# Patient Record
Sex: MALE | Race: WHITE | Employment: OTHER | URBAN - METROPOLITAN AREA
[De-identification: names, ages, dates, MRNs, and addresses within clinical notes are randomized per-mention and may not be internally consistent; named-entity substitution may affect disease eponyms.]

---

## 2017-02-13 ENCOUNTER — TELEPHONE (OUTPATIENT)
Dept: FAMILY MEDICINE CLINIC | Age: 82
End: 2017-02-13

## 2017-02-13 ENCOUNTER — OFFICE VISIT (OUTPATIENT)
Dept: FAMILY MEDICINE CLINIC | Age: 82
End: 2017-02-13

## 2017-02-13 VITALS
BODY MASS INDEX: 29.28 KG/M2 | SYSTOLIC BLOOD PRESSURE: 137 MMHG | OXYGEN SATURATION: 90 % | TEMPERATURE: 98.2 F | DIASTOLIC BLOOD PRESSURE: 68 MMHG | WEIGHT: 193.2 LBS | HEIGHT: 68 IN | RESPIRATION RATE: 12 BRPM | HEART RATE: 65 BPM

## 2017-02-13 DIAGNOSIS — J18.9 PNEUMONIA OF RIGHT LOWER LOBE DUE TO INFECTIOUS ORGANISM: Primary | ICD-10-CM

## 2017-02-13 RX ORDER — ALBUTEROL SULFATE 90 UG/1
1-2 AEROSOL, METERED RESPIRATORY (INHALATION)
Qty: 1 INHALER | Refills: 5 | Status: SHIPPED | OUTPATIENT
Start: 2017-02-13 | End: 2019-10-29 | Stop reason: SDUPTHER

## 2017-02-13 RX ORDER — CEFTRIAXONE 1 G/1
1 INJECTION, POWDER, FOR SOLUTION INTRAMUSCULAR; INTRAVENOUS ONCE
Qty: 1 VIAL | Refills: 0
Start: 2017-02-13 | End: 2017-02-13

## 2017-02-13 RX ORDER — PREDNISONE 20 MG/1
40 TABLET ORAL DAILY
Qty: 10 TAB | Refills: 0 | Status: SHIPPED | OUTPATIENT
Start: 2017-02-13 | End: 2017-02-18

## 2017-02-13 RX ORDER — IPRATROPIUM BROMIDE AND ALBUTEROL SULFATE 2.5; .5 MG/3ML; MG/3ML
3 SOLUTION RESPIRATORY (INHALATION)
Qty: 1 NEBULE | Refills: 0
Start: 2017-02-13 | End: 2017-02-13

## 2017-02-13 RX ORDER — DOXYCYCLINE 100 MG/1
100 TABLET ORAL 2 TIMES DAILY
Qty: 20 TAB | Refills: 0 | Status: SHIPPED | OUTPATIENT
Start: 2017-02-13 | End: 2017-02-23

## 2017-02-13 RX ORDER — CARVEDILOL 3.12 MG/1
TABLET ORAL
Refills: 3 | COMMUNITY
Start: 2016-12-06 | End: 2017-05-03 | Stop reason: ALTCHOICE

## 2017-02-13 NOTE — PROGRESS NOTES
HISTORY OF PRESENT ILLNESS  Juan Antonio Castro is a 80 y.o. male. HPI  Upper Respiratory Infection  Patient complains of symptoms of a URI. Symptoms include congestion and cough. Onset of symptoms was 4 days ago, unchanged since that time. He also c/o congestion, non productive cough, post nasal drip, shortness of breath, sore throat and wheezing for the past 4 days . He is drinking plenty of fluids. Evaluation to date: none. Treatment to date: Delsym and Sudafed. Review of Systems   Constitutional: Positive for malaise/fatigue. Negative for chills and fever. HENT: Positive for congestion and sore throat. Negative for ear pain. Respiratory: Positive for cough and sputum production. Negative for shortness of breath and wheezing. Neurological: Negative for headaches. Physical Exam   Constitutional: He is oriented to person, place, and time. He appears well-developed and well-nourished. No distress. HENT:   Right Ear: Tympanic membrane and ear canal normal.   Left Ear: Tympanic membrane and ear canal normal.   Nose: Mucosal edema present. Right sinus exhibits no maxillary sinus tenderness and no frontal sinus tenderness. Left sinus exhibits no maxillary sinus tenderness and no frontal sinus tenderness. Mouth/Throat: Oropharynx is clear and moist.   Cardiovascular: Normal rate, regular rhythm and normal heart sounds. No murmur heard. Pulmonary/Chest: Effort normal. He has decreased breath sounds. He has no wheezes. He has no rhonchi. Lymphadenopathy:     He has no cervical adenopathy. Neurological: He is alert and oriented to person, place, and time. Psychiatric: He has a normal mood and affect. His behavior is normal.   Nursing note and vitals reviewed. XR Results (most recent):    Results from Appointment encounter on 02/13/17   XR CHEST PA LAT   Narrative Exam:  2 view chest    Indication: Bronchitis, hypertension.     COMPARISON: 5/13/2016    PA and lateral views demonstrate normal heart size. The lungs demonstrate new  opacities posteriorly and medially in the right lower lobe suggesting pneumonia. Follow-up to resolution is suggested. Also linear areas of scarring or  atelectasis right lung base. Left lung is clear. No adenopathy or pleural effusions. Impression IMPRESSION:  1. Findings are consistent mild right lower lobe pneumonia. Follow-up to  resolution is suggested. 23X            ASSESSMENT and PLAN  Greg Coelho was seen today for sore throat and cough. Diagnoses and all orders for this visit:    Pneumonia of right lower lobe due to infectious organism  As noted on CXR. SpO2 increased to 94% following nebulizer treatment. Rocefin 1 g today. Will cover with Doxycycline and Prednisone Albuterol HFA PRN. RTC in 1 week or sooner. -     XR CHEST PA LAT; Future  -     albuterol-ipratropium (DUO-NEB) 2.5 mg-0.5 mg/3 ml nebu; 3 mL by Nebulization route now for 1 dose. -     predniSONE (DELTASONE) 20 mg tablet; Take 2 Tabs by mouth daily for 5 days. -     doxycycline (ADOXA) 100 mg tablet; Take 1 Tab by mouth two (2) times a day for 10 days. -     albuterol (PROVENTIL HFA, VENTOLIN HFA, PROAIR HFA) 90 mcg/actuation inhaler; Take 1-2 Puffs by inhalation every four (4) hours as needed for Wheezing.  -     cefTRIAXone (ROCEPHIN) 1 gram injection; 1 g by IntraMUSCular route once for 1 dose. I have discussed the diagnosis with the patient and the intended plan as seen in the above orders. The patient has received an after-visit summary and questions were answered concerning future plans. I have discussed medication side effects and warnings with the patient as well. Follow-up Disposition:  Return in about 1 week (around 2/20/2017) for pneumonia.

## 2017-02-13 NOTE — MR AVS SNAPSHOT
Visit Information Date & Time Provider Department Dept. Phone Encounter #  
 2/13/2017 10:15 AM Rosamaria Hopson  [facility] [address] [phone] [id number] Follow-up Instructions Return in about 1 week (around 2/20/2017) for pneumonia. Your Appointments 5/3/2017  4:00 PM  
ROUTINE CARE with Rui Yi MD  
[facility]) Appt Note: 6 months follow up visit [address] [id number]  
[phone]  
  
   
 [address] [id number] Upcoming Health Maintenance Date Due ZOSTER VACCINE AGE 60> 12/12/1997 Pneumococcal 65+ Low/Medium Risk (2 of 2 - PPSV23) 11/1/2015 GLAUCOMA SCREENING Q2Y 9/15/2017 MEDICARE YEARLY EXAM 11/3/2017 COLONOSCOPY 3/18/2018 DTaP/Tdap/Td series (2 - Td) 2/14/2019 Allergies as of 2/13/2017  Review Complete On: 2/13/2017 By: Rosamaria Hopson NP No Known Allergies Current Immunizations  Reviewed on 6/22/2016 Name Date DTAP Vaccine 2/14/2009 Influenza High Dose Vaccine PF 12/17/2013 Pneumococcal Vaccine (Unspecified Type) 11/1/2010 Not reviewed this visit You Were Diagnosed With   
  
 Codes Comments Pneumonia of right lower lobe due to infectious organism    -  Primary ICD-10-CM: J18.9 ICD-9-CM: 483.8 Vitals BP Pulse Temp Resp Height(growth percentile) Weight(growth percentile)  
 137/68 (BP 1 Location: Right arm, BP Patient Position: Sitting) 65 98.2 °F (36.8 °C) (Oral) 12 5' 8\" (1.727 m) 193 lb 3.2 oz (87.6 kg) SpO2 BMI Smoking Status 90% 29.38 kg/m2 Never Smoker Vitals History BMI and BSA Data Body Mass Index Body Surface Area  
 29.38 kg/m 2 2.05 m 2 Preferred Pharmacy Pharmacy Name Phone [address], [address] [facility] [place] [address] [phone] Your Updated Medication List  
  
   
 This list is accurate as of: 2/13/17 11:50 AM.  Always use your most recent med list.  
  
  
  
  
 albuterol 90 mcg/actuation inhaler Commonly known as:  PROVENTIL HFA, VENTOLIN HFA, PROAIR HFA Take 1-2 Puffs by inhalation every four (4) hours as needed for Wheezing. albuterol-ipratropium 2.5 mg-0.5 mg/3 ml Nebu Commonly known as:  DUO-NEB  
3 mL by Nebulization route now for 1 dose. amLODIPine 5 mg tablet Commonly known as:  Aba Chafe Take 1 Tab by mouth daily. carvedilol 3.125 mg tablet Commonly known as:  COREG  
TK 1 T PO  BID WITH FOOD  
  
 cefTRIAXone 1 gram injection Commonly known as:  ROCEPHIN  
1 g by IntraMUSCular route once for 1 dose. doxycycline 100 mg tablet Commonly known as:  ADOXA Take 1 Tab by mouth two (2) times a day for 10 days. esomeprazole 40 mg capsule Commonly known as:  NEXIUM  
TAKE 1 CAPSULE BY MOUTH DAILY  
  
 glucosamine 1,000 mg Tab Take 1 Tab by mouth daily. M-VIT PO Take 1 Tab by mouth daily. predniSONE 20 mg tablet Commonly known as:  Brionna Mons Take 2 Tabs by mouth daily for 5 days. VITAMIN B-12 1,000 mcg tablet Generic drug:  cyanocobalamin Take 1,000 mcg by mouth daily. Prescriptions Sent to Pharmacy Refills  
 predniSONE (DELTASONE) 20 mg tablet 0 Sig: Take 2 Tabs by mouth daily for 5 days. Class: Normal  
 Pharmacy: [address] [place] [address] Ph #: [phone] Route: Oral  
 doxycycline (ADOXA) 100 mg tablet 0 Sig: Take 1 Tab by mouth two (2) times a day for 10 days. Class: Normal  
 [address] Ph #: [phone] Route: Oral  
 albuterol (PROVENTIL HFA, VENTOLIN HFA, PROAIR HFA) 90 mcg/actuation inhaler 5 Sig: Take 1-2 Puffs by inhalation every four (4) hours as needed for Wheezing.   
 Class: Normal  
 Pharmacy: [facility] [address], [facility] [place] [address] Ph #: [phone] Route: Inhalation We Performed the Following ALBUTEROL IPRATROP NON-COMP [id number] HCP] CEFTRIAXONE SODIUM INJECTION  MG [ [facility]] Comments:  
 Mix per protocol MN INHAL RX, AIRWAY OBST/DX SPUTUM INDUCT [id number] CPT(R)] MN THER/PROPH/DIAG INJECTION, SUBCUT/IM [id number] CPT(R)] Follow-up Instructions Return in about 1 week (around 2/20/2017) for pneumonia. To-Do List   
 Around 02/13/2017 Imaging:  XR CHEST PA LAT Patient Instructions Pneumonia: Care Instructions Your Care Instructions Pneumonia is an infection of the lungs. Most cases are caused by infections from bacteria or viruses. Pneumonia may be mild or very severe. If it is caused by bacteria, you will be treated with antibiotics. It may take a few weeks to a few months to recover fully from pneumonia, depending on how sick you were and whether your overall health is good. Follow-up care is a key part of your treatment and safety. Be sure to make and go to all appointments, and call your doctor if you are having problems. Its also a good idea to know your test results and keep a list of the medicines you take. How can you care for yourself at home? · Take your antibiotics exactly as directed. Do not stop taking the medicine just because you are feeling better. You need to take the full course of antibiotics. · Take your medicines exactly as prescribed. Call your doctor if you think you are having a problem with your medicine. · Get plenty of rest and sleep. You may feel weak and tired for a while, but your energy level will improve with time. · To prevent dehydration, drink plenty of fluids, enough so that your urine is light yellow or clear like water. Choose water and other caffeine-free clear liquids until you feel better.  If you have kidney, heart, or liver disease and have to limit fluids, talk with your doctor before you increase the amount of fluids you drink. · Take care of your cough so you can rest. A cough that brings up mucus from your lungs is common with pneumonia. It is one way your body gets rid of the infection. But if coughing keeps you from resting or causes severe fatigue and chest-wall pain, talk to your doctor. He or she may suggest that you take a medicine to reduce the cough. · Use a vaporizer or humidifier to add moisture to your bedroom. Follow the directions for cleaning the machine. · Do not smoke or allow others to smoke around you. Smoke will make your cough last longer. If you need help quitting, talk to your doctor about stop-smoking programs and medicines. These can increase your chances of quitting for good. · Take an over-the-counter pain medicine, such as acetaminophen (Tylenol), ibuprofen (Advil, Motrin), or naproxen (Aleve). Read and follow all instructions on the label. · Do not take two or more pain medicines at the same time unless the doctor told you to. Many pain medicines have acetaminophen, which is Tylenol. Too much acetaminophen (Tylenol) can be harmful. · If you were given a spirometer to measure how well your lungs are working, use it as instructed. This can help your doctor tell how your recovery is going. · To prevent pneumonia in the future, talk to your doctor about getting a flu vaccine (once a year) and a pneumococcal vaccine (one time only for most people). When should you call for help? Call 911 anytime you think you may need emergency care. For example, call if: 
· You have severe trouble breathing. Call your doctor now or seek immediate medical care if: 
· You cough up dark brown or bloody mucus (sputum). · You have new or worse trouble breathing. · You are dizzy or lightheaded, or you feel like you may faint.  
Watch closely for changes in your health, and be sure to contact your doctor if: 
· You have a new or higher fever. · You are coughing more deeply or more often. · You are not getting better after 2 days (48 hours). · You do not get better as expected. Where can you learn more? Go to http://marques-mei.info/. Enter [id number] in the search box to learn more about \"Pneumonia: Care Instructions. \" Current as of: May 23, 2016 Content Version: 11.1 © 9503-2218 [employer]. Care instructions adapted under license by [employer] (which disclaims liability or warranty for this information). If you have questions about a medical condition or this instruction, always ask your healthcare professional. [address] any warranty or liability for your use of this information. Introducing [address]! Dear Lorelei Garcia: Thank you for requesting a [employer] account. Our records indicate that you already have an active [employer] account. You can access your account anytime at [email or web address] Did you know that you can access your hospital and ER discharge instructions at any time in Pago[employer]il? You can also review all of your test results from your hospital stay or ER visit. Additional Information If you have questions, please visit the Frequently Asked Questions section of the [employer] website at [email or web address] Remember, P[employer]il is NOT to be used for urgent needs. For medical emergencies, dial 911. Now available from your iPhone and Android! Please provide this summary of care documentation to your next provider. Your primary care clinician is listed as Sherie Smith. If you have any questions after today's visit, please call [phone].

## 2017-02-13 NOTE — TELEPHONE ENCOUNTER
Spoke with Pt's daughter Liliana Nash today who reported that pt had some chills and shivers, so I called pt back to check on him, and he did confirm that he had the above symptoms after he left the office, but rested and feels fine now. He agreed to call office back if he starts having these symptoms again. Pt's daughter notified, so apologized for calling our office though.

## 2017-02-13 NOTE — PATIENT INSTRUCTIONS
Pneumonia: Care Instructions  Your Care Instructions    Pneumonia is an infection of the lungs. Most cases are caused by infections from bacteria or viruses. Pneumonia may be mild or very severe. If it is caused by bacteria, you will be treated with antibiotics. It may take a few weeks to a few months to recover fully from pneumonia, depending on how sick you were and whether your overall health is good. Follow-up care is a key part of your treatment and safety. Be sure to make and go to all appointments, and call your doctor if you are having problems. Its also a good idea to know your test results and keep a list of the medicines you take. How can you care for yourself at home? · Take your antibiotics exactly as directed. Do not stop taking the medicine just because you are feeling better. You need to take the full course of antibiotics. · Take your medicines exactly as prescribed. Call your doctor if you think you are having a problem with your medicine. · Get plenty of rest and sleep. You may feel weak and tired for a while, but your energy level will improve with time. · To prevent dehydration, drink plenty of fluids, enough so that your urine is light yellow or clear like water. Choose water and other caffeine-free clear liquids until you feel better. If you have kidney, heart, or liver disease and have to limit fluids, talk with your doctor before you increase the amount of fluids you drink. · Take care of your cough so you can rest. A cough that brings up mucus from your lungs is common with pneumonia. It is one way your body gets rid of the infection. But if coughing keeps you from resting or causes severe fatigue and chest-wall pain, talk to your doctor. He or she may suggest that you take a medicine to reduce the cough. · Use a vaporizer or humidifier to add moisture to your bedroom. Follow the directions for cleaning the machine. · Do not smoke or allow others to smoke around you.  Smoke will make your cough last longer. If you need help quitting, talk to your doctor about stop-smoking programs and medicines. These can increase your chances of quitting for good. · Take an over-the-counter pain medicine, such as acetaminophen (Tylenol), ibuprofen (Advil, Motrin), or naproxen (Aleve). Read and follow all instructions on the label. · Do not take two or more pain medicines at the same time unless the doctor told you to. Many pain medicines have acetaminophen, which is Tylenol. Too much acetaminophen (Tylenol) can be harmful. · If you were given a spirometer to measure how well your lungs are working, use it as instructed. This can help your doctor tell how your recovery is going. · To prevent pneumonia in the future, talk to your doctor about getting a flu vaccine (once a year) and a pneumococcal vaccine (one time only for most people). When should you call for help? Call 911 anytime you think you may need emergency care. For example, call if:  · You have severe trouble breathing. Call your doctor now or seek immediate medical care if:  · You cough up dark brown or bloody mucus (sputum). · You have new or worse trouble breathing. · You are dizzy or lightheaded, or you feel like you may faint. Watch closely for changes in your health, and be sure to contact your doctor if:  · You have a new or higher fever. · You are coughing more deeply or more often. · You are not getting better after 2 days (48 hours). · You do not get better as expected. Where can you learn more? Go to http://marques-mei.info/. Enter [id number] in the search box to learn more about \"Pneumonia: Care Instructions. \"  Current as of: May 23, 2016  Content Version: 11.1  © 1950-5128 [employer]. Care instructions adapted under license by [employer] (which disclaims liability or warranty for this information).  If you have questions about a medical condition or this instruction, always ask your healthcare professional. [place] [address] any warranty or liability for your use of this information.

## 2017-02-13 NOTE — PROGRESS NOTES
1. Have you been to the ER, urgent care clinic since your last visit? Hospitalized since your last visit? No    2. Have you seen or consulted any other health care providers outside of the [address] [place] [address] since your last visit? Include any pap smears or colon screening.  [facility]     Chief Complaint   Patient presents with    Sore Throat     began 2/9/17    Cough     for the last 3 days- producing a whitish/yellow mucus- has been using OTC delsym

## 2017-02-20 DIAGNOSIS — J11.00 INFLUENZA AND PNEUMONIA: Primary | ICD-10-CM

## 2017-02-21 ENCOUNTER — HOSPITAL ENCOUNTER (OUTPATIENT)
Dept: GENERAL RADIOLOGY | Age: 82
Discharge: HOME | End: 2017-02-21
Payer: MEDICARE

## 2017-02-21 DIAGNOSIS — J11.00 INFLUENZA AND PNEUMONIA: ICD-10-CM

## 2017-02-21 PROCEDURE — 71020 XR CHEST PA LAT: CPT

## 2017-02-22 NOTE — PROGRESS NOTES
Impression             Impression:  1. No acute cardiopulmonary disease.  Resolved right lower lobe airspace disease

## 2017-02-23 NOTE — PROGRESS NOTES
[phone] ([place]) verified  Patient notified of above note and voiced understanding of what was read.

## 2017-04-24 ENCOUNTER — TELEPHONE (OUTPATIENT)
Dept: FAMILY MEDICINE CLINIC | Age: 82
End: 2017-04-24

## 2017-04-24 NOTE — TELEPHONE ENCOUNTER
[phone] spoke to Linnea Gowers patient was [facility] had chest xray was diagnosed of Pneumonia and hiatal hernia. Patient was given doxycycline 100 mg BID for 10 days.  Per Linnea Gowers patient needs CT scan for his chest and his hemoglobin was low needs to know if patient should see GI for low hemoglobin and for the hiatal hernia   Patient has appointment 5/3/2017 at

## 2017-04-24 NOTE — TELEPHONE ENCOUNTER
Contact # is [phone]    Patient's daughter, Iris Phan states patient was seen in the emergency room last night and has pneumonia again.  She would like a call from the nurse to discuss the visit

## 2017-04-26 NOTE — TELEPHONE ENCOUNTER
----- Message from [facility] sent at 4/25/2017  7:15 PM EDT -----  Regarding: Dr. Sveta Griffith, pt's daughter returning a call. Best contact number is [phone].

## 2017-04-26 NOTE — TELEPHONE ENCOUNTER
[phone] spoke to Joo Bowman advised her that Dr Jin Rooney will re evaluate him when he comes in to the office 5/3/2017 Joo Bowman understand

## 2017-05-03 ENCOUNTER — OFFICE VISIT (OUTPATIENT)
Dept: FAMILY MEDICINE CLINIC | Age: 82
End: 2017-05-03

## 2017-05-03 ENCOUNTER — HOSPITAL ENCOUNTER (OUTPATIENT)
Dept: LAB | Age: 82
Discharge: HOME | End: 2017-05-03
Payer: MEDICARE

## 2017-05-03 VITALS
TEMPERATURE: 98.3 F | RESPIRATION RATE: 15 BRPM | HEIGHT: 68 IN | HEART RATE: 56 BPM | SYSTOLIC BLOOD PRESSURE: 152 MMHG | BODY MASS INDEX: 29.55 KG/M2 | DIASTOLIC BLOOD PRESSURE: 82 MMHG | OXYGEN SATURATION: 97 % | WEIGHT: 195 LBS

## 2017-05-03 DIAGNOSIS — K44.9 HIATAL HERNIA: ICD-10-CM

## 2017-05-03 DIAGNOSIS — I10 ESSENTIAL HYPERTENSION: ICD-10-CM

## 2017-05-03 DIAGNOSIS — J18.9 PNEUMONIA OF LEFT LOWER LOBE DUE TO INFECTIOUS ORGANISM: Primary | ICD-10-CM

## 2017-05-03 DIAGNOSIS — D64.9 LOW HEMOGLOBIN: ICD-10-CM

## 2017-05-03 PROCEDURE — 80061 LIPID PANEL: CPT

## 2017-05-03 PROCEDURE — 80053 COMPREHEN METABOLIC PANEL: CPT

## 2017-05-03 PROCEDURE — 85025 COMPLETE CBC W/AUTO DIFF WBC: CPT

## 2017-05-03 RX ORDER — LOSARTAN POTASSIUM 50 MG/1
TABLET ORAL
Refills: 5 | COMMUNITY
Start: 2017-03-30 | End: 2018-04-02 | Stop reason: DRUGHIGH

## 2017-05-03 NOTE — PROGRESS NOTES
Chief Complaint   Patient presents with    Hypertension       1. Have you been to the ER, urgent care clinic since your last visit? Hospitalized since your last visit? No    2. Have you seen or consulted any other health care providers outside of the [address] [place] [address] since your last visit? Include any pap smears or colon screening. No    Body mass index is 29.65 kg/(m^2).

## 2017-05-03 NOTE — PROGRESS NOTES
HISTORY OF PRESENT ILLNESS  Sergey Quintero is a 80 y.o. male. Blood pressure 152/82, pulse (!) 56, temperature 98.3 °F (36.8 °C), temperature source Oral, resp. rate 15, height 5' 8\" (1.727 m), weight 195 lb (88.5 kg), SpO2 97 %. Body mass index is 29.65 kg/(m^2). Chief Complaint   Patient presents with    Hypertension      HPI   Sergey Quintero 80 y.o. male  presents to the office today for a follow up on chronic conditions. Pneumonia/hiatal hernia: Pt was seen at ER on 04/23/17 for pneumonia of left lower lobe which was confirmed with chest XR. He was treated with 10-day course of Doxycycline and pt notes he is feeling well. I have advised pt to complete chest XR today to rule out any abnormality. Pt was also advised he had a hiatal hernia on the left side. Pt states he was unaware he had a hernia and does not endorse any associated pain. Will refer pt to GI (Dr. Roseanna Ying) for further evaluation. Hypertension: Bp at office today 152/82 with manual arm cuff recheck. Pt continues with Losartan 50 mg daily. He has discontinued Coreg 3.125 mg BID due to associated SOB. Pt also notes he discontinued his Norvasc over one month ago because he thought he could stop it. Pt regularly checks his bp outside the office and notes reading of 156/78 last week. Bp is not at goal today. Advised pt to restart his Norvasc 5 mg daily and to monitor his bp outside the office. We will reassess his bp in two weeks. Current Outpatient Prescriptions   Medication Sig Dispense Refill    losartan (COZAAR) 50 mg tablet TK 1 T PO QD  5    albuterol (PROVENTIL HFA, VENTOLIN HFA, PROAIR HFA) 90 mcg/actuation inhaler Take 1-2 Puffs by inhalation every four (4) hours as needed for Wheezing. 1 Inhaler 5    esomeprazole (NEXIUM) 40 mg capsule TAKE 1 CAPSULE BY MOUTH DAILY 90 Cap 1    cyanocobalamin (VITAMIN B-12) 1,000 mcg tablet Take 1,000 mcg by mouth daily.       amLODIPine (NORVASC) 5 mg tablet Take 1 Tab by mouth daily. 90 Tab 1    PV W-O ALO/FERROUS FUMARATE/FA (M-VIT PO) Take 1 Tab by mouth daily.  glucosamine 1,000 mg Tab Take 1 Tab by mouth daily. No Known Allergies  Past Medical History:   Diagnosis Date    Diverticulosis, sigmoid 8/2011    ED (erectile dysfunction) of organic origin     History of prostate cancer     Hypertension     Prostate cancer ([facility] [address]     Sebaceous cyst 4/1/2014     Past Surgical History:   Procedure Laterality Date    ENDOSCOPY, COLON, DIAGNOSTIC  >= 8-10years ago   [facility] SURGERY  1968 [id number]    HX CARPAL TUNNEL RELEASE  4/08    right,left    HX CHOLECYSTECTOMY      HX HERNIA REPAIR      HX PROSTATECTOMY  5/06     Family History   Problem Relation Age of Onset    Cancer Mother      pancreatic    Diabetes Father     Stroke Father     Diabetes Sister     Hypertension Sister     Other Sister      Open heart surgery     Diabetes Brother     Hypertension Brother     Hypertension Brother     Asthma Daughter     Heart Attack Daughter     Other Daughter      hip replacement x 2     Social History   Substance Use Topics    Smoking status: Never Smoker    Smokeless tobacco: Never Used    Alcohol use No        Review of Systems   Constitutional: Negative. Negative for malaise/fatigue. Eyes: Negative for blurred vision. Respiratory: Negative for shortness of breath. Cardiovascular: Negative for chest pain and leg swelling. Musculoskeletal: Negative. Neurological: Negative. Negative for dizziness and headaches. All other systems reviewed and are negative. Physical Exam   Constitutional: He is oriented to person, place, and time. He appears well-developed and well-nourished. HENT:   Head: Normocephalic and atraumatic. Neck: Carotid bruit is not present. Cardiovascular: Normal rate, regular rhythm, normal heart sounds and intact distal pulses. Exam reveals no gallop and no friction rub. No murmur heard.   Pulmonary/Chest: Effort normal. No respiratory distress. He has no wheezes. He has rales in the left lower field. He exhibits no tenderness. Neurological: He is alert and oriented to person, place, and time. Psychiatric: He has a normal mood and affect. His behavior is normal. Judgment and thought content normal.   Nursing note and vitals reviewed. ASSESSMENT and Jamee Segovia was seen today for hypertension. Diagnoses and all orders for this visit:    Pneumonia of left lower lobe due to infectious organism  -     XR CHEST PA LAT; Future        - Pt was treated with 10-day course of Doxycycline and states he is doing well. Advised pt to complete chest XR today to rule out acute pulmonary disease. Hiatal hernia  -     REFERRAL TO GASTROENTEROLOGY (Dr. Joshua Nascimento)   - Will refer pt to Dr. Hansa Allred for further evaluation. Essential hypertension  -     CBC WITH AUTOMATED DIFF  -     METABOLIC PANEL, COMPREHENSIVE  -     LIPID PANEL  - Bp is not at goal today. Pt has discontinued his Norvasc over one month ago. Advised pt to restart his Norvasc 5 mg daily and to monitor his bp outside the office. We will reassess his bp in two weeks. Follow-up Disposition:  Return in about 2 weeks (around 5/17/2017) for hypertension follow up, Wed evening. Medication risks/benefits/costs/interactions/alternatives discussed with patient. Advised patient to call back or return to office if symptoms worsen/change/persist.  If patient cannot reach us or should anything more severe/urgent arise he/she should proceed directly to the nearest emergency department. Discussed expected course/resolution/complications of diagnosis in detail with patient. Patient given a written after visit summary which includes her diagnoses, current medications and vitals. Patient expressed understanding with the diagnosis and plan. Written by kellee Ibanez, as dictated by Rachel Lyon M.D.    I have reviewed and agree with the above note and have made corrections where appropriate, Dr. Liseth Peterson MD

## 2017-05-03 NOTE — MR AVS SNAPSHOT
Visit Information Date & Time Provider Department Dept. Phone Encounter #  
 5/3/2017  4:00 PM Justin Pradhan MD [address] [place] [address] [phone] [id number] Follow-up Instructions Return in about 2 weeks (around 5/17/2017) for hypertension follow up, Wed evening. Upcoming Health Maintenance Date Due ZOSTER VACCINE AGE 60> 12/12/1997 INFLUENZA AGE 9 TO ADULT 8/1/2017 GLAUCOMA SCREENING Q2Y 9/15/2017 MEDICARE YEARLY EXAM 11/3/2017 COLONOSCOPY 3/18/2018 DTaP/Tdap/Td series (2 - Td) 2/14/2019 Allergies as of 5/3/2017  Review Complete On: 5/3/2017 By: Justin Pradhan MD  
 No Known Allergies Current Immunizations  Reviewed on 5/1/2017 Name Date DTAP Vaccine 2/14/2009 Influenza High Dose Vaccine PF 12/17/2013 Influenza Vaccine 9/29/2016 Pneumococcal Conjugate (PCV-13) 10/1/2015 Pneumococcal Vaccine (Unspecified Type) 11/1/2010 Not reviewed this visit You Were Diagnosed With   
  
 Codes Comments Pneumonia of left lower lobe due to infectious organism    -  Primary ICD-10-CM: J18.1 ICD-9-CM: 755 Hiatal hernia     ICD-10-CM: K44.9 ICD-9-CM: 421. 3 Vitals BP Pulse Temp Resp Height(growth percentile) Weight(growth percentile) 152/82 (!) 56 98.3 °F (36.8 °C) (Oral) 15 5' 8\" (1.727 m) 195 lb (88.5 kg) SpO2 BMI Smoking Status 97% 29.65 kg/m2 Never Smoker Vitals History BMI and BSA Data Body Mass Index Body Surface Area  
 29.65 kg/m 2 2.06 m 2 [facility] [address] [email or web address] [place] [place] [address] [phone] Your Updated Medication List  
  
   
This list is accurate as of: 5/3/17  4:46 PM.  Always use your most recent med list.  
  
  
  
  
 albuterol 90 mcg/actuation inhaler Commonly known as:  PROVENTIL HFA, VENTOLIN HFA, PROAIR HFA  
 Take 1-2 Puffs by inhalation every four (4) hours as needed for Wheezing. amLODIPine 5 mg tablet Commonly known as:  Eward Leydi Take 1 Tab by mouth daily. esomeprazole 40 mg capsule Commonly known as:  NEXIUM  
TAKE 1 CAPSULE BY MOUTH DAILY  
  
 glucosamine 1,000 mg Tab Take 1 Tab by mouth daily. losartan 50 mg tablet Commonly known as:  COZAAR TK 1 T PO QD  
  
 M-VIT PO Take 1 Tab by mouth daily. VITAMIN B-12 1,000 mcg tablet Generic drug:  cyanocobalamin Take 1,000 mcg by mouth daily. Follow-up Instructions Return in about 2 weeks (around 5/17/2017) for hypertension follow up, Wed evening. Introducing [facility] HEALTH SERVICES! Dear Michele Hogan: Thank you for requesting a [employer] account. Our records indicate that you already have an active [employer] account. You can access your account anytime at [email or web address] Did you know that you can access your hospital and ER discharge instructions at any time in Ca[employer]? You can also review all of your test results from your hospital stay or ER visit. Additional Information If you have questions, please visit the Frequently Asked Questions section of the Ca[employer] website at [email or web address] Remember, Ca[employer]efits is NOT to be used for urgent needs. For medical emergencies, dial 911. Now available from your iPhone and Android! Please provide this summary of care documentation to your next provider. Your primary care clinician is listed as Lani Marroquin. If you have any questions after today's visit, please call [phone].

## 2017-05-04 LAB
ALBUMIN SERPL-MCNC: 3.9 G/DL (ref 3.5–4.8)
ALBUMIN/GLOB SERPL: 1.3 {RATIO} (ref 1.2–2.2)
ALP SERPL-CCNC: 68 IU/L (ref 39–117)
ALT SERPL-CCNC: 14 IU/L (ref 0–44)
AST SERPL-CCNC: 25 IU/L (ref 0–40)
BASOPHILS # BLD AUTO: 0 X10E3/UL (ref 0–0.2)
BASOPHILS NFR BLD AUTO: 0 %
BILIRUB SERPL-MCNC: 0.3 MG/DL (ref 0–1.2)
BUN SERPL-MCNC: 18 MG/DL (ref 8–27)
BUN/CREAT SERPL: 22 (ref 10–24)
CALCIUM SERPL-MCNC: 9.5 MG/DL (ref 8.6–10.2)
CHLORIDE SERPL-SCNC: 102 MMOL/L (ref 96–106)
CHOLEST SERPL-MCNC: 126 MG/DL (ref 100–199)
CO2 SERPL-SCNC: 29 MMOL/L (ref 18–29)
CREAT SERPL-MCNC: 0.81 MG/DL (ref 0.76–1.27)
EOSINOPHIL # BLD AUTO: 0.1 X10E3/UL (ref 0–0.4)
EOSINOPHIL NFR BLD AUTO: 2 %
ERYTHROCYTE [DISTWIDTH] IN BLOOD BY AUTOMATED COUNT: 14.5 % (ref 12.3–15.4)
GLOBULIN SER CALC-MCNC: 3.1 G/DL (ref 1.5–4.5)
GLUCOSE SERPL-MCNC: 84 MG/DL (ref 65–99)
HCT VFR BLD AUTO: 37.5 % (ref 37.5–51)
HDLC SERPL-MCNC: 44 MG/DL
HGB BLD-MCNC: 11.7 G/DL (ref 12.6–17.7)
IMM GRANULOCYTES # BLD: 0 X10E3/UL (ref 0–0.1)
IMM GRANULOCYTES NFR BLD: 1 %
INTERPRETATION, 910389: NORMAL
LDLC SERPL CALC-MCNC: 71 MG/DL (ref 0–99)
LYMPHOCYTES # BLD AUTO: 1.5 X10E3/UL (ref 0.7–3.1)
LYMPHOCYTES NFR BLD AUTO: 24 %
MCH RBC QN AUTO: 25.3 PG (ref 26.6–33)
MCHC RBC AUTO-ENTMCNC: 31.2 G/DL (ref 31.5–35.7)
MCV RBC AUTO: 81 FL (ref 79–97)
MONOCYTES # BLD AUTO: 0.4 X10E3/UL (ref 0.1–0.9)
MONOCYTES NFR BLD AUTO: 7 %
NEUTROPHILS # BLD AUTO: 4.3 X10E3/UL (ref 1.4–7)
NEUTROPHILS NFR BLD AUTO: 66 %
PLATELET # BLD AUTO: 257 X10E3/UL (ref 150–379)
POTASSIUM SERPL-SCNC: 5.2 MMOL/L (ref 3.5–5.2)
PROT SERPL-MCNC: 7 G/DL (ref 6–8.5)
RBC # BLD AUTO: 4.62 X10E6/UL (ref 4.14–5.8)
SODIUM SERPL-SCNC: 143 MMOL/L (ref 134–144)
TRIGL SERPL-MCNC: 56 MG/DL (ref 0–149)
VLDLC SERPL CALC-MCNC: 11 MG/DL (ref 5–40)
WBC # BLD AUTO: 6.4 X10E3/UL (ref 3.4–10.8)

## 2017-05-04 NOTE — PROGRESS NOTES
[phone] (home) attempted to call patient no answer left message to call me back in regards to xray/labs

## 2017-12-24 DIAGNOSIS — I10 ESSENTIAL HYPERTENSION WITH GOAL BLOOD PRESSURE LESS THAN 140/90: ICD-10-CM

## 2017-12-26 RX ORDER — AMLODIPINE BESYLATE 5 MG/1
TABLET ORAL
Qty: 90 TAB | Refills: 0 | Status: SHIPPED | OUTPATIENT
Start: 2017-12-26 | End: 2018-03-26 | Stop reason: SDUPTHER

## 2018-03-26 DIAGNOSIS — I10 ESSENTIAL HYPERTENSION WITH GOAL BLOOD PRESSURE LESS THAN 140/90: ICD-10-CM

## 2018-03-27 RX ORDER — AMLODIPINE BESYLATE 5 MG/1
TABLET ORAL
Qty: 90 TAB | Refills: 0 | Status: SHIPPED | OUTPATIENT
Start: 2018-03-27 | End: 2018-07-01 | Stop reason: SDUPTHER

## 2018-03-30 NOTE — TELEPHONE ENCOUNTER
[phone] (home)   Spoke to patient notified prescription sent to pharmacy and due for follow up in May per patient [place] 1 month patient wants to see Dr Stefan Smith 4/2/2018 at 7:45A patient understand

## 2018-04-02 ENCOUNTER — OFFICE VISIT (OUTPATIENT)
Dept: FAMILY MEDICINE CLINIC | Age: 83
End: 2018-04-02

## 2018-04-02 ENCOUNTER — HOSPITAL ENCOUNTER (OUTPATIENT)
Dept: LAB | Age: 83
Discharge: HOME | End: 2018-04-02
Payer: MEDICARE

## 2018-04-02 VITALS
TEMPERATURE: 97.4 F | BODY MASS INDEX: 29.25 KG/M2 | OXYGEN SATURATION: 98 % | HEART RATE: 69 BPM | SYSTOLIC BLOOD PRESSURE: 152 MMHG | DIASTOLIC BLOOD PRESSURE: 80 MMHG | RESPIRATION RATE: 18 BRPM | HEIGHT: 68 IN | WEIGHT: 193 LBS

## 2018-04-02 DIAGNOSIS — K21.9 GASTROESOPHAGEAL REFLUX DISEASE WITHOUT ESOPHAGITIS: ICD-10-CM

## 2018-04-02 DIAGNOSIS — I10 ESSENTIAL HYPERTENSION: ICD-10-CM

## 2018-04-02 DIAGNOSIS — E53.8 B12 DEFICIENCY: ICD-10-CM

## 2018-04-02 DIAGNOSIS — R53.83 MALAISE AND FATIGUE: ICD-10-CM

## 2018-04-02 DIAGNOSIS — R53.81 MALAISE AND FATIGUE: ICD-10-CM

## 2018-04-02 DIAGNOSIS — Z00.00 ENCOUNTER FOR MEDICARE ANNUAL WELLNESS EXAM: ICD-10-CM

## 2018-04-02 DIAGNOSIS — M25.432 WRIST SWELLING, LEFT: ICD-10-CM

## 2018-04-02 DIAGNOSIS — M25.532 LEFT WRIST PAIN: Primary | ICD-10-CM

## 2018-04-02 DIAGNOSIS — M79.662 PAIN OF LEFT CALF: ICD-10-CM

## 2018-04-02 PROCEDURE — 81003 URINALYSIS AUTO W/O SCOPE: CPT

## 2018-04-02 PROCEDURE — 80053 COMPREHEN METABOLIC PANEL: CPT

## 2018-04-02 PROCEDURE — 80061 LIPID PANEL: CPT

## 2018-04-02 PROCEDURE — 82607 VITAMIN B-12: CPT

## 2018-04-02 PROCEDURE — 84443 ASSAY THYROID STIM HORMONE: CPT

## 2018-04-02 PROCEDURE — 85025 COMPLETE CBC W/AUTO DIFF WBC: CPT

## 2018-04-02 RX ORDER — LOSARTAN POTASSIUM 100 MG/1
100 TABLET ORAL DAILY
Qty: 30 TAB | Refills: 5 | Status: SHIPPED | OUTPATIENT
Start: 2018-04-02 | End: 2018-04-02 | Stop reason: SDUPTHER

## 2018-04-02 NOTE — MR AVS SNAPSHOT
[address] [place] [address] [address] 
[phone] Patient: Jake Bowling MRN: [id number] :1935 Visit Information Date & Time Provider Department Dept. Phone Encounter #  
 2018  7:45 AM Sahil Hylton  [facility] [address] [phone] [id number] Follow-up Instructions Return in about 4 weeks (around 2018) for hypertension follow up. Upcoming Health Maintenance Date Due ZOSTER VACCINE AGE 60> 10/12/1997 GLAUCOMA SCREENING Q2Y 9/15/2017 MEDICARE YEARLY EXAM 3/14/2018 COLONOSCOPY 3/18/2018 DTaP/Tdap/Td series (2 - Tdap) 2019 Allergies as of 2018  Review Complete On: 2018 By: Sahil Hylton MD  
 No Known Allergies Current Immunizations  Reviewed on 2017 Name Date DTAP Vaccine 2009 Influenza High Dose Vaccine PF 2013 Influenza Vaccine 2016 Pneumococcal Conjugate (PCV-13) 10/1/2015 ZZZ-RETIRED (DO NOT USE) Pneumococcal Vaccine (Unspecified Type) 2010 Not reviewed this visit You Were Diagnosed With   
  
 Codes Comments Left wrist pain    -  Primary ICD-10-CM: K97.036 ICD-9-CM: 719.43 Wrist swelling, left     ICD-10-CM: M25.432 ICD-9-CM: 719.03 Pain of left calf     ICD-10-CM: D44.886 ICD-9-CM: 729.5 Essential hypertension     ICD-10-CM: I10 
ICD-9-CM: 401.9 B12 deficiency     ICD-10-CM: E53.8 ICD-9-CM: 266.2 Gastroesophageal reflux disease without esophagitis     ICD-10-CM: K21.9 ICD-9-CM: 530.81 Encounter for Medicare annual wellness exam     ICD-10-CM: Z00.00 ICD-9-CM: V70.0 Malaise and fatigue     ICD-10-CM: R53.81, R53.83 ICD-9-CM: 780.79 Vitals BP Pulse Temp Resp Height(growth percentile) Weight(growth percentile) 152/80 (BP 1 Location: Left arm, BP Patient Position: Sitting) 69 97.4 °F (36.3 °C) (Oral) 18 5' 8\" (1.727 m) 193 lb (87.5 kg) SpO2 BMI Smoking Status 98% 29.35 kg/m2 Never Smoker Vitals History BMI and BSA Data Body Mass Index Body Surface Area  
 29.35 kg/m 2 2.05 m 2 Preferred Pharmacy Pharmacy Name Phone [address], [facility] [address] [facility] [place] [address] [phone] Your Updated Medication List  
  
   
This list is accurate as of 4/2/18  9:37 AM.  Always use your most recent med list.  
  
  
  
  
 albuterol 90 mcg/actuation inhaler Commonly known as:  PROVENTIL HFA, VENTOLIN HFA, PROAIR HFA Take 1-2 Puffs by inhalation every four (4) hours as needed for Wheezing. amLODIPine 5 mg tablet Commonly known as:  Sherrye Acron TAKE 1 TABLET BY MOUTH EVERY DAY  
  
 esomeprazole 40 mg capsule Commonly known as:  NEXIUM  
TAKE 1 CAPSULE BY MOUTH DAILY  
  
 glucosamine 1,000 mg Tab Take 1 Tab by mouth daily. losartan 100 mg tablet Commonly known as:  COZAAR Take 1 Tab by mouth daily. M-VIT PO Take 1 Tab by mouth daily. VITAMIN B-12 1,000 mcg tablet Generic drug:  cyanocobalamin Take 1,000 mcg by mouth daily. Prescriptions Sent to Pharmacy Refills  
 losartan (COZAAR) 100 mg tablet 5 Sig: Take 1 Tab by mouth daily. Class: Normal  
 Pharmacy: [facility] [address] Ph #: [phone] Route: Oral  
  
We Performed the Following CBC WITH AUTOMATED DIFF [97568 CPT(R)] LIPID PANEL [62758 CPT(R)] METABOLIC PANEL, COMPREHENSIVE [15292 CPT(R)] TSH 3RD GENERATION [96601 CPT(R)] VITAMIN B12 [id number] CPT(R)] Follow-up Instructions Return in about 4 weeks (around 4/27/2018) for hypertension follow up. To-Do List   
 04/02/2018 Imaging:  DUPLEX LOWER EXT VENOUS LEFT   
  
 04/02/2018 Imaging:  US EXT NONVAS LT COMP Patient Instructions Gastroesophageal Reflux Disease (GERD): Care Instructions Your Care Instructions Gastroesophageal reflux disease (GERD) is the backward flow of stomach acid into the esophagus. The esophagus is the tube that leads from your throat to your stomach. A one-way valve prevents the stomach acid from moving up into this tube. When you have GERD, this valve does not close tightly enough. If you have mild GERD symptoms including heartburn, you may be able to control the problem with antacids or over-the-counter medicine. Changing your diet, losing weight, and making other lifestyle changes can also help reduce symptoms. Follow-up care is a key part of your treatment and safety. Be sure to make and go to all appointments, and call your doctor if you are having problems. It's also a good idea to know your test results and keep a list of the medicines you take. How can you care for yourself at home? · Take your medicines exactly as prescribed. Call your doctor if you think you are having a problem with your medicine. · Your doctor may recommend over-the-counter medicine. For mild or occasional indigestion, antacids, such as Tums, Gaviscon, Mylanta, or Maalox, may help. Your doctor also may recommend over-the-counter acid reducers, such as Pepcid AC, Tagamet HB, Zantac 75, or Prilosec. Read and follow all instructions on the label. If you use these medicines often, talk with your doctor. · Change your eating habits. ¨ It's best to eat several small meals instead of two or three large meals. ¨ After you eat, wait 2 to 3 hours before you lie down. ¨ Chocolate, mint, and alcohol can make GERD worse. ¨ Spicy foods, foods that have a lot of acid (like tomatoes and oranges), and coffee can make GERD symptoms worse in some people. If your symptoms are worse after you eat a certain food, you may want to stop eating that food to see if your symptoms get better. · Do not smoke or chew tobacco. Smoking can make GERD worse. If you need help quitting, talk to your doctor about stop-smoking programs and medicines. These can increase your chances of quitting for good. · If you have GERD symptoms at night, raise the head of your bed 6 to 8 inches by putting the frame on blocks or placing a foam wedge under the head of your mattress. (Adding extra pillows does not work.) · Do not wear tight clothing around your middle. · Lose weight if you need to. Losing just 5 to 10 pounds can help. When should you call for help? Call your doctor now or seek immediate medical care if: 
? · You have new or different belly pain. ? · Your stools are black and tarlike or have streaks of blood. ? Watch closely for changes in your health, and be sure to contact your doctor if: 
? · Your symptoms have not improved after 2 days. ? · Food seems to catch in your throat or chest.  
Where can you learn more? Go to http://marques-mei.info/. Enter Y180 in the search box to learn more about \"Gastroesophageal Reflux Disease (GERD): Care Instructions. \" Current as of: May 12, 2017 Content Version: 11.4 © 9600-1785 [employer]. Care instructions adapted under license by [employer] (which disclaims liability or warranty for this information). If you have questions about a medical condition or this instruction, always ask your healthcare professional. [place] [address] any warranty or liability for your use of this information. Introducing [facility] & HEALTH SERVICES! Dear Juan Spencer: Thank you for requesting a [employer] account. Our records indicate that you already have an active [employer] account. You can access your account anytime at [email or web address] Did you know that you can access your hospital and ER discharge instructions at any time in Bella Pictures?   You can also review all of your test results from your hospital stay or ER visit. Additional Information If you have questions, please visit the Frequently Asked Questions section of the [employer]alsa website at [email or web address] Remember, [employer] is NOT to be used for urgent needs. For medical emergencies, dial 911. Now available from your iPhone and Android! Please provide this summary of care documentation to your next provider. Your primary care clinician is listed as Alfie Fernando. If you have any questions after today's visit, please call [phone].

## 2018-04-02 NOTE — ACP (ADVANCE CARE PLANNING)
Advance Care Planning (ACP) Provider Note - Comprehensive     Date of ACP Conversation: 04/02/18  Persons included in Conversation:  patient  Length of ACP Conversation in minutes:  <16 minutes (Non-Billable)            General ACP for ALL Patients with Decision Making Capacity:   Importance of advance care planning, including choosing a healthcare agent to communicate patient's healthcare decisions if patient lost the ability to make decisions, such as after a sudden illness or accident        For Serious or Chronic Illness:  Understanding of medical condition      Interventions Provided:  Recommended completion of Advance Directive form after review of ACP materials and conversation with prospective healthcare agent

## 2018-04-02 NOTE — PROGRESS NOTES
This is the Subsequent Medicare Annual Wellness Exam, performed 12 months or more after the Initial AWV or the last Subsequent AWV    I have reviewed the patient's medical history in detail and updated the computerized patient record. History     Past Medical History:   Diagnosis Date    Diverticulosis, sigmoid 8/2011    ED (erectile dysfunction) of organic origin     History of prostate cancer     Hypertension     [facility] [address]     Sebaceous cyst 4/1/2014      Past Surgical History:   Procedure Laterality Date    ENDOSCOPY, COLON, DIAGNOSTIC  >= 8-10years ago   [facility] SURGERY  1968 [id number]    HX CARPAL TUNNEL RELEASE  4/08    right,left    HX CHOLECYSTECTOMY      HX HERNIA REPAIR      HX PROSTATECTOMY  5/06     Current Outpatient Prescriptions   Medication Sig Dispense Refill    losartan (COZAAR) 100 mg tablet Take 1 Tab by mouth daily. 30 Tab 5    amLODIPine (NORVASC) 5 mg tablet TAKE 1 TABLET BY MOUTH EVERY DAY 90 Tab 0    glucosamine 1,000 mg Tab Take 1 Tab by mouth daily.  albuterol (PROVENTIL HFA, VENTOLIN HFA, PROAIR HFA) 90 mcg/actuation inhaler Take 1-2 Puffs by inhalation every four (4) hours as needed for Wheezing. 1 Inhaler 5    esomeprazole (NEXIUM) 40 mg capsule TAKE 1 CAPSULE BY MOUTH DAILY 90 Cap 1    cyanocobalamin (VITAMIN B-12) 1,000 mcg tablet Take 1,000 mcg by mouth daily.  PV W-O ALO/FERROUS FUMARATE/FA (M-VIT PO) Take 1 Tab by mouth daily.        No Known Allergies  Family History   Problem Relation Age of Onset   Kit Prior Cancer Mother      pancreatic    Diabetes Father     Stroke Father     Diabetes Sister     Hypertension Sister     Other Sister      Open heart surgery     Diabetes Brother     Hypertension Brother     Hypertension Brother     Asthma Daughter     Heart Attack Daughter     Other Daughter      hip replacement x 2     Social History   Substance Use Topics    Smoking status: Never Smoker    Smokeless tobacco: Never Used    Alcohol use No     Patient Active Problem List   Diagnosis Code    Hypertension I10    ED (erectile dysfunction) of organic origin N52.9    Diverticulosis, sigmoid K57.30    History of prostate cancer Z85.46    B12 deficiency E53.8    Sebaceous cyst L72.3    Advanced care planning/counseling discussion Z71.89    Gastroesophageal reflux disease without esophagitis K21.9       Depression Risk Factor Screening:     PHQ over the last two weeks 4/2/2018   Little interest or pleasure in doing things Not at all   Feeling down, depressed or hopeless Several days   Total Score PHQ 2 1     Alcohol Risk Factor Screening: You do not drink alcohol or very rarely. Functional Ability and Level of Safety:   Hearing Loss  Hearing is good. Activities of Daily Living  The home contains: no safety equipment. Patient does total self care    Fall Risk  Fall Risk Assessment, last 12 mths 4/2/2018   Able to walk? Yes   Fall in past 12 months? No       Abuse Screen  Patient is not abused    Cognitive Screening   Evaluation of Cognitive Function:  Has your family/caregiver stated any concerns about your memory: no  Normal    Patient Care Team   Patient Care Team:  Camilo Balderrama MD as PCP - General (Family Practice)    Assessment/Plan   Education and counseling provided:  Are appropriate based on today's review and evaluation    Diagnoses and all orders for this visit:    1. Left wrist pain    2. Wrist swelling, left    3. Pain of left calf  -     US EXT NONVAS LT COMP; Future  -     DUPLEX LOWER EXT VENOUS LEFT; Future    4. Essential hypertension  -     losartan (COZAAR) 100 mg tablet; Take 1 Tab by mouth daily.  -     CBC WITH AUTOMATED DIFF  -     METABOLIC PANEL, COMPREHENSIVE  -     LIPID PANEL    5. B12 deficiency  -     VITAMIN B12    6. Gastroesophageal reflux disease without esophagitis    7. Encounter for Medicare annual wellness exam    8.  Malaise and fatigue  -     TSH 3RD GENERATION        Health Maintenance Due Topic Date Due    ZOSTER VACCINE AGE 60>  10/12/1997    GLAUCOMA SCREENING Q2Y  09/15/2017    MEDICARE YEARLY EXAM  03/14/2018    COLONOSCOPY  03/18/2018

## 2018-04-02 NOTE — PROGRESS NOTES
HISTORY OF PRESENT ILLNESS  Matty Bosch is a [de-identified] y.o. male. Blood pressure 152/80, pulse 69, temperature 97.4 °F (36.3 °C), temperature source Oral, resp. rate 18, height 5' 8\" (1.727 m), weight 193 lb (87.5 kg), SpO2 98 %. Body mass index is 29.35 kg/(m^2). Chief Complaint   Patient presents with    Annual Wellness Visit    Hypertension     follow up    Hand Pain     states left hand injury recently, swollen and painful, ? fracture        HPI  Matty Bosch [de-identified] y.o. male  presents to the office today for annual wellness visit, hypertension follow up, and hand pain. Hypertension: Bp at office today 152/80, 146/80 by manual arm cuff recheck. Pt continues with Amlodipine 5mg daily and Losartan 50mg daily. Pt states that his Bp at home will fluctuate between 130-150/70-80. Advised pt that I want to increase his Losartan from 50mg to 100mg daily. Hand pain: Pt states that on 03/29/18 he was taking out his recycling and dropped the box with the recycling that pulled his left hand down as it fell. He reports that he heard a pop in his wrist and had some pain. He went to the [facility] to play handball and hit his wrist on the wall which caused the pain to radiate up his arm. He is having tingling in his fingers. Advised pt that we will get an XR of his left wrist to rule out anything abnormal.     Left knee pain: Pt has asked if he can have a doppler to rule out a clot behind his left leg. He has soreness in his left leg behind his left knee that gets worse when he walks. He reports that when he walks his leg will get sore before he feels tired and when the pain starts he will get light headed. He states that he has had these symptoms for over 1 year and his symptoms have been progressing. He notes a family history of blockages behind the knee.  Will get an US and duplex of his left knee to rule out anything abnormal.    Health maintenance: Pt continues with Esomeprazole 40mg daily for his GERD symptoms. Pt continues with Vitamin B-12 1,000units daily. Pt states that he has not been regularly taking his B-12. Discussed with pt that if he regularly takes his Nexium he should continue taking his B-12. Pt reports that he has been using Tums instead of his Nexium. [facility] and book[facility]. Patient advised to follow up with me or contact nurse navigator to submit these form to medical chart. I advised Patient of the benefits of Advance Care Plan with regard to end of life care and benefits of filling forms out in case Patient cannot speak for themselves. I have asked pt a series of questions related to [employer]. Current Outpatient Prescriptions   Medication Sig Dispense Refill    losartan (COZAAR) 100 mg tablet Take 1 Tab by mouth daily. 30 Tab 5    amLODIPine (NORVASC) 5 mg tablet TAKE 1 TABLET BY MOUTH EVERY DAY 90 Tab 0    glucosamine 1,000 mg Tab Take 1 Tab by mouth daily.  albuterol (PROVENTIL HFA, VENTOLIN HFA, PROAIR HFA) 90 mcg/actuation inhaler Take 1-2 Puffs by inhalation every four (4) hours as needed for Wheezing. 1 Inhaler 5    esomeprazole (NEXIUM) 40 mg capsule TAKE 1 CAPSULE BY MOUTH DAILY 90 Cap 1    cyanocobalamin (VITAMIN B-12) 1,000 mcg tablet Take 1,000 mcg by mouth daily.  PV W-O ALO/FERROUS FUMARATE/FA (M-VIT PO) Take 1 Tab by mouth daily.        No Known Allergies  Past Medical History:   Diagnosis Date    Diverticulosis, sigmoid 8/2011    ED (erectile dysfunction) of organic origin     History of prostate cancer     Hypertension     Prostate cancer ([facility] [address]     Sebaceous cyst 4/1/2014     Past Surgical History:   Procedure Laterality Date    ENDOSCOPY, COLON, DIAGNOSTIC  >= 8-10years ago   Viola Caves SURGERY  1968 [id number]    HX CARPAL TUNNEL RELEASE  4/08    right,left    HX CHOLECYSTECTOMY      HX HERNIA REPAIR      HX PROSTATECTOMY  5/06     Family History   Problem Relation Age of Onset    Cancer Mother pancreatic    Diabetes Father     Stroke Father     Diabetes Sister     Hypertension Sister     Other Sister      Open heart surgery     Diabetes Brother     Hypertension Brother     Hypertension Brother     Asthma Daughter     Heart Attack Daughter     Other Daughter      hip replacement x 2     Social History   Substance Use Topics    Smoking status: Never Smoker    Smokeless tobacco: Never Used    Alcohol use No        Review of Systems   Constitutional: Negative. Negative for malaise/fatigue. Eyes: Negative for blurred vision. Respiratory: Negative for shortness of breath. Cardiovascular: Negative for chest pain and leg swelling. Musculoskeletal: Positive for joint pain (left hand, left knee). Neurological: Negative. Negative for dizziness and headaches. All other systems reviewed and are negative. Physical Exam   Constitutional: He is oriented to person, place, and time. He appears well-developed and well-nourished. HENT:   Head: Normocephalic and atraumatic. Neck: Carotid bruit is not present. Cardiovascular: Normal rate, regular rhythm, normal heart sounds and intact distal pulses. Exam reveals no gallop and no friction rub. No murmur heard. Pulmonary/Chest: Effort normal and breath sounds normal. No respiratory distress. He has no wheezes. He has no rales. He exhibits no tenderness. Musculoskeletal:   Nabil's negative left calf   Neurological: He is alert and oriented to person, place, and time. Able to do OK sign   Psychiatric: He has a normal mood and affect. His behavior is normal. Judgment and thought content normal.   Nursing note and vitals reviewed. ASSESSMENT and PLAN  Diagnoses and all orders for this visit:    1. Left wrist pain        - Advised pt that we will get an XR of his left wrist to rule out anything abnormal.    2. Wrist swelling, left        - See above    3.  Pain of left calf  -     US EXT NONVAS LT COMP; Future  -     DUPLEX LOWER EXT VENOUS LEFT; Future  - Will get an US and duplex of his left knee to rule out anything abnormal.    4. Essential hypertension  -     losartan (COZAAR) 100 mg tablet; Take 1 Tab by mouth daily.  -     CBC WITH AUTOMATED DIFF  -     METABOLIC PANEL, COMPREHENSIVE  -     LIPID PANEL        -     URINALYSIS W/ RFLX MICROSCOPIC  - Bp at office today 152/80, 146/80 by manual arm cuff recheck. Pt continues with Amlodipine 5mg daily and Losartan 50mg daily. Advised pt that I want to increase his Losartan from 50mg to 100mg daily. 5. B12 deficiency  -     VITAMIN B12  - Pt continues with Vitamin B-12 1,000units daily. Discussed with pt that if he regularly takes his Nexium he should continue taking his B-12.    6. Gastroesophageal reflux disease without esophagitis        - Pt has been taking Tums instead of his Nexium. Advised pt to continue taking Tums if they provide relief for his symptoms. 7. Encounter for Medicare annual wellness exam        - I have asked pt a series of questions related to [facility]. 8. Malaise and fatigue  -     TSH 3RD GENERATION  - Presumed stable, will assess levels today. Follow-up Disposition:  Return in about 4 weeks (around 4/27/2018) for hypertension follow up. Medication risks/benefits/costs/interactions/alternatives discussed with patient. Advised patient to call back or return to office if symptoms worsen/change/persist.  If patient cannot reach us or should anything more severe/urgent arise he/she should proceed directly to the nearest emergency department. Discussed expected course/resolution/complications of diagnosis in detail with patient. Patient given a written after visit summary which includes her diagnoses, current medications and vitals. Patient expressed understanding with the diagnosis and plan. Written by kellee Whatley, as dictated by Moriah Madsen M.D.   I have reviewed and agree with the above note and have made corrections where appropriate, Dr. Keila Almazan MD

## 2018-04-02 NOTE — PROGRESS NOTES
Chief Complaint   Patient presents with    Annual Wellness Visit    Hypertension     follow up    Hand Pain     states left hand injury recently, swollen and painful, ? fracture       Reviewed Record in preparation for visit and have obtained necessary documentation. Identified pt with two pt identifiers (Name @ )    Health Maintenance Due   Topic    ZOSTER VACCINE AGE 60>     GLAUCOMA SCREENING Q2Y     MEDICARE YEARLY EXAM     COLONOSCOPY          1. Have you been to the ER, urgent care clinic since your last visit? Hospitalized since your last visit? No    2. Have you seen or consulted any other health care providers outside of the [address] [place] [address] since your last visit? Include any pap smears or colon screening.  No

## 2018-04-02 NOTE — PATIENT INSTRUCTIONS
Gastroesophageal Reflux Disease (GERD): Care Instructions  Your Care Instructions    Gastroesophageal reflux disease (GERD) is the backward flow of stomach acid into the esophagus. The esophagus is the tube that leads from your throat to your stomach. A one-way valve prevents the stomach acid from moving up into this tube. When you have GERD, this valve does not close tightly enough. If you have mild GERD symptoms including heartburn, you may be able to control the problem with antacids or over-the-counter medicine. Changing your diet, losing weight, and making other lifestyle changes can also help reduce symptoms. Follow-up care is a key part of your treatment and safety. Be sure to make and go to all appointments, and call your doctor if you are having problems. It's also a good idea to know your test results and keep a list of the medicines you take. How can you care for yourself at home? · Take your medicines exactly as prescribed. Call your doctor if you think you are having a problem with your medicine. · Your doctor may recommend over-the-counter medicine. For mild or occasional indigestion, antacids, such as Tums, Gaviscon, Mylanta, or Maalox, may help. Your doctor also may recommend over-the-counter acid reducers, such as Pepcid AC, Tagamet HB, Zantac 75, or Prilosec. Read and follow all instructions on the label. If you use these medicines often, talk with your doctor. · Change your eating habits. ¨ It's best to eat several small meals instead of two or three large meals. ¨ After you eat, wait 2 to 3 hours before you lie down. ¨ Chocolate, mint, and alcohol can make GERD worse. ¨ Spicy foods, foods that have a lot of acid (like tomatoes and oranges), and coffee can make GERD symptoms worse in some people. If your symptoms are worse after you eat a certain food, you may want to stop eating that food to see if your symptoms get better.   · Do not smoke or chew tobacco. Smoking can make GERD worse. If you need help quitting, talk to your doctor about stop-smoking programs and medicines. These can increase your chances of quitting for good. · If you have GERD symptoms at night, raise the head of your bed 6 to 8 inches by putting the frame on blocks or placing a foam wedge under the head of your mattress. (Adding extra pillows does not work.)  · Do not wear tight clothing around your middle. · Lose weight if you need to. Losing just 5 to 10 pounds can help. When should you call for help? Call your doctor now or seek immediate medical care if:  ? · You have new or different belly pain. ? · Your stools are black and tarlike or have streaks of blood. ? Watch closely for changes in your health, and be sure to contact your doctor if:  ? · Your symptoms have not improved after 2 days. ? · Food seems to catch in your throat or chest.   Where can you learn more? Go to http://marques-mei.info/. Enter X838 in the search box to learn more about \"Gastroesophageal Reflux Disease (GERD): Care Instructions. \"  Current as of: May 12, 2017  Content Version: 11.4  © 0987-1981 [employer]. Care instructions adapted under license by [employer] (which disclaims liability or warranty for this information). If you have questions about a medical condition or this instruction, always ask your healthcare professional. [place] [address] any warranty or liability for your use of this information. Medicare Wellness Visit, Male    The best way to live healthy is to have a healthy lifestyle by eating a well-balanced diet, exercising regularly, limiting alcohol and stopping smoking. Regular physical exams and screening tests are another way to keep healthy. Preventive exams provided by your health care provider can find health problems before they become diseases or illnesses.  Preventive services including immunizations, screening tests, monitoring and exams can help you take care of your own health. All people over age 72 should have a pneumovax  and and a prevnar shot to prevent pneumonia. These are once in a lifetime unless you and your provider decide differently. All people over 65 should have a yearly flu shot and a tetanus vaccine every 10 years. Screening for diabetes mellitus with a blood sugar test should be done every year. Glaucoma is a disease of the eye due to increased ocular pressure that can lead to blindness and it should be done every year by an eye professional.    Cardiovascular screening tests that check for elevated lipids (fatty part of blood) which can lead to heart disease and strokes should be done every 5 years. Colorectal screening that evaluates for blood or polyps in your colon should be done yearly as a stool test or every five years as a flexible sigmoidoscope or every 10 years as a colonoscopy up to age 76. Men up to age 76 may need a screening blood test for prostate cancer at certain intervals, depending on their personal and family history. This decision is between the patient and his provider. If you have been a smoker or had family history of abdominal aortic aneurysms, you and your provider may decide to schedule an ultrasound test of your aorta. Hepatitis C screening is also recommended for anyone born between [birth year] through [address]. A shingles vaccine is also recommended once in a lifetime after age 61. Your Medicare Wellness Exam is recommended annually.     Here is a list of your current Health Maintenance items with a due date:  Health Maintenance Due   Topic Date Due    Shingles Vaccine  10/12/1997    Glaucoma Screening   09/15/2017    Annual Well Visit  03/14/2018    Colonoscopy  03/18/2018

## 2018-04-03 LAB
ALBUMIN SERPL-MCNC: 4.2 G/DL (ref 3.5–4.7)
ALBUMIN/GLOB SERPL: 1.4 {RATIO} (ref 1.2–2.2)
ALP SERPL-CCNC: 81 IU/L (ref 39–117)
ALT SERPL-CCNC: 18 IU/L (ref 0–44)
APPEARANCE UR: CLEAR
AST SERPL-CCNC: 27 IU/L (ref 0–40)
BASOPHILS # BLD AUTO: 0 X10E3/UL (ref 0–0.2)
BASOPHILS NFR BLD AUTO: 0 %
BILIRUB SERPL-MCNC: 0.5 MG/DL (ref 0–1.2)
BILIRUB UR QL STRIP: NEGATIVE
BUN SERPL-MCNC: 20 MG/DL (ref 8–27)
BUN/CREAT SERPL: 25 (ref 10–24)
CALCIUM SERPL-MCNC: 8.9 MG/DL (ref 8.6–10.2)
CHLORIDE SERPL-SCNC: 102 MMOL/L (ref 96–106)
CHOLEST SERPL-MCNC: 136 MG/DL (ref 100–199)
CO2 SERPL-SCNC: 24 MMOL/L (ref 18–29)
COLOR UR: YELLOW
CREAT SERPL-MCNC: 0.8 MG/DL (ref 0.76–1.27)
EOSINOPHIL # BLD AUTO: 0.1 X10E3/UL (ref 0–0.4)
EOSINOPHIL NFR BLD AUTO: 1 %
ERYTHROCYTE [DISTWIDTH] IN BLOOD BY AUTOMATED COUNT: 14.4 % (ref 12.3–15.4)
GFR SERPLBLD CREATININE-BSD FMLA CKD-EPI: 84 ML/MIN/1.73
GFR SERPLBLD CREATININE-BSD FMLA CKD-EPI: 98 ML/MIN/1.73
GLOBULIN SER CALC-MCNC: 2.9 G/DL (ref 1.5–4.5)
GLUCOSE SERPL-MCNC: 91 MG/DL (ref 65–99)
GLUCOSE UR QL: NEGATIVE
HCT VFR BLD AUTO: 36.6 % (ref 37.5–51)
HDLC SERPL-MCNC: 49 MG/DL
HGB BLD-MCNC: 11.2 G/DL (ref 13–17.7)
HGB UR QL STRIP: NEGATIVE
IMM GRANULOCYTES # BLD: 0 X10E3/UL (ref 0–0.1)
IMM GRANULOCYTES NFR BLD: 0 %
INTERPRETATION, 910389: NORMAL
KETONES UR QL STRIP: NEGATIVE
LDLC SERPL CALC-MCNC: 77 MG/DL (ref 0–99)
LEUKOCYTE ESTERASE UR QL STRIP: NEGATIVE
LYMPHOCYTES # BLD AUTO: 1.2 X10E3/UL (ref 0.7–3.1)
LYMPHOCYTES NFR BLD AUTO: 16 %
MCH RBC QN AUTO: 24.1 PG (ref 26.6–33)
MCHC RBC AUTO-ENTMCNC: 30.6 G/DL (ref 31.5–35.7)
MCV RBC AUTO: 79 FL (ref 79–97)
MICRO URNS: NORMAL
MONOCYTES # BLD AUTO: 0.5 X10E3/UL (ref 0.1–0.9)
MONOCYTES NFR BLD AUTO: 7 %
NEUTROPHILS # BLD AUTO: 5.8 X10E3/UL (ref 1.4–7)
NEUTROPHILS NFR BLD AUTO: 76 %
NITRITE UR QL STRIP: NEGATIVE
PH UR STRIP: 6 [PH] (ref 5–7.5)
PLATELET # BLD AUTO: 227 X10E3/UL (ref 150–379)
POTASSIUM SERPL-SCNC: 4.3 MMOL/L (ref 3.5–5.2)
PROT SERPL-MCNC: 7.1 G/DL (ref 6–8.5)
PROT UR QL STRIP: NEGATIVE
RBC # BLD AUTO: 4.64 X10E6/UL (ref 4.14–5.8)
SODIUM SERPL-SCNC: 142 MMOL/L (ref 134–144)
SP GR UR: 1.01 (ref 1–1.03)
TRIGL SERPL-MCNC: 48 MG/DL (ref 0–149)
TSH SERPL DL<=0.005 MIU/L-ACNC: 3.64 UIU/ML (ref 0.45–4.5)
UROBILINOGEN UR STRIP-MCNC: 0.2 MG/DL (ref 0.2–1)
VIT B12 SERPL-MCNC: 268 PG/ML (ref 232–1245)
VLDLC SERPL CALC-MCNC: 10 MG/DL (ref 5–40)
WBC # BLD AUTO: 7.6 X10E3/UL (ref 3.4–10.8)

## 2018-04-05 NOTE — PROGRESS NOTES
Inform pt to go to my chart to see results and recommendations    Mr. Rea Mullen,  Only worry id the hg/hct level are low  I would take a multivitamin with Iron.     Rest of the labs are stable    Any questions let me know

## 2018-04-06 NOTE — PROGRESS NOTES
Called and informed patient to go to my chart to review results on my chart. Also wrist xray results should be available as well. Please give me a call to let me know you have reviewed them.

## 2018-04-11 ENCOUNTER — HOSPITAL ENCOUNTER (OUTPATIENT)
Dept: ULTRASOUND IMAGING | Age: 83
Discharge: HOME | End: 2018-04-11
Attending: FAMILY MEDICINE
Payer: MEDICARE

## 2018-04-11 DIAGNOSIS — M79.662 PAIN OF LEFT CALF: ICD-10-CM

## 2018-04-11 PROCEDURE — 93971 EXTREMITY STUDY: CPT

## 2018-04-13 NOTE — PROGRESS NOTES
No DVT pt has a ruptures bakers cyst  If has pain needs to see     Impression             IMPRESSION:  1. No deep venous thrombosis.     2. A small amount of fluid in the left popliteal soft tissues medially is  suggestive of a ruptured Bakers cyst.             Specimen Collected: 04/11/18  1:44 PM

## 2018-04-27 ENCOUNTER — OFFICE VISIT (OUTPATIENT)
Dept: FAMILY MEDICINE CLINIC | Age: 83
End: 2018-04-27

## 2018-04-27 ENCOUNTER — HOSPITAL ENCOUNTER (OUTPATIENT)
Dept: LAB | Age: 83
Discharge: HOME | End: 2018-04-27
Payer: MEDICARE

## 2018-04-27 VITALS
WEIGHT: 195 LBS | BODY MASS INDEX: 29.55 KG/M2 | OXYGEN SATURATION: 99 % | SYSTOLIC BLOOD PRESSURE: 134 MMHG | RESPIRATION RATE: 16 BRPM | HEART RATE: 58 BPM | HEIGHT: 68 IN | TEMPERATURE: 97.6 F | DIASTOLIC BLOOD PRESSURE: 68 MMHG

## 2018-04-27 DIAGNOSIS — R06.02 SOB (SHORTNESS OF BREATH): ICD-10-CM

## 2018-04-27 DIAGNOSIS — E53.8 B12 DEFICIENCY: ICD-10-CM

## 2018-04-27 DIAGNOSIS — I10 ESSENTIAL HYPERTENSION: Primary | ICD-10-CM

## 2018-04-27 DIAGNOSIS — D64.9 LOW HEMOGLOBIN: ICD-10-CM

## 2018-04-27 PROCEDURE — 82607 VITAMIN B-12: CPT

## 2018-04-27 PROCEDURE — 36415 COLL VENOUS BLD VENIPUNCTURE: CPT

## 2018-04-27 PROCEDURE — 83550 IRON BINDING TEST: CPT

## 2018-04-27 PROCEDURE — 85025 COMPLETE CBC W/AUTO DIFF WBC: CPT

## 2018-04-27 RX ORDER — MENTHOL
1000 GEL (GRAM) TOPICAL DAILY
COMMUNITY
End: 2019-06-17

## 2018-04-27 NOTE — PATIENT INSTRUCTIONS
Anemia: Care Instructions  Your Care Instructions    Anemia is a low level of red blood cells, which carry oxygen throughout your body. Many things can cause anemia. Lack of iron is one of the most common causes. Your body needs iron to make hemoglobin, a substance in red blood cells that carries oxygen from the lungs to your body's cells. Without enough iron, the body produces fewer and smaller red blood cells. As a result, your body's cells do not get enough oxygen, and you feel tired and weak. And you may have trouble concentrating. Bleeding is the most common cause of a lack of iron. You may have heavy menstrual bleeding or bleeding caused by conditions such as ulcers, hemorrhoids, or cancer. Regular use of aspirin or other anti-inflammatory medicines (such as ibuprofen) also can cause bleeding in some people. A lack of iron in your diet also can cause anemia, especially at times when the body needs more iron, such as during pregnancy, infancy, and the teen years. Your doctor may have prescribed iron pills. It may take several months of treatment for your iron levels to return to normal. Your doctor also may suggest that you eat foods that are rich in iron, such as meat and beans. There are many other causes of anemia. It is not always due to a lack of iron. Finding the specific cause of your anemia will help your doctor find the right treatment for you. Follow-up care is a key part of your treatment and safety. Be sure to make and go to all appointments, and call your doctor if you are having problems. It's also a good idea to know your test results and keep a list of the medicines you take. How can you care for yourself at home? · Take your medicines exactly as prescribed. Call your doctor if you think you are having a problem with your medicine. · If your doctor recommends iron pills, take them as directed:  ¨ Try to take the pills on an empty stomach about 1 hour before or 2 hours after meals. But you may need to take iron with food to avoid an upset stomach. ¨ Do not take antacids or drink milk or caffeine drinks (such as coffee, tea, or cola) at the same time or within 2 hours of the time that you take your iron. They can make it hard for your body to absorb the iron. ¨ Vitamin C (from food or supplements) helps your body absorb iron. Try taking iron pills with a glass of orange juice or some other food that is high in vitamin C, such as citrus fruits. ¨ Iron pills may cause stomach problems, such as heartburn, nausea, diarrhea, constipation, and cramps. Be sure to drink plenty of fluids, and include fruits, vegetables, and fiber in your diet each day. Iron pills often make your bowel movements dark or green. ¨ If you forget to take an iron pill, do not take a double dose of iron the next time you take a pill. ¨ Keep iron pills out of the reach of small children. An overdose of iron can be very dangerous. · Follow your doctor's advice about eating iron-rich foods. These include red meat, shellfish, poultry, eggs, beans, raisins, whole-grain bread, and leafy green vegetables. · Steam vegetables to help them keep their iron content. When should you call for help? Call 911 anytime you think you may need emergency care. For example, call if:  ? · You have symptoms of a heart attack. These may include:  ¨ Chest pain or pressure, or a strange feeling in the chest.  ¨ Sweating. ¨ Shortness of breath. ¨ Nausea or vomiting. ¨ Pain, pressure, or a strange feeling in the back, neck, jaw, or upper belly or in one or both shoulders or arms. ¨ Lightheadedness or sudden weakness. ¨ A fast or irregular heartbeat. After you call 911, the  may tell you to chew 1 adult-strength or 2 to 4 low-dose aspirin. Wait for an ambulance. Do not try to drive yourself. ? · You passed out (lost consciousness).    ?Call your doctor now or seek immediate medical care if:  ? · You have new or increased shortness of breath. ? · You are dizzy or lightheaded, or you feel like you may faint. ? · Your fatigue and weakness continue or get worse. ? · You have any abnormal bleeding, such as:  ¨ Nosebleeds. ¨ Vaginal bleeding that is different (heavier, more frequent, at a different time of the month) than what you are used to. ¨ Bloody or black stools, or rectal bleeding. ¨ Bloody or pink urine. ? Watch closely for changes in your health, and be sure to contact your doctor if:  ? · You do not get better as expected. Where can you learn more? Go to http://marques-mei.info/. Enter R301 in the search box to learn more about \"Anemia: Care Instructions. \"  Current as of: October 13, 2016  Content Version: 11.4  © 2606-2867 [employer]. Care instructions adapted under license by [employer] (which disclaims liability or warranty for this information). If you have questions about a medical condition or this instruction, always ask your healthcare professional. [place] [address] any warranty or liability for your use of this information.

## 2018-04-27 NOTE — MR AVS SNAPSHOT
[address] 
[phone] Patient: Vicky Lamb MRN: [id number] :1935 Visit Information Date & Time Provider Department Dept. Phone Encounter #  
 2018  2:45 PM Cam Dave  [facility] [phone] [id number] Follow-up Instructions Return in about 3 months (around 2018) for diabetes follow up. Upcoming Health Maintenance Date Due ZOSTER VACCINE AGE 60> 10/12/1997 GLAUCOMA SCREENING Q2Y 9/15/2017 COLONOSCOPY 3/18/2018 Influenza Age 5 to Adult 2018 DTaP/Tdap/Td series (2 - Tdap) 2019 MEDICARE YEARLY EXAM 4/3/2019 Allergies as of 2018  Review Complete On: 2018 By: Cam Dave MD  
 No Known Allergies Current Immunizations  Reviewed on 2017 Name Date DTAP Vaccine 2009 Influenza High Dose Vaccine PF 2013 Influenza Vaccine 2016 Pneumococcal Conjugate (PCV-13) 10/1/2015 ZZZ-RETIRED (DO NOT USE) Pneumococcal Vaccine (Unspecified Type) 2010 Not reviewed this visit You Were Diagnosed With   
  
 Codes Comments Essential hypertension    -  Primary ICD-10-CM: I10 
ICD-9-CM: 401.9 B12 deficiency     ICD-10-CM: E53.8 ICD-9-CM: 266.2 Low hemoglobin     ICD-10-CM: D64.9 ICD-9-CM: 285.9 SOB (shortness of breath)     ICD-10-CM: R06.02 
ICD-9-CM: 786.05 Vitals BP Pulse Temp Resp Height(growth percentile) Weight(growth percentile) 134/68 (!) 58 97.6 °F (36.4 °C) (Oral) 16 5' 8\" (1.727 m) 195 lb (88.5 kg) SpO2 BMI Smoking Status 99% 29.65 kg/m2 Never Smoker Vitals History BMI and BSA Data Body Mass Index Body Surface Area  
 29.65 kg/m 2 2.06 m 2 Preferred Pharmacy Pharmacy Name Phone [address], [facility] [place] [facility] [place] [address] [phone] Your Updated Medication List  
  
   
This list is accurate as of 4/27/18  3:17 PM.  Always use your most recent med list.  
  
  
  
  
 albuterol 90 mcg/actuation inhaler Commonly known as:  PROVENTIL HFA, VENTOLIN HFA, PROAIR HFA Take 1-2 Puffs by inhalation every four (4) hours as needed for Wheezing. amLODIPine 5 mg tablet Commonly known as:  Alexa Ape TAKE 1 TABLET BY MOUTH EVERY DAY  
  
 esomeprazole 40 mg capsule Commonly known as:  NEXIUM  
TAKE 1 CAPSULE BY MOUTH DAILY  
  
 glucosamine 1,000 mg Tab Take 1 Tab by mouth daily. losartan 100 mg tablet Commonly known as:  COZAAR  
TAKE 1 TABLET BY MOUTH DAILY  
  
 M-VIT PO Take 1 Tab by mouth daily. VITAMIN B-12 1,000 mcg tablet Generic drug:  cyanocobalamin Take 1,000 mcg by mouth daily. vitamin e 1,000 unit capsule Commonly known as:  E GEMS Take 1,000 Units by mouth daily. We Performed the Following CBC WITH AUTOMATED DIFF [42922 CPT(R)] IRON PROFILE B3031533 CPT(R)] PULMONARY FUNCTION TEST [UME8965 Custom] VITAMIN B12 & FOLATE [02676 CPT(R)] Follow-up Instructions Return in about 3 months (around 7/27/2018) for diabetes follow up. To-Do List   
 04/27/2018 PFT:  PFT DLCO Patient Instructions Anemia: Care Instructions Your Care Instructions Anemia is a low level of red blood cells, which carry oxygen throughout your body. Many things can cause anemia. Lack of iron is one of the most common causes. Your body needs iron to make hemoglobin, a substance in red blood cells that carries oxygen from the lungs to your body's cells. Without enough iron, the body produces fewer and smaller red blood cells. As a result, your body's cells do not get enough oxygen, and you feel tired and weak. And you may have trouble concentrating. Bleeding is the most common cause of a lack of iron.  You may have heavy menstrual bleeding or bleeding caused by conditions such as ulcers, hemorrhoids, or cancer. Regular use of aspirin or other anti-inflammatory medicines (such as ibuprofen) also can cause bleeding in some people. A lack of iron in your diet also can cause anemia, especially at times when the body needs more iron, such as during pregnancy, infancy, and the teen years. Your doctor may have prescribed iron pills. It may take several months of treatment for your iron levels to return to normal. Your doctor also may suggest that you eat foods that are rich in iron, such as meat and beans. There are many other causes of anemia. It is not always due to a lack of iron. Finding the specific cause of your anemia will help your doctor find the right treatment for you. Follow-up care is a key part of your treatment and safety. Be sure to make and go to all appointments, and call your doctor if you are having problems. It's also a good idea to know your test results and keep a list of the medicines you take. How can you care for yourself at home? · Take your medicines exactly as prescribed. Call your doctor if you think you are having a problem with your medicine. · If your doctor recommends iron pills, take them as directed: ¨ Try to take the pills on an empty stomach about 1 hour before or 2 hours after meals. But you may need to take iron with food to avoid an upset stomach. ¨ Do not take antacids or drink milk or caffeine drinks (such as coffee, tea, or cola) at the same time or within 2 hours of the time that you take your iron. They can make it hard for your body to absorb the iron. ¨ Vitamin C (from food or supplements) helps your body absorb iron. Try taking iron pills with a glass of orange juice or some other food that is high in vitamin C, such as citrus fruits. ¨ Iron pills may cause stomach problems, such as heartburn, nausea, diarrhea, constipation, and cramps.  Be sure to drink plenty of fluids, and include fruits, vegetables, and fiber in your diet each day. Iron pills often make your bowel movements dark or green. ¨ If you forget to take an iron pill, do not take a double dose of iron the next time you take a pill. ¨ Keep iron pills out of the reach of small children. An overdose of iron can be very dangerous. · Follow your doctor's advice about eating iron-rich foods. These include red meat, shellfish, poultry, eggs, beans, raisins, whole-grain bread, and leafy green vegetables. · Steam vegetables to help them keep their iron content. When should you call for help? Call 911 anytime you think you may need emergency care. For example, call if: 
? · You have symptoms of a heart attack. These may include: ¨ Chest pain or pressure, or a strange feeling in the chest. 
¨ Sweating. ¨ Shortness of breath. ¨ Nausea or vomiting. ¨ Pain, pressure, or a strange feeling in the back, neck, jaw, or upper belly or in one or both shoulders or arms. ¨ Lightheadedness or sudden weakness. ¨ A fast or irregular heartbeat. After you call 911, the  may tell you to chew 1 adult-strength or 2 to 4 low-dose aspirin. Wait for an ambulance. Do not try to drive yourself. ? · You passed out (lost consciousness). ?Call your doctor now or seek immediate medical care if: 
? · You have new or increased shortness of breath. ? · You are dizzy or lightheaded, or you feel like you may faint. ? · Your fatigue and weakness continue or get worse. ? · You have any abnormal bleeding, such as: 
¨ Nosebleeds. ¨ Vaginal bleeding that is different (heavier, more frequent, at a different time of the month) than what you are used to. ¨ Bloody or black stools, or rectal bleeding. ¨ Bloody or pink urine. ? Watch closely for changes in your health, and be sure to contact your doctor if: 
? · You do not get better as expected. Where can you learn more? Go to http://sruthi.info/. Enter R301 in the search box to learn more about \"Anemia: Care Instructions. \" Current as of: October 13, 2016 Content Version: 11.4 © 0750-9946 Healthwise, [employer]. Care instructions adapted under license by [employer] (which disclaims liability or warranty for this information). If you have questions about a medical condition or this instruction, always ask your healthcare professional. [address] any warranty or liability for your use of this information. Introducing [facility] & HEALTH SERVICES! Dear Jaycee Reeder: Thank you for requesting a [employer] account. Our records indicate that you already have an active [employer] account. You can access your account anytime at [email or web address]. [email or web address] Did you know that you can access your hospital and ER discharge instructions at any time in [employer]? You can also review all of your test results from your hospital stay or ER visit. Additional Information If you have questions, please visit the Frequently Asked Questions section of the [employer] website at [email or web address] Remember, [employer] is NOT to be used for urgent needs. For medical emergencies, dial 911. Now available from your iPhone and Android! Please provide this summary of care documentation to your next provider. Your primary care clinician is listed as Leti Bowers. If you have any questions after today's visit, please call [phone].

## 2018-04-27 NOTE — PROGRESS NOTES
HISTORY OF PRESENT ILLNESS  Mau Amado is a [de-identified] y.o. male. Blood pressure 134/68, pulse (!) 58, temperature 97.6 °F (36.4 °C), temperature source Oral, resp. rate 16, height 5' 8\" (1.727 m), weight 195 lb (88.5 kg), SpO2 99 %. Body mass index is 29.65 kg/(m^2). Chief Complaint   Patient presents with    Hypertension     1 month         HPI  Mau Amado [de-identified] y.o. male  presents to the office today for hypertension follow up. Hypertension: Bp at office today 153/84, 134/68 by manual arm cuff recheck. Pt continues with Losartan 100mg daily and Amlodipine 5mg daily. Pt states that his Bp at home has been 140s/70s. Pt reports that since we increased his Losartan to 100mg he has not noticed any vision changes when changing position. Advised pt to continue to monitor his blood pressure at home. Health maintenance: Discussed with pt that his Hgb and hematocrit are low. Pt denies any blood in his stool. Will check an iron panel today to make sure that pt is not iron deficient. Pt states that he has been having some shortness of breath with activity. Advised pt that we need to have him take pulmonary function test to rule out anything abnormal.     Current Outpatient Prescriptions   Medication Sig Dispense Refill    vitamin e (E GEMS) 1,000 unit capsule Take 1,000 Units by mouth daily.  losartan (COZAAR) 100 mg tablet TAKE 1 TABLET BY MOUTH DAILY 90 Tab 1    amLODIPine (NORVASC) 5 mg tablet TAKE 1 TABLET BY MOUTH EVERY DAY 90 Tab 0    cyanocobalamin (VITAMIN B-12) 1,000 mcg tablet Take 1,000 mcg by mouth daily.  PV W-O ALO/FERROUS FUMARATE/FA (M-VIT PO) Take 1 Tab by mouth daily.  glucosamine 1,000 mg Tab Take 1 Tab by mouth daily.  albuterol (PROVENTIL HFA, VENTOLIN HFA, PROAIR HFA) 90 mcg/actuation inhaler Take 1-2 Puffs by inhalation every four (4) hours as needed for Wheezing.  (Patient not taking: Reported on 4/27/2018) 1 Inhaler 5    esomeprazole (NEXIUM) 40 mg capsule TAKE 1 CAPSULE BY MOUTH DAILY (Patient not taking: Reported on 4/27/2018) 90 Cap 1     No Known Allergies  Past Medical History:   Diagnosis Date    Diverticulosis, sigmoid 8/2011    ED (erectile dysfunction) of organic origin     History of prostate cancer     Hypertension     [facility] ([facility] [address]     Sebaceous cyst 4/1/2014     Past Surgical History:   Procedure Laterality Date    ENDOSCOPY, COLON, DIAGNOSTIC  >= 8-10years ago   Onetha Holstein SURGERY  1968 [id number]    HX CARPAL TUNNEL RELEASE  4/08    right,left    HX CHOLECYSTECTOMY      HX HERNIA REPAIR      HX PROSTATECTOMY  5/06     Family History   Problem Relation Age of Onset    Cancer Mother      pancreatic    Diabetes Father     Stroke Father     Diabetes Sister     Hypertension Sister     Other Sister      Open heart surgery     Diabetes Brother     Hypertension Brother     Hypertension Brother     Asthma Daughter     Heart Attack Daughter     Other Daughter      hip replacement x 2     Social History   Substance Use Topics    Smoking status: Never Smoker    Smokeless tobacco: Never Used    Alcohol use No        Review of Systems   Constitutional: Negative. Negative for malaise/fatigue. Eyes: Negative for blurred vision. Respiratory: Negative for shortness of breath. Cardiovascular: Negative for chest pain and leg swelling. Musculoskeletal: Negative. Neurological: Negative. Negative for dizziness and headaches. All other systems reviewed and are negative. Physical Exam   Constitutional: He is oriented to person, place, and time. He appears well-developed and well-nourished. HENT:   Head: Normocephalic and atraumatic. Neck: Carotid bruit is not present. Cardiovascular: Normal rate, regular rhythm, normal heart sounds and intact distal pulses. Exam reveals no gallop and no friction rub. No murmur heard. Pulmonary/Chest: Effort normal and breath sounds normal. No respiratory distress. He has no wheezes. He has no rales. He exhibits no tenderness. Neurological: He is alert and oriented to person, place, and time. Psychiatric: He has a normal mood and affect. His behavior is normal. Judgment and thought content normal.   Nursing note and vitals reviewed. ASSESSMENT and PLAN  Diagnoses and all orders for this visit:    1. Essential hypertension        - Bp at office today 153/84, 134/68 by manual arm cuff recheck. Pt continues with Losartan 100mg daily and Amlodipine 5mg daily. Advised pt to continue to monitor his blood pressure at home. 2. B12 deficiency  -     VITAMIN B12 & FOLATE  - Presumed stable, will assess levels today. 3. Low hemoglobin  -     CBC WITH AUTOMATED DIFF  -     IRON PROFILE  - Discussed with pt that his Hgb and hematocrit are low. Will check an iron panel today to make sure that pt is not iron deficient. 4. SOB (shortness of breath)  -     PULMONARY FUNCTION TEST  -     PFT DLCO; Future  - Advised pt that we need to have him take pulmonary function test to rule out anything abnormal.       Follow-up Disposition:  Return in about 3 months (around 7/27/2018) for diabetes follow up. Medication risks/benefits/costs/interactions/alternatives discussed with patient. Advised patient to call back or return to office if symptoms worsen/change/persist.  If patient cannot reach us or should anything more severe/urgent arise he/she should proceed directly to the nearest emergency department. Discussed expected course/resolution/complications of diagnosis in detail with patient. Patient given a written after visit summary which includes her diagnoses, current medications and vitals. Patient expressed understanding with the diagnosis and plan. Written by kellee Wasserman, as dictated by Reinaldo Romberg, M.D.   I have reviewed and agree with the above note and have made corrections where appropriate, Dr. Edyth Brunner, MD

## 2018-04-27 NOTE — PROGRESS NOTES
Catalina Peraza is a [de-identified] y.o. male    Chief Complaint   Patient presents with    Hypertension     1 month        1. Have you been to the ER, urgent care clinic since your last visit? Hospitalized since your last visit? No    2. Have you seen or consulted any other health care providers outside of the [address] [place] [address] since your last visit? Include any pap smears or colon screening.   No    Visit Vitals    /84 (BP 1 Location: Left arm, BP Patient Position: Sitting)    Pulse (!) 58    Temp 97.6 °F (36.4 °C) (Oral)    Resp 16    Ht 5' 8\" (1.727 m)    Wt 195 lb (88.5 kg)    SpO2 99%    BMI 29.65 kg/m2

## 2018-04-28 LAB
BASOPHILS # BLD AUTO: 0 X10E3/UL (ref 0–0.2)
BASOPHILS NFR BLD AUTO: 0 %
EOSINOPHIL # BLD AUTO: 0.1 X10E3/UL (ref 0–0.4)
EOSINOPHIL NFR BLD AUTO: 2 %
ERYTHROCYTE [DISTWIDTH] IN BLOOD BY AUTOMATED COUNT: 14 % (ref 12.3–15.4)
FOLATE SERPL-MCNC: 14.8 NG/ML
HCT VFR BLD AUTO: 35.4 % (ref 37.5–51)
HGB BLD-MCNC: 10.9 G/DL (ref 13–17.7)
IMM GRANULOCYTES # BLD: 0 X10E3/UL (ref 0–0.1)
IMM GRANULOCYTES NFR BLD: 0 %
IRON SATN MFR SERPL: 4 % (ref 15–55)
IRON SERPL-MCNC: 16 UG/DL (ref 38–169)
LYMPHOCYTES # BLD AUTO: 1.2 X10E3/UL (ref 0.7–3.1)
LYMPHOCYTES NFR BLD AUTO: 19 %
MCH RBC QN AUTO: 25.3 PG (ref 26.6–33)
MCHC RBC AUTO-ENTMCNC: 30.8 G/DL (ref 31.5–35.7)
MCV RBC AUTO: 82 FL (ref 79–97)
MONOCYTES # BLD AUTO: 0.8 X10E3/UL (ref 0.1–0.9)
MONOCYTES NFR BLD AUTO: 13 %
NEUTROPHILS # BLD AUTO: 4.2 X10E3/UL (ref 1.4–7)
NEUTROPHILS NFR BLD AUTO: 66 %
PLATELET # BLD AUTO: 207 X10E3/UL (ref 150–379)
RBC # BLD AUTO: 4.3 X10E6/UL (ref 4.14–5.8)
TIBC SERPL-MCNC: 368 UG/DL (ref 250–450)
UIBC SERPL-MCNC: 352 UG/DL (ref 111–343)
VIT B12 SERPL-MCNC: 238 PG/ML (ref 232–1245)
WBC # BLD AUTO: 6.4 X10E3/UL (ref 3.4–10.8)

## 2018-04-30 DIAGNOSIS — E61.1 LOW SERUM IRON: Primary | ICD-10-CM

## 2018-04-30 RX ORDER — LANOLIN ALCOHOL/MO/W.PET/CERES
325 CREAM (GRAM) TOPICAL 2 TIMES DAILY
Qty: 60 TAB | Refills: 5 | Status: ON HOLD | OUTPATIENT
Start: 2018-04-30 | End: 2019-05-30

## 2018-04-30 RX ORDER — LANOLIN ALCOHOL/MO/W.PET/CERES
CREAM (GRAM) TOPICAL 2 TIMES DAILY
COMMUNITY
End: 2018-04-30 | Stop reason: SDUPTHER

## 2018-04-30 NOTE — TELEPHONE ENCOUNTER
[phone] verified  Patient notified of above note and voiced understanding of what was read.   Patient didn't want to see hematology specialist yet wants to try to take medication first will come back in 1 month after his travel to get labs done

## 2018-04-30 NOTE — PROGRESS NOTES
[phone] verified  Patient notified of above note and voiced understanding of what was read.   Patient didn't want to see hematology specialist yet wants to try to take medication first will come back in 1 month after his travel to get labs done

## 2018-04-30 NOTE — PROGRESS NOTES
His Iron levels are very low  He needs to start Iron 325 mg BID  He needs to take vitamin C orange juice.     We need to recheck iron levels in  1 month place order for iron profile and ferrtin levels    Pt needs to also see hematology  Dr. Diane Ambriz need to have an iron infusion  Please place referrral

## 2018-04-30 NOTE — TELEPHONE ENCOUNTER
----- Message from Arley Escudero MD sent at 4/30/2018 10:07 AM EDT -----  His Iron levels are very low  He needs to start Iron 325 mg BID  He needs to take vitamin C orange juice.     We need to recheck iron levels in  1 month place order for iron profile and ferrtin levels    Pt needs to also see hematology  Dr. Tali Pennington need to have an iron infusion  Please place referrral

## 2018-06-05 ENCOUNTER — HOSPITAL ENCOUNTER (OUTPATIENT)
Dept: PULMONOLOGY | Age: 83
Discharge: HOME | End: 2018-06-05
Attending: FAMILY MEDICINE
Payer: MEDICARE

## 2018-06-05 DIAGNOSIS — R06.02 SOB (SHORTNESS OF BREATH): ICD-10-CM

## 2018-06-05 PROCEDURE — 94010 BREATHING CAPACITY TEST: CPT

## 2018-06-05 PROCEDURE — 94729 DIFFUSING CAPACITY: CPT

## 2018-06-07 NOTE — PROCEDURES
[address]  PULMONARY FUNCTION    Bert Mccain  MR#: [id number]  : 1935  ACCOUNT #: [de-identified]   DATE OF SERVICE: 2018    INDICATION:  Shortness of breath. FINDINGS:  SPIROMETRY:  Reduced FVC, normal FEV1, normal FEV1/FVC ratio. DIFFUSION CAPACITY:  Normal diffusion capacity. 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 Normal diffusion capacity.       MD Juan Peralta / Pedrito Lei  D: 2018 13:27     T: 2018 13:41  JOB #: [id number]

## 2018-07-01 DIAGNOSIS — I10 ESSENTIAL HYPERTENSION WITH GOAL BLOOD PRESSURE LESS THAN 140/90: ICD-10-CM

## 2018-07-02 RX ORDER — AMLODIPINE BESYLATE 5 MG/1
TABLET ORAL
Qty: 90 TAB | Refills: 0 | Status: SHIPPED | OUTPATIENT
Start: 2018-07-02 | End: 2018-10-27 | Stop reason: SDUPTHER

## 2018-10-27 DIAGNOSIS — I10 ESSENTIAL HYPERTENSION WITH GOAL BLOOD PRESSURE LESS THAN 140/90: ICD-10-CM

## 2018-10-29 RX ORDER — AMLODIPINE BESYLATE 5 MG/1
TABLET ORAL
Qty: 90 TAB | Refills: 0 | Status: SHIPPED | OUTPATIENT
Start: 2018-10-29 | End: 2019-01-28 | Stop reason: SDUPTHER

## 2018-12-12 ENCOUNTER — OFFICE VISIT (OUTPATIENT)
Dept: FAMILY MEDICINE CLINIC | Age: 83
End: 2018-12-12

## 2018-12-12 ENCOUNTER — HOSPITAL ENCOUNTER (OUTPATIENT)
Dept: LAB | Age: 83
Discharge: HOME | End: 2018-12-12
Payer: MEDICARE

## 2018-12-12 VITALS
SYSTOLIC BLOOD PRESSURE: 120 MMHG | HEIGHT: 68 IN | DIASTOLIC BLOOD PRESSURE: 80 MMHG | TEMPERATURE: 97.9 F | RESPIRATION RATE: 16 BRPM | WEIGHT: 197 LBS | BODY MASS INDEX: 29.86 KG/M2 | OXYGEN SATURATION: 97 % | HEART RATE: 58 BPM

## 2018-12-12 DIAGNOSIS — R51.9 INTRACTABLE HEADACHE, UNSPECIFIED CHRONICITY PATTERN, UNSPECIFIED HEADACHE TYPE: ICD-10-CM

## 2018-12-12 DIAGNOSIS — I10 ESSENTIAL HYPERTENSION: Primary | ICD-10-CM

## 2018-12-12 DIAGNOSIS — J45.990 EXERCISE-INDUCED ASTHMA: ICD-10-CM

## 2018-12-12 DIAGNOSIS — Z12.11 COLON CANCER SCREENING: ICD-10-CM

## 2018-12-12 DIAGNOSIS — Z23 ENCOUNTER FOR IMMUNIZATION: ICD-10-CM

## 2018-12-12 DIAGNOSIS — E61.1 IRON DEFICIENCY: ICD-10-CM

## 2018-12-12 PROCEDURE — 85025 COMPLETE CBC W/AUTO DIFF WBC: CPT

## 2018-12-12 PROCEDURE — 85651 RBC SED RATE NONAUTOMATED: CPT

## 2018-12-12 PROCEDURE — 83550 IRON BINDING TEST: CPT

## 2018-12-12 PROCEDURE — 80061 LIPID PANEL: CPT

## 2018-12-12 PROCEDURE — 80053 COMPREHEN METABOLIC PANEL: CPT

## 2018-12-12 PROCEDURE — 86140 C-REACTIVE PROTEIN: CPT

## 2018-12-12 PROCEDURE — 36415 COLL VENOUS BLD VENIPUNCTURE: CPT

## 2018-12-12 RX ORDER — GLUC/MSM/COLGN2/HYAL/ANTIARTH3 375-375-20
1 TABLET ORAL
COMMUNITY
End: 2019-10-21

## 2018-12-12 RX ORDER — ALBUTEROL SULFATE 90 UG/1
2 AEROSOL, METERED RESPIRATORY (INHALATION)
Qty: 1 INHALER | Refills: 0 | Status: SHIPPED | OUTPATIENT
Start: 2018-12-12 | End: 2019-02-13 | Stop reason: SDUPTHER

## 2018-12-12 NOTE — PROGRESS NOTES
Chief Complaint   Patient presents with    Hypertension     patient is not fasting     \"REVIEWED RECORD IN PREPARATION FOR VISIT AND HAVE OBTAINED THE NECESSARY DOCUMENTATION\"  1. Have you been to the ER, urgent care clinic since your last visit? Hospitalized since your last visit? No    2. Have you seen or consulted any other health care providers outside of the [address] [place] [address] since your last visit? Include any pap smears or colon screening.  No

## 2018-12-12 NOTE — PATIENT INSTRUCTIONS
Learning About Diuretics for High Blood Pressure  Introduction  Diuretics help to lower blood pressure. This reduces your risk of a heart attack and stroke. It also reduces your risk of kidney disease. Diuretics cause your kidneys to remove sodium and water. They also relax the blood vessel walls. These help lower your blood pressure. Examples  · Chlorthalidone  · Hydrochlorothiazide  Possible side effects  There are some common side effects. They are:  · Too little potassium. · Feeling dizzy. · Rash. · Urinating a lot. · High blood sugar. (But this is not common.)  You may have other side effects. Check the information that comes with your medicine. What to know about taking this medicine  · You may take other medicines for blood pressure. Diuretics can help those work better. They can also prevent extra fluid in your body. · You may need to take potassium pills. Or you may have to watch how much potassium is in your food. Ask your doctor about this. · You may need blood tests to check your kidneys and your potassium level. · Take your medicines exactly as prescribed. Call your doctor if you think you are having a problem with your medicine. · Check with your doctor or pharmacist before you use any other medicines. This includes over-the-counter medicines. Make sure your doctor knows all of the medicines, vitamins, herbal products, and supplements you take. Taking some medicines together can cause problems. Where can you learn more? Go to http://marques-mei.info/. Enter B679 in the search box to learn more about \"Learning About Diuretics for High Blood Pressure. \"  Current as of: December 6, 2017  Content Version: 11.8  © 1678-6062 [employer]. Care instructions adapted under license by [employer] (which disclaims liability or warranty for this information).  If you have questions about a medical condition or this instruction, always ask your healthcare professional. [address] any warranty or liability for your use of this information.

## 2018-12-12 NOTE — PROGRESS NOTES
HISTORY OF PRESENT ILLNESS  Etelvina Munoz is a 82 y.o. male who presents today for hypertension. Blood pressure 142/64, pulse (!) 58, temperature 97.9 °F (36.6 °C), temperature source Oral, resp. rate 16, height 5' 8\" (1.727 m), weight 197 lb (89.4 kg), SpO2 97 %. Body mass index is 29.95 kg/m². Chief Complaint   Patient presents with    Hypertension     patient is not fasting      HPI  Hypertension: Bp at office today 142/64. 120/80 on manual arm cuff recheck. Pt continues with Norvasc 5 mg daily and cozaar 100 mg daily. Pt reports that his bp machine at home broke and he needs to get a new one. SOB: Pt reports that he exercises regularly by playing hand ball. Pt states that sometimes he feels SOB and he takes 2 puffs of albuterol from an inhaler at his house that belonged to his wife. Advised that I will order him his own inhaler. Headaches: Pt asked about headaches that center on his left side and was concerned about sxs of stroke. Pt endorses some blurred vision. He describes the pain as similar to brain freeze and intermittent. Advised that I want to make sure it isn't Temporal arteritis. Will check SED rate     Health Maintenance: Pt received their flu shot in office today. Pt will see Dr. Karen Buchanan for his colonoscopy. Pt will have fasting lab work done at later date. Pt denies headaches, CP, bathroom problems. Current Outpatient Medications   Medication Sig Dispense Refill    Ferrous Fumarate 325 mg (106 mg iron) tab Take 325 mg by mouth.  glucosamine ramirez 2KCl-chondroit 500-400 mg tab Take 1 Tab by mouth.  albuterol (PROVENTIL HFA, VENTOLIN HFA, PROAIR HFA) 90 mcg/actuation inhaler Take 2 Puffs by inhalation every four (4) hours as needed for Wheezing. 1 Inhaler 0    amLODIPine (NORVASC) 5 mg tablet TAKE 1 TABLET BY MOUTH EVERY DAY 90 Tab 0    ferrous sulfate 325 mg (65 mg iron) tablet Take 1 Tab by mouth two (2) times a day.  60 Tab 5    vitamin e (E GEMS) 1,000 unit capsule Take 1,000 Units by mouth daily.  losartan (COZAAR) 100 mg tablet TAKE 1 TABLET BY MOUTH DAILY 90 Tab 1    albuterol (PROVENTIL HFA, VENTOLIN HFA, PROAIR HFA) 90 mcg/actuation inhaler Take 1-2 Puffs by inhalation every four (4) hours as needed for Wheezing. 1 Inhaler 5    cyanocobalamin (VITAMIN B-12) 1,000 mcg tablet Take 1,000 mcg by mouth daily.  esomeprazole (NEXIUM) 40 mg capsule TAKE 1 CAPSULE BY MOUTH DAILY (Patient not taking: Reported on 4/27/2018) 90 Cap 1     No Known Allergies  Past Medical History:   Diagnosis Date    Diverticulosis, sigmoid 8/2011    ED (erectile dysfunction) of organic origin     History of prostate cancer     Hypertension     Prostate [facility] ([facility] [address]     Sebaceous cyst 4/1/2014     Past Surgical History:   Procedure Laterality Date    ENDOSCOPY, COLON, DIAGNOSTIC  >= 8-10years ago    HX BACK SURGERY  1968 [id number]    HX CARPAL TUNNEL RELEASE  4/08    right,left    HX CHOLECYSTECTOMY      HX HERNIA REPAIR      HX PROSTATECTOMY  5/06     Family History   Problem Relation Age of Onset    Cancer Mother         pancreatic    Diabetes Father     Stroke Father     Diabetes Sister     Hypertension Sister     Other Sister         Open heart surgery     Diabetes Brother     Hypertension Brother     Hypertension Brother     Asthma Daughter     Heart Attack Daughter     Other Daughter         hip replacement x 2     Social History     Tobacco Use    Smoking status: Never Smoker    Smokeless tobacco: Never Used   Substance Use Topics    Alcohol use: No        Review of Systems   Constitutional: Negative. Negative for malaise/fatigue. Eyes: Negative for blurred vision. Respiratory: Positive for shortness of breath (sometimes after exercising ). Cardiovascular: Negative for chest pain and leg swelling. Musculoskeletal: Negative. Neurological: Positive for headaches (concentrated on left side). Negative for dizziness.    All other systems reviewed and are negative. Physical Exam   Constitutional: He is oriented to person, place, and time. He appears well-developed and well-nourished. HENT:   Head: Normocephalic and atraumatic. Neck: Carotid bruit is not present. Cardiovascular: Normal rate, regular rhythm, normal heart sounds and intact distal pulses. Exam reveals no gallop and no friction rub. No murmur heard. Pulmonary/Chest: Effort normal and breath sounds normal. No respiratory distress. He has no wheezes. He has no rales. He exhibits no tenderness. Neurological: He is alert and oriented to person, place, and time. Psychiatric: He has a normal mood and affect. His behavior is normal. Judgment and thought content normal.   Nursing note and vitals reviewed. ASSESSMENT and PLAN  Diagnoses and all orders for this visit:    1. Essential hypertension  -     METABOLIC PANEL, COMPREHENSIVE  -     CBC WITH AUTOMATED DIFF  -     LIPID PANEL  -     Bp at office today 142/64. 120/80 on manual arm cuff recheck. Pt continues with Norvasc 5 mg daily and cozaar 100 mg daily. Stable after recheck. Advised to continue current regimen. Pt will return at later date for fasting labs     2. Colon cancer screening  -     Joycelyn Barrera- Dr. Kehinde Zimmerman    3. Exercise-induced asthma  -     albuterol (PROVENTIL HFA, VENTOLIN HFA, PROAIR HFA) 90 mcg/actuation inhaler; Take 2 Puffs by inhalation every four (4) hours as needed for Wheezing.  -     Pt reports feeling sOB after exercising sometimes and had been using an inhaler that had belonged to his wife. Ordered inhaler for him to use as needed    4. Encounter for immunization  -     INFLUENZA VACCINE INACTIVATED (IIV), SUBUNIT, ADJUVANTED, IM  -     ADMIN INFLUENZA VIRUS VAC    5. Intractable headache, unspecified chronicity pattern, unspecified headache type  -     SED RATE (ESR)  -     C REACTIVE PROTEIN, QT  -     Pt reports having headaches sometimes.  Want to make sure it isn't Temporal arteritis. Will check SED rate      Follow-up Disposition:  Return in about 6 months (around 6/12/2019) for hyperlipidemia follow up, hypertension follow up. Medication risks/benefits/costs/interactions/alternatives discussed with patient. Advised patient to call back or return to office if symptoms worsen/change/persist.  If patient cannot reach us or should anything more severe/urgent arise he/she should proceed directly to the nearest emergency department. Discussed expected course/resolution/complications of diagnosis in detail with patient. Patient given a written after visit summary which includes their diagnoses, current medications and vitals. Patient expressed understanding with the diagnosis and plan. Written by kellee Eagle, as dictated by Ricardo Montes De Oca M.D.  4:42 PM - 4:58 PM    Total time spent with the patient 16 minutes, greater than 50% of time spent counseling patient.

## 2018-12-13 LAB
ALBUMIN SERPL-MCNC: 4.1 G/DL (ref 3.5–4.7)
ALBUMIN/GLOB SERPL: 1.5 {RATIO} (ref 1.2–2.2)
ALP SERPL-CCNC: 78 IU/L (ref 39–117)
ALT SERPL-CCNC: 20 IU/L (ref 0–44)
AST SERPL-CCNC: 30 IU/L (ref 0–40)
BASOPHILS # BLD AUTO: 0 X10E3/UL (ref 0–0.2)
BASOPHILS NFR BLD AUTO: 0 %
BILIRUB SERPL-MCNC: 0.3 MG/DL (ref 0–1.2)
BUN SERPL-MCNC: 24 MG/DL (ref 8–27)
BUN/CREAT SERPL: 26 (ref 10–24)
CALCIUM SERPL-MCNC: 9 MG/DL (ref 8.6–10.2)
CHLORIDE SERPL-SCNC: 104 MMOL/L (ref 96–106)
CHOLEST SERPL-MCNC: 134 MG/DL (ref 100–199)
CO2 SERPL-SCNC: 29 MMOL/L (ref 20–29)
CREAT SERPL-MCNC: 0.92 MG/DL (ref 0.76–1.27)
CRP SERPL-MCNC: 0.6 MG/L (ref 0–4.9)
EOSINOPHIL # BLD AUTO: 0.2 X10E3/UL (ref 0–0.4)
EOSINOPHIL NFR BLD AUTO: 2 %
ERYTHROCYTE [DISTWIDTH] IN BLOOD BY AUTOMATED COUNT: 13.5 % (ref 12.3–15.4)
ERYTHROCYTE [SEDIMENTATION RATE] IN BLOOD BY WESTERGREN METHOD: 7 MM/HR (ref 0–30)
GLOBULIN SER CALC-MCNC: 2.8 G/DL (ref 1.5–4.5)
GLUCOSE SERPL-MCNC: 91 MG/DL (ref 65–99)
HCT VFR BLD AUTO: 41.5 % (ref 37.5–51)
HDLC SERPL-MCNC: 44 MG/DL
HGB BLD-MCNC: 13.3 G/DL (ref 13–17.7)
IMM GRANULOCYTES # BLD: 0.1 X10E3/UL (ref 0–0.1)
IMM GRANULOCYTES NFR BLD: 1 %
INTERPRETATION, 910389: NORMAL
IRON SATN MFR SERPL: 17 % (ref 15–55)
IRON SERPL-MCNC: 53 UG/DL (ref 38–169)
LDLC SERPL CALC-MCNC: 64 MG/DL (ref 0–99)
LYMPHOCYTES # BLD AUTO: 1.8 X10E3/UL (ref 0.7–3.1)
LYMPHOCYTES NFR BLD AUTO: 21 %
MCH RBC QN AUTO: 28.5 PG (ref 26.6–33)
MCHC RBC AUTO-ENTMCNC: 32 G/DL (ref 31.5–35.7)
MCV RBC AUTO: 89 FL (ref 79–97)
MONOCYTES # BLD AUTO: 1 X10E3/UL (ref 0.1–0.9)
MONOCYTES NFR BLD AUTO: 12 %
NEUTROPHILS # BLD AUTO: 5.7 X10E3/UL (ref 1.4–7)
NEUTROPHILS NFR BLD AUTO: 64 %
PLATELET # BLD AUTO: 192 X10E3/UL (ref 150–379)
POTASSIUM SERPL-SCNC: 4.3 MMOL/L (ref 3.5–5.2)
PROT SERPL-MCNC: 6.9 G/DL (ref 6–8.5)
RBC # BLD AUTO: 4.67 X10E6/UL (ref 4.14–5.8)
SODIUM SERPL-SCNC: 143 MMOL/L (ref 134–144)
TIBC SERPL-MCNC: 304 UG/DL (ref 250–450)
TRIGL SERPL-MCNC: 130 MG/DL (ref 0–149)
UIBC SERPL-MCNC: 251 UG/DL (ref 111–343)
VLDLC SERPL CALC-MCNC: 26 MG/DL (ref 5–40)
WBC # BLD AUTO: 8.7 X10E3/UL (ref 3.4–10.8)

## 2019-01-28 DIAGNOSIS — I10 ESSENTIAL HYPERTENSION WITH GOAL BLOOD PRESSURE LESS THAN 140/90: ICD-10-CM

## 2019-01-29 RX ORDER — AMLODIPINE BESYLATE 5 MG/1
TABLET ORAL
Qty: 90 TAB | Refills: 0 | Status: SHIPPED | OUTPATIENT
Start: 2019-01-29 | End: 2019-05-13 | Stop reason: SDUPTHER

## 2019-02-13 ENCOUNTER — OFFICE VISIT (OUTPATIENT)
Dept: FAMILY MEDICINE CLINIC | Age: 84
End: 2019-02-13

## 2019-02-13 VITALS
BODY MASS INDEX: 29.95 KG/M2 | DIASTOLIC BLOOD PRESSURE: 86 MMHG | RESPIRATION RATE: 19 BRPM | HEART RATE: 63 BPM | TEMPERATURE: 97.9 F | SYSTOLIC BLOOD PRESSURE: 130 MMHG | HEIGHT: 68 IN | OXYGEN SATURATION: 96 % | WEIGHT: 197.6 LBS

## 2019-02-13 DIAGNOSIS — R32 URINARY INCONTINENCE, UNSPECIFIED TYPE: ICD-10-CM

## 2019-02-13 DIAGNOSIS — J32.9 SINUSITIS, UNSPECIFIED CHRONICITY, UNSPECIFIED LOCATION: Primary | ICD-10-CM

## 2019-02-13 RX ORDER — CEFDINIR 300 MG/1
300 CAPSULE ORAL 2 TIMES DAILY
Qty: 20 CAP | Refills: 0 | Status: SHIPPED | OUTPATIENT
Start: 2019-02-13 | End: 2019-02-23

## 2019-02-13 NOTE — PROGRESS NOTES
HISTORY OF PRESENT ILLNESS Karthik Falling Stephany 82 y.o. male  presents to the office today with c/o sinus infection. HPI Blood pressure 130/86, pulse 63, temperature 97.9 °F (36.6 °C), temperature source Oral, resp. rate 19, height 5' 8\" (1.727 m), weight 197 lb 9.6 oz (89.6 kg), SpO2 96 %. Body mass index is 30.04 kg/m². Chief Complaint Patient presents with  Sinus Infection  
  head congestion, fever temp 100, blowing clear mucus out his nose, sinus pressure, watery eyes since Saturday has been taking sudafed and mucinex Sinus Infection: Pt reports onset of head congestion and sinus pressure since 2/9/19. He admits to low grade fever of 100, runny nose, and watery eyes. Pt has been taking Mucinex and Sudafed. Frontal tenderness, no maxillary tenderness with palpation on exam; post oropharyngeal erythema noted. Advised pt to start on Omnicef 300 mg/BID. Urinary Incontinence: Pt reports increase in urinary frequency and urinary leaking. His prostate was removed 5/2006. He inquires about medications to treat. Advised pt to follow up with urology [facility]) to discuss treatment. Current Outpatient Medications Medication Sig Dispense Refill  cefdinir (OMNICEF) 300 mg capsule Take 1 Cap by mouth two (2) times a day for 10 days. 20 Cap 0  
 amLODIPine (NORVASC) 5 mg tablet TAKE 1 TABLET BY MOUTH EVERY DAY 90 Tab 0  
 ferrous sulfate 325 mg (65 mg iron) tablet Take 1 Tab by mouth two (2) times a day. 60 Tab 5  
 vitamin e (E GEMS) 1,000 unit capsule Take 1,000 Units by mouth daily.  losartan (COZAAR) 100 mg tablet TAKE 1 TABLET BY MOUTH DAILY 90 Tab 1  
 albuterol (PROVENTIL HFA, VENTOLIN HFA, PROAIR HFA) 90 mcg/actuation inhaler Take 1-2 Puffs by inhalation every four (4) hours as needed for Wheezing. 1 Inhaler 5  cyanocobalamin (VITAMIN B-12) 1,000 mcg tablet Take 1,000 mcg by mouth daily.  glucosamine ramirez 2KCl-chondroit 500-400 mg tab Take 1 Tab by mouth.  esomeprazole (NEXIUM) 40 mg capsule TAKE 1 CAPSULE BY MOUTH DAILY (Patient not taking: Reported on 4/27/2018) 90 Cap 1 No Known Allergies Past Medical History:  
Diagnosis Date  Diverticulosis, sigmoid 8/2011  ED (erectile dysfunction) of organic origin  History of prostate cancer  Hypertension  Prostate cancer ([facility] [address]  Sebaceous cyst 4/1/2014 Past Surgical History:  
Procedure Laterality Date  ENDOSCOPY, COLON, DIAGNOSTIC  >= 8-10years ago [address] [id number]  HX CARPAL TUNNEL RELEASE  4/08 right,left  HX CHOLECYSTECTOMY  HX HERNIA REPAIR    
 HX PROSTATECTOMY  5/06 Family History Problem Relation Age of Onset  Cancer Mother   
     pancreatic  Diabetes Father  Stroke Father  Diabetes Sister  Hypertension Sister  Other Sister Open heart surgery  Diabetes Brother  Hypertension Brother  Hypertension Brother  Asthma Daughter  Heart Attack Daughter  Other Daughter   
     hip replacement x 2 Social History Tobacco Use  Smoking status: Never Smoker  Smokeless tobacco: Never Used Substance Use Topics  Alcohol use: No  
  
 
Review of Systems Constitutional: Negative for chills and fever. HENT: Negative for hearing loss and tinnitus. Eyes: Negative for blurred vision and double vision. Respiratory: Negative for shortness of breath. Cardiovascular: Negative for chest pain and palpitations. Gastrointestinal: Negative for nausea and vomiting. Genitourinary: Negative for dysuria and frequency. Musculoskeletal: Negative for back pain and falls. Skin: Negative for itching and rash. Neurological: Negative for dizziness, loss of consciousness and headaches. Psychiatric/Behavioral: Negative for depression. The patient is not nervous/anxious. Physical Exam  
Constitutional: He is oriented to person, place, and time.  He appears well-developed and well-nourished. HENT:  
Head: Normocephalic and atraumatic. Right Ear: External ear normal.  
Left Ear: External ear normal.  
Nose: Nose normal.  
Mouth/Throat: Oropharynx is clear and moist.  
Frontal tenderness, no maxillary tenderness with palpation. Post oropharyngeal erythema, no exudate noted Eyes: Conjunctivae and EOM are normal.  
Neck: Normal range of motion. Neck supple. Cardiovascular: Normal rate, regular rhythm, normal heart sounds and intact distal pulses. Pulmonary/Chest: Effort normal and breath sounds normal.  
Abdominal: Soft. Bowel sounds are normal.  
Genitourinary: Testes normal.  
Musculoskeletal: Normal range of motion. Neurological: He is alert and oriented to person, place, and time. Skin: Skin is warm and dry. Psychiatric: He has a normal mood and affect. His behavior is normal. Judgment and thought content normal.  
Nursing note and vitals reviewed. ASSESSMENT and PLAN Diagnoses and all orders for this visit: 1. Sinusitis, unspecified chronicity, unspecified location Frontal tenderness, no maxillary tenderness with palpation on exam; post oropharyngeal erythema noted. Advised pt to start on Omnicef 300 mg/BID. -     cefdinir (OMNICEF) 300 mg capsule; Take 1 Cap by mouth two (2) times a day for 10 days. 2. Urinary incontinence, unspecified type Advised pt to follow up with urology [facility]) to discuss treatment. 
-     [address] [place] [address] Follow-up Disposition: 
Return if symptoms worsen or fail to improve, for URI. Medication risks/benefits/costs/interactions/alternatives discussed with patient. Advised patient to call back or return to office if symptoms worsen/change/persist.  If patient cannot reach us or should anything more severe/urgent arise he/she should proceed directly to the nearest emergency department. Discussed expected course/resolution/complications of diagnosis in detail with patient. Patient given a written after visit summary which includes her diagnoses, current medications and vitals. Patient expressed understanding with the diagnosis and plan. Written by kellee Jordan, as dictated by Raoul Altamirano M.D. 
 
1:02 PM - 1:30 PM 
Total time spent with the patient 28 minutes, greater than 50% of time spent counseling patient.

## 2019-02-13 NOTE — PATIENT INSTRUCTIONS
Sinusitis: Care Instructions Your Care Instructions Sinusitis is an infection of the lining of the sinus cavities in your head. Sinusitis often follows a cold. It causes pain and pressure in your head and face. In most cases, sinusitis gets better on its own in 1 to 2 weeks. But some mild symptoms may last for several weeks. Sometimes antibiotics are needed. Follow-up care is a key part of your treatment and safety. Be sure to make and go to all appointments, and call your doctor if you are having problems. It's also a good idea to know your test results and keep a list of the medicines you take. How can you care for yourself at home? · Take an over-the-counter pain medicine, such as acetaminophen (Tylenol), ibuprofen (Advil, Motrin), or naproxen (Aleve). Read and follow all instructions on the label. · If the doctor prescribed antibiotics, take them as directed. Do not stop taking them just because you feel better. You need to take the full course of antibiotics. · Be careful when taking over-the-counter cold or flu medicines and Tylenol at the same time. Many of these medicines have acetaminophen, which is Tylenol. Read the labels to make sure that you are not taking more than the recommended dose. Too much acetaminophen (Tylenol) can be harmful. · Breathe warm, moist air from a steamy shower, a hot bath, or a sink filled with hot water. Avoid cold, dry air. Using a humidifier in your home may help. Follow the directions for cleaning the machine. · Use saline (saltwater) nasal washes to help keep your nasal passages open and wash out mucus and bacteria. You can buy saline nose drops at a grocery store or drugstore. Or you can make your own at home by adding 1 teaspoon of salt and 1 teaspoon of baking soda to 2 cups of distilled water. If you make your own, fill a bulb syringe with the solution, insert the tip into your nostril, and squeeze gently. Cristin Alonzo your nose. · Put a hot, wet towel or a warm gel pack on your face 3 or 4 times a day for 5 to 10 minutes each time. · Try a decongestant nasal spray like oxymetazoline (Afrin). Do not use it for more than 3 days in a row. Using it for more than 3 days can make your congestion worse. When should you call for help? Call your doctor now or seek immediate medical care if: 
  · You have new or worse swelling or redness in your face or around your eyes.  
  · You have a new or higher fever.  
 Watch closely for changes in your health, and be sure to contact your doctor if: 
  · You have new or worse facial pain.  
  · The mucus from your nose becomes thicker (like pus) or has new blood in it.  
  · You are not getting better as expected. Where can you learn more? Go to http://marques-mei.info/. Enter Q931 in the search box to learn more about \"Sinusitis: Care Instructions. \" Current as of: March 27, 2018 Content Version: 11.9 © 1635-1775 [employer]. Care instructions adapted under license by [employer] (which disclaims liability or warranty for this information). If you have questions about a medical condition or this instruction, always ask your healthcare professional. [address] any warranty or liability for your use of this information.

## 2019-02-13 NOTE — PROGRESS NOTES
Chief Complaint Patient presents with  Sinus Infection  
  head congestion, fever temp 100, blowing clear mucus out his nose, sinus pressure, watery eyes since Saturday has been taking sudafed and mucinex Spoke to patient Identified pt with two pt identifiers (Name @ ) 1. Have you been to the ER, urgent care clinic since your last visit? Hospitalized since your last visit? NO 
 
2. Have you seen or consulted any other health care providers outside of the [address] [place] [address] since your last visit? Include any pap smears or colon screening. NO 
 
\"REVIEWED RECORD IN PREPARATION FOR VISIT AND HAVE OBTAINED NECESSARY DOCUMENTATION\"

## 2019-02-25 ENCOUNTER — TELEPHONE (OUTPATIENT)
Dept: FAMILY MEDICINE CLINIC | Age: 84
End: 2019-02-25

## 2019-02-25 ENCOUNTER — OFFICE VISIT (OUTPATIENT)
Dept: FAMILY MEDICINE CLINIC | Age: 84
End: 2019-02-25

## 2019-02-25 VITALS
OXYGEN SATURATION: 95 % | BODY MASS INDEX: 29.25 KG/M2 | SYSTOLIC BLOOD PRESSURE: 120 MMHG | HEART RATE: 65 BPM | TEMPERATURE: 97.8 F | DIASTOLIC BLOOD PRESSURE: 82 MMHG | RESPIRATION RATE: 16 BRPM | HEIGHT: 68 IN | WEIGHT: 193 LBS

## 2019-02-25 DIAGNOSIS — B02.9 HERPES ZOSTER WITHOUT COMPLICATION: Primary | ICD-10-CM

## 2019-02-25 RX ORDER — VALACYCLOVIR HYDROCHLORIDE 1 G/1
TABLET, FILM COATED ORAL
Refills: 0 | Status: ON HOLD | COMMUNITY
Start: 2019-02-23 | End: 2019-05-30

## 2019-02-25 RX ORDER — GABAPENTIN 100 MG/1
100 CAPSULE ORAL 3 TIMES DAILY
Qty: 90 CAP | Refills: 1 | Status: SHIPPED | OUTPATIENT
Start: 2019-02-25 | End: 2019-03-13 | Stop reason: SDUPTHER

## 2019-02-25 RX ORDER — LOSARTAN POTASSIUM 50 MG/1
TABLET ORAL
Refills: 1 | COMMUNITY
Start: 2019-02-01 | End: 2019-05-01 | Stop reason: SDUPTHER

## 2019-02-25 NOTE — PROGRESS NOTES
HPI  Sam Chin 82 y.o. male  presents to the office today for shingles outbreak. Blood pressure 120/82, pulse 65, temperature 97.8 °F (36.6 °C), temperature source Oral, resp. rate 16, height 5' 8\" (1.727 m), weight 193 lb (87.5 kg), SpO2 95 %. Body mass index is 29.35 kg/m². Chief Complaint   Patient presents with    Arm Pain     left arm and elbow. symptoms began on Friday 2/22/19     Rash     redness and pain , sores in palm of hand     Shingles     was seen at [facility] diagnosed with shingles       Shingles: Pt reports he noticed pain and vesicular lesions on 2/22/19. Pt was seen at [facility] and started on Valtrex 1g/TID. He was not put on anything for the pain. He notes his daughter gave him Gabapentin and it has been helping to tolerate the pain. Instructed pt to cover lesions while around other people. In addition he will finish his course of Valtrex 1g/TID and he will start on Gabapentin 100 mg/TID. Health Maintenance: Pt notes his cough and congestion has gotten better. He notes residual dry cough, but he is tolerating. He notes he has been able to exercise by playing handball. Current Outpatient Medications   Medication Sig Dispense Refill    valACYclovir (VALTREX) 1 gram tablet TK 1 T PO TID FOR 7 DAYS  0    losartan (COZAAR) 50 mg tablet TK 1 T PO QD  1    gabapentin (NEURONTIN) 100 mg capsule Take 1 Cap by mouth three (3) times daily. 90 Cap 1    amLODIPine (NORVASC) 5 mg tablet TAKE 1 TABLET BY MOUTH EVERY DAY 90 Tab 0    glucosamine ramirez 2KCl-chondroit 500-400 mg tab Take 1 Tab by mouth.  ferrous sulfate 325 mg (65 mg iron) tablet Take 1 Tab by mouth two (2) times a day. 60 Tab 5    losartan (COZAAR) 100 mg tablet TAKE 1 TABLET BY MOUTH DAILY 90 Tab 1    albuterol (PROVENTIL HFA, VENTOLIN HFA, PROAIR HFA) 90 mcg/actuation inhaler Take 1-2 Puffs by inhalation every four (4) hours as needed for Wheezing.  1 Inhaler 5    cyanocobalamin (VITAMIN B-12) 1,000 mcg tablet Take 1,000 mcg by mouth daily.  vitamin e (E GEMS) 1,000 unit capsule Take 1,000 Units by mouth daily.  esomeprazole (NEXIUM) 40 mg capsule TAKE 1 CAPSULE BY MOUTH DAILY (Patient not taking: Reported on 4/27/2018) 90 Cap 1     No Known Allergies  Past Medical History:   Diagnosis Date    Diverticulosis, sigmoid 8/2011    ED (erectile dysfunction) of organic origin     History of prostate cancer     Hypertension     Prostate cancer ([facility] [address]     Sebaceous cyst 4/1/2014     Past Surgical History:   Procedure Laterality Date    ENDOSCOPY, COLON, DIAGNOSTIC  >= 8-10years ago   [address] SURGERY  1968 [id number]    HX CARPAL TUNNEL RELEASE  4/08    right,left    HX CHOLECYSTECTOMY      HX HERNIA REPAIR      HX PROSTATECTOMY  5/06     Family History   Problem Relation Age of Onset    Cancer Mother         pancreatic    Diabetes Father     Stroke Father     Diabetes Sister     Hypertension Sister     Other Sister         Open heart surgery     Diabetes Brother     Hypertension Brother     Hypertension Brother     Asthma Daughter     Heart Attack Daughter     Other Daughter         hip replacement x 2     Social History     Tobacco Use    Smoking status: Never Smoker    Smokeless tobacco: Never Used   Substance Use Topics    Alcohol use: No        Review of Systems   Constitutional: Negative for chills and fever. HENT: Negative for hearing loss and tinnitus. Eyes: Negative for blurred vision and double vision. Respiratory: Positive for cough. Negative for shortness of breath. Cardiovascular: Negative for chest pain and palpitations. Gastrointestinal: Negative for nausea and vomiting. Genitourinary: Negative for dysuria and frequency. Musculoskeletal: Negative for back pain and falls. Skin: Positive for itching and rash. Neurological: Positive for tingling. Negative for dizziness, loss of consciousness and headaches.    Psychiatric/Behavioral: Negative for depression. The patient is not nervous/anxious. Physical Exam   Constitutional: He is oriented to person, place, and time. He appears well-developed and well-nourished. HENT:   Head: Normocephalic and atraumatic. Right Ear: External ear normal.   Left Ear: External ear normal.   Nose: Nose normal.   Mouth/Throat: Oropharynx is clear and moist.   Eyes: Conjunctivae and EOM are normal.   Neck: Normal range of motion. Neck supple. Cardiovascular: Normal rate, regular rhythm, normal heart sounds and intact distal pulses. Pulmonary/Chest: Effort normal and breath sounds normal.   Abdominal: Soft. Bowel sounds are normal.   Genitourinary: Testes normal.   Musculoskeletal: Normal range of motion. Neurological: He is alert and oriented to person, place, and time. Skin: Skin is warm and dry. Vesicular, erythematous lesions on lateral aspect of left arm and palm of wrist.   Psychiatric: He has a normal mood and affect. His behavior is normal. Judgment and thought content normal.   Nursing note and vitals reviewed. ASSESSMENT and PLAN  Diagnoses and all orders for this visit:    1. Herpes zoster without complication  Instructed pt to cover lesions while around other people. In addition he will finish his course of Valtrex 1g/TID and he will start on Gabapentin 100 mg/TID. -     gabapentin (NEURONTIN) 100 mg capsule; Take 1 Cap by mouth three (3) times daily. Follow-up Disposition:  Return in about 4 weeks (around 3/25/2019). Medication risks/benefits/costs/interactions/alternatives discussed with patient. Advised patient to call back or return to office if symptoms worsen/change/persist.  If patient cannot reach us or should anything more severe/urgent arise he/she should proceed directly to the nearest emergency department. Discussed expected course/resolution/complications of diagnosis in detail with patient.     Patient given a written after visit summary which includes her diagnoses, current medications and vitals. Patient expressed understanding with the diagnosis and plan. Written by kellee Henson, as dictated by Ines Lane M.D.    6:56 PM - 7:06 PM    Total time spent with the patient 10 minutes, greater than 50% of time spent counseling patient.

## 2019-02-25 NOTE — TELEPHONE ENCOUNTER
[phone] spoke to patient notified per Dr Augustus Wisdom ok to be seen today at 5:30PM patient understand

## 2019-02-25 NOTE — PROGRESS NOTES
Chief Complaint   Patient presents with    Arm Pain     left arm and elbow. symptoms began on Friday 2/22/19     Rash     redness and pain , sores in palm of hand     Shingles     was seen at [facility] diagnosed with shingles        1. Have you been to the ER, urgent care clinic since your last visit? Yes, [facility] for Rash . Hospitalized since your last visit? No    2. Have you seen or consulted any other health care providers outside of the [address] [place] [address] since your last visit? Include any pap smears or colon screening.  No

## 2019-02-25 NOTE — TELEPHONE ENCOUNTER
----- Message from Milagros Damon sent at 2/25/2019 12:21 PM EST -----  Regarding: /Telephone   Pt requesting a call back regarding developing \"shingles\" on upper left side of body.  Best [phone]

## 2019-02-26 NOTE — PROGRESS NOTES
This is the Subsequent Medicare Annual Wellness Exam, performed 12 months or more after the Initial AWV or the last Subsequent AWV I have reviewed the patient's medical history in detail and updated the computerized patient record. History Past Medical History:  
Diagnosis Date  Diverticulosis, sigmoid 8/2011  ED (erectile dysfunction) of organic origin  History of prostate cancer  Hypertension  Prostate cancer ([facility] [address]  Sebaceous cyst 4/1/2014 Past Surgical History:  
Procedure Laterality Date  ENDOSCOPY, COLON, DIAGNOSTIC  >= 8-10years ago [address] [facility] [address] [id number]  HX CARPAL TUNNEL RELEASE  4/08 right,left  HX CHOLECYSTECTOMY  HX HERNIA REPAIR    
 HX PROSTATECTOMY  5/06 Current Outpatient Medications Medication Sig Dispense Refill  valACYclovir (VALTREX) 1 gram tablet TK 1 T PO TID FOR 7 DAYS  0  
 losartan (COZAAR) 50 mg tablet TK 1 T PO QD  1  
 gabapentin (NEURONTIN) 100 mg capsule Take 1 Cap by mouth three (3) times daily. 90 Cap 1  
 amLODIPine (NORVASC) 5 mg tablet TAKE 1 TABLET BY MOUTH EVERY DAY 90 Tab 0  
 glucosamine ramirez 2KCl-chondroit 500-400 mg tab Take 1 Tab by mouth.  ferrous sulfate 325 mg (65 mg iron) tablet Take 1 Tab by mouth two (2) times a day. 60 Tab 5  
 losartan (COZAAR) 100 mg tablet TAKE 1 TABLET BY MOUTH DAILY 90 Tab 1  
 albuterol (PROVENTIL HFA, VENTOLIN HFA, PROAIR HFA) 90 mcg/actuation inhaler Take 1-2 Puffs by inhalation every four (4) hours as needed for Wheezing. 1 Inhaler 5  cyanocobalamin (VITAMIN B-12) 1,000 mcg tablet Take 1,000 mcg by mouth daily.  vitamin e (E GEMS) 1,000 unit capsule Take 1,000 Units by mouth daily.  esomeprazole (NEXIUM) 40 mg capsule TAKE 1 CAPSULE BY MOUTH DAILY (Patient not taking: Reported on 4/27/2018) 90 Cap 1 No Known Allergies Family History Problem Relation Age of Onset  Cancer Mother   
     pancreatic  Diabetes Father  Stroke Father  Diabetes Sister  Hypertension Sister  Other Sister Open heart surgery  Diabetes Brother  Hypertension Brother  Hypertension Brother  Asthma Daughter  Heart Attack Daughter  Other Daughter   
     hip replacement x 2 Social History Tobacco Use  Smoking status: Never Smoker  Smokeless tobacco: Never Used Substance Use Topics  Alcohol use: No  
 
Patient Active Problem List  
Diagnosis Code  Hypertension I10  
 ED (erectile dysfunction) of organic origin N52.9  Diverticulosis, sigmoid K57.30  
 History of prostate cancer Z85.46  
 B12 deficiency E53.8  Sebaceous cyst L72.3  Advanced care planning/counseling discussion Z71.89  
 Gastroesophageal reflux disease without esophagitis K21.9 Depression Risk Factor Screening:  
 
3 most recent PHQ Screens 2019 Little interest or pleasure in doing things Not at all Feeling down, depressed, irritable, or hopeless Not at all Total Score PHQ 2 0 Alcohol Risk Factor Screening:  
{screenin} Functional Ability and Level of Safety:  
Hearing Loss {Desc; hearing loss:79117::\"Hearing is good. \"} Activities of Daily Living The home contains: {AWV Home BQMVUP:30695::\"TW safety equipment. \"} 
{Functional ADL's:48129::\"Patient does total self care\"} Fall Risk Fall Risk Assessment, last 12 mths 2019 Able to walk? Yes Fall in past 12 months? No  
 
 
Abuse Screen 
{Abuse Screen:::\"Patient is not abused\"} Cognitive Screening Evaluation of Cognitive Function: 
Has your family/caregiver stated any concerns about your memory: {AWV no/yes:57651::\"no\"} {Cognitive Screenin::\"Normal\"} Patient Care Team  
Patient Care Team: 
Yaa Abarca MD as PCP - [facility]) Assessment/Plan Education and counseling provided: 
{Education List, choose as appropriate:::\"Are appropriate based on today's review and evaluation\"} Diagnoses and all orders for this visit: 1. Herpes zoster without complication 
-     gabapentin (NEURONTIN) 100 mg capsule; Take 1 Cap by mouth three (3) times daily. Health Maintenance Due Topic Date Due  Shingrix Vaccine Age 50> (1 of 2) 12/12/1987  GLAUCOMA SCREENING Q2Y  09/15/2017  
 DTaP/Tdap/Td series (2 - Tdap) 02/14/2019 This is the Subsequent Medicare Annual Wellness Exam, performed 12 months or more after the Initial AWV or the last Subsequent AWV I have reviewed the patient's medical history in detail and updated the computerized patient record. History Past Medical History:  
Diagnosis Date  Diverticulosis, sigmoid 8/2011  ED (erectile dysfunction) of organic origin  History of prostate cancer  Hypertension  Prostate cancer ([facility] [address]  Sebaceous cyst 4/1/2014 Past Surgical History:  
Procedure Laterality Date  ENDOSCOPY, COLON, DIAGNOSTIC  >= 8-10years ago [address] [place] [id number]  HX CARPAL TUNNEL RELEASE  4/08 right,left  HX CHOLECYSTECTOMY  HX HERNIA REPAIR    
 HX PROSTATECTOMY  5/06 Current Outpatient Medications Medication Sig Dispense Refill  valACYclovir (VALTREX) 1 gram tablet TK 1 T PO TID FOR 7 DAYS  0  
 losartan (COZAAR) 50 mg tablet TK 1 T PO QD  1  
 gabapentin (NEURONTIN) 100 mg capsule Take 1 Cap by mouth three (3) times daily. 90 Cap 1  
 amLODIPine (NORVASC) 5 mg tablet TAKE 1 TABLET BY MOUTH EVERY DAY 90 Tab 0  
 glucosamine ramirez 2KCl-chondroit 500-400 mg tab Take 1 Tab by mouth.  ferrous sulfate 325 mg (65 mg iron) tablet Take 1 Tab by mouth two (2) times a day. 60 Tab 5  
 losartan (COZAAR) 100 mg tablet TAKE 1 TABLET BY MOUTH DAILY 90 Tab 1  
 albuterol (PROVENTIL HFA, VENTOLIN HFA, PROAIR HFA) 90 mcg/actuation inhaler Take 1-2 Puffs by inhalation every four (4) hours as needed for Wheezing. 1 Inhaler 5  cyanocobalamin (VITAMIN B-12) 1,000 mcg tablet Take 1,000 mcg by mouth daily.    
 vitamin e (E GEMS) 1,000 unit capsule Take 1,000 Units by mouth daily.  esomeprazole (NEXIUM) 40 mg capsule TAKE 1 CAPSULE BY MOUTH DAILY (Patient not taking: Reported on 2018) 90 Cap 1 No Known Allergies Family History Problem Relation Age of Onset  Cancer Mother   
     pancreatic  Diabetes Father  Stroke Father  Diabetes Sister  Hypertension Sister  Other Sister Open heart surgery  Diabetes Brother  Hypertension Brother  Hypertension Brother  Asthma Daughter  Heart Attack Daughter  Other Daughter   
     hip replacement x 2 Social History Tobacco Use  Smoking status: Never Smoker  Smokeless tobacco: Never Used Substance Use Topics  Alcohol use: No  
 
Patient Active Problem List  
Diagnosis Code  Hypertension I10  
 ED (erectile dysfunction) of organic origin N52.9  Diverticulosis, sigmoid K57.30  
 History of prostate cancer Z85.46  
 B12 deficiency E53.8  Sebaceous cyst L72.3  Advanced care planning/counseling discussion Z71.89  
 Gastroesophageal reflux disease without esophagitis K21.9 Depression Risk Factor Screening:  
 
3 most recent PHQ Screens 2019 Little interest or pleasure in doing things Not at all Feeling down, depressed, irritable, or hopeless Not at all Total Score PHQ 2 0 Alcohol Risk Factor Screening:  
{screenin} Functional Ability and Level of Safety:  
Hearing Loss {Desc; hearing loss:59649::\"Hearing is good. \"} Activities of Daily Living The home contains: [facility]::\"LM safety equipment. \"} 
{Functional ADL's:71331::\"Patient does total self care\"} Fall Risk Fall Risk Assessment, last 12 mths 2019 Able to walk? Yes Fall in past 12 months? No  
 
 
Abuse Screen 
{Abuse Screen:::\"Patient is not abused\"} Cognitive Screening Evaluation of Cognitive Function: Has your family/caregiver stated any concerns about your memory: {AWV no/yes:47772::\"no\"} {Cognitive Screenin::\"Normal\"} Patient Care Team  
Patient Care Team: 
Nidhi Thomas MD as PCP - [facility]) Assessment/Plan Education and counseling provided: 
{Education List, choose as appropriate:76888::\"Are appropriate based on today's review and evaluation\"} Diagnoses and all orders for this visit: 1. Herpes zoster without complication 
-     gabapentin (NEURONTIN) 100 mg capsule; Take 1 Cap by mouth three (3) times daily. Health Maintenance Due Topic Date Due  Shingrix Vaccine Age 50> (1 of 2) 1987  GLAUCOMA SCREENING Q2Y  09/15/2017  
 DTaP/Tdap/Td series (2 - Tdap) 2019

## 2019-02-26 NOTE — PATIENT INSTRUCTIONS
Shingles: Care Instructions  Your Care Instructions    Shingles (herpes zoster) causes pain and a blistered rash. The rash can appear anywhere on the body but will be on only one side of the body, the left or right. It will be in a band, a strip, or a small area. The pain can be very severe. Shingles can also cause tingling or itching in the area of the rash. The blisters scab over after a few days and heal in 2 to 4 weeks. Medicines can help you feel better and may help prevent more serious problems caused by shingles. Shingles is caused by the same virus that causes chickenpox. When you have chickenpox, the virus gets into your nerve roots and stays there (becomes dormant) long after you get over the chickenpox. If the virus becomes active again, it can cause shingles. Follow-up care is a key part of your treatment and safety. Be sure to make and go to all appointments, and call your doctor if you are having problems. It's also a good idea to know your test results and keep a list of the medicines you take. How can you care for yourself at home? · Be safe with medicines. Take your medicines exactly as prescribed. Call your doctor if you think you are having a problem with your medicine. Antiviral medicine helps you get better faster. · Try not to scratch or pick at the blisters. They will crust over and fall off on their own if you leave them alone. · Put cool, wet cloths on the area to relieve pain and itching. You can also use calamine lotion. Try not to use so much lotion that it cakes and is hard to get off. · Put cornstarch or baking soda on the sores to help dry them out so they heal faster. · Do not use thick ointment, such as petroleum jelly, on the sores. This will keep them from drying and healing. · To help remove loose crusts, soak them in tap water. This can help decrease oozing, and dry and soothe the skin.   · Take an over-the-counter pain medicine, such as acetaminophen (Tylenol), ibuprofen (Advil, Motrin), or naproxen (Aleve). Read and follow all instructions on the label. · Avoid close contact with people until the blisters have healed. It is very important for you to avoid contact with anyone who has never had chickenpox or the chickenpox vaccine. Pregnant women, young babies, and anyone else who has a hard time fighting infection (such as someone with HIV, diabetes, or cancer) is especially at risk. When should you call for help? Call your doctor now or seek immediate medical care if:    · You have a new or higher fever.     · You have a severe headache and a stiff neck.     · You lose the ability to think clearly.     · The rash spreads to your forehead, nose, eyes, or eyelids.     · You have eye pain, or your vision gets worse.     · You have new pain in your face, or you cannot move the muscles in your face.     · Blisters spread to new parts of your body.    Watch closely for changes in your health, and be sure to contact your doctor if:    · The rash has not healed after 2 to 4 weeks.     · You still have pain after the rash has healed. Where can you learn more? Go to http://marquesKloudlessmei.info/. Iglesia Barron in the search box to learn more about \"Shingles: Care Instructions. \"  Current as of: July 30, 2018  Content Version: 11.9  © 6813-6289 [employer]. Care instructions adapted under license by [employer] (which disclaims liability or warranty for this information). If you have questions about a medical condition or this instruction, always ask your healthcare professional. [place] [address] any warranty or liability for your use of this information. Influenza (Flu): Care Instructions  Your Care Instructions    Influenza (flu) is an infection in the lungs and breathing passages. It is caused by the influenza virus. There are different strains, or types, of the flu virus from year to year.  Unlike the common cold, the flu comes on suddenly and the symptoms, such as a cough, congestion, fever, chills, fatigue, aches, and pains, are more severe. These symptoms may last up to 10 days. Although the flu can make you feel very sick, it usually doesn't cause serious health problems. Home treatment is usually all you need for flu symptoms. But your doctor may prescribe antiviral medicine to prevent other health problems, such as pneumonia, from developing. Older people and those who have a long-term health condition, such as lung disease, are most at risk for having pneumonia or other health problems. Follow-up care is a key part of your treatment and safety. Be sure to make and go to all appointments, and call your doctor if you are having problems. It's also a good idea to know your test results and keep a list of the medicines you take. How can you care for yourself at home? · Get plenty of rest.  · Drink plenty of fluids, enough so that your urine is light yellow or clear like water. If you have kidney, heart, or liver disease and have to limit fluids, talk with your doctor before you increase the amount of fluids you drink. · Take an over-the-counter pain medicine if needed, such as acetaminophen (Tylenol), ibuprofen (Advil, Motrin), or naproxen (Aleve), to relieve fever, headache, and muscle aches. Read and follow all instructions on the label. No one younger than 20 should take aspirin. It has been linked to Reye syndrome, a serious illness. · Do not smoke. Smoking can make the flu worse. If you need help quitting, talk to your doctor about stop-smoking programs and medicines. These can increase your chances of quitting for good. · Breathe moist air from a hot shower or from a sink filled with hot water to help clear a stuffy nose. · Before you use cough and cold medicines, check the label. These medicines may not be safe for young children or for people with certain health problems.   · If the skin around your nose and lips becomes sore, put some petroleum jelly on the area. · To ease coughing:  ? Drink fluids to soothe a scratchy throat. ? Suck on cough drops or plain hard candy. ? Take an over-the-counter cough medicine that contains dextromethorphan to help you get some sleep. Read and follow all instructions on the label. ? Raise your head at night with an extra pillow. This may help you rest if coughing keeps you awake. · Take any prescribed medicine exactly as directed. Call your doctor if you think you are having a problem with your medicine. To avoid spreading the flu  · Wash your hands regularly, and keep your hands away from your face. · Stay home from school, work, and other public places until you are feeling better and your fever has been gone for at least 24 hours. The fever needs to have gone away on its own without the help of medicine. · Ask people living with you to talk to their doctors about preventing the flu. They may get antiviral medicine to keep from getting the flu from you. · To prevent the flu in the future, get a flu vaccine every fall. Encourage people living with you to get the vaccine. · Cover your mouth when you cough or sneeze. When should you call for help? Call 911 anytime you think you may need emergency care. For example, call if:    · You have severe trouble breathing.    Call your doctor now or seek immediate medical care if:    · You have new or worse trouble breathing.     · You seem to be getting much sicker.     · You feel very sleepy or confused.     · You have a new or higher fever.     · You get a new rash.    Watch closely for changes in your health, and be sure to contact your doctor if:    · You begin to get better and then get worse.     · You are not getting better after 1 week. Where can you learn more? Go to http://marques-mei.info/. Enter D276 in the search box to learn more about \"Influenza (Flu): Care Instructions. \"  Current as of: September 5, 2018  Content Version: 11.9  © 8371-0745 [employer]. Care instructions adapted under license by [employer] (which disclaims liability or warranty for this information). If you have questions about a medical condition or this instruction, always ask your healthcare professional. [address] any warranty or liability for your use of this information.

## 2019-02-27 NOTE — PROGRESS NOTES
Felton Ceron is a 82 y.o. male and presents for annual Medicare Wellness Visit. Problem List: Reviewed with patient and discussed risk factors. Patient Active Problem List  
Diagnosis Code  Hypertension I10  
 ED (erectile dysfunction) of organic origin N52.9  Diverticulosis, sigmoid K57.30  
 History of prostate cancer Z85.46  
 B12 deficiency E53.8  Sebaceous cyst L72.3  Advanced care planning/counseling discussion Z71.89  
 Gastroesophageal reflux disease without esophagitis K21.9 Current medical providers:  Patient Care Team: 
Michoacano Alonso MD as PCP - General (Family Practice) PSH: Reviewed with patient Past Surgical History:  
Procedure Laterality Date  ENDOSCOPY, COLON, DIAGNOSTIC  >= 8-10years ago [address] [id number]  HX CARPAL TUNNEL RELEASE  4/08 right,left  HX CHOLECYSTECTOMY  HX HERNIA REPAIR    
 HX PROSTATECTOMY  5/06 SH: Reviewed with patient Social History Tobacco Use  Smoking status: Never Smoker  Smokeless tobacco: Never Used Substance Use Topics  Alcohol use: No  
 Drug use: No  
 
 
FH: Reviewed with patient Family History Problem Relation Age of Onset  Cancer Mother   
     pancreatic  Diabetes Father  Stroke Father  Diabetes Sister  Hypertension Sister  Other Sister Open heart surgery  Diabetes Brother  Hypertension Brother  Hypertension Brother  Asthma Daughter  Heart Attack Daughter  Other Daughter   
     hip replacement x 2 Medications/Allergies: Reviewed with patient Current Outpatient Medications on File Prior to Visit Medication Sig Dispense Refill  valACYclovir (VALTREX) 1 gram tablet TK 1 T PO TID FOR 7 DAYS  0  
 losartan (COZAAR) 50 mg tablet TK 1 T PO QD  1  
 amLODIPine (NORVASC) 5 mg tablet TAKE 1 TABLET BY MOUTH EVERY DAY 90 Tab 0  
 glucosamine ramirez 2KCl-chondroit 500-400 mg tab Take 1 Tab by mouth.  ferrous sulfate 325 mg (65 mg iron) tablet Take 1 Tab by mouth two (2) times a day. 60 Tab 5  
 losartan (COZAAR) 100 mg tablet TAKE 1 TABLET BY MOUTH DAILY 90 Tab 1  
 albuterol (PROVENTIL HFA, VENTOLIN HFA, PROAIR HFA) 90 mcg/actuation inhaler Take 1-2 Puffs by inhalation every four (4) hours as needed for Wheezing. 1 Inhaler 5  cyanocobalamin (VITAMIN B-12) 1,000 mcg tablet Take 1,000 mcg by mouth daily.  vitamin e (E GEMS) 1,000 unit capsule Take 1,000 Units by mouth daily.  esomeprazole (NEXIUM) 40 mg capsule TAKE 1 CAPSULE BY MOUTH DAILY (Patient not taking: Reported on 4/27/2018) 90 Cap 1 No current facility-administered medications on file prior to visit. No Known Allergies Objective: 
Visit Vitals /82 (BP 1 Location: Right arm, BP Patient Position: Sitting) Pulse 65 Temp 97.8 °F (36.6 °C) (Oral) Resp 16 Ht 5' 8\" (1.727 m) Wt 193 lb (87.5 kg) SpO2 95% BMI 29.35 kg/m² Body mass index is 29.35 kg/m². Assessment of cognitive impairment: Alert and oriented x *** Depression Screen:  
3 most recent PHQ Screens 2/13/2019 Little interest or pleasure in doing things Not at all Feeling down, depressed, irritable, or hopeless Not at all Total Score PHQ 2 0 Fall Risk Assessment:   
Fall Risk Assessment, last 12 mths 2/13/2019 Able to walk? Yes Fall in past 12 months? No  
 
 
Functional Ability:  
Does the patient exhibit a steady gait? {gen no default/yes/free text:784160::\"yes\"} How long did it take the patient to get up and walk from a sitting position? *** Is the patient self reliant?  (ie can do own laundry, meals, household chores)  {gen no default/yes/free text:003174::\"yes\"} Does the patient handle his/her own medications? {gen no default/yes/free text:893754::\"yes\"} Does the patient handle his/her own money? {gen no default/yes/free text:492251::\"yes\"} Is the patients home safe (ie good lighting, handrails on stairs and bath, etc.)? {gen no default/yes/free text:751985::\"yes\"} Did you notice or did patient express any hearing difficulties? {gen no default/yes/free text:030839::\"yes\"} Did you notice or did patient express any vision difficulties? {gen no default/yes/free text:136219::\"no\"} Were distance and reading eye charts used? {gen no default/yes/free text:547124::\"yes\"} Advance Care Planning:  
Patient was offered the opportunity to discuss advance care planning:  {gen no default/yes/free text:222933::\"yes\"} Does patient have an Advance Directive:  {gen no default/yes/free text:848811::\"yes\"} If no, did you provide information on [facility] [employer]ctions? {gen no default/yes/free text:316932::\"yes\"} Plan:   
 
Orders Placed This Encounter  valACYclovir (VALTREX) 1 gram tablet  losartan (COZAAR) 50 mg tablet  gabapentin (NEURONTIN) 100 mg capsule Health Maintenance Topic Date Due  Shingrix Vaccine Age 50> (1 of 2) 12/12/1987  GLAUCOMA SCREENING Q2Y  09/15/2017  
 DTaP/Tdap/Td series (2 - Tdap) 02/14/2019  MEDICARE YEARLY EXAM  04/03/2019  COLONOSCOPY  03/18/2020  Pneumococcal 65+ Low/Medium Risk  Completed  Influenza Age 5 to Adult  Completed *Patient verbalized understanding and agreement with the plan. A copy of the After Visit Summary with personalized health plan was given to the patient today.

## 2019-03-13 ENCOUNTER — OFFICE VISIT (OUTPATIENT)
Dept: FAMILY MEDICINE CLINIC | Age: 84
End: 2019-03-13

## 2019-03-13 VITALS
HEART RATE: 63 BPM | OXYGEN SATURATION: 96 % | HEIGHT: 68 IN | WEIGHT: 189 LBS | DIASTOLIC BLOOD PRESSURE: 78 MMHG | BODY MASS INDEX: 28.64 KG/M2 | TEMPERATURE: 97.9 F | SYSTOLIC BLOOD PRESSURE: 132 MMHG | RESPIRATION RATE: 18 BRPM

## 2019-03-13 DIAGNOSIS — B02.9 HERPES ZOSTER WITHOUT COMPLICATION: ICD-10-CM

## 2019-03-13 DIAGNOSIS — L30.9 DERMATITIS: ICD-10-CM

## 2019-03-13 DIAGNOSIS — R29.898 LEFT HAND WEAKNESS: Primary | ICD-10-CM

## 2019-03-13 RX ORDER — GABAPENTIN 100 MG/1
100-300 CAPSULE ORAL 3 TIMES DAILY
Qty: 270 CAP | Refills: 1 | Status: SHIPPED | OUTPATIENT
Start: 2019-03-13 | End: 2019-04-03 | Stop reason: DRUGHIGH

## 2019-03-13 NOTE — PATIENT INSTRUCTIONS
Neuropathic Pain: Care Instructions  Your Care Instructions    Neuropathic pain is caused by pressure on or damage to your nerves. It's often simply called nerve pain. Some people feel this type of pain all the time. For others, it comes and goes. Diabetes, shingles, or an injury can cause nerve pain. Many people say the pain feels sharp, burning, or stabbing. But some people feel it as a dull ache. In some cases, it makes your skin very sensitive. So touch, pressure, and other sensations that did not hurt before may now cause pain. It's important to know that this kind of pain is real and can affect your quality of life. It's also important to know that treatment can help. Treatment includes pain medicines, exercise, and physical therapy. Medicines can help reduce the number of pain signals that travel over the nerves. This can make the painful areas less sensitive. It can also help you sleep better and improve your mood. But medicines are only one part of successful treatment. Most people do best with more than one kind of treatment. Your doctor may recommend that you try cognitive-behavioral therapy and stress management. Or, if needed, you may decide to try to quit smoking, lower your blood pressure, or better control blood sugar. These kinds of healthy changes can also make a difference. If you feel that your treatment is not working, talk to your doctor. And be sure to tell your doctor if you think you might be depressed or anxious. These are common problems that can also be treated. Follow-up care is a key part of your treatment and safety. Be sure to make and go to all appointments, and call your doctor if you are having problems. It's also a good idea to know your test results and keep a list of the medicines you take. How can you care for yourself at home? · Be safe with medicines. Read and follow all instructions on the label.   ? If the doctor gave you a prescription medicine for pain, take it as prescribed. ? If you are not taking a prescription pain medicine, ask your doctor if you can take an over-the-counter medicine. · Save hard tasks for days when you have less pain. Follow a hard task with an easy task. And remember to take breaks. · Relax, and reduce stress. You may want to try deep breathing or meditation. These can help. · Keep moving. Gentle, daily exercise can help reduce pain. Your doctor or physical therapist can tell you what type of exercise is best for you. This may include walking, swimming, and stationary biking. It may also include stretches and range-of-motion exercises. · Try heat, cold packs, and massage. · Get enough sleep. Constant pain can make you more tired. If the pain makes it hard to sleep, talk with your doctor. · Think positively. Your thoughts can affect your pain. Do fun things to distract yourself from the pain. See a movie, read a book, listen to music, or spend time with a friend. · Keep a pain diary. Try to write down how strong your pain is and what it feels like. Also try to notice and write down how your moods, thoughts, sleep, activities, and medicine affect your pain. These notes can help you and your doctor find the best ways to treat your pain. Reducing constipation caused by pain medicine  Pain medicines often cause constipation. To reduce constipation:  · Include fruits, vegetables, beans, and whole grains in your diet each day. These foods are high in fiber. · Drink plenty of fluids, enough so that your urine is light yellow or clear like water. If you have kidney, heart, or liver disease and have to limit fluids, talk with your doctor before you increase the amount of fluids you drink. · Get some exercise every day. Build up slowly to 30 to 60 minutes a day on 5 or more days of the week. · Take a fiber supplement, such as Citrucel or Metamucil, every day if needed. Read and follow all instructions on the label.   · Schedule time each day for a bowel movement. Having a daily routine may help. Take your time and do not strain when having a bowel movement. · Ask your doctor about a laxative. The goal is to have one easy bowel movement every 1 to 2 days. Do not let constipation go untreated for more than 3 days. When should you call for help? Call your doctor now or seek immediate medical care if:    · You feel sad, anxious, or hopeless for more than a few days. This could mean you are depressed. Depression is common in people who have a lot of pain. But it can be treated.     · You have trouble with bowel movements, such as:  ? No bowel movement in 3 days. ? Blood in the anal area, in your stool, or on the toilet paper. ? Diarrhea for more than 24 hours.    Watch closely for changes in your health, and be sure to contact your doctor if:    · Your pain is getting worse.     · You can't sleep because of pain.     · You are very worried or anxious about your pain.     · You have trouble taking your pain medicine.     · You have any concerns about your pain medicine or its side effects.     · You have vomiting or cramps for more than 2 hours. Where can you learn more? Go to http://marques-mei.info/. Enter L552 in the search box to learn more about \"Neuropathic Pain: Care Instructions. \"  Current as of: Andreia 3, 2018  Content Version: 11.9  © 9219-8540 [employer]. Care instructions adapted under license by [employer] (which disclaims liability or warranty for this information). If you have questions about a medical condition or this instruction, always ask your healthcare professional. [place] [address] any warranty or liability for your use of this information.

## 2019-03-13 NOTE — PROGRESS NOTES
HPI  Olive Reyes 82 y.o. male  presents to the office today for shingles follow up. Blood pressure 132/78, pulse 63, temperature 97.9 °F (36.6 °C), temperature source Oral, resp. rate 18, height 5' 8\" (1.727 m), weight 189 lb (85.7 kg), SpO2 96 %. Body mass index is 28.74 kg/m². Chief Complaint   Patient presents with    Shingles     follow up       Shingles/left hand weakness: Pt has completed course of treatment, but still has residual weakness of left hand. Pt reports he picked up gallon of milk and it had dropped. He had seen Dr. Gilda Conklin and told nerve in hand affected due to Shingles. Pt has scabs on palm aspect of left hand; advised pt to leave scabs and allow them to fall off naturally. We discussed increased Gabapentin to 300 mg because low dose has not been helping. We elect to titrate medication up to 300 mg at night and 100-200 mg in the morning and afternoon due to sedating side effect. Dermatitis: Pt has been utilizing oatmeal soap and has had breakout and inquires if due to the soap. Current Outpatient Medications   Medication Sig Dispense Refill    gabapentin (NEURONTIN) 100 mg capsule Take 1-3 Caps by mouth three (3) times daily. 270 Cap 1    losartan (COZAAR) 50 mg tablet TK 1 T PO QD  1    amLODIPine (NORVASC) 5 mg tablet TAKE 1 TABLET BY MOUTH EVERY DAY 90 Tab 0    glucosamine ramirez 2KCl-chondroit 500-400 mg tab Take 1 Tab by mouth.  ferrous sulfate 325 mg (65 mg iron) tablet Take 1 Tab by mouth two (2) times a day. 60 Tab 5    vitamin e (E GEMS) 1,000 unit capsule Take 1,000 Units by mouth daily.  albuterol (PROVENTIL HFA, VENTOLIN HFA, PROAIR HFA) 90 mcg/actuation inhaler Take 1-2 Puffs by inhalation every four (4) hours as needed for Wheezing. 1 Inhaler 5    cyanocobalamin (VITAMIN B-12) 1,000 mcg tablet Take 1,000 mcg by mouth daily.       valACYclovir (VALTREX) 1 gram tablet TK 1 T PO TID FOR 7 DAYS  0    losartan (COZAAR) 100 mg tablet TAKE 1 TABLET BY MOUTH DAILY 90 Tab 1    esomeprazole (NEXIUM) 40 mg capsule TAKE 1 CAPSULE BY MOUTH DAILY (Patient not taking: Reported on 4/27/2018) 90 Cap 1     No Known Allergies  Past Medical History:   Diagnosis Date    Diverticulosis, sigmoid 8/2011    ED (erectile dysfunction) of organic origin     History of prostate cancer     Hypertension     [facility]state [facility] ([facility] [address]     Sebaceous cyst 4/1/2014     Past Surgical History:   Procedure Laterality Date    ENDOSCOPY, COLON, DIAGNOSTIC  >= 8-10years ago   [facility] SURGERY  1968 [id number]    HX CARPAL TUNNEL RELEASE  4/08    right,left    HX CHOLECYSTECTOMY      HX HERNIA REPAIR      HX PROSTATECTOMY  5/06     Family History   Problem Relation Age of Onset    Cancer Mother         pancreatic    Diabetes Father     Stroke Father     Diabetes Sister     Hypertension Sister     Other Sister         Open heart surgery     Diabetes Brother     Hypertension Brother     Hypertension Brother     Asthma Daughter     Heart Attack Daughter     Other Daughter         hip replacement x 2     Social History     Tobacco Use    Smoking status: Never Smoker    Smokeless tobacco: Never Used   Substance Use Topics    Alcohol use: No        Review of Systems   Constitutional: Negative for chills and fever. HENT: Negative for hearing loss and tinnitus. Eyes: Negative for blurred vision and double vision. Respiratory: Negative for shortness of breath. Cardiovascular: Negative for chest pain and palpitations. Gastrointestinal: Negative for nausea and vomiting. Genitourinary: Negative for dysuria and frequency. Musculoskeletal: Negative for back pain and falls. Skin: Positive for rash. Negative for itching. Neurological: Negative for dizziness, loss of consciousness and headaches. Psychiatric/Behavioral: Negative for depression. The patient is not nervous/anxious. Physical Exam   Constitutional: He is oriented to person, place, and time.  He appears well-developed and well-nourished. HENT:   Head: Normocephalic and atraumatic. Right Ear: External ear normal.   Left Ear: External ear normal.   Nose: Nose normal.   Mouth/Throat: Oropharynx is clear and moist.   Eyes: Conjunctivae and EOM are normal.   Neck: Normal range of motion. Neck supple. Cardiovascular: Normal rate, regular rhythm, normal heart sounds and intact distal pulses. Pulmonary/Chest: Effort normal and breath sounds normal.   Abdominal: Soft. Bowel sounds are normal.   Genitourinary: Testes normal.   Musculoskeletal: Normal range of motion. Neurological: He is alert and oriented to person, place, and time. Skin: Skin is warm and dry. Psychiatric: He has a normal mood and affect. His behavior is normal. Judgment and thought content normal.   Nursing note and vitals reviewed. ASSESSMENT and PLAN  Diagnoses and all orders for this visit:    1. Left hand weakness  Advised pt to do hand  exercises by squeezing tennis ball 3x/day. If not regaining strength, he may need to start PT.     2. Herpes zoster without complication  Discussed it will take time for symptoms to improve. Advised pt to wear plastic glove and sleeve when he is participating in activities to prevent spreading of lesions. Provided pt with new increased Rx of Gabapentin 300 mg.  -     gabapentin (NEURONTIN) 100 mg capsule; Take 1-3 Caps by mouth three (3) times daily. 3. Dermatitis  Most likely caused by soap; advised pt to stop using oatmeal soap. Follow-up Disposition:  Return if symptoms worsen or fail to improve, for shingles. Medication risks/benefits/costs/interactions/alternatives discussed with patient. Advised patient to call back or return to office if symptoms worsen/change/persist.  If patient cannot reach us or should anything more severe/urgent arise he/she should proceed directly to the nearest emergency department.   Discussed expected course/resolution/complications of diagnosis in detail with patient. Patient given a written after visit summary which includes her diagnoses, current medications and vitals. Patient expressed understanding with the diagnosis and plan. Written by kellee Limon, as dictated by Netta Dominguez M.D. Greater than 50% of time spent counseling patient.

## 2019-03-13 NOTE — PROGRESS NOTES
Chief Complaint   Patient presents with    Shingles     follow up        1. Have you been to the ER, urgent care clinic since your last visit? Hospitalized since your last visit? No    2. Have you seen or consulted any other health care providers outside of the [address] [place] [address] since your last visit? Include any pap smears or colon screening.  No

## 2019-04-03 ENCOUNTER — OFFICE VISIT (OUTPATIENT)
Dept: FAMILY MEDICINE CLINIC | Age: 84
End: 2019-04-03

## 2019-04-03 VITALS
WEIGHT: 189 LBS | TEMPERATURE: 97.9 F | HEIGHT: 68 IN | DIASTOLIC BLOOD PRESSURE: 79 MMHG | RESPIRATION RATE: 18 BRPM | SYSTOLIC BLOOD PRESSURE: 127 MMHG | OXYGEN SATURATION: 96 % | HEART RATE: 60 BPM | BODY MASS INDEX: 28.64 KG/M2

## 2019-04-03 DIAGNOSIS — R42 DIZZY: ICD-10-CM

## 2019-04-03 DIAGNOSIS — B02.29 POSTHERPETIC NEURALGIA: Primary | ICD-10-CM

## 2019-04-03 DIAGNOSIS — R00.1 SINUS BRADYCARDIA: ICD-10-CM

## 2019-04-03 RX ORDER — GABAPENTIN 300 MG/1
600 CAPSULE ORAL 3 TIMES DAILY
Qty: 90 CAP | Refills: 2 | Status: SHIPPED | OUTPATIENT
Start: 2019-04-03 | End: 2019-08-05 | Stop reason: SDUPTHER

## 2019-04-03 NOTE — PROGRESS NOTES
Chief Complaint Patient presents with  Shingles  
  follow up Reviewed Record in preparation for visit and have obtained necessary documentation. Identified pt with two pt identifiers (Name @ ) Health Maintenance Due Topic  Shingrix Vaccine Age 50> (1 of 2)  Pneumococcal 65+ years (2 of 2 - PPSV23)  GLAUCOMA SCREENING Q2Y   
 DTaP/Tdap/Td series (2 - Tdap)  MEDICARE YEARLY EXAM   
 
 
 
1. Have you been to the ER, urgent care clinic since your last visit? Hospitalized since your last visit? no 
 
2. Have you seen or consulted any other health care providers outside of the [address] [place] [address] since your last visit? Include any pap smears or colon screening.  no

## 2019-04-03 NOTE — PROGRESS NOTES
HPI 
Radha Wilhelm 82 y.o. male  presents to the office today for shingles follow up. Blood pressure 127/79, pulse 60, temperature 97.9 °F (36.6 °C), temperature source Oral, resp. rate 18, height 5' 8\" (1.727 m), weight 189 lb (85.7 kg), SpO2 96 %. Body mass index is 28.74 kg/m². Chief Complaint Patient presents with  Shingles  
  follow up [facility] Postherpetic Neuralgia: Pt reports blisters have healed on his forearm. However, he still has nerve pain on the palm of his hand. He continues to use Gabapentin 300 mg. He states he has been using sling for his hand. Dizzy/Bradycardia: Pt reports a few episodes of feeling dizzy after overly exerting hisself. Pt notes his most recent episode happened several days ago as he was taking out trash. He notes he took more trash out than tolerated and on his way back to the house, he had to stop and hold onto his vehicle because he started to feel lightheaded and dizzy. Administered EKG today in office, we discussed results. Based on results, encouraged pt to follow up with cardiology. RR interval appears to be stable, SR, no murmur, rub, or gallop noted. Current Outpatient Medications Medication Sig Dispense Refill  gabapentin (NEURONTIN) 300 mg capsule Take 2 Caps by mouth three (3) times daily. 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  
 amLODIPine (NORVASC) 5 mg tablet TAKE 1 TABLET BY MOUTH EVERY DAY 90 Tab 0  
 glucosamine ramirez 2KCl-chondroit 500-400 mg tab Take 1 Tab by mouth.  ferrous sulfate 325 mg (65 mg iron) tablet Take 1 Tab by mouth two (2) times a day. 60 Tab 5  
 vitamin e (E GEMS) 1,000 unit capsule Take 1,000 Units by mouth daily.  losartan (COZAAR) 100 mg tablet TAKE 1 TABLET BY MOUTH DAILY 90 Tab 1  
 albuterol (PROVENTIL HFA, VENTOLIN HFA, PROAIR HFA) 90 mcg/actuation inhaler Take 1-2 Puffs by inhalation every four (4) hours as needed for Wheezing.  1 Inhaler 5  
  cyanocobalamin (VITAMIN B-12) 1,000 mcg tablet Take 1,000 mcg by mouth daily.  valACYclovir (VALTREX) 1 gram tablet TK 1 T PO TID FOR 7 DAYS  0  
 losartan (COZAAR) 50 mg tablet TK 1 T PO QD  1  
 esomeprazole (NEXIUM) 40 mg capsule TAKE 1 CAPSULE BY MOUTH DAILY (Patient not taking: Reported on 4/27/2018) 90 Cap 1 No Known Allergies Past Medical History:  
Diagnosis Date  Diverticulosis, sigmoid 8/2011  ED (erectile dysfunction) of organic origin  History of prostate cancer  Hypertension  Prostate cancer ([facility] [address]  Sebaceous cyst 4/1/2014 Past Surgical History:  
Procedure Laterality Date  ENDOSCOPY, COLON, DIAGNOSTIC  >= 8-10years ago [address] [place] [id number]  HX CARPAL TUNNEL RELEASE  4/08 right,left  HX CHOLECYSTECTOMY  HX HERNIA REPAIR    
 HX PROSTATECTOMY  5/06 Family History Problem Relation Age of Onset  Cancer Mother   
     pancreatic  Diabetes Father  Stroke Father  Diabetes Sister  Hypertension Sister  Other Sister Open heart surgery  Diabetes Brother  Hypertension Brother  Hypertension Brother  Asthma Daughter  Heart Attack Daughter  Other Daughter   
     hip replacement x 2 Social History Tobacco Use  Smoking status: Never Smoker  Smokeless tobacco: Never Used Substance Use Topics  Alcohol use: No  
  
 
Review of Systems Constitutional: Negative for chills and fever. HENT: Negative for hearing loss and tinnitus. Eyes: Negative for blurred vision and double vision. Respiratory: Negative for shortness of breath. Cardiovascular: Negative for chest pain and palpitations. Gastrointestinal: Negative for nausea and vomiting. Genitourinary: Negative for dysuria and frequency. Musculoskeletal: Negative for back pain and falls. Skin: Negative for itching and rash. Neurological: Negative for dizziness, loss of consciousness and headaches. Psychiatric/Behavioral: Negative for depression. The patient is not nervous/anxious. Physical Exam  
Constitutional: He is oriented to person, place, and time. He appears well-developed and well-nourished. HENT:  
Head: Normocephalic and atraumatic. Right Ear: External ear normal.  
Left Ear: External ear normal.  
Nose: Nose normal.  
Mouth/Throat: Oropharynx is clear and moist.  
Eyes: Conjunctivae and EOM are normal.  
Neck: Normal range of motion. Neck supple. Cardiovascular: Normal rate, regular rhythm, normal heart sounds and intact distal pulses. Pulmonary/Chest: Effort normal and breath sounds normal.  
Abdominal: Soft. Bowel sounds are normal.  
Genitourinary: Testes normal.  
Musculoskeletal: Normal range of motion. Neurological: He is alert and oriented to person, place, and time. Skin: Skin is warm and dry. Psychiatric: He has a normal mood and affect. His behavior is normal. Judgment and thought content normal.  
Nursing note and vitals reviewed. ASSESSMENT and PLAN Diagnoses and all orders for this visit: 1. Postherpetic neuralgia Pt symptoms have overall improved, but still having residual nerve related hypersensitivity in palm of the left hand. Advised pt to increase Gabapentin from 100 mg to 300 mg/TID. -     gabapentin (NEURONTIN) 300 mg capsule; Take 2 Caps by mouth three (3) times daily. Indications: Nerve Pain after Herpes 2. Dizzy Administered EKG today in office, results indicate AFIB, but physical exam is not consistent. Advised pt to follow up with cardiology, concerned about bradycardia. 
-     AMB POC EKG ROUTINE W/ 12 LEADS, INTER & REP 
-     DUPLEX CAROTID BILATERAL; Future 
-     REFERRAL TO CARDIOLOGY 3. Sinus bradycardia Administered EKG today in office, results indicate AFIB, but physical exam is not consistent. Advised pt to follow up with cardiology, concerned about bradycardia.  Encouraged pt to hold off on strenuous exercise at this time.  
-     REFERRAL TO CARDIOLOGY Follow-up and Dispositions · Return in about 1 month (around 5/1/2019), or if symptoms worsen or fail to improve, for herpetic neuralgia. Medication risks/benefits/costs/interactions/alternatives discussed with patient. Advised patient to call back or return to office if symptoms worsen/change/persist.  If patient cannot reach us or should anything more severe/urgent arise he/she should proceed directly to the nearest emergency department. Discussed expected course/resolution/complications of diagnosis in detail with patient. Patient given a written after visit summary which includes her diagnoses, current medications and vitals. Patient expressed understanding with the diagnosis and plan. Written by kellee Hernández, as dictated by Enrike Muller M.D. 
 
3:09 PM - 3:47 PM 
 
Total time spent with the patient 36 minutes, greater than 50% of time spent counseling patient.

## 2019-04-03 NOTE — PATIENT INSTRUCTIONS
Neuropathic Pain: Care Instructions Your Care Instructions Neuropathic pain is caused by pressure on or damage to your nerves. It's often simply called nerve pain. Some people feel this type of pain all the time. For others, it comes and goes. Diabetes, shingles, or an injury can cause nerve pain. Many people say the pain feels sharp, burning, or stabbing. But some people feel it as a dull ache. In some cases, it makes your skin very sensitive. So touch, pressure, and other sensations that did not hurt before may now cause pain. It's important to know that this kind of pain is real and can affect your quality of life. It's also important to know that treatment can help. Treatment includes pain medicines, exercise, and physical therapy. Medicines can help reduce the number of pain signals that travel over the nerves. This can make the painful areas less sensitive. It can also help you sleep better and improve your mood. But medicines are only one part of successful treatment. Most people do best with more than one kind of treatment. Your doctor may recommend that you try cognitive-behavioral therapy and stress management. Or, if needed, you may decide to try to quit smoking, lower your blood pressure, or better control blood sugar. These kinds of healthy changes can also make a difference. If you feel that your treatment is not working, talk to your doctor. And be sure to tell your doctor if you think you might be depressed or anxious. These are common problems that can also be treated. Follow-up care is a key part of your treatment and safety. Be sure to make and go to all appointments, and call your doctor if you are having problems. It's also a good idea to know your test results and keep a list of the medicines you take. How can you care for yourself at home? · Be safe with medicines. Read and follow all instructions on the label. ? If the doctor gave you a prescription medicine for pain, take it as prescribed. ? If you are not taking a prescription pain medicine, ask your doctor if you can take an over-the-counter medicine. · Save hard tasks for days when you have less pain. Follow a hard task with an easy task. And remember to take breaks. · Relax, and reduce stress. You may want to try deep breathing or meditation. These can help. · Keep moving. Gentle, daily exercise can help reduce pain. Your doctor or physical therapist can tell you what type of exercise is best for you. This may include walking, swimming, and stationary biking. It may also include stretches and range-of-motion exercises. · Try heat, cold packs, and massage. · Get enough sleep. Constant pain can make you more tired. If the pain makes it hard to sleep, talk with your doctor. · Think positively. Your thoughts can affect your pain. Do fun things to distract yourself from the pain. See a movie, read a book, listen to music, or spend time with a friend. · Keep a pain diary. Try to write down how strong your pain is and what it feels like. Also try to notice and write down how your moods, thoughts, sleep, activities, and medicine affect your pain. These notes can help you and your doctor find the best ways to treat your pain. Reducing constipation caused by pain medicine Pain medicines often cause constipation. To reduce constipation: 
· Include fruits, vegetables, beans, and whole grains in your diet each day. These foods are high in fiber. · Drink plenty of fluids, enough so that your urine is light yellow or clear like water. If you have kidney, heart, or liver disease and have to limit fluids, talk with your doctor before you increase the amount of fluids you drink. · Get some exercise every day. Build up slowly to 30 to 60 minutes a day on 5 or more days of the week.  
· Take a fiber supplement, such as Citrucel or Metamucil, every day if needed. Read and follow all instructions on the label. · Schedule time each day for a bowel movement. Having a daily routine may help. Take your time and do not strain when having a bowel movement. · Ask your doctor about a laxative. The goal is to have one easy bowel movement every 1 to 2 days. Do not let constipation go untreated for more than 3 days. When should you call for help? Call your doctor now or seek immediate medical care if: 
  · You feel sad, anxious, or hopeless for more than a few days. This could mean you are depressed. Depression is common in people who have a lot of pain. But it can be treated.  
  · You have trouble with bowel movements, such as: 
? No bowel movement in 3 days. ? Blood in the anal area, in your stool, or on the toilet paper. ? Diarrhea for more than 24 hours.  
 Watch closely for changes in your health, and be sure to contact your doctor if: 
  · Your pain is getting worse.  
  · You can't sleep because of pain.  
  · You are very worried or anxious about your pain.  
  · You have trouble taking your pain medicine.  
  · You have any concerns about your pain medicine or its side effects.  
  · You have vomiting or cramps for more than 2 hours. Where can you learn more? Go to http://marques-mei.info/. Enter Q174 in the search box to learn more about \"Neuropathic Pain: Care Instructions. \" Current as of: Andreia 3, 2018 Content Version: 11.9 © 2613-8932 [employer]. Care instructions adapted under license by [employer] (which disclaims liability or warranty for this information). If you have questions about a medical condition or this instruction, always ask your healthcare professional. [place] [address] any warranty or liability for your use of this information.

## 2019-04-09 ENCOUNTER — HOSPITAL ENCOUNTER (OUTPATIENT)
Dept: VASCULAR SURGERY | Age: 84
Discharge: HOME | End: 2019-04-09
Attending: FAMILY MEDICINE
Payer: MEDICARE

## 2019-04-09 DIAGNOSIS — R42 DIZZY: ICD-10-CM

## 2019-04-09 PROCEDURE — 93880 EXTRACRANIAL BILAT STUDY: CPT

## 2019-04-10 LAB
LEFT CCA DIST DIAS: 22.7 CM/S
LEFT CCA DIST SYS: 86.5 CM/S
LEFT CCA PROX DIAS: 15 CM/S
LEFT CCA PROX SYS: 77.7 CM/S
LEFT ECA DIAS: 17.12 CM/S
LEFT ECA SYS: 120.5 CM/S
LEFT ICA DIST DIAS: 21.5 CM/S
LEFT ICA DIST SYS: 77.6 CM/S
LEFT ICA MID DIAS: 18.8 CM/S
LEFT ICA MID SYS: 72.1 CM/S
LEFT ICA PROX DIAS: 20.3 CM/S
LEFT ICA PROX SYS: 94.6 CM/S
LEFT ICA/CCA SYS: 1.09
LEFT VERTEBRAL DIAS: 17.17 CM/S
LEFT VERTEBRAL SYS: 42.5 CM/S
RIGHT CCA DIST DIAS: 16.4 CM/S
RIGHT CCA DIST SYS: 86.9 CM/S
RIGHT CCA PROX DIAS: 16.4 CM/S
RIGHT CCA PROX SYS: 83 CM/S
RIGHT ECA DIAS: 15.51 CM/S
RIGHT ECA SYS: 107.6 CM/S
RIGHT ICA DIST DIAS: 20.4 CM/S
RIGHT ICA DIST SYS: 60 CM/S
RIGHT ICA MID DIAS: 13.9 CM/S
RIGHT ICA MID SYS: 73.2 CM/S
RIGHT ICA PROX DIAS: 15.5 CM/S
RIGHT ICA PROX SYS: 96.2 CM/S
RIGHT ICA/CCA SYS: 1.1
RIGHT VERTEBRAL DIAS: 13.16 CM/S
RIGHT VERTEBRAL SYS: 38.7 CM/S

## 2019-04-11 ENCOUNTER — TELEPHONE (OUTPATIENT)
Dept: FAMILY MEDICINE CLINIC | Age: 84
End: 2019-04-11

## 2019-04-11 NOTE — TELEPHONE ENCOUNTER
Patient need to speak to Makenzie Klein, in regards to his situation, and referral to neurologist about shingles , Long message wants to speak to Makenzie Klein only  [facility]# [phone]

## 2019-04-12 NOTE — TELEPHONE ENCOUNTER
Patient returning call to [facility], he will be home until 5pm  [facility]# [phone]

## 2019-04-12 NOTE — TELEPHONE ENCOUNTER
[phone] spoke to patient per patient has appointment with Dr. Estee Doe on 4/22/2019 and he needs office notes faxed to [phone]     Faxed office notes to Dr Avi Castillo [phone]

## 2019-04-13 NOTE — PROGRESS NOTES
Result Text Consistent with normal right and left ICA stenosis. All Measurements Cerebrovascular Findings Right Carotid There is no stenosis in the right CCA. There is minimal stenosis in the right ICA. There is no stenosis in the right ECA. The right vertebral is antegrade. Left Carotid There is no stenosis in the left CCA. There is minimal stenosis in the left ICA. There is no stenosis in the left ECA. The left vertebral is antegrade.

## 2019-05-01 ENCOUNTER — OFFICE VISIT (OUTPATIENT)
Dept: FAMILY MEDICINE CLINIC | Age: 84
End: 2019-05-01

## 2019-05-01 VITALS
SYSTOLIC BLOOD PRESSURE: 120 MMHG | BODY MASS INDEX: 30.31 KG/M2 | HEIGHT: 68 IN | OXYGEN SATURATION: 96 % | WEIGHT: 200 LBS | TEMPERATURE: 98 F | RESPIRATION RATE: 19 BRPM | HEART RATE: 54 BPM | DIASTOLIC BLOOD PRESSURE: 80 MMHG

## 2019-05-01 DIAGNOSIS — Z00.00 ENCOUNTER FOR MEDICARE ANNUAL WELLNESS EXAM: ICD-10-CM

## 2019-05-01 DIAGNOSIS — B02.23 POST-HERPETIC POLYNEUROPATHY: ICD-10-CM

## 2019-05-01 DIAGNOSIS — I10 ESSENTIAL HYPERTENSION: Primary | ICD-10-CM

## 2019-05-01 DIAGNOSIS — Z71.89 ACP (ADVANCE CARE PLANNING): ICD-10-CM

## 2019-05-01 DIAGNOSIS — G56.02 CARPAL TUNNEL SYNDROME OF LEFT WRIST: ICD-10-CM

## 2019-05-01 NOTE — PROGRESS NOTES
This is the Subsequent Medicare Annual Wellness Exam, performed 12 months or more after the Initial AWV or the last Subsequent AWV I have reviewed the patient's medical history in detail and updated the computerized patient record. History Past Medical History:  
Diagnosis Date  Diverticulosis, sigmoid 8/2011  ED (erectile dysfunction) of organic origin  History of prostate cancer  Hypertension  Prostate cancer ([facility] [address]  Sebaceous cyst 4/1/2014 Past Surgical History:  
Procedure Laterality Date  ENDOSCOPY, COLON, DIAGNOSTIC  >= 8-10years ago 40 [facility] [place] [id number]  HX CARPAL TUNNEL RELEASE  4/08 right,left  HX CHOLECYSTECTOMY  HX HERNIA REPAIR    
 HX PROSTATECTOMY  5/06 Current Outpatient Medications Medication Sig Dispense Refill  gabapentin (NEURONTIN) 300 mg capsule Take 2 Caps by mouth three (3) times daily. 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  
 amLODIPine (NORVASC) 5 mg tablet TAKE 1 TABLET BY MOUTH EVERY DAY 90 Tab 0  
 glucosamine ramirez 2KCl-chondroit 500-400 mg tab Take 1 Tab by mouth.  ferrous sulfate 325 mg (65 mg iron) tablet Take 1 Tab by mouth two (2) times a day. 60 Tab 5  
 vitamin e (E GEMS) 1,000 unit capsule Take 1,000 Units by mouth daily.  losartan (COZAAR) 100 mg tablet TAKE 1 TABLET BY MOUTH DAILY 90 Tab 1  
 albuterol (PROVENTIL HFA, VENTOLIN HFA, PROAIR HFA) 90 mcg/actuation inhaler Take 1-2 Puffs by inhalation every four (4) hours as needed for Wheezing. 1 Inhaler 5  cyanocobalamin (VITAMIN B-12) 1,000 mcg tablet Take 1,000 mcg by mouth daily.  valACYclovir (VALTREX) 1 gram tablet TK 1 T PO TID FOR 7 DAYS  0  
 esomeprazole (NEXIUM) 40 mg capsule TAKE 1 CAPSULE BY MOUTH DAILY (Patient not taking: Reported on 4/27/2018) 90 Cap 1 No Known Allergies Family History Problem Relation Age of Onset  Cancer Mother   
     pancreatic  Diabetes Father  Stroke Father  Diabetes Sister  Hypertension Sister  Other Sister Open heart surgery  Diabetes Brother  Hypertension Brother  Hypertension Brother  Asthma Daughter  Heart Attack Daughter  Other Daughter   
     hip replacement x 2 Social History Tobacco Use  Smoking status: Never Smoker  Smokeless tobacco: Never Used Substance Use Topics  Alcohol use: No  
 
Patient Active Problem List  
Diagnosis Code  Hypertension I10  
 ED (erectile dysfunction) of organic origin N52.9  Diverticulosis, sigmoid K57.30  
 History of prostate cancer Z85.46  
 B12 deficiency E53.8  Sebaceous cyst L72.3  Advanced care planning/counseling discussion Z71.89  
 Gastroesophageal reflux disease without esophagitis K21.9 Depression Risk Factor Screening:  
 
3 most recent PHQ Screens 5/1/2019 Little interest or pleasure in doing things Not at all Feeling down, depressed, irritable, or hopeless Not at all Total Score PHQ 2 0 Alcohol Risk Factor Screening: You do not drink alcohol or very rarely. Functional Ability and Level of Safety:  
Hearing Loss Hearing is good. Activities of Daily Living The home contains: no safety equipment. Patient does total self care Fall Risk Fall Risk Assessment, last 12 mths 5/1/2019 Able to walk? Yes Fall in past 12 months? No  
 
 
Abuse Screen Patient is not abused Cognitive Screening Evaluation of Cognitive Function: 
Has your family/caregiver stated any concerns about your memory: no 
 
 
Patient Care Team  
Patient Care Team: 
Libby Mas MD as PCP - General (Family Practice) Assessment/Plan Education and counseling provided: 
Are appropriate based on today's review and evaluation Diagnoses and all orders for this visit: 1. Essential hypertension 2. Carpal tunnel syndrome of left wrist 
 
3. Post-herpetic polyneuropathy Health Maintenance Due Topic Date Due  
  Pneumococcal 65+ years (2 of 2 - PPSV23) 10/01/2016  GLAUCOMA SCREENING Q2Y  09/15/2017  Shingrix Vaccine Age 50> (2 of 2) 08/20/2018  DTaP/Tdap/Td series (2 - Tdap) 02/14/2019  MEDICARE YEARLY EXAM  04/03/2019

## 2019-05-01 NOTE — PROGRESS NOTES
CORI Mclaughlin 82 y.o. male  presents to the office today for shingles follow up Blood pressure 120/80, pulse (!) 54, temperature 98 °F (36.7 °C), temperature source Oral, resp. rate 19, height 5' 8\" (1.727 m), weight 200 lb (90.7 kg), SpO2 96 %. Body mass index is 30.41 kg/m². Chief Complaint Patient presents with Abril Silva Annual Wellness Visit  Shingles 1 month follow up visit for shingles Hypertension: BP at office today 154/79 and 120/80 on manual recheck. Pt continues with amlodipine 5 mg/d and losartan 100 mg/d. Pt reports more SOB with exertion. He states he used to take out the trash and walk without feeling SOB. Pt has yet to see Dr. Luisa Holland, Left/Polyneorpathy: Pt reports his hand has been stable since previous visit. He notes he has followed up with Dr. Shiva Colin. He suggested pt follow up for EMG and follow up with Dr. Delvis Muller for an evaluation. Pt notes he has followed up with hand therapist. Pt notes he had hands submerged in cold and warm water and had degree of swelling measured. He was provided with compression glove to wear on left hand. Admits to numbness of left hand. Pt is concerned about carpal tunnel of left wrist due to decreased strength. He states he is still following up with PT. Health Maintenance: Medicare questionnaire discussed and responses reviewed today in office. Patient given [facility] Directive form and booklet. Patient advised to follow up with me or contact nurse navigator to submit these form to medical chart. I advised patient of the benefits of Advance Care Plan with regard to end of life care and benefits of filling forms out in case patient cannot speak for themselves. Current Outpatient Medications Medication Sig Dispense Refill  gabapentin (NEURONTIN) 300 mg capsule Take 2 Caps by mouth three (3) times daily.  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  
  amLODIPine (NORVASC) 5 mg tablet TAKE 1 TABLET BY MOUTH EVERY DAY 90 Tab 0  
 glucosamine ramirez 2KCl-chondroit 500-400 mg tab Take 1 Tab by mouth.  ferrous sulfate 325 mg (65 mg iron) tablet Take 1 Tab by mouth two (2) times a day. 60 Tab 5  
 vitamin e (E GEMS) 1,000 unit capsule Take 1,000 Units by mouth daily.  losartan (COZAAR) 100 mg tablet TAKE 1 TABLET BY MOUTH DAILY 90 Tab 1  
 albuterol (PROVENTIL HFA, VENTOLIN HFA, PROAIR HFA) 90 mcg/actuation inhaler Take 1-2 Puffs by inhalation every four (4) hours as needed for Wheezing. 1 Inhaler 5  cyanocobalamin (VITAMIN B-12) 1,000 mcg tablet Take 1,000 mcg by mouth daily.  valACYclovir (VALTREX) 1 gram tablet TK 1 T PO TID FOR 7 DAYS  0  
 esomeprazole (NEXIUM) 40 mg capsule TAKE 1 CAPSULE BY MOUTH DAILY (Patient not taking: Reported on 4/27/2018) 90 Cap 1 No Known Allergies Past Medical History:  
Diagnosis Date  Diverticulosis, sigmoid 8/2011  ED (erectile dysfunction) of organic origin  History of prostate cancer  Hypertension  Prostate cancer ([facility] [address]  Sebaceous cyst 4/1/2014 Past Surgical History:  
Procedure Laterality Date  ENDOSCOPY, COLON, DIAGNOSTIC  >= 8-10years ago 40 [facility] [id number]  HX CARPAL TUNNEL RELEASE  4/08 right,left  HX CHOLECYSTECTOMY  HX HERNIA REPAIR    
 HX PROSTATECTOMY  5/06 Family History Problem Relation Age of Onset  Cancer Mother   
     pancreatic  Diabetes Father  Stroke Father  Diabetes Sister  Hypertension Sister  Other Sister Open heart surgery  Diabetes Brother  Hypertension Brother  Hypertension Brother  Asthma Daughter  Heart Attack Daughter  Other Daughter   
     hip replacement x 2 Social History Tobacco Use  Smoking status: Never Smoker  Smokeless tobacco: Never Used Substance Use Topics  Alcohol use: No  
  
 
Review of Systems Constitutional: Negative for chills and fever. HENT: Negative for hearing loss and tinnitus. Eyes: Negative for blurred vision and double vision. Respiratory: Negative for shortness of breath. Cardiovascular: Negative for chest pain and palpitations. Gastrointestinal: Negative for nausea and vomiting. Genitourinary: Negative for dysuria and frequency. Musculoskeletal: Negative for back pain and falls. Skin: Negative for itching and rash. Neurological: Negative for dizziness, loss of consciousness and headaches. Positive: numbness left hand Psychiatric/Behavioral: Negative for depression. The patient is not nervous/anxious. Physical Exam  
Constitutional: He is oriented to person, place, and time. He appears well-developed and well-nourished. HENT:  
Head: Normocephalic and atraumatic. Right Ear: External ear normal.  
Left Ear: External ear normal.  
Nose: Nose normal.  
Mouth/Throat: Oropharynx is clear and moist.  
Eyes: Conjunctivae and EOM are normal.  
Neck: Normal range of motion. Neck supple. Cardiovascular: Normal rate, regular rhythm, normal heart sounds and intact distal pulses. Pulmonary/Chest: Effort normal and breath sounds normal.  
Abdominal: Soft. Bowel sounds are normal.  
Genitourinary: Testes normal.  
Musculoskeletal: Normal range of motion. Phallens Tinel test negative Neurological: He is alert and oriented to person, place, and time. Strength 4/5 left hand, 5/5 right hand Skin: Skin is warm and dry. Psychiatric: He has a normal mood and affect. His behavior is normal. Judgment and thought content normal.  
Nursing note and vitals reviewed. ASSESSMENT and PLAN Diagnoses and all orders for this visit: 1. Essential hypertension BP is at goal today in office. Advised pt to continue with losartan 100 mg/d and amlodipine 5 mg/d. He has an appointment with Dr. Earnest Bryan next week.   
 
2. Carpal tunnel syndrome of left wrist 
 Advised pt to continue following up with Dr. Mariah Pires and Dr. Eleazar Onofre. 3. Post-herpetic polyneuropathy Strength appears to be returning slowly. Advised pt to continue following up with PT.   
 
4. Encounter for Medicare annual wellness exam 
Medicare questionnaire discussed and responses reviewed today in office. 5. ACP (advance care planning) Patient given [facility] Directive form and booklet. Patient advised to follow up with me or contact nurse navigator to submit these form to medical chart. I advised patient of the benefits of Advance Care Plan with regard to end of life care and benefits of filling forms out in case patient cannot speak for themselves. Follow-up and Dispositions · Return in about 3 months (around 8/1/2019) for hyperlipidemia follow up. Medication risks/benefits/costs/interactions/alternatives discussed with patient. Advised patient to call back or return to office if symptoms worsen/change/persist.  If patient cannot reach us or should anything more severe/urgent arise he/she should proceed directly to the nearest emergency department. Discussed expected course/resolution/complications of diagnosis in detail with patient. Patient given a written after visit summary which includes her diagnoses, current medications and vitals. Patient expressed understanding with the diagnosis and plan. Written by kellee Ignacio, as dictated by Namrata Bae M.D. 
 
2:08 PM - 2:38 PM 
 
Total time spent with the patient 30 minutes, greater than 50% of time spent counseling patient.

## 2019-05-01 NOTE — PATIENT INSTRUCTIONS
Body Mass Index: Care Instructions Your Care Instructions Body mass index (BMI) can help you see if your weight is raising your risk for health problems. It uses a formula to compare how much you weigh with how tall you are. · A BMI lower than 18.5 is considered underweight. · A BMI between 18.5 and 24.9 is considered healthy. · A BMI between 25 and 29.9 is considered overweight. A BMI of 30 or higher is considered obese. If your BMI is in the normal range, it means that you have a lower risk for weight-related health problems. If your BMI is in the overweight or obese range, you may be at increased risk for weight-related health problems, such as high blood pressure, heart disease, stroke, arthritis or joint pain, and diabetes. If your BMI is in the underweight range, you may be at increased risk for health problems such as fatigue, lower protection (immunity) against illness, muscle loss, bone loss, hair loss, and hormone problems. BMI is just one measure of your risk for weight-related health problems. You may be at higher risk for health problems if you are not active, you eat an unhealthy diet, or you drink too much alcohol or use tobacco products. Follow-up care is a key part of your treatment and safety. Be sure to make and go to all appointments, and call your doctor if you are having problems. It's also a good idea to know your test results and keep a list of the medicines you take. How can you care for yourself at home? · Practice healthy eating habits. This includes eating plenty of fruits, vegetables, whole grains, lean protein, and low-fat dairy. · If your doctor recommends it, get more exercise. Walking is a good choice. Bit by bit, increase the amount you walk every day. Try for at least 30 minutes on most days of the week. · Do not smoke. Smoking can increase your risk for health problems.  If you need help quitting, talk to your doctor about stop-smoking programs and medicines. These can increase your chances of quitting for good. · Limit alcohol to 2 drinks a day for men and 1 drink a day for women. Too much alcohol can cause health problems. If you have a BMI higher than 25 · Your doctor may do other tests to check your risk for weight-related health problems. This may include measuring the distance around your waist. A waist measurement of more than 40 inches in men or 35 inches in women can increase the risk of weight-related health problems. · Talk with your doctor about steps you can take to stay healthy or improve your health. You may need to make lifestyle changes to lose weight and stay healthy, such as changing your diet and getting regular exercise. If you have a BMI lower than 18.5 · Your doctor may do other tests to check your risk for health problems. · Talk with your doctor about steps you can take to stay healthy or improve your health. You may need to make lifestyle changes to gain or maintain weight and stay healthy, such as getting more healthy foods in your diet and doing exercises to build muscle. Where can you learn more? Go to http://marques-mei.info/. Enter S176 in the search box to learn more about \"Body Mass Index: Care Instructions. \" Current as of: June 25, 2018 Content Version: 11.9 © 4328-9555 [employer]. Care instructions adapted under license by [employer] (which disclaims liability or warranty for this information). If you have questions about a medical condition or this instruction, always ask your healthcare professional. [address] any warranty or liability for your use of this information. 0=not present

## 2019-05-01 NOTE — PROGRESS NOTES
Chief Complaint Patient presents with [facility] Annual Wellness Visit  Shingles 1 month follow up visit for shingles Spoke to patient Identified pt with two pt identifiers (Name @ ) 1. Have you been to the ER, urgent care clinic since your last visit? Hospitalized since your last visit? No 
 
2. Have you seen or consulted any other health care providers outside of the [address] [place] [address] since your last visit? Include any pap smears or colon screening. No  
 
\"REVIEWED RECORD IN PREPARATION FOR VISIT AND HAVE OBTAINED NECESSARY DOCUMENTATION\"

## 2019-05-02 ENCOUNTER — PATIENT OUTREACH (OUTPATIENT)
Dept: FAMILY MEDICINE CLINIC | Age: 84
End: 2019-05-02

## 2019-05-02 NOTE — PROGRESS NOTES
Patient referred to  today for ACP discussion and information. Patient verified by 3 identifiers. ACP discussion completed. Discussion completed, information given. Patient will call for appointment to complete document after discussion with agent. 
 
 
====Renny Cedeno Invitation==== 
 
Patient was invited to [facility] on this date and given the information folder for review. Recommended appointment with Renny Cedeno facilitator for ACP conversation regarding advance directives. [x] Yes  [] No  Referral sent to [facility] team member or Coordinator for follow-up 
 
[] Yes  [x] No  Patient scheduled an appointment

## 2019-05-02 NOTE — ACP (ADVANCE CARE PLANNING)
====Renny Cedeno Invitation====    Patient was invited to [facility] on this date and given the information folder for review. Recommended appointment with Renny Cedeno facilitator for ACP conversation regarding advance directives. [] Yes  [] No  Referral sent to [facility] team member or Coordinator for follow-up    [] Yes  [x] No  Patient scheduled an appointment      [facility] First Steps ACP            Advance Care Planning Conversation  First Steps®     Does individual have existing ACP documents? [] Yes  [x] No  If Yes, type of document:   Copy of document in electronic health record? [] Yes  [] No  If no, requested copy of document? [] Yes  [] No    Date of conversation:  5/1/19 Location: [facility]    Participants:   [x] Patient    [] Prospective agent (not yet named in a document) / Other surrogate decision  maker / next of kin    Name:   Relationship to patient:    Phone number:    [] Healthcare agent (already designated in existing ACP document)    Name:     Relationship to patient:    Phone number:       Other persons present:   Name:     Relationship to patient:   Name:     Relationship to patient:    Individual's Fears/Concerns about planning: none verbalized, \"understand, lost wife one year ago. \"     Goals/Narrative of Conversation: \"I know this is something I need to do. \"    Conversation topics for Individual    Has learned the following from prior experiences with serious illness: \"Know it is not good to wait to have this done. \"    Identifies the following as important for living well: \"Making sure my family knows what I want. \"    \"What cultural, Denominational, spiritual, or personal beliefs (if any) do you have that might help you choose the care you want, or do not want? \"  Patient response:   None verbalized    \"Would you like to talk to someone about these beliefs or concerns? \"  Patient response: no    Conversation topics for Agent / Prospective Agent    Understanding of the role of healthcare agent:     Agent confirms Willingness to:   []Yes []No Accept role   [] Yes []No Talk with individual about his/her goals, values, & preferences   [] Yes [] No   Follow individual's decisions, even if do not agree with those decisions   []Yes  []No Make decisions in difficult moments    Other questions/concerns about role of agent: Patient will talk with agent before signing document    Topics for Chronic Illness (if applicable): \"What do you understand about your (name of illness)? \"  Patient response: Unaware of any chronic conditions    \"What do you understand about the complications that may occur with your (name of illness)? \"  Patient response:    \"What do you understand about CPR? \" Patient response:    \"What outcome would you expect from CPR? \" Patient response:    \"What would be an unacceptable outcome? \" Patient response:        First Steps® ACP Facilitator: Carmela Arreola RN    Length (minutes): 30    The following was provided (check all that apply):   [x] Clarification of the goals of ACP    [x] Criteria for choosing a healthcare agent   [x] Written information on the planning process      Healthcare Decision Information Cards:   [x] Help with Breathing Facts   [x] Tube Feeding Facts   [x] CPR Facts      [x] Review of the completion of the advance directive    [] Review of existing advance directive       Meeting Outcomes:   [x] ACP discussion completed   [] Advance directive form completed   [] Original of completed advance directive given to patient   [] Copy given to healthcare agent    [] Copy scanned to electronic medical record    If ACP discussion not completed, last interview topic discussed: All topics discussed     Follow-up plan:     [x] Schedule follow-up conversation to continue planning, will call for completion of document time.    [] Referred individual to provider for additional questions/concerns    [] Individual will invite agent/prospective agent to next ACP appointment   [x] Recommended to review completed AMD annually or upon change in health status     [x] This note routed to one or more involved healthcare providers. Patient discussion and conversation done. Patient will talk with agent and call for appointment to have document completed.

## 2019-05-16 ENCOUNTER — OFFICE VISIT (OUTPATIENT)
Dept: FAMILY MEDICINE CLINIC | Age: 84
End: 2019-05-16

## 2019-05-16 VITALS
BODY MASS INDEX: 30.71 KG/M2 | SYSTOLIC BLOOD PRESSURE: 150 MMHG | RESPIRATION RATE: 18 BRPM | WEIGHT: 202.6 LBS | OXYGEN SATURATION: 98 % | DIASTOLIC BLOOD PRESSURE: 77 MMHG | HEART RATE: 66 BPM | HEIGHT: 68 IN | TEMPERATURE: 97.9 F

## 2019-05-16 DIAGNOSIS — G56.02 CARPAL TUNNEL SYNDROME ON LEFT: ICD-10-CM

## 2019-05-16 DIAGNOSIS — I10 ESSENTIAL HYPERTENSION: ICD-10-CM

## 2019-05-16 DIAGNOSIS — Z01.818 PREOP EXAMINATION: Primary | ICD-10-CM

## 2019-05-16 NOTE — PROGRESS NOTES
Preoperative Evaluation    Date of Exam: 2019    Yudi Crystal is a 82 y.o. male (:1935) who presents for preoperative evaluation. Procedure/Surgery: Left carpal tunnel release  Date of Procedure/Surgery: 19  [facility]  Primary Physician: Camilla Saab MD  Latex Allergy: no    Medical History:     Past Medical History:   Diagnosis Date    Diverticulosis, sigmoid 2011    ED (erectile dysfunction) of organic origin     History of prostate cancer     Hypertension     Prostate cancer ([facility] [address]     Sebaceous cyst 2014     Allergies:   No Known Allergies   Medications:     Current Outpatient Medications   Medication Sig    amLODIPine (NORVASC) 5 mg tablet TAKE 1 TABLET BY MOUTH EVERY DAY    gabapentin (NEURONTIN) 300 mg capsule Take 2 Caps by mouth three (3) times daily. 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  vitamin e (E GEMS) 1,000 unit capsule Take 1,000 Units by mouth daily.  losartan (COZAAR) 100 mg tablet TAKE 1 TABLET BY MOUTH DAILY    albuterol (PROVENTIL HFA, VENTOLIN HFA, PROAIR HFA) 90 mcg/actuation inhaler Take 1-2 Puffs by inhalation every four (4) hours as needed for Wheezing.  cyanocobalamin (VITAMIN B-12) 1,000 mcg tablet Take 1,000 mcg by mouth daily.  valACYclovir (VALTREX) 1 gram tablet TK 1 T PO TID FOR 7 DAYS    ferrous sulfate 325 mg (65 mg iron) tablet Take 1 Tab by mouth two (2) times a day.  esomeprazole (NEXIUM) 40 mg capsule TAKE 1 CAPSULE BY MOUTH DAILY (Patient not taking: Reported on 2018)     No current facility-administered medications for this visit.       Surgical History:     Past Surgical History:   Procedure Laterality Date    ENDOSCOPY, COLON, DIAGNOSTIC  >= 8-10years ago   Rajat Noun SURGERY  1968 [id number]    HX CARPAL TUNNEL RELEASE      right,left    HX CHOLECYSTECTOMY      HX HERNIA REPAIR  HX PROSTATECTOMY  5/06     Social History:     Socioeconomic History    Marital status:    Tobacco Use    Smoking status: Never Smoker    Smokeless tobacco: Never Used   Substance and Sexual Activity    Alcohol use: No    Drug use: No       Anesthesia Complications: None  History of abnormal bleeding : None    REVIEW OF SYSTEMS:  A comprehensive review of systems was negative. EXAM:   Visit Vitals  /77 (BP 1 Location: Right arm, BP Patient Position: Sitting)   Pulse 66   Temp 97.9 °F (36.6 °C) (Oral)   Resp 18   Ht 5' 8\" (1.727 m)   Wt 202 lb 9.6 oz (91.9 kg)   SpO2 98%   BMI 30.81 kg/m²     General appearance: alert, well appearing, and in no distress. Chest: clear to auscultation, no wheezes, rales or rhonchi, symmetric air entry. CVS exam: normal rate, regular rhythm, normal S1, S2, no murmurs, rubs, clicks or gallops, normal bilateral carotid upstroke without bruits, no JVD. Exam of extremities: peripheral pulses normal, no pedal edema, no clubbing or cyanosis    DIAGNOSTICS:   1. EKG: EKG FINDINGS - unchanged from previous tracings, normal sinus rhythm, nonspecific ST and T waves changes  2. Labs: pending      IMPRESSION:   Based on the above evaluation, the patient is stable and cleared for surgery.         Abdoulaye Cordon NP   5/16/2019

## 2019-05-16 NOTE — PATIENT INSTRUCTIONS
Carpal Tunnel Release: Before Your Surgery  What is carpal tunnel release? Carpal tunnel release is surgery that reduces the pressure on a nerve in the wrist. Your doctor will cut a ligament that presses on the nerve. This lets the nerve pass freely through the tunnel without being squeezed. The surgery can be open or endoscopic. In open surgery, your doctor makes a small cut in the palm of your hand. This cut is called an incision. In endoscopic surgery, your doctor makes one small incision in the wrist, or one small incision in the wrist and one in the palm. Your doctor puts a thin tube with a camera attached (endoscope) into the incision. Surgical tools are put in along with the endoscope. In both types of surgeries, the incisions are closed with stitches. The incisions leave scars that usually fade in time. You may be asleep during the surgery. Or you may be awake and have medicine to numb your hand and arm so you will not feel pain. After surgery, your wrist and hand pain should begin to go away. It usually takes 3 to 4 months to recover and 1 year before your hand strength returns. How much hand strength returns is different for each person. You will go home the same day as the surgery. When you can return to work depends on the type of work you do. Follow-up care is a key part of your treatment and safety. Be sure to make and go to all appointments, and call your doctor if you are having problems. It's also a good idea to know your test results and keep a list of the medicines you take. What happens before surgery?   Surgery can be stressful. This information will help you understand what you can expect. And it will help you safely prepare for surgery.   Preparing for surgery    · Understand exactly what surgery is planned, along with the risks, benefits, and other options. · Tell your doctors ALL the medicines, vitamins, supplements, and herbal remedies you take.  Some of these can increase the risk of bleeding or interact with anesthesia.     · If you take blood thinners, such as warfarin (Coumadin), clopidogrel (Plavix), or aspirin, be sure to talk to your doctor. He or she will tell you if you should stop taking these medicines before your surgery. Make sure that you understand exactly what your doctor wants you to do.     · Your doctor will tell you which medicines to take or stop before your surgery. You may need to stop taking certain medicines a week or more before surgery. So talk to your doctor as soon as you can.     · If you have an advance directive, let your doctor know. It may include a living will and a durable power of  for health care. Bring a copy to the hospital. If you don't have one, you may want to prepare one. It lets your doctor and loved ones know your health care wishes. Doctors advise that everyone prepare these papers before any type of surgery or procedure. What happens on the day of surgery? · Follow the instructions exactly about when to stop eating and drinking. If you don't, your surgery may be canceled. If your doctor told you to take your medicines on the day of surgery, take them with only a sip of water.     · Take a bath or shower before you come in for your surgery. Do not apply lotions, perfumes, deodorants, or nail polish.     · Do not shave the surgical site yourself.     · Take off all jewelry and piercings. And take out contact lenses, if you wear them.    At the hospital or surgery center   · Bring a picture ID.     · The area for surgery is often marked to make sure there are no errors.     · You will be kept comfortable and safe by your anesthesia provider. The anesthesia may make you sleep. Or it may just numb the area being worked on.     · The surgery will take about 15 to 60 minutes. Going home   · Be sure you have someone to drive you home.  Anesthesia and pain medicine make it unsafe for you to drive.     · You will be given more specific instructions about recovering from your surgery. They will cover things like diet, wound care, follow-up care, driving, and getting back to your normal routine. When should you call your doctor? · You have questions or concerns.     · You don't understand how to prepare for your surgery.     · You become ill before the surgery (such as fever, flu, or a cold).     · You need to reschedule or have changed your mind about having the surgery. Where can you learn more? Go to http://marques-mei.info/. Enter P498 in the search box to learn more about \"Carpal Tunnel Release: Before Your Surgery. \"  Current as of: September 20, 2018  Content Version: 11.9  © 3059-6871 [employer]. Care instructions adapted under license by [employer] (which disclaims liability or warranty for this information). If you have questions about a medical condition or this instruction, always ask your healthcare professional. [address] any warranty or liability for your use of this information.

## 2019-05-16 NOTE — PROGRESS NOTES
Chief Complaint   Patient presents with    Pre-op Exam     carpel tunnel      1. Have you been to the ER, urgent care clinic since your last visit? Hospitalized since your last visit? No    2. Have you seen or consulted any other health care providers outside of the [address] [place] [address] since your last visit? Include any pap smears or colon screening.  No    Pre-op paperwork on counter in exam room

## 2019-05-17 ENCOUNTER — HOSPITAL ENCOUNTER (OUTPATIENT)
Dept: LAB | Age: 84
Discharge: HOME | End: 2019-05-17
Payer: MEDICARE

## 2019-05-17 PROCEDURE — 85025 COMPLETE CBC W/AUTO DIFF WBC: CPT

## 2019-05-17 PROCEDURE — 80053 COMPREHEN METABOLIC PANEL: CPT

## 2019-05-18 LAB
ALBUMIN SERPL-MCNC: 4.1 G/DL (ref 3.5–4.7)
ALBUMIN/GLOB SERPL: 1.5 {RATIO} (ref 1.2–2.2)
ALP SERPL-CCNC: 66 IU/L (ref 39–117)
ALT SERPL-CCNC: 13 IU/L (ref 0–44)
AST SERPL-CCNC: 24 IU/L (ref 0–40)
BASOPHILS # BLD AUTO: 0 X10E3/UL (ref 0–0.2)
BASOPHILS NFR BLD AUTO: 0 %
BILIRUB SERPL-MCNC: 0.5 MG/DL (ref 0–1.2)
BUN SERPL-MCNC: 21 MG/DL (ref 8–27)
BUN/CREAT SERPL: 22 (ref 10–24)
CALCIUM SERPL-MCNC: 9.4 MG/DL (ref 8.6–10.2)
CHLORIDE SERPL-SCNC: 101 MMOL/L (ref 96–106)
CO2 SERPL-SCNC: 27 MMOL/L (ref 20–29)
CREAT SERPL-MCNC: 0.97 MG/DL (ref 0.76–1.27)
EOSINOPHIL # BLD AUTO: 0.2 X10E3/UL (ref 0–0.4)
EOSINOPHIL NFR BLD AUTO: 3 %
ERYTHROCYTE [DISTWIDTH] IN BLOOD BY AUTOMATED COUNT: 14.5 % (ref 12.3–15.4)
GLOBULIN SER CALC-MCNC: 2.8 G/DL (ref 1.5–4.5)
GLUCOSE SERPL-MCNC: 89 MG/DL (ref 65–99)
HCT VFR BLD AUTO: 37.5 % (ref 37.5–51)
HGB BLD-MCNC: 12.2 G/DL (ref 13–17.7)
IMM GRANULOCYTES # BLD AUTO: 0 X10E3/UL (ref 0–0.1)
IMM GRANULOCYTES NFR BLD AUTO: 1 %
LYMPHOCYTES # BLD AUTO: 1.1 X10E3/UL (ref 0.7–3.1)
LYMPHOCYTES NFR BLD AUTO: 17 %
MCH RBC QN AUTO: 28.7 PG (ref 26.6–33)
MCHC RBC AUTO-ENTMCNC: 32.5 G/DL (ref 31.5–35.7)
MCV RBC AUTO: 88 FL (ref 79–97)
MONOCYTES # BLD AUTO: 0.7 X10E3/UL (ref 0.1–0.9)
MONOCYTES NFR BLD AUTO: 11 %
NEUTROPHILS # BLD AUTO: 4.5 X10E3/UL (ref 1.4–7)
NEUTROPHILS NFR BLD AUTO: 68 %
PLATELET # BLD AUTO: 166 X10E3/UL (ref 150–379)
POTASSIUM SERPL-SCNC: 4.3 MMOL/L (ref 3.5–5.2)
PROT SERPL-MCNC: 6.9 G/DL (ref 6–8.5)
RBC # BLD AUTO: 4.25 X10E6/UL (ref 4.14–5.8)
SODIUM SERPL-SCNC: 144 MMOL/L (ref 134–144)
WBC # BLD AUTO: 6.5 X10E3/UL (ref 3.4–10.8)

## 2019-05-23 ENCOUNTER — TELEPHONE (OUTPATIENT)
Dept: FAMILY MEDICINE CLINIC | Age: 84
End: 2019-05-23

## 2019-05-23 NOTE — TELEPHONE ENCOUNTER
Maciej Tapia from [facility] pre admission is calling requesting for the patients most recent EKG and older EKG to be faxed over to do comparison.       Fax: [phone]      Best callback:  [phone]      LOV:  Friday, May 17, 2019

## 2019-05-24 ENCOUNTER — TELEPHONE (OUTPATIENT)
Dept: CARDIOLOGY CLINIC | Age: 84
End: 2019-05-24

## 2019-05-24 ENCOUNTER — HOSPITAL ENCOUNTER (OUTPATIENT)
Dept: LAB | Age: 84
Discharge: HOME | End: 2019-05-24
Payer: MEDICARE

## 2019-05-24 ENCOUNTER — OFFICE VISIT (OUTPATIENT)
Dept: CARDIOLOGY CLINIC | Age: 84
End: 2019-05-24

## 2019-05-24 VITALS
DIASTOLIC BLOOD PRESSURE: 80 MMHG | RESPIRATION RATE: 16 BRPM | HEIGHT: 68 IN | WEIGHT: 200.8 LBS | SYSTOLIC BLOOD PRESSURE: 140 MMHG | BODY MASS INDEX: 30.43 KG/M2 | HEART RATE: 62 BPM | OXYGEN SATURATION: 97 %

## 2019-05-24 DIAGNOSIS — R94.31 ABNORMAL EKG: ICD-10-CM

## 2019-05-24 DIAGNOSIS — I10 ESSENTIAL HYPERTENSION: ICD-10-CM

## 2019-05-24 DIAGNOSIS — R07.2 SUBSTERNAL CHEST PAIN: Primary | ICD-10-CM

## 2019-05-24 DIAGNOSIS — R06.09 DOE (DYSPNEA ON EXERTION): ICD-10-CM

## 2019-05-24 DIAGNOSIS — I44.0 FIRST DEGREE AV BLOCK: ICD-10-CM

## 2019-05-24 PROCEDURE — 85651 RBC SED RATE NONAUTOMATED: CPT

## 2019-05-24 PROCEDURE — 36415 COLL VENOUS BLD VENIPUNCTURE: CPT

## 2019-05-24 PROCEDURE — 86141 C-REACTIVE PROTEIN HS: CPT

## 2019-05-24 PROCEDURE — 80048 BASIC METABOLIC PNL TOTAL CA: CPT

## 2019-05-24 PROCEDURE — 85025 COMPLETE CBC W/AUTO DIFF WBC: CPT

## 2019-05-24 NOTE — PROGRESS NOTES
Miracle Hammond     1935       Gaby Aranda MD, [facility]  Date of Visit-5/24/2019   PCP is Camilla Saab MD   [facility] [place]  [facility]  HPI:  Yudi Crystal is a 82 y.o. male   Nice gentleman , friends with Netta Mendez, refer by Dr Chaevz Shannon  There was suspicion for possible Atrial fibrillation   Now has worse dyspnea on exertion doing household chores  They used to play hand ball but now patient is limited due to wrist injury  He is a new patient   He describes a chest tighness along left side , lasting 1-2 minutes, worse over last one year and worse when he cuts grass or takes out garbage  Not a pain but more tightness or soreness, sometimes last 5 minutes, relieved by stopping exertion    EKG: NSR first degree AV block  msec otherwise normal     Assessment/Plan:     1. Chest pain and FERNANDO with exertion  Suspect CAD , discussed options of stress test or cath  Agrees to cath  Planned for 5-30-19  risks and benefits discussed  2/1000 serious life threatening risk includes stroke, heart attack leading to  death  1/100 prolong hospital stay above and bleeding, groin complications, infection, renals possible but  unlikely unless baseline renal dysfunction, tear in cardiac vessel, tamponade  PCI 7-6/817 similar complications but benefits likely outweigh risk     2. Rhythm on EKG 4-3-19 hard to see but I believe we see P waves in leads V 4 V5  Cant follow it in rhythm strip, so hard to know if rest of EKG is sinus      3. HTN at goal by average but some higher #s  BP Readings from Last 6 Encounters:   05/24/19 140/80   05/16/19 150/77   05/01/19 120/80   04/03/19 127/79   03/13/19 132/78   02/25/19 120/82       --Norvasc also good anti-anginal   & Losartan  Lab Results   Component Value Date/Time    Creatinine 0.90 05/24/2019 12:50 PM      4. First degree AV block, mild, asymptomatic   5.  Iron deficiency on Fe tabs  Lab Results   Component Value Date/Time    LDL, calculated 64 2018 05:04 PM    Lipids ok for primary prevention      Future Appointments   Date Time Provider Samantha Ley   2019  9:00 AM Savana Henry  [address]   2019  1:45 PM Rafia Swann MD [address]      Patient Instructions   Your cardiac catheterization procedure has been scheduled for 19 at 0815, at [facility].    Please report to Admitting Department by 0630, or 2 hours prior to your scheduled procedure. Please bring a list of your current medications and medication bottles, if able, to the hospital on this day. You will be unable to drive after your procedure so please make sure to bring someone with you to your procedure. You will need to have nothing to eat or drink after midnight, the night prior to your procedure. You may have small sips of water, if needed, to take with your medication. You will need labs drawn prior to your procedure. Please go to [facility] to have this done. Key CAD CHF Meds             amLODIPine (NORVASC) 5 mg tablet (Taking) TAKE 1 TABLET BY MOUTH EVERY DAY    losartan (COZAAR) 100 mg tablet (Taking) TAKE 1 TABLET BY MOUTH DAILY            Impression:   1. Substernal chest pain    2. FERNANDO (dyspnea on exertion)    3. Abnormal EKG    4. Essential hypertension    5. First degree AV block         Medical Hx= prostate cancer May 2006 surgery  Lower back surgery , , appy, choly, hernia, all in 1950s  No prior heart cath  Social Hx=  No etoh, no tobaco, drinsk 2-3 cups coffee daily, retired, 3 grown children, likes to go to [facility] to play handball  Family Hx= mother  80 of cancer, father  64 of stroke    REVIEW OF SYMPTOMS:Review of Systems   Constitutional: Negative for diaphoresis, fever and malaise/fatigue. HENT: Negative for ear pain, hearing loss, nosebleeds and tinnitus. Eyes: Negative for blurred vision, double vision and pain.    Respiratory: Positive for shortness of breath. Negative for cough, hemoptysis, sputum production, wheezing and stridor. Cardiovascular: Positive for chest pain. Negative for palpitations, orthopnea, claudication and leg swelling. Gastrointestinal: Negative for abdominal pain, blood in stool, constipation, diarrhea, heartburn, melena, nausea and vomiting. Genitourinary: Negative for dysuria, frequency and urgency. Musculoskeletal: Negative for back pain, falls, joint pain, myalgias and neck pain. Skin: Negative for rash. Neurological: Negative for dizziness, sensory change, seizures, loss of consciousness, weakness and headaches. Endo/Heme/Allergies: Does not bruise/bleed easily. Psychiatric/Behavioral: Negative for depression, hallucinations and memory loss. The patient is not nervous/anxious and does not have insomnia. A  full 12 system ROS is reviewed with aid of new patient form  see scanned new patient worksheet. No Known Allergies    see supplement sheet, initialed and to be scanned by staff  Past Medical History:   Diagnosis Date    Diverticulosis, sigmoid 8/2011    ED (erectile dysfunction) of organic origin     History of prostate cancer     Hypertension     Prostate cancer ([facility] [address]     Sebaceous cyst 4/1/2014      Social Hx= reports that he has never smoked. He has never used smokeless tobacco. He reports that he does not drink alcohol or use drugs. Exam and Labs:    Visit Vitals  /80 (BP 1 Location: Left arm, BP Patient Position: Sitting)   Pulse 62   Resp 16   Ht 5' 8\" (1.727 m)   Wt 200 lb 12.8 oz (91.1 kg)   SpO2 97%   BMI 30.53 kg/m²    @Constitutional:  NAD, comfortable  Head: NC,AT. Eyes: No scleral icterus. Neck:  Neck supple. No JVD present. Throat: moist mucous membranes. Chest: Effort normal & normal respiratory excursion . Neurological: alert, conversant and oriented . Skin: Skin is not cold. No obvious systemic rash noted. Not diaphoretic. No erythema.  Psychiatric:  Grossly normal mood and affect. Behavior appears normal. Extremities:  no clubbing or cyanosis. Abdomen: non distended    Lungs:breath sounds normal. No stridor. distress, wheezes or  Rales. Heart:    normal rate, regular rhythm, normal S1, S2, no murmurs, rubs, clicks or gallops , PMI non displaced. Edema: Edema is none. Lab Results   Component Value Date/Time    Cholesterol, total 134 12/12/2018 05:04 PM    HDL Cholesterol 44 12/12/2018 05:04 PM    LDL, calculated 64 12/12/2018 05:04 PM    Triglyceride 130 12/12/2018 05:04 PM     Lab Results   Component Value Date/Time    Sodium 144 05/17/2019 08:26 AM    Potassium 4.3 05/17/2019 08:26 AM    Chloride 101 05/17/2019 08:26 AM    CO2 27 05/17/2019 08:26 AM    Anion gap 6 12/02/2009 08:22 AM    Glucose 89 05/17/2019 08:26 AM    BUN 21 05/17/2019 08:26 AM    Creatinine 0.97 05/17/2019 08:26 AM    BUN/Creatinine ratio 22 05/17/2019 08:26 AM    GFR est AA 84 05/17/2019 08:26 AM    GFR est non-AA 73 05/17/2019 08:26 AM    Calcium 9.4 05/17/2019 08:26 AM      Wt Readings from Last 3 Encounters:   05/24/19 200 lb 12.8 oz (91.1 kg)   05/16/19 202 lb 9.6 oz (91.9 kg)   05/01/19 200 lb (90.7 kg)      BP Readings from Last 3 Encounters:   05/24/19 140/80   05/16/19 150/77   05/01/19 120/80      Current Outpatient Medications   Medication Sig    amLODIPine (NORVASC) 5 mg tablet TAKE 1 TABLET BY MOUTH EVERY DAY    gabapentin (NEURONTIN) 300 mg capsule Take 2 Caps by mouth three (3) times daily. 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  valACYclovir (VALTREX) 1 gram tablet TK 1 T PO TID FOR 7 DAYS    glucosamine ramirez 2KCl-chondroit 500-400 mg tab Take 1 Tab by mouth.  ferrous sulfate 325 mg (65 mg iron) tablet Take 1 Tab by mouth two (2) times a day.  vitamin e (E GEMS) 1,000 unit capsule Take 1,000 Units by mouth daily.     albuterol (PROVENTIL HFA, VENTOLIN HFA, PROAIR HFA) 90 mcg/actuation inhaler Take 1-2 Puffs by inhalation every four (4) hours as needed for Wheezing.  esomeprazole (NEXIUM) 40 mg capsule TAKE 1 CAPSULE BY MOUTH DAILY (Patient not taking: Reported on 4/27/2018)     No current facility-administered medications for this visit. Impression see above.

## 2019-05-24 NOTE — TELEPHONE ENCOUNTER
Lidya Abarca is calling to obtain the cardiac clearance for a procedure first thing Tuesday morning.    FGPKP:[phone] X  [phone]  Fax: [phone] (PLEASE SEND TO BOTH)

## 2019-05-24 NOTE — H&P (VIEW-ONLY)
Ezra Connolly Tomaszjilihaja     1935       Gaby Major MD, [facility] Date of Visit-5/24/2019 PCP is Jerrica Ball MD  
[address] [facility] and [facility] [place] [facility] HPI:  Xuan Montes is a 82 y.o. male Nice gentleman , friends with Elizabeth Cervantes, refer by Dr Lorna Lora There was suspicion for possible Atrial fibrillation Now has worse dyspnea on exertion doing household chores They used to play hand ball but now patient is limited due to wrist injury He is a new patient He describes a chest tighness along left side , lasting 1-2 minutes, worse over last one year and worse when he cuts grass or takes out garbage Not a pain but more tightness or soreness, sometimes last 5 minutes, relieved by stopping exertion EKG: NSR first degree AV block  msec otherwise normal  
 
Assessment/Plan: 1. Chest pain and FERNANDO with exertion Suspect CAD , discussed options of stress test or cath Agrees to cath Planned for 5-30-19 
risks and benefits discussed 2/1000 serious life threatening risk includes stroke, heart attack leading to  death 1/100 prolong hospital stay above and bleeding, groin complications, infection, renals possible but  unlikely unless baseline renal dysfunction, tear in cardiac vessel, tamponade PCI 1-4/390 similar complications but benefits likely outweigh risk 2. Rhythm on EKG 4-3-19 hard to see but I believe we see P waves in leads V 4 V5 Cant follow it in rhythm strip, so hard to know if rest of EKG is sinus 3. HTN at goal by average but some higher #s BP Readings from Last 6 Encounters:  
05/24/19 140/80  
05/16/19 150/77  
05/01/19 120/80  
04/03/19 127/79  
03/13/19 132/78  
02/25/19 120/82  
   
--Norvasc also good anti-anginal 
 & Losartan Lab Results Component Value Date/Time Creatinine 0.90 05/24/2019 12:50 PM  
  
4. First degree AV block, mild, asymptomatic 5. Iron deficiency on Fe tabs Lab Results Component Value Date/Time LDL, calculated 64 2018 05:04 PM  
 Lipids ok for primary prevention Future Appointments Date Time Provider Samantha Hancocki 2019  9:00 AM Conor Velasquez  [address]  
2019  1:45 PM Roz Mcneil  [facility] Patient Instructions Your cardiac catheterization procedure has been scheduled for 19 at 0815, at [facility]. 
 
Please report to Admitting Department by 0630, or 2 hours prior to your scheduled procedure. Please bring a list of your current medications and medication bottles, if able, to the hospital on this day. You will be unable to drive after your procedure so please make sure to bring someone with you to your procedure. You will need to have nothing to eat or drink after midnight, the night prior to your procedure. You may have small sips of water, if needed, to take with your medication. You will need labs drawn prior to your procedure. Please go to [facility] to have this done. Key CAD CHF Meds   
    
  
 amLODIPine (NORVASC) 5 mg tablet (Taking) TAKE 1 TABLET BY MOUTH EVERY DAY  
 losartan (COZAAR) 100 mg tablet (Taking) TAKE 1 TABLET BY MOUTH DAILY Impression: 1. Substernal chest pain 2. FERNANDO (dyspnea on exertion) 3. Abnormal EKG 4. Essential hypertension 5. First degree AV block Medical Hx= prostate cancer May 2006 surgery Lower back surgery , , appy, choly, hernia, all in 80s No prior heart cath Social Hx=  No etoh, no tobaco, drinsk 2-3 cups coffee daily, retired, 3 grown children, likes to go to [employer] to play handball Family Hx= mother  80 of cancer, father  64 of stroke REVIEW OF SYMPTOMS:Review of Systems Constitutional: Negative for diaphoresis, fever and malaise/fatigue. HENT: Negative for ear pain, hearing loss, nosebleeds and tinnitus. Eyes: Negative for blurred vision, double vision and pain. Respiratory: Positive for shortness of breath. Negative for cough, hemoptysis, sputum production, wheezing and stridor. Cardiovascular: Positive for chest pain. Negative for palpitations, orthopnea, claudication and leg swelling. Gastrointestinal: Negative for abdominal pain, blood in stool, constipation, diarrhea, heartburn, melena, nausea and vomiting. Genitourinary: Negative for dysuria, frequency and urgency. Musculoskeletal: Negative for back pain, falls, joint pain, myalgias and neck pain. Skin: Negative for rash. Neurological: Negative for dizziness, sensory change, seizures, loss of consciousness, weakness and headaches. Endo/Heme/Allergies: Does not bruise/bleed easily. Psychiatric/Behavioral: Negative for depression, hallucinations and memory loss. The patient is not nervous/anxious and does not have insomnia. A  full 12 system ROS is reviewed with aid of new patient form  see scanned new patient worksheet. No Known Allergies 
 
see supplement sheet, initialed and to be scanned by staff Past Medical History:  
Diagnosis Date  Diverticulosis, sigmoid 8/2011  ED (erectile dysfunction) of organic origin  History of prostate cancer  Hypertension  Prostate cancer ([facility] [address]  Sebaceous cyst 4/1/2014 Social Hx= reports that he has never smoked. He has never used smokeless tobacco. He reports that he does not drink alcohol or use drugs. Exam and Labs:   
Visit Vitals /80 (BP 1 Location: Left arm, BP Patient Position: Sitting) Pulse 62 Resp 16 Ht 5' 8\" (1.727 m) Wt 200 lb 12.8 oz (91.1 kg) SpO2 97% BMI 30.53 kg/m² @Constitutional:  NAD, comfortable Head: NC,AT. Eyes: No scleral icterus. Neck:  Neck supple. No JVD present. Throat: moist mucous membranes. Chest: Effort normal & normal respiratory excursion . Neurological: alert, conversant and oriented . Skin: Skin is not cold. No obvious systemic rash noted. Not diaphoretic. No erythema. Psychiatric:  Grossly normal mood and affect. Behavior appears normal. Extremities:  no clubbing or cyanosis. Abdomen: non distended Lungs:breath sounds normal. No stridor. distress, wheezes or  Rales. Heart:    normal rate, regular rhythm, normal S1, S2, no murmurs, rubs, clicks or gallops , PMI non displaced. Edema: Edema is none. Lab Results Component Value Date/Time Cholesterol, total 134 12/12/2018 05:04 PM  
 HDL Cholesterol 44 12/12/2018 05:04 PM  
 LDL, calculated 64 12/12/2018 05:04 PM  
 Triglyceride 130 12/12/2018 05:04 PM  
 
Lab Results Component Value Date/Time Sodium 144 05/17/2019 08:26 AM  
 Potassium 4.3 05/17/2019 08:26 AM  
 Chloride 101 05/17/2019 08:26 AM  
 CO2 27 05/17/2019 08:26 AM  
 Anion gap 6 12/02/2009 08:22 AM  
 Glucose 89 05/17/2019 08:26 AM  
 BUN 21 05/17/2019 08:26 AM  
 Creatinine 0.97 05/17/2019 08:26 AM  
 BUN/Creatinine ratio 22 05/17/2019 08:26 AM  
 GFR est AA 84 05/17/2019 08:26 AM  
 GFR est non-AA 73 05/17/2019 08:26 AM  
 Calcium 9.4 05/17/2019 08:26 AM  
  
Wt Readings from Last 3 Encounters:  
05/24/19 200 lb 12.8 oz (91.1 kg) 05/16/19 202 lb 9.6 oz (91.9 kg) 05/01/19 200 lb (90.7 kg) BP Readings from Last 3 Encounters:  
05/24/19 140/80  
05/16/19 150/77  
05/01/19 120/80 Current Outpatient Medications Medication Sig  
 amLODIPine (NORVASC) 5 mg tablet TAKE 1 TABLET BY MOUTH EVERY DAY  gabapentin (NEURONTIN) 300 mg capsule Take 2 Caps by mouth three (3) times daily. 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  valACYclovir (VALTREX) 1 gram tablet TK 1 T PO TID FOR 7 DAYS  glucosamine ramirez 2KCl-chondroit 500-400 mg tab Take 1 Tab by mouth.  ferrous sulfate 325 mg (65 mg iron) tablet Take 1 Tab by mouth two (2) times a day.  vitamin e (E GEMS) 1,000 unit capsule Take 1,000 Units by mouth daily.  albuterol (PROVENTIL HFA, VENTOLIN HFA, PROAIR HFA) 90 mcg/actuation inhaler Take 1-2 Puffs by inhalation every four (4) hours as needed for Wheezing.  esomeprazole (NEXIUM) 40 mg capsule TAKE 1 CAPSULE BY MOUTH DAILY (Patient not taking: Reported on 4/27/2018) No current facility-administered medications for this visit. Impression see above.

## 2019-05-24 NOTE — PROGRESS NOTES
Visit Vitals  /80 (BP 1 Location: Left arm, BP Patient Position: Sitting)   Pulse 62   Resp 16   Ht 5' 8\" (1.727 m)   Wt 200 lb 12.8 oz (91.1 kg)   SpO2 97%   BMI 30.53 kg/m²

## 2019-05-24 NOTE — PATIENT INSTRUCTIONS
Your cardiac catheterization procedure has been scheduled for 5-30-19 at 0815, at [facility].    Please report to Admitting Department by 0630, or 2 hours prior to your scheduled procedure. Please bring a list of your current medications and medication bottles, if able, to the hospital on this day. You will be unable to drive after your procedure so please make sure to bring someone with you to your procedure. You will need to have nothing to eat or drink after midnight, the night prior to your procedure. You may have small sips of water, if needed, to take with your medication. You will need labs drawn prior to your procedure. Please go to [facility] to have this done.

## 2019-05-25 LAB
BASOPHILS # BLD AUTO: 0 X10E3/UL (ref 0–0.2)
BASOPHILS NFR BLD AUTO: 0 %
BUN SERPL-MCNC: 17 MG/DL (ref 8–27)
BUN/CREAT SERPL: 19 (ref 10–24)
CALCIUM SERPL-MCNC: 9.4 MG/DL (ref 8.6–10.2)
CHLORIDE SERPL-SCNC: 103 MMOL/L (ref 96–106)
CO2 SERPL-SCNC: 28 MMOL/L (ref 20–29)
CREAT SERPL-MCNC: 0.9 MG/DL (ref 0.76–1.27)
CRP SERPL HS-MCNC: 10.91 MG/L (ref 0–3)
EOSINOPHIL # BLD AUTO: 0.2 X10E3/UL (ref 0–0.4)
EOSINOPHIL NFR BLD AUTO: 3 %
ERYTHROCYTE [DISTWIDTH] IN BLOOD BY AUTOMATED COUNT: 14.4 % (ref 12.3–15.4)
ERYTHROCYTE [SEDIMENTATION RATE] IN BLOOD BY WESTERGREN METHOD: 20 MM/HR (ref 0–30)
GLUCOSE SERPL-MCNC: 88 MG/DL (ref 65–99)
HCT VFR BLD AUTO: 36.6 % (ref 37.5–51)
HGB BLD-MCNC: 12.1 G/DL (ref 13–17.7)
IMM GRANULOCYTES # BLD AUTO: 0 X10E3/UL (ref 0–0.1)
IMM GRANULOCYTES NFR BLD AUTO: 0 %
LYMPHOCYTES # BLD AUTO: 1.3 X10E3/UL (ref 0.7–3.1)
LYMPHOCYTES NFR BLD AUTO: 18 %
MCH RBC QN AUTO: 28.9 PG (ref 26.6–33)
MCHC RBC AUTO-ENTMCNC: 33.1 G/DL (ref 31.5–35.7)
MCV RBC AUTO: 88 FL (ref 79–97)
MONOCYTES # BLD AUTO: 0.7 X10E3/UL (ref 0.1–0.9)
MONOCYTES NFR BLD AUTO: 11 %
NEUTROPHILS # BLD AUTO: 4.8 X10E3/UL (ref 1.4–7)
NEUTROPHILS NFR BLD AUTO: 68 %
PLATELET # BLD AUTO: 188 X10E3/UL (ref 150–450)
POTASSIUM SERPL-SCNC: 4.7 MMOL/L (ref 3.5–5.2)
RBC # BLD AUTO: 4.18 X10E6/UL (ref 4.14–5.8)
SODIUM SERPL-SCNC: 143 MMOL/L (ref 134–144)
WBC # BLD AUTO: 7 X10E3/UL (ref 3.4–10.8)

## 2019-05-28 RX ORDER — SODIUM CHLORIDE 0.9 % (FLUSH) 0.9 %
5-40 SYRINGE (ML) INJECTION EVERY 8 HOURS
Status: CANCELLED | OUTPATIENT
Start: 2019-05-30

## 2019-05-28 RX ORDER — SODIUM CHLORIDE 9 MG/ML
1000 INJECTION, SOLUTION INTRAVENOUS CONTINUOUS
Status: CANCELLED | OUTPATIENT
Start: 2019-05-30 | End: 2019-05-30

## 2019-05-28 RX ORDER — SODIUM CHLORIDE 0.9 % (FLUSH) 0.9 %
5-40 SYRINGE (ML) INJECTION AS NEEDED
Status: CANCELLED | OUTPATIENT
Start: 2019-05-30

## 2019-05-29 NOTE — PROGRESS NOTES
Labs for pre cath ok  Sed rate WNL so temporal arteritis seems less likely  CRP up, inflammatory marker, need to see if CAD  Has cath in am

## 2019-05-30 ENCOUNTER — HOSPITAL ENCOUNTER (OUTPATIENT)
Age: 84
Setting detail: OUTPATIENT SURGERY
Discharge: HOME | End: 2019-05-30
Attending: SPECIALIST | Admitting: SPECIALIST
Payer: MEDICARE

## 2019-05-30 VITALS
SYSTOLIC BLOOD PRESSURE: 150 MMHG | WEIGHT: 199 LBS | DIASTOLIC BLOOD PRESSURE: 78 MMHG | OXYGEN SATURATION: 96 % | HEIGHT: 68 IN | TEMPERATURE: 97.6 F | HEART RATE: 60 BPM | RESPIRATION RATE: 18 BRPM | BODY MASS INDEX: 30.16 KG/M2

## 2019-05-30 DIAGNOSIS — R07.2 PRECORDIAL PAIN: ICD-10-CM

## 2019-05-30 LAB — END DIASTOLIC PRESSURE: 20

## 2019-05-30 PROCEDURE — 74011250636 HC RX REV CODE- 250/636: Performed by: SPECIALIST

## 2019-05-30 PROCEDURE — 99152 MOD SED SAME PHYS/QHP 5/>YRS: CPT | Performed by: SPECIALIST

## 2019-05-30 PROCEDURE — 92928 PRQ TCAT PLMT NTRAC ST 1 LES: CPT | Performed by: SPECIALIST

## 2019-05-30 PROCEDURE — C1725 CATH, TRANSLUMIN NON-LASER: HCPCS | Performed by: SPECIALIST

## 2019-05-30 PROCEDURE — C1769 GUIDE WIRE: HCPCS | Performed by: SPECIALIST

## 2019-05-30 PROCEDURE — 74011636320 HC RX REV CODE- 636/320: Performed by: SPECIALIST

## 2019-05-30 PROCEDURE — 74011250637 HC RX REV CODE- 250/637: Performed by: SPECIALIST

## 2019-05-30 PROCEDURE — 93458 L HRT ARTERY/VENTRICLE ANGIO: CPT | Performed by: SPECIALIST

## 2019-05-30 PROCEDURE — 74011000258 HC RX REV CODE- 258: Performed by: INTERNAL MEDICINE

## 2019-05-30 PROCEDURE — 77030004532 HC CATH ANGI DX IMP BSC -A: Performed by: SPECIALIST

## 2019-05-30 PROCEDURE — C1887 CATHETER, GUIDING: HCPCS | Performed by: SPECIALIST

## 2019-05-30 PROCEDURE — C1894 INTRO/SHEATH, NON-LASER: HCPCS | Performed by: SPECIALIST

## 2019-05-30 PROCEDURE — 93005 ELECTROCARDIOGRAM TRACING: CPT

## 2019-05-30 PROCEDURE — C1874 STENT, COATED/COV W/DEL SYS: HCPCS | Performed by: SPECIALIST

## 2019-05-30 PROCEDURE — 74011250636 HC RX REV CODE- 250/636

## 2019-05-30 PROCEDURE — 74011250637 HC RX REV CODE- 250/637: Performed by: INTERNAL MEDICINE

## 2019-05-30 PROCEDURE — 99153 MOD SED SAME PHYS/QHP EA: CPT | Performed by: SPECIALIST

## 2019-05-30 PROCEDURE — 77030013744: Performed by: SPECIALIST

## 2019-05-30 PROCEDURE — 74011250636 HC RX REV CODE- 250/636: Performed by: INTERNAL MEDICINE

## 2019-05-30 DEVICE — XIENCE SIERRA™ EVEROLIMUS ELUTING CORONARY STENT SYSTEM 2.25 MM X 08 MM / RAPID-EXCHANGE
Type: IMPLANTABLE DEVICE | Status: FUNCTIONAL
Brand: XIENCE SIERRA™

## 2019-05-30 RX ORDER — MIDAZOLAM HYDROCHLORIDE 1 MG/ML
INJECTION, SOLUTION INTRAMUSCULAR; INTRAVENOUS AS NEEDED
Status: DISCONTINUED | OUTPATIENT
Start: 2019-05-30 | End: 2019-05-30 | Stop reason: HOSPADM

## 2019-05-30 RX ORDER — CLOPIDOGREL BISULFATE 75 MG/1
75 TABLET ORAL DAILY
Status: DISCONTINUED | OUTPATIENT
Start: 2019-05-30 | End: 2019-05-30 | Stop reason: HOSPADM

## 2019-05-30 RX ORDER — ONDANSETRON 2 MG/ML
4 INJECTION INTRAMUSCULAR; INTRAVENOUS
Status: DISCONTINUED | OUTPATIENT
Start: 2019-05-30 | End: 2019-05-30 | Stop reason: HOSPADM

## 2019-05-30 RX ORDER — SODIUM CHLORIDE 9 MG/ML
1000 INJECTION, SOLUTION INTRAVENOUS CONTINUOUS
Status: DISCONTINUED | OUTPATIENT
Start: 2019-05-30 | End: 2019-05-30 | Stop reason: HOSPADM

## 2019-05-30 RX ORDER — SODIUM CHLORIDE 0.9 % (FLUSH) 0.9 %
5-40 SYRINGE (ML) INJECTION EVERY 8 HOURS
Status: DISCONTINUED | OUTPATIENT
Start: 2019-05-30 | End: 2019-05-30 | Stop reason: HOSPADM

## 2019-05-30 RX ORDER — LIDOCAINE HYDROCHLORIDE 10 MG/ML
INJECTION INFILTRATION; PERINEURAL AS NEEDED
Status: DISCONTINUED | OUTPATIENT
Start: 2019-05-30 | End: 2019-05-30 | Stop reason: HOSPADM

## 2019-05-30 RX ORDER — FENTANYL CITRATE 50 UG/ML
INJECTION, SOLUTION INTRAMUSCULAR; INTRAVENOUS AS NEEDED
Status: DISCONTINUED | OUTPATIENT
Start: 2019-05-30 | End: 2019-05-30 | Stop reason: HOSPADM

## 2019-05-30 RX ORDER — SODIUM CHLORIDE 9 MG/ML
3 INJECTION, SOLUTION INTRAVENOUS CONTINUOUS
Status: DISPENSED | OUTPATIENT
Start: 2019-05-30 | End: 2019-05-30

## 2019-05-30 RX ORDER — SODIUM CHLORIDE 0.9 % (FLUSH) 0.9 %
5-40 SYRINGE (ML) INJECTION AS NEEDED
Status: DISCONTINUED | OUTPATIENT
Start: 2019-05-30 | End: 2019-05-30 | Stop reason: HOSPADM

## 2019-05-30 RX ORDER — HYDRALAZINE HYDROCHLORIDE 20 MG/ML
10 INJECTION INTRAMUSCULAR; INTRAVENOUS ONCE
Status: COMPLETED | OUTPATIENT
Start: 2019-05-30 | End: 2019-05-30

## 2019-05-30 RX ORDER — CLOPIDOGREL BISULFATE 75 MG/1
75 TABLET ORAL DAILY
Qty: 90 TAB | Refills: 3 | Status: SHIPPED | OUTPATIENT
Start: 2019-05-30 | End: 2020-06-18 | Stop reason: SDUPTHER

## 2019-05-30 RX ORDER — ACETAMINOPHEN 325 MG/1
650 TABLET ORAL
Status: DISCONTINUED | OUTPATIENT
Start: 2019-05-30 | End: 2019-05-30 | Stop reason: HOSPADM

## 2019-05-30 RX ORDER — AMLODIPINE BESYLATE 5 MG/1
5 TABLET ORAL ONCE
Status: COMPLETED | OUTPATIENT
Start: 2019-05-30 | End: 2019-05-30

## 2019-05-30 RX ORDER — SODIUM CHLORIDE 9 MG/ML
1.5 INJECTION, SOLUTION INTRAVENOUS CONTINUOUS
Status: DISPENSED | OUTPATIENT
Start: 2019-05-30 | End: 2019-05-30

## 2019-05-30 RX ORDER — CLOPIDOGREL 300 MG/1
TABLET, FILM COATED ORAL AS NEEDED
Status: DISCONTINUED | OUTPATIENT
Start: 2019-05-30 | End: 2019-05-30 | Stop reason: HOSPADM

## 2019-05-30 RX ORDER — HEPARIN SODIUM 200 [USP'U]/100ML
INJECTION, SOLUTION INTRAVENOUS
Status: COMPLETED | OUTPATIENT
Start: 2019-05-30 | End: 2019-05-30

## 2019-05-30 RX ADMIN — HYDRALAZINE HYDROCHLORIDE 10 MG: 20 INJECTION INTRAMUSCULAR; INTRAVENOUS at 13:10

## 2019-05-30 RX ADMIN — SODIUM CHLORIDE 1.5 ML/KG/HR: 900 INJECTION, SOLUTION INTRAVENOUS at 10:49

## 2019-05-30 RX ADMIN — SODIUM CHLORIDE 500 ML: 9 INJECTION, SOLUTION INTRAVENOUS at 14:59

## 2019-05-30 RX ADMIN — SODIUM CHLORIDE 3 ML/KG/HR: 900 INJECTION, SOLUTION INTRAVENOUS at 07:37

## 2019-05-30 RX ADMIN — AMLODIPINE BESYLATE 5 MG: 5 TABLET ORAL at 11:23

## 2019-05-30 NOTE — PROGRESS NOTES
Dr Troy Mcgee to be updated on Pt's elevated BP. Right groin site with no bleeding or hematoma and Pt with no complaints of discomfort.

## 2019-05-30 NOTE — PROGRESS NOTES
Pt and Pt's daughter verbalized understanding of discharge instructions. PIV removed and guaze with tape placed; no redness or swelling. Right groin site with no bleeding or hematoma. Pt escorted to car via wheelchair with belongings; daughter is driving.

## 2019-05-30 NOTE — Clinical Note
Lesion located in the Proximal OM 1. Balloon inserted. Balloon inflated using multiple inflations inflation technique. Pressure = 14 gen; Duration = 30 sec. Inflation 2: Pressure: 14 gen; Duration: 30 sec.

## 2019-05-30 NOTE — PROGRESS NOTES
Dr Marita Miranda updated on Pt's EG=446/85 HR= 55. Pt medicated with Norvasc 5mg ( see MAR ) as per Dr Marita Miranda. Will monitor.

## 2019-05-30 NOTE — Clinical Note
Lesion: Located in the Proximal OM 1. Stent inserted. Stent deployed. First inflation pressure = 12 gen; inflation time: 30 sec.

## 2019-05-30 NOTE — CARDIO/PULMONARY
Cardiac Rehab: CAD education folder given to [facility]. Educated using teach back method. Reviewed CAD diagnosis definition and purpose of intervention. Discussed risk factors for CAD to include the following: family history, elevated BMI, hyperlipidemia, hypertension, diabetes, stress, and smoking. Discussed Heart Healthy/Low Sodium (2000 mg) diet. Reviewed the importance of medication compliance, the purpose of PLAVIX, and potential side effects. Discussed follow up appointments with cardiologist, signs and symptoms of angina, and what to report to physician after discharge. Emphasized the value of cardiac rehab. Discussed Cardiac Rehab Program format, benefits, and encouraged enrollment to assist with risk modification and management. Agreed to follow up by phone as he would like to discuss with Dr Carrie Cee verbalized understanding with questions answered.  Barbee Barthel, RN

## 2019-05-30 NOTE — PROGRESS NOTES
SHEATH PULL NOTE:    Patient informed of procedure with questions answered with review. Sheath site prepped with Chloraprep swab. 6 fr sheath in Right Groin pulled by Maia Giron RN,RN. Hand hold and clotting pad, with manual compression to site. No bleeding, no hematoma, no pain at site. Hemostasis obtained with hand hold/manual compression at site. Patient tolerated well. No change in status. Handhold for 20 minutes. No change at site. Tegaderm dressing applied to site. No bleeding, no hematoma, no pain/discomfort at site. Groin instructions provided with review. Continue to monitor procedure site and patient status. *Advised patient to keep head flat and extremity flat to decrease risk of bleeding. *Recommended that patient not drink for ONE HOUR post sheath pull completion. *Recommended that patient not eat for TWO HOURS post sheath pull completion. *Instructed patient on rationale for delay of PO products to decrease risk for aspiration and if additional treatment to procedure site is required. Patient verbalized understanding of instructions with review.

## 2019-05-30 NOTE — PROGRESS NOTES
Transfer to [address] [place] [address] from Procedure Area    Verbal report given to Sanam Figueredo on Bren [place] being transferred to Cardiac Cath Lab  for routine progression of care   Patient is post left heart cath with intervention procedure. Patient stable upon transfer to . Report consisted of patients Situation, Background, Assessment and   Recommendations(SBAR). Information from the following report(s) Kardex, Procedure Summary, Intake/Output, MAR and Recent Results was reviewed with the receiving nurse. Opportunity for questions and clarification was provided. Patient medicated during procedure with orders obtained and verified by Dr. Lorin Wilde. Refer to patient PROCEDURE REPORT for vital signs, assessment, status, and response during procedure.

## 2019-05-30 NOTE — PROGRESS NOTES
Cardiac Cath Lab Recovery Arrival Note:      Karma Jasmine arrived to Cardiac Cath Lab, Recovery Area. Staff introduced to patient. Patient identifiers verified with NAME and DATE OF BIRTH. Procedure verified with patient. Consent forms reviewed and signed by patient or authorized representative and verified. Allergies verified. Patient and family oriented to department. Patient and family informed of procedure and plan of care. Questions answered with review. Patient prepped for procedure, per orders from physician, prior to arrival.    Patient on cardiac monitor, non-invasive blood pressure, SPO2 monitor. On Room Air. Patient is A&Ox 4. Patient reports No Pain. Patient in stretcher, in low position, with side rails up, call bell within reach, patient instructed to call if assistance as needed. Patient prep in: [address], [address] [facility] [address]  Family in: Waiting Room.    Prep by: Savannah Rai RN

## 2019-05-30 NOTE — PROGRESS NOTES
Pt to be medicated with Hydralazine 10 mg IVP as per Dr Deborah Bernal for Pt's elevated  /92 HR=55. Will Monitor.

## 2019-05-30 NOTE — PROGRESS NOTES
Cardiac Cath Lab Procedure Area Arrival Note:    Rashaun Springer arrived to Cardiac Cath Lab, Procedure Area. Patient identifiers verified with NAME and DATE OF BIRTH. Procedure verified with patient. Consent forms verified. Allergies verified. Patient informed of procedure and plan of care. Questions answered with review. Patient voiced understanding of procedure and plan of care. Patient on cardiac monitor, non-invasive blood pressure, SPO2 monitor. On O2 @ 2 lpm via nasal cannula. IV of normal saline on pump at 135 ml/hr. Patient status doing well without problems. Patient is A&Ox 4. Patient reports no pain. Patient medicated during procedure with orders obtained and verified by Dr. Gurjit Johnston.    Refer to patients Cardiac Cath Lab PROCEDURE REPORT for vital signs, assessment, status, and response during procedure, printed at end of case. Printed report on chart or scanned into chart.

## 2019-05-30 NOTE — BRIEF OP NOTE
BRIEF OPERATIVE NOTE    Date of Procedure: 5/30/2019   Preoperative Diagnosis: Precordial pain [R07.2]  Postoperative Diagnosis: CAD, angina pectoris  Procedure(s):  LEFT HEART CATH / CORONARY ANGIOGRAPHY  Insert Stent Good Coronary  Surgeon(s) and Role:  Panel 1:     * Le Jones MD - Primary  Panel 2:     Alistair Thakkar MD - Primary    Surgical Staff:  CV Monitor: Eduardo Zepeda RN  CV Scrub: Brock Sauceda  CV Circulator: Frederick Russo case tracking events are documented in the log. Anesthesia: MAC  Estimated Blood Loss: minimal  Specimens: none  Findings: 70% OM1. Reduced to 0% with 2.25 x 8 Xience Shelli GOOD. Post dilated with 2.5 mm balloon. MAYUR 3 flow. No vessel damage. Complications: none  Implants:   Implant Name Type Inv.  Item Serial No.  Lot No. LRB No. Used Action   STENT SYS COR [id number] -- XIENCE SHELLI - [id number] Stent STENT SYS COR 2.25X8MM -- XIENCE SHELLI  [employer]  N/A 1 Implanted

## 2019-05-30 NOTE — PROGRESS NOTES
TRANSFER - IN REPORT:    Verbal report received from [facility] on Beverly Wilson  being received from procedure area for routine progression of care. Report consisted of patients Situation, Background, Assessment and Recommendations(SBAR). Information from the following report(s) SBAR, Procedure Summary, MAR, Recent Results and Cardiac Rhythm NSR was reviewed with the receiving clinician. Opportunity for questions and clarification was provided. Assessment completed upon patients arrival to [address] [place] [address] and care assumed. Cardiac Cath Lab Recovery Arrival Note:    Beverly Wilson arrived to [facility] recovery area. Patient procedure= LHC/stent. Patient on cardiac monitor, non-invasive blood pressure, SPO2 monitor. On oO2 @ 2 lpm via NC.  IV  of NS on pump at 135 ml/hr. Patient status doing well without problems. Patient is A&Ox 4. Patient reports No Pain. PROCEDURE SITE CHECK:    Procedure site:without any bleeding and No Hematoma, No pain/discomfort reported at procedure site. No change in patient status. Continue to monitor patient and status.

## 2019-05-30 NOTE — INTERVAL H&P NOTE
H&P Update: 
Shala Chin was seen and examined. History and physical has been reviewed. The patient has been examined.  There have been no significant clinical changes since the completion of the originally dated History and Physical. 
Went over the labs including ESR and CRP

## 2019-05-31 LAB
ATRIAL RATE: 55 BPM
CALCULATED P AXIS, ECG09: 71 DEGREES
CALCULATED R AXIS, ECG10: -25 DEGREES
CALCULATED T AXIS, ECG11: 15 DEGREES
DIAGNOSIS, 93000: NORMAL
P-R INTERVAL, ECG05: 240 MS
Q-T INTERVAL, ECG07: 448 MS
QRS DURATION, ECG06: 98 MS
QTC CALCULATION (BEZET), ECG08: 428 MS
VENTRICULAR RATE, ECG03: 55 BPM

## 2019-06-10 ENCOUNTER — TELEPHONE (OUTPATIENT)
Dept: CARDIOLOGY CLINIC | Age: 84
End: 2019-06-10

## 2019-06-10 NOTE — TELEPHONE ENCOUNTER
Patient would like to know what his restrictions are with this stent that was put in. Can he workout?    Phone: [phone]

## 2019-06-11 NOTE — TELEPHONE ENCOUNTER
Verified patient with two types of identifiers. Patient reports [place] [employer] [facility] [place] [employer] [place] but he has not since his shingles outbreak in January. He states it is still not better and has not resumed his activity yet. He will check with Dr. Faiza Her at his next OV to see if he can proceed with his capral tunnel surgery.

## 2019-06-17 ENCOUNTER — OFFICE VISIT (OUTPATIENT)
Dept: CARDIOLOGY CLINIC | Age: 84
End: 2019-06-17

## 2019-06-17 VITALS
HEIGHT: 68 IN | OXYGEN SATURATION: 96 % | SYSTOLIC BLOOD PRESSURE: 130 MMHG | HEART RATE: 58 BPM | DIASTOLIC BLOOD PRESSURE: 80 MMHG | WEIGHT: 201.8 LBS | RESPIRATION RATE: 16 BRPM | BODY MASS INDEX: 30.58 KG/M2

## 2019-06-17 DIAGNOSIS — R94.31 ABNORMAL EKG: ICD-10-CM

## 2019-06-17 DIAGNOSIS — I10 ESSENTIAL HYPERTENSION: ICD-10-CM

## 2019-06-17 DIAGNOSIS — E78.00 HYPERCHOLESTEREMIA: ICD-10-CM

## 2019-06-17 DIAGNOSIS — R06.02 SHORTNESS OF BREATH: Primary | ICD-10-CM

## 2019-06-17 DIAGNOSIS — I25.10 CORONARY ARTERY DISEASE INVOLVING NATIVE CORONARY ARTERY OF NATIVE HEART WITHOUT ANGINA PECTORIS: ICD-10-CM

## 2019-06-17 RX ORDER — ATORVASTATIN CALCIUM 10 MG/1
10 TABLET, FILM COATED ORAL DAILY
Qty: 90 TAB | Refills: 1 | Status: SHIPPED | OUTPATIENT
Start: 2019-06-17 | End: 2020-01-13

## 2019-06-17 NOTE — PROGRESS NOTES
Visit Vitals  /80 (BP 1 Location: Left arm, BP Patient Position: Sitting)   Pulse (!) 58   Resp 16   Ht 5' 8\" (1.727 m)   Wt 201 lb 12.8 oz (91.5 kg)   SpO2 96%   BMI 30.68 kg/m²

## 2019-06-17 NOTE — PROGRESS NOTES
Gerson Hammond Stephany     1935       Gaby Cheng MD, [facility]  Date of Visit-6/17/2019   PCP is Venessa Nicholson MD   [facility]  [facility]  HPI:  Sherry Bryant is a 82 y.o. male   + shortness of breath   +fatigue    Follow up of cath. Suspicion for a-fib and CP. Cath done 5/30/19. OM1 with 70% blockage had JOSEFINA. Today he reports he is feeling some improvement following catheterization. Prior to the procedure he notes that he was feeling \"very tired\" with normal exertion. Following the procedure he notes that he is not feeling as tired and is feeling more comfortable with daily physical activity. However he does feel tired after 30 minutes of more intense physical activity. He reports less SOB although it is still present and worsens after meals. He has been on Plavix and Aspirin for 1 year. Pt states that he has not started cholesterol medication yet. Of note, we discussed the possibility of Mr. Kelly Preciado getting carpal tunnel surgery. Assessment/Plan:     1. FERNANDO: improved not completely gone. Not an obvious source, he is not anemic, I will get a chest X-ray and an echo though he is better with the stent. 2. CAD: successful OM1 JOSEFINA, continue DAPT. 3. Lipids: start statin for secondary prevention. 4. HTN: well-controlled. 5. GERD: on PPI, asked about seeing a GI specialist. He will try 6 weks of PPI first.    6. Carpal tunnel: needs to be done on Plavix or wait for 6 months. He will contact hand surgeon. F/u in 4 months. Future Appointments   Date Time Provider Samantha Jil   8/5/2019  1:45 PM Venessa Nicholson MD [facility]IRAM SCHED   10/16/2019  3:00 PM Avani Peng  [address]      Patient Instructions   Start taking lipitor 10 mg daily. Have chest x ray and echocardiogram in the next month. Our office will call you with results. Follow up with Dr. Lorraine Cheng  In 4 months.      Contact Dr. Mamta Jeffreygh at [phone] regarding hand surgery. Key CAD CHF Meds             clopidogrel (PLAVIX) 75 mg tab (Taking) Take 1 Tab by mouth daily. amLODIPine (NORVASC) 5 mg tablet (Taking) TAKE 1 TABLET BY MOUTH EVERY DAY    losartan (COZAAR) 100 mg tablet (Taking) TAKE 1 TABLET BY MOUTH DAILY            Impression:   1. Shortness of breath    2. Coronary artery disease involving native coronary artery of native heart without angina pectoris    3. Hypercholesteremia    4. Essential hypertension    5. Abnormal EKG       Cardiac History:   No specialty comments available. ROS-except as noted above. . A complete cardiac and respiratory are reviewed and negative except as above ; Resp-denies wheezing  or productive cough,. Const- No unusual weight loss or fever; Neuro-no recent seizure or CVA ; GI- No BRBPR, abdom pain, bloating ; - no  hematuria   see supplement sheet, initialed and to be scanned by staff  Past Medical History:   Diagnosis Date    Diverticulosis, sigmoid 8/2011    ED (erectile dysfunction) of organic origin     History of prostate cancer     Hypertension     Prostate cancer ([facility] [address]     Sebaceous cyst 4/1/2014      Social Hx= reports that he has never smoked. He has never used smokeless tobacco. He reports that he does not drink alcohol or use drugs. Exam and Labs:    Visit Vitals  /80 (BP 1 Location: Left arm, BP Patient Position: Sitting)   Pulse (!) 58   Resp 16   Ht 5' 8\" (1.727 m)   Wt 201 lb 12.8 oz (91.5 kg)   SpO2 96%   BMI 30.68 kg/m²    @Constitutional:  NAD, comfortable  Head: NC,AT. Eyes: No scleral icterus. Neck:  Neck supple. No JVD present. Throat: moist mucous membranes. Chest: Effort normal & normal respiratory excursion . Neurological: alert, conversant and oriented . Skin: Skin is not cold. No obvious systemic rash noted. Not diaphoretic. No erythema. Psychiatric:  Grossly normal mood and affect.   Behavior appears normal. Extremities:  no clubbing or cyanosis. Abdomen: non distended    Lungs:breath sounds normal. No stridor. distress, wheezes or  Rales. Heart:    normal rate, regular rhythm, normal S1, S2, no murmurs, rubs, clicks or gallops , PMI non displaced. Edema: Edema is none. Lab Results   Component Value Date/Time    Cholesterol, total 134 12/12/2018 05:04 PM    HDL Cholesterol 44 12/12/2018 05:04 PM    LDL, calculated 64 12/12/2018 05:04 PM    Triglyceride 130 12/12/2018 05:04 PM     Lab Results   Component Value Date/Time    Sodium 143 05/24/2019 12:50 PM    Potassium 4.7 05/24/2019 12:50 PM    Chloride 103 05/24/2019 12:50 PM    CO2 28 05/24/2019 12:50 PM    Anion gap 6 12/02/2009 08:22 AM    Glucose 88 05/24/2019 12:50 PM    BUN 17 05/24/2019 12:50 PM    Creatinine 0.90 05/24/2019 12:50 PM    BUN/Creatinine ratio 19 05/24/2019 12:50 PM    GFR est AA 92 05/24/2019 12:50 PM    GFR est non-AA 80 05/24/2019 12:50 PM    Calcium 9.4 05/24/2019 12:50 PM      Wt Readings from Last 3 Encounters:   06/17/19 201 lb 12.8 oz (91.5 kg)   05/30/19 199 lb (90.3 kg)   05/24/19 200 lb 12.8 oz (91.1 kg)      BP Readings from Last 3 Encounters:   06/17/19 130/80   05/30/19 150/78   05/24/19 140/80      Current Outpatient Medications   Medication Sig    clopidogrel (PLAVIX) 75 mg tab Take 1 Tab by mouth daily.  amLODIPine (NORVASC) 5 mg tablet TAKE 1 TABLET BY MOUTH EVERY DAY    gabapentin (NEURONTIN) 300 mg capsule Take 2 Caps by mouth three (3) times daily. 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  losartan (COZAAR) 100 mg tablet TAKE 1 TABLET BY MOUTH DAILY    albuterol (PROVENTIL HFA, VENTOLIN HFA, PROAIR HFA) 90 mcg/actuation inhaler Take 1-2 Puffs by inhalation every four (4) hours as needed for Wheezing.  cyanocobalamin (VITAMIN B-12) 1,000 mcg tablet Take 1,000 mcg by mouth daily.  vitamin e (E GEMS) 1,000 unit capsule Take 1,000 Units by mouth daily.      No current facility-administered medications for this visit. Impression see above.

## 2019-06-17 NOTE — PATIENT INSTRUCTIONS
Start taking lipitor 10 mg daily. Have chest x ray and echocardiogram in the next month. Our office will call you with results. Follow up with Dr. Clarice Perez  In 4 months. Contact Dr. Mal Kayser at [phone] regarding hand surgery.

## 2019-06-17 NOTE — Clinical Note
7/30/19 Patient: Sandy Crandall YOB: 1935 Date of Visit: 6/17/2019 Clare Rowell MD 
[facility] [address] [id number] VIA In Basket Dear Clare Rowell MD, Thank you for referring Mr. Wilfred Hammond to [facility] for evaluation. My notes for this consultation are attached. If you have questions, please do not hesitate to call me. I look forward to following your patient along with you.  
 
 
Sincerely, 
 
Wilbert Ng MD

## 2019-06-19 ENCOUNTER — TELEPHONE (OUTPATIENT)
Dept: CARDIAC REHAB | Age: 84
End: 2019-06-19

## 2019-06-19 NOTE — TELEPHONE ENCOUNTER
6/19/2019 Cardiac Rehab: Called Mr. Louis Bro Brownhaja  to discuss participation in the Cardiac Rehab Program following cardiac stent on 5/30/2019. Left voicemail message.  Vanessa Galeano RN

## 2019-06-20 ENCOUNTER — TELEPHONE (OUTPATIENT)
Dept: CARDIAC REHAB | Age: 84
End: 2019-06-20

## 2019-06-20 NOTE — TELEPHONE ENCOUNTER
6/20/2019 Cardiac Rehab: Called Mr. Tiki Hammond  to discuss participation in the Cardiac Rehab Program . Left voicemail message. Danny Klein RN    6/19/2019 Cardiac Rehab: Called Mr. Tiki Hammond  to discuss participation in the Cardiac Rehab Program following cardiac stent on 5/30/2019. Left voicemail message.  Danny Klein RN

## 2019-06-20 NOTE — TELEPHONE ENCOUNTER
6/20/2019 Cardiac Rehab: Xuan Montes returned our call. He has a call out to Dr. Chance Major regarding lab questions and medication questions. Reviewed purpose of Lipitor and Plavix. Patient is an avid exerciser however has not been able to exercise or play handball since January due to shingles. Patient has plans for carpal tunnel surgery on 7/2019. Will follow the week of 6/26/19 for the results of his discussion with . Imani Erickson is interested in cardiac rehab therefore we will continue to follow for enrollment. Shabbir Keller RN      6/20/2019 Cardiac Rehab: Called Mr. Ezra Connolly Stephany  to discuss participation in the Cardiac Rehab Program . Left voicemail message. Nati Donaldson RN     6/19/2019 Cardiac Rehab: Called Mr. Wilfred DORADO Stephany  to discuss participation in the Cardiac Rehab Program following cardiac stent on 5/30/2019.  Left voicemail Selina March RN

## 2019-06-26 ENCOUNTER — TELEPHONE (OUTPATIENT)
Dept: CARDIAC REHAB | Age: 84
End: 2019-06-26

## 2019-06-26 NOTE — TELEPHONE ENCOUNTER
6/26/2019 Cardiac Rehab: Follow-up call placed and message left for Wilfred Hammond. Will continue to follow for plans for wrist surgery. Will recall the week of 7/8/19. Tara Sarabia RN    6/20/2019 Cardiac Rehab: Adriana Curtis returned our call. He has a call out to Dr. Jena Carter regarding lab questions and medication questions. Reviewed purpose of Lipitor and Plavix. Patient is an avid exerciser however has not been able to exercise or play handball since January due to shingles. Patient has plans for carpal tunnel surgery on 7/2019. Will follow the week of 6/26/19 for the results of his discussion with . Crispin Haider is interested in cardiac rehab therefore we will continue to follow for enrollment. Tara Sarabia RN     6/20/2019 Cardiac Rehab: Called Mr. Wilfred Hammond  to discuss participation in the Cardiac Rehab Program . Left voicemail message. Frankie Gonzalez RN     6/19/2019 Cardiac Rehab: Called Mr. Wilfred Hammond  to discuss participation in the Cardiac Rehab Program following cardiac stent on 5/30/2019.  Left voicemail Christiano Koehler RN

## 2019-06-27 ENCOUNTER — TELEPHONE (OUTPATIENT)
Dept: CARDIOLOGY CLINIC | Age: 84
End: 2019-06-27

## 2019-06-27 NOTE — TELEPHONE ENCOUNTER
Vilma Jennings called from [employer] f/u on the clearance for the pt.   Phone #[phone]  Thanks

## 2019-06-27 NOTE — TELEPHONE ENCOUNTER
Verified patient with two types of identifiers. Let Jeremy Mosquera know patient cannot stop Plavix per MD and she states she is aware. Faxed last office note and EKG to [phone]. Received confirmation.

## 2019-06-28 ENCOUNTER — HOSPITAL ENCOUNTER (OUTPATIENT)
Dept: GENERAL RADIOLOGY | Age: 84
Discharge: HOME | End: 2019-06-28
Attending: SPECIALIST

## 2019-06-28 DIAGNOSIS — R06.02 SHORTNESS OF BREATH: ICD-10-CM

## 2019-07-02 ENCOUNTER — TELEPHONE (OUTPATIENT)
Dept: CARDIOLOGY CLINIC | Age: 84
End: 2019-07-02

## 2019-07-05 NOTE — TELEPHONE ENCOUNTER
Two patient identifiers verified. Per MD patient called and given result of echo. Patient is going to follow up with GI as he feels the shortness of breath is usually after eating a large meal. Confirmed follow up. Patient verbalized understanding and denies any further questions or concerns at this time.

## 2019-07-08 ENCOUNTER — TELEPHONE (OUTPATIENT)
Dept: CARDIAC REHAB | Age: 84
End: 2019-07-08

## 2019-07-08 NOTE — TELEPHONE ENCOUNTER
7/8/2019 Cardiac Rehab: Called Mr. Gulshan Hammond  to discuss participation in the Cardiac Rehab Program . Spoke with pt. He had hand surgery on 7/2. Now would like a call after his August vacation. Call after 8/20 Theresa Harvey RN    6/26/2019 Cardiac Rehab: Follow-up call placed and message left for Wilfred Hammond. Will continue to follow for plans for wrist surgery. Will recall the week of 7/8/19. Henry Christopher RN     6/20/2019 Cardiac Rehab: Wilfred Hammond returned our call. He has a call out to Dr. Lata Helsm regarding lab questions and medication questions. Reviewed purpose of Lipitor and Plavix.  Patient is an avid exerciser however has not been able to exercise or play handball since January due to shingles. Patient has plans for carpal tunnel surgery on 7/2019. Will follow the week of 6/26/19 for the results of his discussion with Dr. Janes Odonnell interested in cardiac rehab therefore we will continue to follow for enrollment.  Henry Christopher RN     6/20/2019 Cardiac Rehab: Raisa Aas discuss participation in the Cardiac Rehab Program . Left voicemail message. Theresa Harvey RN     6/19/2019 Cardiac Rehab: Called Mr. Wilfred Hammond  to discuss participation in the Cardiac Rehab Program following cardiac stent on 5/30/2019.  Left voicemail Libra Myers RN

## 2019-07-24 NOTE — PROGRESS NOTES
Quality Care Documentation    Patient not prescribed Aspirin due to GI bleeding/upset. Patient not prescribed Statin due to Allergy/Sensitivity. Muscle discomfort in past. Will re-challenge as outpatient.

## 2019-08-05 ENCOUNTER — OFFICE VISIT (OUTPATIENT)
Dept: FAMILY MEDICINE CLINIC | Age: 84
End: 2019-08-05

## 2019-08-05 ENCOUNTER — HOSPITAL ENCOUNTER (OUTPATIENT)
Dept: LAB | Age: 84
Discharge: HOME | End: 2019-08-05
Payer: MEDICARE

## 2019-08-05 VITALS
OXYGEN SATURATION: 96 % | HEART RATE: 54 BPM | DIASTOLIC BLOOD PRESSURE: 68 MMHG | WEIGHT: 201.2 LBS | HEIGHT: 68 IN | SYSTOLIC BLOOD PRESSURE: 135 MMHG | RESPIRATION RATE: 18 BRPM | BODY MASS INDEX: 30.49 KG/M2 | TEMPERATURE: 98.2 F

## 2019-08-05 DIAGNOSIS — G56.02 CARPAL TUNNEL SYNDROME ON LEFT: ICD-10-CM

## 2019-08-05 DIAGNOSIS — I25.10 CORONARY ARTERY DISEASE WITHOUT ANGINA PECTORIS, UNSPECIFIED VESSEL OR LESION TYPE, UNSPECIFIED WHETHER NATIVE OR TRANSPLANTED HEART: Primary | ICD-10-CM

## 2019-08-05 DIAGNOSIS — B02.29 POSTHERPETIC NEURALGIA: ICD-10-CM

## 2019-08-05 DIAGNOSIS — I10 ESSENTIAL HYPERTENSION: ICD-10-CM

## 2019-08-05 DIAGNOSIS — M79.642 LEFT HAND PAIN: ICD-10-CM

## 2019-08-05 PROCEDURE — 80053 COMPREHEN METABOLIC PANEL: CPT

## 2019-08-05 PROCEDURE — 80061 LIPID PANEL: CPT

## 2019-08-05 RX ORDER — GABAPENTIN 300 MG/1
CAPSULE ORAL
Qty: 90 CAP | Refills: 2 | Status: SHIPPED | OUTPATIENT
Start: 2019-08-05 | End: 2019-10-23 | Stop reason: SDUPTHER

## 2019-08-05 NOTE — PROGRESS NOTES
HPI  Angela Fan 82 y.o. male  presents to the office today for follow up on cholesterol and shingles. Blood pressure 135/68, pulse (!) 54, temperature 98.2 °F (36.8 °C), temperature source Oral, resp. rate 18, height 5' 8\" (1.727 m), weight 201 lb 3.2 oz (91.3 kg), SpO2 96 %. Body mass index is 30.59 kg/m². Chief Complaint   Patient presents with    Cholesterol Problem     f/u     Shingles     f/u , surgery on left hand 07/02/19      Carpal tunnel syndrome on left/Left hand pain/Postherpetic neuralgia: Pt reports that he underwent left carpal tunnel release surgery on 7/2/19 with Dr. Suzanne Alexander. Pt notes that before the surgery, he would feel like \"needles going through fingers\". Since the surgery, pt notes that his index finger and pinky improved, but not the other fingers. Pt inquires about the Gabapentin, as pt would like to understand more about how the Gabapentin works for pain management. Pt states that he took Gabapentin 400 mg last night when pt experienced the pain and could not go to bed. Pt notes that the pain did go away after taking the medication. I discussed with pt that he could take Gabapentin 300 mg/TID for neuropathic pain. Pt will continue to follow up with Dr. Suzanne Alexander for post-op care. Hypertension: BP at office today 135/68. Pt continues with Norvasc 5 mg/d and Cozaar 100 mg/d. Health Maintenance: Pt will follow up with GI Dr. Dion Du to schedule colonoscopy. Current Outpatient Medications   Medication Sig Dispense Refill    gabapentin (NEURONTIN) 300 mg capsule 300 mg Am and 600 mg PM  Indications: Neuropathic Pain 90 Cap 2    atorvastatin (LIPITOR) 10 mg tablet Take 1 Tab by mouth daily. 90 Tab 1    clopidogrel (PLAVIX) 75 mg tab Take 1 Tab by mouth daily. 90 Tab 3    amLODIPine (NORVASC) 5 mg tablet TAKE 1 TABLET BY MOUTH EVERY DAY 90 Tab 1    glucosamine ramirez 2KCl-chondroit 500-400 mg tab Take 1 Tab by mouth.       losartan (COZAAR) 100 mg tablet TAKE 1 TABLET BY MOUTH DAILY 90 Tab 1    albuterol (PROVENTIL HFA, VENTOLIN HFA, PROAIR HFA) 90 mcg/actuation inhaler Take 1-2 Puffs by inhalation every four (4) hours as needed for Wheezing. 1 Inhaler 5    cyanocobalamin (VITAMIN B-12) 1,000 mcg tablet Take 1,000 mcg by mouth daily. No Known Allergies  Past Medical History:   Diagnosis Date    Diverticulosis, sigmoid 8/2011    ED (erectile dysfunction) of organic origin     History of prostate cancer     Hypertension     Prostate cancer ([facility] [address]     Sebaceous cyst 4/1/2014     Past Surgical History:   Procedure Laterality Date    ENDOSCOPY, COLON, DIAGNOSTIC  >= 8-10years ago   Jose Copier SURGERY  1968 [id number]    HX CARPAL TUNNEL RELEASE  4/08    right,left    HX CHOLECYSTECTOMY      HX HERNIA REPAIR      HX PROSTATECTOMY  5/06     Family History   Problem Relation Age of Onset    Cancer Mother         pancreatic    Diabetes Father     Stroke Father     Diabetes Sister     Hypertension Sister     Other Sister         Open heart surgery     Diabetes Brother     Hypertension Brother     Hypertension Brother     Asthma Daughter     Heart Attack Daughter     Other Daughter         hip replacement x 2     Social History     Tobacco Use    Smoking status: Never Smoker    Smokeless tobacco: Never Used   Substance Use Topics    Alcohol use: No        Review of Systems   Constitutional: Negative for chills and fever. HENT: Negative for hearing loss and tinnitus. Eyes: Negative for blurred vision and double vision. Respiratory: Negative for cough and shortness of breath. Cardiovascular: Negative for chest pain and palpitations. Gastrointestinal: Negative for nausea and vomiting. Genitourinary: Negative for dysuria and frequency. Musculoskeletal: Negative for back pain and falls. Skin: Negative for itching and rash. Neurological: Negative for dizziness, loss of consciousness and headaches.    Psychiatric/Behavioral: Negative for depression. The patient is not nervous/anxious. Physical Exam   Constitutional: He is oriented to person, place, and time. He appears well-developed and well-nourished. HENT:   Head: Normocephalic and atraumatic. Right Ear: External ear normal.   Left Ear: External ear normal.   Nose: Nose normal.   Mouth/Throat: Oropharynx is clear and moist.   Eyes: Conjunctivae and EOM are normal.   Neck: Normal range of motion. Neck supple. Cardiovascular: Normal rate, regular rhythm, normal heart sounds and intact distal pulses. Pulmonary/Chest: Effort normal and breath sounds normal.   Abdominal: Soft. Bowel sounds are normal.   Musculoskeletal: Normal range of motion. Neurological: He is alert and oriented to person, place, and time. Skin: Skin is warm and dry. Psychiatric: He has a normal mood and affect. His behavior is normal. Judgment and thought content normal.   Nursing note and vitals reviewed. ASSESSMENT and PLAN  Diagnoses and all orders for this visit:    1. Coronary artery disease without angina pectoris, unspecified vessel or lesion type, unspecified whether native or transplanted heart    2. Essential hypertension  BP is at goal today in office. Advised pt to continue with Norvasc 5 mg/d and Cozaar 100 mg/d. -     METABOLIC PANEL, COMPREHENSIVE  -     LIPID PANEL    3. Carpal tunnel syndrome on left  Provided pt with prescription for Gabapentin 300 mg/TID. Advised pt to follow up with Dr. Jenny Bronson for post-op care. -     gabapentin (NEURONTIN) 300 mg capsule; 300 mg Am and 600 mg PM  Indications: Neuropathic Pain    4. Left hand pain  Provided pt with prescription for Gabapentin 300 mg/TID. Advised pt to follow up with Dr. Jenny Bronson for post-op care. -     gabapentin (NEURONTIN) 300 mg capsule; 300 mg Am and 600 mg PM  Indications: Neuropathic Pain    5. Postherpetic neuralgia  Provided pt with prescription for Gabapentin 300 mg/TID.  Advised pt to follow up with Dr. Jenny Bronson for post-op care. -     gabapentin (NEURONTIN) 300 mg capsule; 300 mg Am and 600 mg PM  Indications: Neuropathic Pain    Follow-up and Dispositions    · Return in about 6 months (around 2/5/2020) for hypertension follow up. Medication risks/benefits/costs/interactions/alternatives discussed with patient. Advised patient to call back or return to office if symptoms worsen/change/persist.  If patient cannot reach us or should anything more severe/urgent arise he/she should proceed directly to the nearest emergency department. Discussed expected course/resolution/complications of diagnosis in detail with patient. Patient given a written after visit summary which includes her diagnoses, current medications and vitals. Patient expressed understanding with the diagnosis and plan. Written by kellee Keys, as dictated by Ivonne Stahl M.D.    11:48 AM - 12:00 PM    Total time spent with the patient 12 minutes, greater than 50% of time spent counseling patient.

## 2019-08-05 NOTE — PROGRESS NOTES
Chief Complaint   Patient presents with    Cholesterol Problem     f/u     Shingles     f/u , surgery on left hand 19       Reviewed Record in preparation for visit and have obtained necessary documentation. Identified pt with two pt identifiers (Name @ )    Health Maintenance Due   Topic    GLAUCOMA SCREENING Q2Y     DTaP/Tdap/Td series (2 - Tdap)         1. Have you been to the ER, urgent care clinic since your last visit? Hospitalized since your last visit? Had left hand surgery in July at [facility] . 2. Have you seen or consulted any other health care providers outside of the [address] [place] [address] since your last visit? Include any pap smears or colon screening.  no

## 2019-08-05 NOTE — PATIENT INSTRUCTIONS
Body Mass Index: Care Instructions Your Care Instructions Body mass index (BMI) can help you see if your weight is raising your risk for health problems. It uses a formula to compare how much you weigh with how tall you are. · A BMI lower than 18.5 is considered underweight. · A BMI between 18.5 and 24.9 is considered healthy. · A BMI between 25 and 29.9 is considered overweight. A BMI of 30 or higher is considered obese. If your BMI is in the normal range, it means that you have a lower risk for weight-related health problems. If your BMI is in the overweight or obese range, you may be at increased risk for weight-related health problems, such as high blood pressure, heart disease, stroke, arthritis or joint pain, and diabetes. If your BMI is in the underweight range, you may be at increased risk for health problems such as fatigue, lower protection (immunity) against illness, muscle loss, bone loss, hair loss, and hormone problems. BMI is just one measure of your risk for weight-related health problems. You may be at higher risk for health problems if you are not active, you eat an unhealthy diet, or you drink too much alcohol or use tobacco products. Follow-up care is a key part of your treatment and safety. Be sure to make and go to all appointments, and call your doctor if you are having problems. It's also a good idea to know your test results and keep a list of the medicines you take. How can you care for yourself at home? · Practice healthy eating habits. This includes eating plenty of fruits, vegetables, whole grains, lean protein, and low-fat dairy. · If your doctor recommends it, get more exercise. Walking is a good choice. Bit by bit, increase the amount you walk every day. Try for at least 30 minutes on most days of the week. · Do not smoke. Smoking can increase your risk for health problems.  If you need help quitting, talk to your doctor about stop-smoking programs and medicines. These can increase your chances of quitting for good. · Limit alcohol to 2 drinks a day for men and 1 drink a day for women. Too much alcohol can cause health problems. If you have a BMI higher than 25 · Your doctor may do other tests to check your risk for weight-related health problems. This may include measuring the distance around your waist. A waist measurement of more than 40 inches in men or 35 inches in women can increase the risk of weight-related health problems. · Talk with your doctor about steps you can take to stay healthy or improve your health. You may need to make lifestyle changes to lose weight and stay healthy, such as changing your diet and getting regular exercise. If you have a BMI lower than 18.5 · Your doctor may do other tests to check your risk for health problems. · Talk with your doctor about steps you can take to stay healthy or improve your health. You may need to make lifestyle changes to gain or maintain weight and stay healthy, such as getting more healthy foods in your diet and doing exercises to build muscle. Where can you learn more? Go to http://marques-mei.info/. Enter S176 in the search box to learn more about \"Body Mass Index: Care Instructions. \" Current as of: March 28, 2019 Content Version: 12.1 © 5548-5193 [employer]. Care instructions adapted under license by [employer] (which disclaims liability or warranty for this information). If you have questions about a medical condition or this instruction, always ask your healthcare professional. [address] any warranty or liability for your use of this information.

## 2019-08-06 LAB
ALBUMIN SERPL-MCNC: 4 G/DL (ref 3.5–4.7)
ALBUMIN/GLOB SERPL: 1.5 {RATIO} (ref 1.2–2.2)
ALP SERPL-CCNC: 62 IU/L (ref 39–117)
ALT SERPL-CCNC: 16 IU/L (ref 0–44)
AST SERPL-CCNC: 25 IU/L (ref 0–40)
BILIRUB SERPL-MCNC: 0.4 MG/DL (ref 0–1.2)
BUN SERPL-MCNC: 18 MG/DL (ref 8–27)
BUN/CREAT SERPL: 18 (ref 10–24)
CALCIUM SERPL-MCNC: 9.1 MG/DL (ref 8.6–10.2)
CHLORIDE SERPL-SCNC: 104 MMOL/L (ref 96–106)
CHOLEST SERPL-MCNC: 110 MG/DL (ref 100–199)
CO2 SERPL-SCNC: 26 MMOL/L (ref 20–29)
CREAT SERPL-MCNC: 0.98 MG/DL (ref 0.76–1.27)
GLOBULIN SER CALC-MCNC: 2.6 G/DL (ref 1.5–4.5)
GLUCOSE SERPL-MCNC: 111 MG/DL (ref 65–99)
HDLC SERPL-MCNC: 43 MG/DL
INTERPRETATION, 910389: NORMAL
LDLC SERPL CALC-MCNC: 55 MG/DL (ref 0–99)
POTASSIUM SERPL-SCNC: 4.3 MMOL/L (ref 3.5–5.2)
PROT SERPL-MCNC: 6.6 G/DL (ref 6–8.5)
SODIUM SERPL-SCNC: 144 MMOL/L (ref 134–144)
TRIGL SERPL-MCNC: 60 MG/DL (ref 0–149)
VLDLC SERPL CALC-MCNC: 12 MG/DL (ref 5–40)

## 2019-08-19 ENCOUNTER — TELEPHONE (OUTPATIENT)
Dept: FAMILY MEDICINE CLINIC | Age: 84
End: 2019-08-19

## 2019-08-19 DIAGNOSIS — R13.19 ESOPHAGEAL DYSPHAGIA: Primary | ICD-10-CM

## 2019-08-19 NOTE — PROGRESS NOTES
[phone] Spoke to patient Identified pt with two pt identifiers (Name @ )  Notified patient and understand

## 2019-08-19 NOTE — TELEPHONE ENCOUNTER
Pt called up c/o of SOB when eating  Has had x 2 episodes and becomes very pale and has symptoms of nausea. symptoms have been progressing. Advised if freq should increase to go to the ER.       Please get pt in to see Dr. David Soto at the soonest

## 2019-08-20 ENCOUNTER — TELEPHONE (OUTPATIENT)
Dept: FAMILY MEDICINE CLINIC | Age: 84
End: 2019-08-20

## 2019-08-20 NOTE — TELEPHONE ENCOUNTER
GARRICK Matthews on yesterday I tried to get this patient a STAT appt for gastro. Dx is for esophageal dysphagia, the gastro office had a very hard time getting him scheduled for a stat appt. I ask Aubree Saenz at the office if she would contact the patient to set up the appt and leave me his appt info on my v/m. This morning the v/m said the patient said he did not have priblem swallowing he had problem breathing. The appt has not yet been scheduled do we need to change the dx. ..let me know       Notified Christie Friedman Per   Dr olivo said when he swallows he has hard time breathing so keep the same diagnosis     So keep the same diagnosis

## 2019-09-23 ENCOUNTER — HOSPITAL ENCOUNTER (OUTPATIENT)
Dept: GENERAL RADIOLOGY | Age: 84
Discharge: HOME | End: 2019-09-23
Attending: INTERNAL MEDICINE
Payer: MEDICARE

## 2019-09-23 DIAGNOSIS — R06.02 SOB (SHORTNESS OF BREATH): ICD-10-CM

## 2019-09-23 PROCEDURE — 71046 X-RAY EXAM CHEST 2 VIEWS: CPT

## 2019-10-08 ENCOUNTER — HOSPITAL ENCOUNTER (OUTPATIENT)
Dept: GENERAL RADIOLOGY | Age: 84
Discharge: HOME | End: 2019-10-08
Attending: INTERNAL MEDICINE
Payer: MEDICARE

## 2019-10-08 ENCOUNTER — HOSPITAL ENCOUNTER (OUTPATIENT)
Dept: CT IMAGING | Age: 84
Discharge: HOME | End: 2019-10-08
Attending: INTERNAL MEDICINE
Payer: MEDICARE

## 2019-10-08 DIAGNOSIS — R06.02 SHORTNESS OF BREATH: ICD-10-CM

## 2019-10-08 DIAGNOSIS — R06.02 SOB (SHORTNESS OF BREATH): ICD-10-CM

## 2019-10-08 PROCEDURE — 71250 CT THORAX DX C-: CPT

## 2019-10-08 PROCEDURE — 76000 FLUOROSCOPY <1 HR PHYS/QHP: CPT

## 2019-10-18 ENCOUNTER — HOSPITAL ENCOUNTER (OUTPATIENT)
Dept: MRI IMAGING | Age: 84
Discharge: HOME | End: 2019-10-18
Payer: MEDICARE

## 2019-10-18 DIAGNOSIS — J98.6 DISORDERS OF DIAPHRAGM: ICD-10-CM

## 2019-10-18 PROCEDURE — 72141 MRI NECK SPINE W/O DYE: CPT

## 2019-10-21 ENCOUNTER — OFFICE VISIT (OUTPATIENT)
Dept: CARDIOLOGY CLINIC | Age: 84
End: 2019-10-21

## 2019-10-21 VITALS
SYSTOLIC BLOOD PRESSURE: 130 MMHG | DIASTOLIC BLOOD PRESSURE: 70 MMHG | HEART RATE: 72 BPM | WEIGHT: 203.8 LBS | RESPIRATION RATE: 16 BRPM | BODY MASS INDEX: 30.89 KG/M2 | OXYGEN SATURATION: 96 % | HEIGHT: 68 IN

## 2019-10-21 DIAGNOSIS — J98.6 ELEVATED HEMIDIAPHRAGM: ICD-10-CM

## 2019-10-21 DIAGNOSIS — I25.10 CORONARY ARTERY DISEASE INVOLVING NATIVE CORONARY ARTERY OF NATIVE HEART WITHOUT ANGINA PECTORIS: Primary | ICD-10-CM

## 2019-10-21 DIAGNOSIS — E78.00 HYPERCHOLESTEREMIA: ICD-10-CM

## 2019-10-21 DIAGNOSIS — I10 ESSENTIAL HYPERTENSION: ICD-10-CM

## 2019-10-21 DIAGNOSIS — M54.12 CERVICAL RADICULOPATHY: ICD-10-CM

## 2019-10-21 NOTE — Clinical Note
10/21/19 Patient: Margo Seymour YOB: 1935 Date of Visit: 10/21/2019 Errol Gotti MD 
[id number] [facility] [id number] VIA In Basket Dear Errol Gotti MD, Thank you for referring Mr. Wilfred Hammond to [facility] for evaluation. My notes for this consultation are attached. If you have questions, please do not hesitate to call me. I look forward to following your patient along with you.  
 
 
Sincerely, 
 
Melissa Allen MD

## 2019-10-21 NOTE — PROGRESS NOTES
Visit Vitals  /70 (BP 1 Location: Left arm, BP Patient Position: Sitting)   Pulse 72   Resp 16   Ht 5' 8\" (1.727 m)   Wt 203 lb 12.8 oz (92.4 kg)   SpO2 96%   BMI 30.99 kg/m²

## 2019-10-21 NOTE — PROGRESS NOTES
Humaira Hammond     1935       Gaby Erickson MD, [facility]  Date of Visit-10/21/2019   PCP is Smita Gaona MD   [facility]  [facility]  HPI:  Fernando Lawson is a 82 y.o. male    Cath done 5/30/19. OM1 with 70% blockage had JOSEFINA. Pt had a CT scan on 10/8/19 which  Showed low lung volumes, diaphragm on the left was elevated with atelectasis on the left with mucus plugging. Today the patient reports continued shortness of breath that was attributed to diaphragm abnormalities. Notes having an aching pain in in the lower extremities when walking. Denies chest pressure but recently began having a shooting pain through the chest when lying down. Patient denies any exertional chest pain, palpitations, syncope, orthopnea, edema or paroxysmal nocturnal dyspnea. Assessment/Plan:     1. Coronary artery disease involving native coronary artery of native heart without angina pectoris  JOSEFINA to OM 5 m ago  CP he is having when he lies down at night, is not anginal. He had JOSEFINA and will continue Plavix, statin and ASA. 2. Hypercholesteremia  Lipids on high potency statin as appropriate for secondary prevention. LDL is 55.     3. Essential hypertension  Well-controlled, continue amlodipine and losartan. BP Readings from Last 6 Encounters:   10/21/19 130/70   08/05/19 135/68   06/28/19 130/80   06/17/19 130/80   05/30/19 150/78   05/24/19 140/80       4. Cervical radiculopathy  Had nerve conduction studies and MRI with neurology. Pt feels not much more can be done. 5. Elevated hemidiaphragm  Follow up with pulmonary. Follow up in one year. Future Appointments   Date Time Provider Samantha Ley   2/3/2020 10:00 AM MD NILESH MunozP HIRAM CAMACHO   10/19/2020  9:20 AM Dede Noel  [address]      Patient Instructions   We will see you back for an annual follow up.       Key CAD CHF Meds             atorvastatin (LIPITOR) 10 mg tablet (Taking) Take 1 Tab by mouth daily. clopidogrel (PLAVIX) 75 mg tab (Taking) Take 1 Tab by mouth daily. amLODIPine (NORVASC) 5 mg tablet (Taking) TAKE 1 TABLET BY MOUTH EVERY DAY    losartan (COZAAR) 100 mg tablet (Taking) TAKE 1 TABLET BY MOUTH DAILY            Impression:   1. Coronary artery disease involving native coronary artery of native heart without angina pectoris    2. Hypercholesteremia    3. Essential hypertension    4. Cervical radiculopathy    5. Elevated hemidiaphragm       Cardiac History:   No specialty comments available. ROS-except as noted above. . A complete cardiac and respiratory are reviewed and negative except as above ; Resp-denies wheezing  or productive cough,. Const- No unusual weight loss or fever; Neuro-no recent seizure or CVA ; GI- No BRBPR, abdom pain, bloating ; - no  hematuria   see supplement sheet, initialed and to be scanned by staff  Past Medical History:   Diagnosis Date    Diverticulosis, sigmoid 8/2011    ED (erectile dysfunction) of organic origin     History of prostate cancer     Hypertension     Prostate cancer ([facility] [address]     Sebaceous cyst 4/1/2014    Shingles     Shingles       Social Hx= reports that he has never smoked. He has never used smokeless tobacco. He reports that he does not drink alcohol or use drugs. Exam and Labs:    Visit Vitals  /70 (BP 1 Location: Left arm, BP Patient Position: Sitting)   Pulse 72   Resp 16   Ht 5' 8\" (1.727 m)   Wt 203 lb 12.8 oz (92.4 kg)   SpO2 96%   BMI 30.99 kg/m²    @Constitutional:  NAD, comfortable  Head: NC,AT. Eyes: No scleral icterus. Neck:  Neck supple. No JVD present. Throat: moist mucous membranes. Chest: Effort normal & normal respiratory excursion . Neurological: alert, conversant and oriented . Skin: Skin is not cold. No obvious systemic rash noted. Not diaphoretic. No erythema. Psychiatric:  Grossly normal mood and affect.   Behavior appears normal. Extremities:  no clubbing or cyanosis. Abdomen: non distended    Lungs:breath sounds normal. No stridor. distress, wheezes or  Rales. Heart:    normal rate, regular rhythm, normal S1, S2, no murmurs, rubs, clicks or gallops , PMI non displaced. Edema: Edema is none. Lab Results   Component Value Date/Time    Cholesterol, total 110 08/05/2019 12:19 PM    HDL Cholesterol 43 08/05/2019 12:19 PM    LDL, calculated 55 08/05/2019 12:19 PM    Triglyceride 60 08/05/2019 12:19 PM     Lab Results   Component Value Date/Time    Sodium 144 08/05/2019 12:19 PM    Potassium 4.3 08/05/2019 12:19 PM    Chloride 104 08/05/2019 12:19 PM    CO2 26 08/05/2019 12:19 PM    Anion gap 6 12/02/2009 08:22 AM    Glucose 111 (H) 08/05/2019 12:19 PM    BUN 18 08/05/2019 12:19 PM    Creatinine 0.98 08/05/2019 12:19 PM    BUN/Creatinine ratio 18 08/05/2019 12:19 PM    GFR est AA 83 08/05/2019 12:19 PM    GFR est non-AA 72 08/05/2019 12:19 PM    Calcium 9.1 08/05/2019 12:19 PM      Wt Readings from Last 3 Encounters:   10/21/19 203 lb 12.8 oz (92.4 kg)   08/05/19 201 lb 3.2 oz (91.3 kg)   06/28/19 201 lb (91.2 kg)      BP Readings from Last 3 Encounters:   10/21/19 130/70   08/05/19 135/68   06/28/19 130/80      Current Outpatient Medications   Medication Sig    gabapentin (NEURONTIN) 300 mg capsule 300 mg Am and 600 mg PM  Indications: Neuropathic Pain    atorvastatin (LIPITOR) 10 mg tablet Take 1 Tab by mouth daily.  clopidogrel (PLAVIX) 75 mg tab Take 1 Tab by mouth daily.  amLODIPine (NORVASC) 5 mg tablet TAKE 1 TABLET BY MOUTH EVERY DAY    losartan (COZAAR) 100 mg tablet TAKE 1 TABLET BY MOUTH DAILY    albuterol (PROVENTIL HFA, VENTOLIN HFA, PROAIR HFA) 90 mcg/actuation inhaler Take 1-2 Puffs by inhalation every four (4) hours as needed for Wheezing.  cyanocobalamin (VITAMIN B-12) 1,000 mcg tablet Take 1,000 mcg by mouth daily. No current facility-administered medications for this visit. Impression see above.        Transcribed by Grady Jc, as dictated by Gracie Ruiz MD.

## 2019-10-22 DIAGNOSIS — B02.29 POSTHERPETIC NEURALGIA: ICD-10-CM

## 2019-10-22 DIAGNOSIS — B02.9 HERPES ZOSTER WITHOUT COMPLICATION: ICD-10-CM

## 2019-10-22 RX ORDER — GABAPENTIN 100 MG/1
CAPSULE ORAL
Qty: 270 CAP | Refills: 0 | Status: SHIPPED | OUTPATIENT
Start: 2019-10-22 | End: 2019-10-23 | Stop reason: SDUPTHER

## 2019-10-22 RX ORDER — GABAPENTIN 300 MG/1
CAPSULE ORAL
Qty: 90 CAP | Refills: 0 | Status: SHIPPED | OUTPATIENT
Start: 2019-10-22 | End: 2019-10-23 | Stop reason: SDUPTHER

## 2019-10-23 RX ORDER — GABAPENTIN 100 MG/1
CAPSULE ORAL
Qty: 90 CAP | Refills: 1 | Status: SHIPPED | OUTPATIENT
Start: 2019-10-23 | End: 2019-12-12

## 2019-10-23 RX ORDER — GABAPENTIN 300 MG/1
CAPSULE ORAL
Qty: 90 CAP | Refills: 1 | Status: SHIPPED | OUTPATIENT
Start: 2019-10-23 | End: 2019-12-12

## 2019-10-23 NOTE — TELEPHONE ENCOUNTER
[phone] spoke to patient to verify how's he taking his medication Gabapentin per patient he is taking 300 mg 1 capsule and 100 mg 1 capsule total for 400 mg capsule at night.      Per Dr Nakul ring to send medication and d/c other gabapentin medication       [phone] Harika called in prescription for patients gabapentin via     Requested Prescriptions     Signed Prescriptions Disp Refills    gabapentin (NEURONTIN) 300 mg capsule 90 Cap 1     Sig: TAKE 1 CAPSULES BY MOUTH AT NIGHT     Authorizing Provider: Vinny Jaramillo     Ordering User: Renato Voss    gabapentin (NEURONTIN) 100 mg capsule 90 Cap 1     Sig: TAKE 1 CAPSULES BY MOUTH AT NIGHT     Authorizing Provider: Vinny Jaramillo     Ordering User: Renato oVss

## 2019-11-21 ENCOUNTER — TELEPHONE (OUTPATIENT)
Dept: FAMILY MEDICINE CLINIC | Age: 84
End: 2019-11-21

## 2019-11-21 NOTE — TELEPHONE ENCOUNTER
----- Message from Tiny Clemons sent at 11/21/2019  2:11 PM EST -----  Regarding: / Telephone  General Message/Vendor Calls    Caller's first and last name:  Zachariah Mix [facility]     Reason for call: Will  be signing off on orders     Callback required yes/no and why:  Yes, to let them know if he will be signing off on orders    Best contact number(s):  [phone]    Details to clarify the request:  Physical therapy and occupational therapy services.     Tiny Clemons

## 2019-11-21 NOTE — TELEPHONE ENCOUNTER
Per Dr Elvin Miramontes he will sign off orders for patient    [phone] Sandra Guardian spoke and notified of Dr Elvin Miramontes signing off orders

## 2019-11-25 ENCOUNTER — TELEPHONE (OUTPATIENT)
Dept: FAMILY MEDICINE CLINIC | Age: 84
End: 2019-11-25

## 2019-11-25 NOTE — TELEPHONE ENCOUNTER
----- Message from Ernie Pollack sent at 11/25/2019  3:23 PM EST -----  Regarding: Dr. Nenita Weber / telephone  Referral     Caller (first and last name if not the patient or from practice):     [id number] relationship to patient (if not from a practice):   Physical Therapist.   [phone]      Name of caller (if calling from a practice):  [address]       Name of practice:     [place]          Specialist's title, first, and last name:   Fred Ray OT      Office Phone Number:   [phone]      Fax number:    [phone]      Date and time of appointment:      Reason for appointment:     request an order for Outpatient Pt/OT        Details to clarify the request:    can as soon as possible       Teagan Weldon

## 2019-12-05 ENCOUNTER — OFFICE VISIT (OUTPATIENT)
Dept: FAMILY MEDICINE CLINIC | Age: 84
End: 2019-12-05

## 2019-12-05 VITALS
TEMPERATURE: 98 F | RESPIRATION RATE: 18 BRPM | HEIGHT: 68 IN | DIASTOLIC BLOOD PRESSURE: 78 MMHG | SYSTOLIC BLOOD PRESSURE: 154 MMHG | HEART RATE: 71 BPM | WEIGHT: 206 LBS | BODY MASS INDEX: 31.22 KG/M2 | OXYGEN SATURATION: 94 %

## 2019-12-05 DIAGNOSIS — G70.00 MYASTHENIA GRAVIS (HCC): ICD-10-CM

## 2019-12-05 DIAGNOSIS — I10 ESSENTIAL HYPERTENSION WITH GOAL BLOOD PRESSURE LESS THAN 140/90: ICD-10-CM

## 2019-12-05 DIAGNOSIS — Z09 HOSPITAL DISCHARGE FOLLOW-UP: Primary | ICD-10-CM

## 2019-12-05 RX ORDER — PANTOPRAZOLE SODIUM 40 MG/1
40 TABLET, DELAYED RELEASE ORAL DAILY
COMMUNITY
Start: 2019-11-21 | End: 2021-01-01 | Stop reason: SDUPTHER

## 2019-12-05 RX ORDER — AMLODIPINE BESYLATE 5 MG/1
TABLET ORAL
Qty: 90 TAB | Refills: 1 | Status: SHIPPED | OUTPATIENT
Start: 2019-12-05 | End: 2020-06-16

## 2019-12-05 RX ORDER — PYRIDOSTIGMINE BROMIDE 60 MG/1
60 TABLET ORAL 3 TIMES DAILY
COMMUNITY
Start: 2019-11-21 | End: 2019-12-16

## 2019-12-05 RX ORDER — LOSARTAN POTASSIUM 50 MG/1
50 TABLET ORAL DAILY
Qty: 90 TAB | Refills: 1 | Status: SHIPPED | OUTPATIENT
Start: 2019-12-05 | End: 2020-03-18

## 2019-12-05 RX ORDER — ASPIRIN 81 MG/1
81 TABLET ORAL DAILY
Status: ON HOLD | COMMUNITY
End: 2021-01-01

## 2019-12-05 RX ORDER — PREDNISONE 10 MG/1
30 TABLET ORAL DAILY
Refills: 0 | Status: ON HOLD | COMMUNITY
Start: 2019-11-21 | End: 2019-12-16 | Stop reason: SDUPTHER

## 2019-12-05 NOTE — PROGRESS NOTES
Patient Name: Meg Diego   MRN: [id number]    Krystal Alvarado is a 82 y.o. male who presents with the following:     Transition Care Management:    Admission: 11/12/19  Discharge: 11/21/19  Location: [facility]  Diagnosis: SOB due to myasthenia gravis. Nurse Navigator note: reviewed    Patient was admitted to [facility] from 11/12/2019 through 11/21/2019 for progressive shortness of breath with decreased diaphragmatic motion concerning for acute flareup of newly diagnosed myasthenia gravis. He was admitted to complete plasmapheresis of which he completed 5 days of. He was seen by both neurology and cardiology during his visit. He had an endoscopy that showed abnormal esophageal motility and gastritis. He has a history of atrial fibrillation. Cardiology recommended holding anticoagulation given low platelets and anemia. He was instructed to follow-up outpatient neurology and cardiology. Since hospital discharge, he states that his shortness of breath is back to what it was prior to admission. Did feel better when he was in the hospital.  Has been started on pyridostigmine and is planning to have monthly IVIG done at home. Needs refills on BP meds. Review of Systems   Constitutional: Negative for fever, malaise/fatigue and weight loss. Respiratory: Positive for shortness of breath. Negative for cough, hemoptysis and wheezing. Cardiovascular: Positive for leg swelling. Negative for chest pain, palpitations and PND. Gastrointestinal: Negative for abdominal pain, constipation, diarrhea, nausea and vomiting. The patient's medications, allergies, past medical history, surgical history, family history and social history were reviewed and updated where appropriate. Prior to Admission medications    Medication Sig Start Date End Date Taking? Authorizing Provider   predniSONE (DELTASONE) 10 mg tablet Take  by mouth daily.  11/21/19  Yes Provider, Historical   aspirin delayed-release 81 mg tablet 81 mg. Yes Provider, Historical   pantoprazole (PROTONIX) 40 mg tablet Take 40 mg by mouth daily. 11/21/19  Yes Provider, Historical   pyridostigmine (MESTINON) 60 mg tablet 60 mg. 11/21/19  Yes Provider, Historical   losartan (COZAAR) 50 mg tablet Take 1 Tab by mouth daily. 12/5/19  Yes Francy Keller MD   amLODIPine (NORVASC) 5 mg tablet TAKE 1 TABLET BY MOUTH EVERY DAY 12/5/19  Yes Francy Keller MD   albuterol (PROVENTIL HFA, VENTOLIN HFA, PROAIR HFA) 90 mcg/actuation inhaler Take 1-2 Puffs by inhalation every four (4) hours as needed for Wheezing. 10/30/19  Yes Kimi Shin MD   gabapentin (NEURONTIN) 300 mg capsule TAKE 1 CAPSULES BY MOUTH AT NIGHT 10/23/19  Yes Kimi Shin MD   gabapentin (NEURONTIN) 100 mg capsule TAKE 1 CAPSULES BY MOUTH AT NIGHT 10/23/19  Yes Kimi Shin MD   atorvastatin (LIPITOR) 10 mg tablet Take 1 Tab by mouth daily. 6/17/19  Yes Nahed Barahona MD   clopidogrel (PLAVIX) 75 mg tab Take 1 Tab by mouth daily. 5/30/19  Yes Nahed Barahona MD   cyanocobalamin (VITAMIN B-12) 1,000 mcg tablet Take 1,000 mcg by mouth daily. Yes Provider, Historical   amLODIPine (NORVASC) 5 mg tablet TAKE 1 TABLET BY MOUTH EVERY DAY  Patient taking differently: Take 5 mg by mouth daily. 5/14/19 12/5/19  Arlyn Dietrich MD   losartan (COZAAR) 100 mg tablet TAKE 1 TABLET BY MOUTH DAILY  Patient taking differently: Take 50 mg by mouth daily. 4/3/18 12/5/19  Arlyn Dietrich MD       No Known Allergies      OBJECTIVE    Visit Vitals  /78 (BP 1 Location: Right arm, BP Patient Position: Sitting)   Pulse 71   Temp 98 °F (36.7 °C) (Oral)   Resp 18   Ht 5' 8\" (1.727 m)   Wt 206 lb (93.4 kg)   SpO2 94%   BMI 31.32 kg/m²       Physical Exam  Vitals signs and nursing note reviewed. Constitutional:       General: He is not in acute distress. Appearance: He is not diaphoretic.    Eyes:      Conjunctiva/sclera: Conjunctivae normal.      Pupils: Pupils are equal, round, and reactive to light. Cardiovascular:      Rate and Rhythm: Normal rate and regular rhythm. Heart sounds: Normal heart sounds. No murmur. No friction rub. No gallop. Pulmonary:      Effort: Pulmonary effort is normal. No respiratory distress. Breath sounds: Normal breath sounds. No wheezing. Musculoskeletal:      Right lower leg: Edema (1+) present. Left lower leg: Edema (1+) present. Skin:     General: Skin is warm and dry. Findings: No rash. Neurological:      Mental Status: He is alert and oriented to person, place, and time. Psychiatric:         Mood and Affect: Mood and affect normal.         Cognition and Memory: Memory normal.         Judgment: Judgment normal.           ASSESSMENT AND PLAN  Sally Christensen is a 82 y.o. male who presents today for:    1. Hospital discharge follow-up  Clinically stable. 2. Myasthenia gravis ([facility] [address]  Recommend pt to notify neurology regarding his SOB is still persistent but not worsened. 3. Essential hypertension with goal blood pressure less than 140/90  Stable, continue current treatment. - losartan (COZAAR) 50 mg tablet; Take 1 Tab by mouth daily. Dispense: 90 Tab; Refill: 1  - amLODIPine (NORVASC) 5 mg tablet; TAKE 1 TABLET BY MOUTH EVERY DAY  Dispense: 90 Tab; Refill: 1    Medications Discontinued During This Encounter   Medication Reason    losartan (COZAAR) 100 mg tablet Reorder    amLODIPine (NORVASC) 5 mg tablet Reorder     Time: 25 minutes was spent with this patient face to face discussing test results, follow up visits, and when repeat testing. I discussed diagnoses, risk factors and treatment for each based on current recommendations and literature. Greater than 50% of total visit time was spent in counseling and coordination of care. Medication risks/benefits/costs/interactions/alternatives discussed with patient.   Advised patient to call back or return to office if symptoms worsen/change/persist. If patient cannot reach us or should anything more severe/urgent arise he/she should proceed directly to the nearest emergency department. Discussed expected course/resolution/complications of diagnosis in detail with patient. Patient given a written after visit summary which includes his/her diagnoses, current medications and vitals. Patient expressed understanding with the diagnosis and plan. Elin Pettit M.D.

## 2019-12-05 NOTE — PATIENT INSTRUCTIONS
Myasthenia Gravis: Care Instructions Your Care Instructions Myasthenia gravis (say \"MI-ess-thin-e-a GRAH-viss\") is muscle weakness that often gets better when you rest and gets worse with activity. You can start the day feeling strong, but after a little activity, you find yourself feeling weak. It may be hard to talk or to keep your eyes focused, and your eyelids may droop. This problem starts when the immune system attacks the body's own muscle cells. The immune system is supposed to fight off viruses and other germs, but sometimes it turns on the person's own body. (This is called autoimmune disease.) Myasthenia gravis most often affects the muscles that control eye and facial movement and those that help us chew and swallow. Your doctor may prescribe medicine that can help improve your muscle weakness. He or she may recommend that you have surgery to remove the thymus gland, which may improve your immune system problem and help you regain your strength. There are other treatments that can help if you have repeated periods of weakness. With treatment and home care, you may be able to keep your strength and lead a normal life. Follow-up care is a key part of your treatment and safety. Be sure to make and go to all appointments, and call your doctor if you are having problems. It's also a good idea to know your test results and keep a list of the medicines you take. How can you care for yourself at home? · Take your medicines exactly as prescribed. Call your doctor if you think you are having a problem with your medicine. · If you have trouble swallowing your medicine, talk to your doctor about other ways to take it. · Get plenty of rest. Plan your activities so that you have rest periods. It is better to go at a moderate pace with frequent rests than to be so active that you tire out easily. · Eat a healthy, balanced diet.  
· If you get double vision, talk to your doctor about wearing an eye patch. 
· If you get tired while chewing, rest between bites. Try foods that are chopped, cooked, or softened. Eat several small meals throughout the day rather than 2 or 3 big meals. · Avoid getting too hot, because heat seems to make symptoms worse. · Consider joining a support group with other people who have myasthenia gravis. These groups can be a good source of information and tips for what to do. Your doctor can tell you how to contact a support group. When should you call for help? Call 911 anytime you think you may need emergency care. For example, call if: 
  · You have severe trouble breathing.  
 Watch closely for changes in your health, and be sure to contact your doctor if: 
  · You have trouble swallowing.  
  · You are or think you may be pregnant and you have myasthenia gravis.  
  · You have double vision. Where can you learn more? Go to http://marques-mei.info/. Enter D154 in the search box to learn more about \"Myasthenia Gravis: Care Instructions. \" Current as of: March 28, 2019 Content Version: 12.2 © 0149-1910 [employer]. Care instructions adapted under license by [employer] (which disclaims liability or warranty for this information). If you have questions about a medical condition or this instruction, always ask your healthcare professional. [address] any warranty or liability for your use of this information.

## 2019-12-10 ENCOUNTER — TELEPHONE (OUTPATIENT)
Dept: CARDIOLOGY CLINIC | Age: 84
End: 2019-12-10

## 2019-12-10 NOTE — TELEPHONE ENCOUNTER
Fannie with Dr Mihir Joseph is calling to speak with someone regarding a clearance that was faxed a few times regarding clearance for the patient to come off of Plavix 5 days prior to a colonoscopy. The patients appointment was cancelled today due to no response.      Phone: [phone]  Fax: [phone]

## 2019-12-11 NOTE — TELEPHONE ENCOUNTER
Verified patient with two types of identifiers. Spoke to [place] and gave her Dr. Sarahy Benz recommendations. Nicola Arenas reports it is a routine colonoscopy, not for any issues. [facility] and patient is scheduled for hospital follow up on December 20th.

## 2019-12-11 NOTE — TELEPHONE ENCOUNTER
Requested from [facility] Dr Tamika Solis for stopping Plavix  Pt with 5/30/19.  OM1 with 70% blockage had JOSEFINA  Would typically have DAPT for one year, we are at 6 months   If urgent , then can consider  But review of chart shows patient was at [facility] with myasthemia gravis, low platlets and per PCP note seen by Cards   Given that complex situation, would not proceed to stop Plavix until we get records from [facility] of Cards consult, fu notes, testing and see patient in office  The request did not indicate reason for EGD or urgency  Heidi get recs and get him for clinic visit  See if pt know why he is having procedure, get notes from GI also   Heidi please notify Dr Hunter and get recs for GI from her  thanks

## 2019-12-12 ENCOUNTER — APPOINTMENT (OUTPATIENT)
Dept: GENERAL RADIOLOGY | Age: 84
DRG: 948 | End: 2019-12-12
Attending: STUDENT IN AN ORGANIZED HEALTH CARE EDUCATION/TRAINING PROGRAM
Payer: MEDICARE

## 2019-12-12 ENCOUNTER — TELEPHONE (OUTPATIENT)
Dept: CARDIOLOGY CLINIC | Age: 84
End: 2019-12-12

## 2019-12-12 ENCOUNTER — HOSPITAL ENCOUNTER (INPATIENT)
Age: 84
LOS: 3 days | Discharge: HOME | DRG: 948 | End: 2019-12-16
Attending: STUDENT IN AN ORGANIZED HEALTH CARE EDUCATION/TRAINING PROGRAM | Admitting: STUDENT IN AN ORGANIZED HEALTH CARE EDUCATION/TRAINING PROGRAM
Payer: MEDICARE

## 2019-12-12 DIAGNOSIS — R06.02 SHORTNESS OF BREATH: ICD-10-CM

## 2019-12-12 DIAGNOSIS — I50.9 ACUTE ON CHRONIC CONGESTIVE HEART FAILURE, UNSPECIFIED HEART FAILURE TYPE (HCC): Primary | ICD-10-CM

## 2019-12-12 LAB
ALBUMIN SERPL-MCNC: 3.6 G/DL (ref 3.5–5)
ALBUMIN/GLOB SERPL: 1.3 {RATIO} (ref 1.1–2.2)
ALP SERPL-CCNC: 62 U/L (ref 45–117)
ALT SERPL-CCNC: 33 U/L (ref 12–78)
ANION GAP SERPL CALC-SCNC: 3 MMOL/L (ref 5–15)
AST SERPL-CCNC: 27 U/L (ref 15–37)
BASOPHILS # BLD: 0 K/UL (ref 0–0.1)
BASOPHILS NFR BLD: 0 % (ref 0–1)
BILIRUB SERPL-MCNC: 1 MG/DL (ref 0.2–1)
BNP SERPL-MCNC: ABNORMAL PG/ML
BUN SERPL-MCNC: 37 MG/DL (ref 6–20)
BUN/CREAT SERPL: 39 (ref 12–20)
CALCIUM SERPL-MCNC: 8.6 MG/DL (ref 8.5–10.1)
CHLORIDE SERPL-SCNC: 109 MMOL/L (ref 97–108)
CO2 SERPL-SCNC: 33 MMOL/L (ref 21–32)
COMMENT, HOLDF: NORMAL
CREAT SERPL-MCNC: 0.94 MG/DL (ref 0.7–1.3)
DIFFERENTIAL METHOD BLD: ABNORMAL
EOSINOPHIL # BLD: 0.1 K/UL (ref 0–0.4)
EOSINOPHIL NFR BLD: 1 % (ref 0–7)
ERYTHROCYTE [DISTWIDTH] IN BLOOD BY AUTOMATED COUNT: 15.3 % (ref 11.5–14.5)
GLOBULIN SER CALC-MCNC: 2.7 G/DL (ref 2–4)
GLUCOSE SERPL-MCNC: 101 MG/DL (ref 65–100)
HCT VFR BLD AUTO: 32.6 % (ref 36.6–50.3)
HGB BLD-MCNC: 9.1 G/DL (ref 12.1–17)
IMM GRANULOCYTES # BLD AUTO: 0.1 K/UL (ref 0–0.04)
IMM GRANULOCYTES NFR BLD AUTO: 1 % (ref 0–0.5)
LYMPHOCYTES # BLD: 0.8 K/UL (ref 0.8–3.5)
LYMPHOCYTES NFR BLD: 8 % (ref 12–49)
MCH RBC QN AUTO: 24.1 PG (ref 26–34)
MCHC RBC AUTO-ENTMCNC: 27.9 G/DL (ref 30–36.5)
MCV RBC AUTO: 86.5 FL (ref 80–99)
MONOCYTES # BLD: 0.6 K/UL (ref 0–1)
MONOCYTES NFR BLD: 6 % (ref 5–13)
NEUTS SEG # BLD: 7.9 K/UL (ref 1.8–8)
NEUTS SEG NFR BLD: 84 % (ref 32–75)
NRBC # BLD: 0 K/UL (ref 0–0.01)
NRBC BLD-RTO: 0 PER 100 WBC
PLATELET # BLD AUTO: 153 K/UL (ref 150–400)
PMV BLD AUTO: 10.5 FL (ref 8.9–12.9)
POTASSIUM SERPL-SCNC: 3.5 MMOL/L (ref 3.5–5.1)
PROT SERPL-MCNC: 6.3 G/DL (ref 6.4–8.2)
RBC # BLD AUTO: 3.77 M/UL (ref 4.1–5.7)
RBC MORPH BLD: ABNORMAL
SAMPLES BEING HELD,HOLD: NORMAL
SODIUM SERPL-SCNC: 145 MMOL/L (ref 136–145)
TROPONIN I SERPL-MCNC: 0.13 NG/ML
WBC # BLD AUTO: 9.5 K/UL (ref 4.1–11.1)

## 2019-12-12 PROCEDURE — 71046 X-RAY EXAM CHEST 2 VIEWS: CPT

## 2019-12-12 PROCEDURE — 36415 COLL VENOUS BLD VENIPUNCTURE: CPT

## 2019-12-12 PROCEDURE — 84484 ASSAY OF TROPONIN QUANT: CPT

## 2019-12-12 PROCEDURE — 99218 HC RM OBSERVATION: CPT

## 2019-12-12 PROCEDURE — 80053 COMPREHEN METABOLIC PANEL: CPT

## 2019-12-12 PROCEDURE — 85025 COMPLETE CBC W/AUTO DIFF WBC: CPT

## 2019-12-12 PROCEDURE — 83880 ASSAY OF NATRIURETIC PEPTIDE: CPT

## 2019-12-12 PROCEDURE — 93005 ELECTROCARDIOGRAM TRACING: CPT

## 2019-12-12 PROCEDURE — 99284 EMERGENCY DEPT VISIT MOD MDM: CPT

## 2019-12-12 RX ORDER — GABAPENTIN 400 MG/1
400 CAPSULE ORAL
COMMUNITY
End: 2020-02-11 | Stop reason: SDUPTHER

## 2019-12-12 NOTE — ED TRIAGE NOTES
TRIAGE NOTE: Patient sent here Dr. Gian Dunham for admission for CHF. Patient reports shortness of breath and bilateral leg swelling. Patient reports shortness of breath and chest discomfort. Patient oxygen saturations 89-91% on RA.   Patient placed on 2L via NC.

## 2019-12-12 NOTE — TELEPHONE ENCOUNTER
Hx of Cath done 5/30/19. OM1 with 70% blockage had JOSEFINA. Pt had a CT scan on 10/8/19 which  Showed low lung volumes, diaphragm on the left was elevated with atelectasis on the left with mucus plugging.      Last seen 10-21-19  After that admit to [facility] with worse shortness of breath  Records in chart show saw PCP office on 12-5-19  \"admitted to [facility] from 11/12/2019 through 11/21/2019 for progressive shortness of breath with decreased diaphragmatic motion concerning for acute flareup of newly diagnosed myasthenia gravis. He was admitted to complete plasmapheresis of which he completed 5 days of. He was seen by both neurology and cardiology during his visit. He had an endoscopy that showed abnormal esophageal motility and gastritis. He has a history of atrial fibrillation. Cardiology recommended holding anticoagulation given low platelets and anemia. He was instructed to follow-up outpatient neurology and cardiology. Since hospital discharge, he states that his shortness of breath is back to what it was prior to admission. Did feel better when he was in the hospital.  Has been started on pyridostigmine and is planning to have monthly IVIG done at home. Needs refills on BP meds. \"    I had received GI request for colonoscopy but declined given that above interval hx until I could see him    Today got call from Neurology Dr Nj Ours pt was sob in office , 2-3 + edema and she did not think the SOB was from his myasthenia gravis  I offered to see in office or have pt go to [facility] or [facility] ER   I can evaluate and either admit for admit to Hospitalist/Internal Medicine team and consult and pt chosen to go the [facility] ER  For likely admit since sat is 90% per Dr Donald Roberson  She has copy of previous echo and dc summary and will hand those to the patient to give to me  We have notified ER and bed control that pt is coming to ER

## 2019-12-13 ENCOUNTER — APPOINTMENT (OUTPATIENT)
Dept: MRI IMAGING | Age: 84
DRG: 948 | End: 2019-12-13
Attending: PSYCHIATRY & NEUROLOGY
Payer: MEDICARE

## 2019-12-13 ENCOUNTER — APPOINTMENT (OUTPATIENT)
Dept: NON INVASIVE DIAGNOSTICS | Age: 84
DRG: 948 | End: 2019-12-13
Attending: STUDENT IN AN ORGANIZED HEALTH CARE EDUCATION/TRAINING PROGRAM
Payer: MEDICARE

## 2019-12-13 PROBLEM — R06.02 SOB (SHORTNESS OF BREATH): Status: ACTIVE | Noted: 2019-12-13

## 2019-12-13 LAB
ARTERIAL PATENCY WRIST A: YES
AV VELOCITY RATIO: 0.75
BASE EXCESS BLD CALC-SCNC: 9 MMOL/L
BDY SITE: ABNORMAL
BNP SERPL-MCNC: ABNORMAL PG/ML
ECHO AO ROOT DIAM: 3.52 CM
ECHO AV AREA PEAK VELOCITY: 2.4 CM2
ECHO AV PEAK GRADIENT: 5.1 MMHG
ECHO AV PEAK VELOCITY: 113.34 CM/S
ECHO EST RA PRESSURE: 15 MMHG
ECHO LA AREA 4C: 25.7 CM2
ECHO LA MAJOR AXIS: 3.64 CM
ECHO LA TO AORTIC ROOT RATIO: 1.04
ECHO LA VOL 2C: 88.33 ML (ref 18–58)
ECHO LA VOL 4C: 80.87 ML (ref 18–58)
ECHO LA VOL BP: 92.67 ML (ref 18–58)
ECHO LA VOL/BSA BIPLANE: 43.71 ML/M2 (ref 16–28)
ECHO LA VOLUME INDEX A2C: 41.66 ML/M2 (ref 16–28)
ECHO LA VOLUME INDEX A4C: 38.14 ML/M2 (ref 16–28)
ECHO LV INTERNAL DIMENSION DIASTOLIC: 4.95 CM (ref 4.2–5.9)
ECHO LV INTERNAL DIMENSION SYSTOLIC: 3.55 CM
ECHO LV IVSD: 1.19 CM (ref 0.6–1)
ECHO LV MASS 2D: 252.1 G (ref 88–224)
ECHO LV MASS INDEX 2D: 118.9 G/M2 (ref 49–115)
ECHO LV POSTERIOR WALL DIASTOLIC: 1.08 CM (ref 0.6–1)
ECHO LVOT DIAM: 2.02 CM
ECHO LVOT PEAK GRADIENT: 2.9 MMHG
ECHO LVOT PEAK VELOCITY: 84.56 CM/S
ECHO PULMONARY ARTERY SYSTOLIC PRESSURE (PASP): 55 MMHG
ECHO PV MAX VELOCITY: 58.19 CM/S
ECHO PV PEAK GRADIENT: 1.4 MMHG
ECHO RIGHT VENTRICULAR SYSTOLIC PRESSURE (RVSP): 55 MMHG
ECHO RV INTERNAL DIMENSION: 4.16 CM
ECHO RV TAPSE: 1.51 CM (ref 1.5–2)
ECHO TV REGURGITANT MAX VELOCITY: 316.35 CM/S
ECHO TV REGURGITANT PEAK GRADIENT: 40 MMHG
FERRITIN SERPL-MCNC: 53 NG/ML (ref 26–388)
GAS FLOW.O2 O2 DELIVERY SYS: ABNORMAL L/MIN
GAS FLOW.O2 SETTING OXYMISER: 2 L/M
HCO3 BLD-SCNC: 33.2 MMOL/L (ref 22–26)
IRON SATN MFR SERPL: 8 % (ref 20–50)
IRON SERPL-MCNC: 21 UG/DL (ref 35–150)
LVFS 2D: 28.25 %
PCO2 BLD: 51.2 MMHG (ref 35–45)
PH BLD: 7.42 [PH] (ref 7.35–7.45)
PO2 BLD: 80 MMHG (ref 80–100)
SAO2 % BLD: 96 % (ref 92–97)
SPECIMEN TYPE: ABNORMAL
TIBC SERPL-MCNC: 276 UG/DL (ref 250–450)
TOTAL RESP. RATE, ITRR: 18
TROPONIN I SERPL-MCNC: 0.16 NG/ML
TROPONIN I SERPL-MCNC: 0.16 NG/ML

## 2019-12-13 PROCEDURE — 74011250637 HC RX REV CODE- 250/637: Performed by: STUDENT IN AN ORGANIZED HEALTH CARE EDUCATION/TRAINING PROGRAM

## 2019-12-13 PROCEDURE — 83880 ASSAY OF NATRIURETIC PEPTIDE: CPT

## 2019-12-13 PROCEDURE — 82803 BLOOD GASES ANY COMBINATION: CPT

## 2019-12-13 PROCEDURE — 96374 THER/PROPH/DIAG INJ IV PUSH: CPT

## 2019-12-13 PROCEDURE — 82728 ASSAY OF FERRITIN: CPT

## 2019-12-13 PROCEDURE — 99218 HC RM OBSERVATION: CPT

## 2019-12-13 PROCEDURE — 36600 WITHDRAWAL OF ARTERIAL BLOOD: CPT

## 2019-12-13 PROCEDURE — 74011250636 HC RX REV CODE- 250/636: Performed by: FAMILY MEDICINE

## 2019-12-13 PROCEDURE — A9270 NON-COVERED ITEM OR SERVICE: HCPCS | Performed by: STUDENT IN AN ORGANIZED HEALTH CARE EDUCATION/TRAINING PROGRAM

## 2019-12-13 PROCEDURE — 65660000000 HC RM CCU STEPDOWN

## 2019-12-13 PROCEDURE — 74011636637 HC RX REV CODE- 636/637: Performed by: STUDENT IN AN ORGANIZED HEALTH CARE EDUCATION/TRAINING PROGRAM

## 2019-12-13 PROCEDURE — 84484 ASSAY OF TROPONIN QUANT: CPT

## 2019-12-13 PROCEDURE — 74011250636 HC RX REV CODE- 250/636: Performed by: PHYSICIAN ASSISTANT

## 2019-12-13 PROCEDURE — 36415 COLL VENOUS BLD VENIPUNCTURE: CPT

## 2019-12-13 PROCEDURE — A9575 INJ GADOTERATE MEGLUMI 0.1ML: HCPCS | Performed by: FAMILY MEDICINE

## 2019-12-13 PROCEDURE — 74011250637 HC RX REV CODE- 250/637: Performed by: PHYSICIAN ASSISTANT

## 2019-12-13 PROCEDURE — 93306 TTE W/DOPPLER COMPLETE: CPT

## 2019-12-13 PROCEDURE — 70553 MRI BRAIN STEM W/O & W/DYE: CPT

## 2019-12-13 PROCEDURE — 74011250636 HC RX REV CODE- 250/636: Performed by: STUDENT IN AN ORGANIZED HEALTH CARE EDUCATION/TRAINING PROGRAM

## 2019-12-13 PROCEDURE — 83540 ASSAY OF IRON: CPT

## 2019-12-13 PROCEDURE — 93005 ELECTROCARDIOGRAM TRACING: CPT

## 2019-12-13 RX ORDER — LOSARTAN POTASSIUM 50 MG/1
50 TABLET ORAL DAILY
Status: DISCONTINUED | OUTPATIENT
Start: 2019-12-13 | End: 2019-12-16 | Stop reason: HOSPADM

## 2019-12-13 RX ORDER — GADOTERATE MEGLUMINE 376.9 MG/ML
18 INJECTION INTRAVENOUS
Status: COMPLETED | OUTPATIENT
Start: 2019-12-13 | End: 2019-12-13

## 2019-12-13 RX ORDER — FUROSEMIDE 10 MG/ML
40 INJECTION INTRAMUSCULAR; INTRAVENOUS 2 TIMES DAILY
Status: DISCONTINUED | OUTPATIENT
Start: 2019-12-13 | End: 2019-12-16

## 2019-12-13 RX ORDER — ONDANSETRON 2 MG/ML
4 INJECTION INTRAMUSCULAR; INTRAVENOUS
Status: DISCONTINUED | OUTPATIENT
Start: 2019-12-13 | End: 2019-12-16 | Stop reason: HOSPADM

## 2019-12-13 RX ORDER — LANOLIN ALCOHOL/MO/W.PET/CERES
1000 CREAM (GRAM) TOPICAL DAILY
Status: DISCONTINUED | OUTPATIENT
Start: 2019-12-13 | End: 2019-12-16 | Stop reason: HOSPADM

## 2019-12-13 RX ORDER — FUROSEMIDE 10 MG/ML
40 INJECTION INTRAMUSCULAR; INTRAVENOUS DAILY
Status: DISCONTINUED | OUTPATIENT
Start: 2019-12-13 | End: 2019-12-13

## 2019-12-13 RX ORDER — PANTOPRAZOLE SODIUM 40 MG/1
40 TABLET, DELAYED RELEASE ORAL DAILY
Status: DISCONTINUED | OUTPATIENT
Start: 2019-12-13 | End: 2019-12-16 | Stop reason: HOSPADM

## 2019-12-13 RX ORDER — SODIUM CHLORIDE 0.9 % (FLUSH) 0.9 %
10 SYRINGE (ML) INJECTION
Status: COMPLETED | OUTPATIENT
Start: 2019-12-13 | End: 2019-12-13

## 2019-12-13 RX ORDER — MORPHINE SULFATE 2 MG/ML
1 INJECTION, SOLUTION INTRAMUSCULAR; INTRAVENOUS
Status: DISCONTINUED | OUTPATIENT
Start: 2019-12-13 | End: 2019-12-16 | Stop reason: HOSPADM

## 2019-12-13 RX ORDER — POTASSIUM CHLORIDE 750 MG/1
40 TABLET, FILM COATED, EXTENDED RELEASE ORAL 2 TIMES DAILY
Status: DISCONTINUED | OUTPATIENT
Start: 2019-12-13 | End: 2019-12-16 | Stop reason: HOSPADM

## 2019-12-13 RX ORDER — POTASSIUM CHLORIDE 750 MG/1
40 TABLET, FILM COATED, EXTENDED RELEASE ORAL DAILY
Status: DISCONTINUED | OUTPATIENT
Start: 2019-12-13 | End: 2019-12-13

## 2019-12-13 RX ORDER — GABAPENTIN 400 MG/1
400 CAPSULE ORAL
Status: DISCONTINUED | OUTPATIENT
Start: 2019-12-13 | End: 2019-12-16 | Stop reason: HOSPADM

## 2019-12-13 RX ORDER — AMLODIPINE BESYLATE 5 MG/1
5 TABLET ORAL DAILY
Status: DISCONTINUED | OUTPATIENT
Start: 2019-12-13 | End: 2019-12-16 | Stop reason: HOSPADM

## 2019-12-13 RX ORDER — ASPIRIN 81 MG/1
81 TABLET ORAL DAILY
Status: DISCONTINUED | OUTPATIENT
Start: 2019-12-13 | End: 2019-12-16 | Stop reason: HOSPADM

## 2019-12-13 RX ORDER — ACETAMINOPHEN 325 MG/1
650 TABLET ORAL
Status: DISCONTINUED | OUTPATIENT
Start: 2019-12-13 | End: 2019-12-16 | Stop reason: HOSPADM

## 2019-12-13 RX ORDER — SODIUM CHLORIDE 0.9 % (FLUSH) 0.9 %
5-40 SYRINGE (ML) INJECTION AS NEEDED
Status: DISCONTINUED | OUTPATIENT
Start: 2019-12-13 | End: 2019-12-16 | Stop reason: HOSPADM

## 2019-12-13 RX ORDER — ATORVASTATIN CALCIUM 10 MG/1
10 TABLET, FILM COATED ORAL DAILY
Status: DISCONTINUED | OUTPATIENT
Start: 2019-12-13 | End: 2019-12-16 | Stop reason: HOSPADM

## 2019-12-13 RX ORDER — SODIUM CHLORIDE 0.9 % (FLUSH) 0.9 %
5-40 SYRINGE (ML) INJECTION EVERY 8 HOURS
Status: DISCONTINUED | OUTPATIENT
Start: 2019-12-13 | End: 2019-12-16 | Stop reason: HOSPADM

## 2019-12-13 RX ORDER — FUROSEMIDE 10 MG/ML
40 INJECTION INTRAMUSCULAR; INTRAVENOUS ONCE
Status: COMPLETED | OUTPATIENT
Start: 2019-12-13 | End: 2019-12-13

## 2019-12-13 RX ORDER — PYRIDOSTIGMINE BROMIDE 60 MG/1
60 TABLET ORAL 3 TIMES DAILY
Status: DISCONTINUED | OUTPATIENT
Start: 2019-12-13 | End: 2019-12-13

## 2019-12-13 RX ADMIN — ASPIRIN 81 MG: 81 TABLET, COATED ORAL at 09:19

## 2019-12-13 RX ADMIN — AMLODIPINE BESYLATE 5 MG: 5 TABLET ORAL at 09:16

## 2019-12-13 RX ADMIN — PYRIDOSTIGMINE BROMIDE 60 MG: 60 TABLET ORAL at 09:23

## 2019-12-13 RX ADMIN — GABAPENTIN 400 MG: 100 CAPSULE ORAL at 01:49

## 2019-12-13 RX ADMIN — GABAPENTIN 400 MG: 100 CAPSULE ORAL at 21:06

## 2019-12-13 RX ADMIN — Medication 1000 MCG: at 09:19

## 2019-12-13 RX ADMIN — PANTOPRAZOLE SODIUM 40 MG: 40 TABLET, DELAYED RELEASE ORAL at 09:16

## 2019-12-13 RX ADMIN — PREDNISONE 30 MG: 20 TABLET ORAL at 09:14

## 2019-12-13 RX ADMIN — LOSARTAN POTASSIUM 50 MG: 50 TABLET, FILM COATED ORAL at 09:16

## 2019-12-13 RX ADMIN — Medication 10 ML: at 15:53

## 2019-12-13 RX ADMIN — Medication 10 ML: at 21:06

## 2019-12-13 RX ADMIN — FUROSEMIDE 40 MG: 10 INJECTION, SOLUTION INTRAMUSCULAR; INTRAVENOUS at 15:52

## 2019-12-13 RX ADMIN — Medication 10 ML: at 18:15

## 2019-12-13 RX ADMIN — Medication 10 ML: at 06:08

## 2019-12-13 RX ADMIN — ATORVASTATIN CALCIUM 10 MG: 10 TABLET, FILM COATED ORAL at 09:14

## 2019-12-13 RX ADMIN — GADOTERATE MEGLUMINE 18 ML: 376.9 INJECTION INTRAVENOUS at 18:15

## 2019-12-13 RX ADMIN — Medication 10 ML: at 01:49

## 2019-12-13 RX ADMIN — POTASSIUM CHLORIDE 40 MEQ: 750 TABLET, FILM COATED, EXTENDED RELEASE ORAL at 15:53

## 2019-12-13 RX ADMIN — FUROSEMIDE 40 MG: 10 INJECTION, SOLUTION INTRAMUSCULAR; INTRAVENOUS at 01:49

## 2019-12-13 NOTE — CONSULTS
12/12/2019    Cardiology Consult  Note   [facility] M.D[facility] , FAYE   --------PCP:-Anisa Dietrich MD   -----Subjective:   Olena Reeves is a 82 y.o. male  Consult   the patient known to me with CAD s/p PCI in May 2019  Seen in October stable CV status but c/o shortness of breath thought to be due to diaphragm  Has sniff test that showed decrease amplitude of movement but not paralysis  Pt seeing Erica Rooney with Pulmonary , admit to [facility]   Found to have Myasthenia gravis, Dr Marina Duncan follows  Pt got plasmapharesis  Found to have afib with RVR seen by Dr Cony Bridges at [facility] and had echo with normal LV fx, due to anemia in 8s and borderline platelets was not started on [address]  Pt came to Dr Marina Duncan office today with one week of worse shortness of breath and 3+ new edema to upper legs  Pt has worse cough and FERNANDO at short distances with sat 90%  Had started prednisone and was down to 30 mg in their office  Pt to ER today with need for admit, has been to triage with ED to see  Luz Albarran, sister and daughter at bedside, dtr with records which are :  daughter records [facility]      Echo 11-18-19 EF Definity for atrial fibrillation   Moderate to severe LVH   EF 60-65%  MAC, mild MR  Mod SERGIO   PASP 45      Dr Cony Brdiges note   atrial fibrillation with RVR and charleen  CAD May 2019 Stent STM  Myasthenia gravis with plasmapharesis  HTN XOL, low platelets anemia  No OAC due to anemia, plts 130K     EKG 11-5-19 ,afib with left axis, inferior small Q waves minor lateral STT      Saw Dr Chantal Moralez for esophageal dysmotility   EGD 11-20-19 gastritis   Hgb 8.7     11-11-19 Dr Cheryl Carrasco  Start Mestinon 60 mg daily  Multiple receptor tests      [facility]  FEV1 2.44  Ratio 71  Mild obstructive pattern        Prior cardiac history   Seen May with SOB , EKG with small P waves  Cath done 5/30/19. OM1 with 70% blockage had JOSEFINA.  Pt with improvement in SOB  CT scan on 10/8/19 which  Showed low lung volumes, diaphragm on the left was elevated with atelectasis on the left with mucus plugging. Discussion/Plan/Impression:  1. Worse shortness of breath with hypoxia now requiring oxygen  Suspect systemic process with MG being treated, cough worse  2. New severe lower extremity edema  , is this cardiac or prednisone  Check echo and BNP  3. CAD with stent , no angina, have stopped Plavix due to anemia  4. PAF, in and out of afib now rate fair    Co-morbids  1. Anemia, ?source  2. GI wanted colonscopy, suggest this done as IP due to OP risk given current situation  3. Will need Neuro and Pulmonary consult for dz state  following               Lab Results   Component Value Date/Time    Creatinine 0.94 12/12/2019 03:59 PM      Results for orders placed or performed during the hospital encounter of 12/12/19   EKG, 12 LEAD, INITIAL   Result Value Ref Range    Ventricular Rate 78 BPM    Atrial Rate 93 BPM    QRS Duration 90 ms    Q-T Interval 376 ms    QTC Calculation (Bezet) 428 ms    Calculated R Axis -44 degrees    Calculated T Axis -83 degrees    Diagnosis       Atrial fibrillation with premature ventricular or aberrantly conducted   complexes  Left axis deviation  Nonspecific ST and T wave abnormality  When compared with ECG of 30-MAY-2019 09:56,  Atrial fibrillation has replaced Sinus rhythm  Nonspecific T wave abnormality, worse in Inferior leads  Nonspecific T wave abnormality now evident in Lateral leads     Results for orders placed or performed in visit on 03/23/11   AMB POC EKG ROUTINE W/ 12 LEADS, INTER & REP    Impression    Normal, no change since December 2009. Cardiac Studies/Hx:  No specialty comments available. Medical history notable for  has a past medical history of Diverticulosis, sigmoid (8/2011), ED (erectile dysfunction) of organic origin, History of prostate cancer, Hypertension, Prostate cancer ([facility] [address] Sebaceous cyst (4/1/2014), Shingles, and Shingles.  He also has no past medical history of Abuse or Hypercholesterolemia. Social history notable for  reports that he has never smoked. He has never used smokeless tobacco. He reports that he does not drink alcohol or use drugs. Family history notable for family history includes Asthma in his daughter; Cancer in his mother; Diabetes in his brother, father, and sister; Heart Attack in his daughter; Hypertension in his brother, brother, and sister; Other in his daughter and sister; Stroke in his father. Past Medical History:   Diagnosis Date    Diverticulosis, sigmoid 8/2011    ED (erectile dysfunction) of organic origin     History of prostate cancer     Hypertension     Prostate cancer ([facility] [address]     Sebaceous cyst 4/1/2014    Shingles     Shingles         ROS-pertinents  negative except as above  The pertinent portions of the medical history,physician and nursing notes, meds,vitals , labs and Ins/Outs,are reviewed in the electronic record  Pt reports   Severe SOB and edema and the remainder of a complete multisystem 11 ROS  Are reviewed and negative    Social History     Tobacco Use    Smoking status: Never Smoker    Smokeless tobacco: Never Used   Substance Use Topics    Alcohol use: No    Drug use: No      Family History   Problem Relation Age of Onset    Cancer Mother         pancreatic    Diabetes Father     Stroke Father     Diabetes Sister     Hypertension Sister     Other Sister         Open heart surgery     Diabetes Brother     Hypertension Brother     Hypertension Brother     Asthma Daughter     Heart Attack Daughter     Other Daughter         hip replacement x 2      Prior to Admission Medications   Prescriptions Last Dose Informant Patient Reported? Taking? albuterol (PROVENTIL HFA, VENTOLIN HFA, PROAIR HFA) 90 mcg/actuation inhaler   No No   Sig: Take 1-2 Puffs by inhalation every four (4) hours as needed for Wheezing.    amLODIPine (NORVASC) 5 mg tablet   No No   Sig: TAKE 1 TABLET BY MOUTH EVERY DAY   aspirin delayed-release 81 mg tablet   Yes No   Si mg.   atorvastatin (LIPITOR) 10 mg tablet   No No   Sig: Take 1 Tab by mouth daily. clopidogrel (PLAVIX) 75 mg tab   No No   Sig: Take 1 Tab by mouth daily. cyanocobalamin (VITAMIN B-12) 1,000 mcg tablet   Yes No   Sig: Take 1,000 mcg by mouth daily. gabapentin (NEURONTIN) 100 mg capsule   No No   Sig: TAKE 1 CAPSULES BY MOUTH AT NIGHT   gabapentin (NEURONTIN) 300 mg capsule   No No   Sig: TAKE 1 CAPSULES BY MOUTH AT NIGHT   losartan (COZAAR) 50 mg tablet   No No   Sig: Take 1 Tab by mouth daily. pantoprazole (PROTONIX) 40 mg tablet   Yes No   Sig: Take 40 mg by mouth daily. predniSONE (DELTASONE) 10 mg tablet   Yes No   Sig: Take  by mouth daily. pyridostigmine (MESTINON) 60 mg tablet   Yes No   Si mg.       Facility-Administered Medications: None     No Known Allergies   Objective:    Physical Exam:   Patient Vitals for the past 12 hrs:   Temp Pulse Resp BP SpO2   19 1526 98 °F (36.7 °C) 72 16 129/85 91 %      General:  alert, cooperative, no distress, appears stated age   [de-identified], Neck:  NC,AT- no JVD   Chest Wall: inspection normal - no chest wall deformities or tenderness, respiratory effort increased   Lung:   Crackles both bases bilaterally   Heart:    Irregularly irregular rhythm, , normal S1, S2, no murmurs, rubs, clicks or gallops   Abdomen: nondistended   Extremities: extremities normal, atraumatic, no cyanosis or edema     Last 24hr Input/Output:  No intake or output data in the 24 hours ending 19     Data Review:   Recent Results (from the past 24 hour(s))   EKG, 12 LEAD, INITIAL    Collection Time: 19  3:53 PM   Result Value Ref Range    Ventricular Rate 78 BPM    Atrial Rate 93 BPM    QRS Duration 90 ms    Q-T Interval 376 ms    QTC Calculation (Bezet) 428 ms    Calculated R Axis -44 degrees    Calculated T Axis -83 degrees    Diagnosis       Atrial fibrillation with premature ventricular or aberrantly conducted   complexes  Left axis deviation  Nonspecific ST and T wave abnormality  When compared with ECG of 30-MAY-2019 09:56,  Atrial fibrillation has replaced Sinus rhythm  Nonspecific T wave abnormality, worse in Inferior leads  Nonspecific T wave abnormality now evident in Lateral leads     CBC WITH AUTOMATED DIFF    Collection Time: 12/12/19  3:59 PM   Result Value Ref Range    WBC 9.5 4.1 - 11.1 K/uL    RBC 3.77 (L) 4.10 - 5.70 M/uL    HGB 9.1 (L) 12.1 - 17.0 g/dL    HCT 32.6 (L) 36.6 - 50.3 %    MCV 86.5 80.0 - 99.0 FL    MCH 24.1 (L) 26.0 - 34.0 PG    MCHC 27.9 (L) 30.0 - 36.5 g/dL    RDW 15.3 (H) 11.5 - 14.5 %    PLATELET 416 460 - 014 K/uL    MPV 10.5 8.9 - 12.9 FL    NRBC 0.0 0  WBC    ABSOLUTE NRBC 0.00 0.00 - 0.01 K/uL    NEUTROPHILS 84 (H) 32 - 75 %    LYMPHOCYTES 8 (L) 12 - 49 %    MONOCYTES 6 5 - 13 %    EOSINOPHILS 1 0 - 7 %    BASOPHILS 0 0 - 1 %    IMMATURE GRANULOCYTES 1 (H) 0.0 - 0.5 %    ABS. NEUTROPHILS 7.9 1.8 - 8.0 K/UL    ABS. LYMPHOCYTES 0.8 0.8 - 3.5 K/UL    ABS. MONOCYTES 0.6 0.0 - 1.0 K/UL    ABS. EOSINOPHILS 0.1 0.0 - 0.4 K/UL    ABS. BASOPHILS 0.0 0.0 - 0.1 K/UL    ABS. IMM. GRANS. 0.1 (H) 0.00 - 0.04 K/UL    DF AUTOMATED      RBC COMMENTS HYPOCHROMIA  1+       METABOLIC PANEL, COMPREHENSIVE    Collection Time: 12/12/19  3:59 PM   Result Value Ref Range    Sodium 145 136 - 145 mmol/L    Potassium 3.5 3.5 - 5.1 mmol/L    Chloride 109 (H) 97 - 108 mmol/L    CO2 33 (H) 21 - 32 mmol/L    Anion gap 3 (L) 5 - 15 mmol/L    Glucose 101 (H) 65 - 100 mg/dL    BUN 37 (H) 6 - 20 MG/DL    Creatinine 0.94 0.70 - 1.30 MG/DL    BUN/Creatinine ratio 39 (H) 12 - 20      GFR est AA >60 >60 ml/min/1.73m2    GFR est non-AA >60 >60 ml/min/1.73m2    Calcium 8.6 8.5 - 10.1 MG/DL    Bilirubin, total 1.0 0.2 - 1.0 MG/DL    ALT (SGPT) 33 12 - 78 U/L    AST (SGOT) 27 15 - 37 U/L    Alk.  phosphatase 62 45 - 117 U/L    Protein, total 6.3 (L) 6.4 - 8.2 g/dL    Albumin 3.6 3.5 - 5.0 g/dL    Globulin 2.7 2.0 - 4.0 g/dL    A-G Ratio 1.3 1.1 - 2.2     SAMPLES BEING HELD    Collection Time: 12/12/19  3:59 PM   Result Value Ref Range    SAMPLES BEING HELD 1red,1blue     COMMENT        Add-on orders for these samples will be processed based on acceptable specimen integrity and analyte stability, which may vary by analyte.       Lab Results   Component Value Date/Time    Cholesterol, total 110 08/05/2019 12:19 PM    Cholesterol, total 134 12/12/2018 05:04 PM    Cholesterol, total 136 04/02/2018 09:54 AM    Cholesterol, total 126 05/03/2017 04:58 PM    Cholesterol, total 135 11/02/2016 04:53 PM    HDL Cholesterol 43 08/05/2019 12:19 PM    HDL Cholesterol 44 12/12/2018 05:04 PM    HDL Cholesterol 49 04/02/2018 09:54 AM    HDL Cholesterol 44 05/03/2017 04:58 PM    HDL Cholesterol 38 (L) 11/02/2016 04:53 PM    LDL, calculated 55 08/05/2019 12:19 PM    LDL, calculated 64 12/12/2018 05:04 PM    LDL, calculated 77 04/02/2018 09:54 AM    LDL, calculated 71 05/03/2017 04:58 PM    LDL, calculated 77 11/02/2016 04:53 PM    Triglyceride 60 08/05/2019 12:19 PM    Triglyceride 130 12/12/2018 05:04 PM    Triglyceride 48 04/02/2018 09:54 AM    Triglyceride 56 05/03/2017 04:58 PM    Triglyceride 99 11/02/2016 04:53 PM      Rachele Pickering MD 12/12/2019

## 2019-12-13 NOTE — DISCHARGE INSTRUCTIONS
Patient Education        Heart Failure: Care Instructions  Your Care Instructions    Heart failure occurs when your heart does not pump as much blood as the body needs. Failure does not mean that the heart has stopped pumping but rather that it is not pumping as well as it should. Over time, this causes fluid buildup in your lungs and other parts of your body. Fluid buildup can cause shortness of breath, fatigue, swollen ankles, and other problems. By taking medicines regularly, reducing sodium (salt) in your diet, checking your weight every day, and making lifestyle changes, you can feel better and live longer. Follow-up care is a key part of your treatment and safety. Be sure to make and go to all appointments, and call your doctor if you are having problems. It's also a good idea to know your test results and keep a list of the medicines you take. How can you care for yourself at home? Medicines    · Be safe with medicines. Take your medicines exactly as prescribed. Call your doctor if you think you are having a problem with your medicine.     · Do not take any vitamins, over-the-counter medicine, or herbal products without talking to your doctor first. Skylar Jaramillolin not take ibuprofen (Advil or Motrin) and naproxen (Aleve) without talking to your doctor first. They could make your heart failure worse.     · You may take some of the following medicine. ? Angiotensin-converting enzyme inhibitors (ACEIs) or angiotensin II receptor blockers (ARBs) reduce the heart's workload, lower blood pressure, and reduce swelling. Taking an ACEI or ARB may lower your chance of needing to be hospitalized. ? Beta-blockers can slow heart rate, decrease blood pressure, and improve your condition. Taking a beta-blocker may lower your chance of needing to be hospitalized. ? Diuretics, also called water pills, reduce swelling.    You will get more details on the specific medicines your doctor prescribes.   Diet    · Your doctor may suggest that you limit sodium. Your doctor can tell you how much sodium is right for you. An example is less than 3,000 mg a day. This includes all the salt you eat in cooking or in packaged foods. People get most of their sodium from processed foods. Fast food and restaurant meals also tend to be very high in sodium.     · Ask your doctor how much liquid you can drink each day. You may have to limit liquids.    Weight    · Weigh yourself without clothing at the same time each day. Record your weight. Call your doctor if you have a sudden weight gain, such as more than 2 to 3 pounds in a day or 5 pounds in a week. (Your doctor may suggest a different range of weight gain.) A sudden weight gain may mean that your heart failure is getting worse.    Activity level    · Start light exercise (if your doctor says it is okay). Even if you can only do a small amount, exercise will help you get stronger, have more energy, and manage your weight and your stress. Walking is an easy way to get exercise. Start out by walking a little more than you did before. Bit by bit, increase the amount you walk.     · When you exercise, watch for signs that your heart is working too hard. You are pushing yourself too hard if you cannot talk while you are exercising. If you become short of breath or dizzy or have chest pain, stop, sit down, and rest.     · If you feel \"wiped out\" the day after you exercise, walk slower or for a shorter distance until you can work up to a better pace.     · Get enough rest at night. Sleeping with 1 or 2 pillows under your upper body and head may help you breathe easier.    Lifestyle changes    · Do not smoke. Smoking can make a heart condition worse. If you need help quitting, talk to your doctor about stop-smoking programs and medicines. These can increase your chances of quitting for good.  Quitting smoking may be the most important step you can take to protect your heart.     · Limit alcohol to 2 drinks a day for men and 1 drink a day for women. Too much alcohol can cause health problems.     · Avoid getting sick from colds and the flu. Get a pneumococcal vaccine shot. If you have had one before, ask your doctor whether you need another dose. Get a flu shot each year. If you must be around people with colds or the flu, wash your hands often. When should you call for help? Call 911 if you have symptoms of sudden heart failure such as:    · You have severe trouble breathing.     · You cough up pink, foamy mucus.     · You have a new irregular or rapid heartbeat.    Call your doctor now or seek immediate medical care if:    · You have new or increased shortness of breath.     · You are dizzy or lightheaded, or you feel like you may faint.     · You have sudden weight gain, such as more than 2 to 3 pounds in a day or 5 pounds in a week. (Your doctor may suggest a different range of weight gain.)     · You have increased swelling in your legs, ankles, or feet.     · You are suddenly so tired or weak that you cannot do your usual activities.    Watch closely for changes in your health, and be sure to contact your doctor if you develop new symptoms. Where can you learn more? Go to http://marques-mei.info/. Enter F847 in the search box to learn more about \"Heart Failure: Care Instructions. \"  Current as of: April 9, 2019  Content Version: 12.2  © 0401-4874 [employer]. Care instructions adapted under license by [employer] (which disclaims liability or warranty for this information). If you have questions about a medical condition or this instruction, always ask your healthcare professional. [address] any warranty or liability for your use of this information.

## 2019-12-13 NOTE — CONSULTS
Pulmonary, Critical Care, and Sleep Medicine~Consult Note    Name: Olya Conner MRN: [id number]   : 1935 Hospital: [place] [address]   Date: 2019 9:21 AM Admission: 2019     Impression Plan   1. Progressive dyspnea in the setting of OHD and MS  2. Myasthenia gravis; 10/15/19 PFTs: FVC 2.3L @ 66%, FEV1 1.49L @ 61%, VC 2.3L @ 66%, PIMAX 52%, PEMAX 46%   3. A fib  4. Pedal edema: HF? Vs outpatient meds   5. CAD s/p PCI in   6. HTN  7. Dysphagia hx; working with SLP as an outpatient    8. Anemia  1. Echo today pending   2. Check abg   3. Check VC/ NIF today via RT and placing those in the chart daily for now    4. Mestinon/prednione per neuro  5. O2 titration above 90%  6. Since he is in IMCU lets start NIV at night for now  7. Prn over the weekend      Daily Progression:    Consult Note requested by hospitalist service. Patient admitted by the request of Neuro. He reported over the past week that he has seen progressive dyspnea and pedal edema. + cough and generalized weakness, but no fevers, purulent sputum, wheeze, hx of asthma/copd. Known MS and a fib. SNIFF test in October was normal.     I have reviewed the labs and previous days notes. Pertinent items are noted in HPI. Past Medical History:   Diagnosis Date    Diverticulosis, sigmoid 2011    ED (erectile dysfunction) of organic origin     History of prostate cancer     Hypertension     Prostate cancer ([facility] [address]     Sebaceous cyst 2014    Shingles     Shingles       Past Surgical History:   Procedure Laterality Date    ENDOSCOPY, COLON, DIAGNOSTIC  >= 8-10years ago   Jacquiline Logan SURGERY  1968 [id number]    HX CARPAL TUNNEL RELEASE      right,left    HX CHOLECYSTECTOMY      HX HERNIA REPAIR      HX PROSTATECTOMY        Prior to Admission medications    Medication Sig Start Date End Date Taking?  Authorizing Provider   gabapentin (NEURONTIN) 400 mg capsule Take 400 mg by mouth nightly. Yes Provider, Historical   predniSONE (DELTASONE) 10 mg tablet Take 30 mg by mouth daily. 19  Yes Provider, Historical   aspirin delayed-release 81 mg tablet Take 81 mg by mouth daily. Yes Provider, Historical   pantoprazole (PROTONIX) 40 mg tablet Take 40 mg by mouth daily. 19  Yes Provider, Historical   pyridostigmine (MESTINON) 60 mg tablet Take 60 mg by mouth three (3) times daily. 19  Yes Provider, Historical   losartan (COZAAR) 50 mg tablet Take 1 Tab by mouth daily. 19  Yes Jagruti Kirkpatrick MD   amLODIPine (NORVASC) 5 mg tablet TAKE 1 TABLET BY MOUTH EVERY DAY 19  Yes Jagruti Kirkpatrick MD   albuterol (PROVENTIL HFA, VENTOLIN HFA, PROAIR HFA) 90 mcg/actuation inhaler Take 1-2 Puffs by inhalation every four (4) hours as needed for Wheezing. 10/30/19  Yes Rudy Briseno MD   atorvastatin (LIPITOR) 10 mg tablet Take 1 Tab by mouth daily. 19  Yes Tacos Soria MD   clopidogrel (PLAVIX) 75 mg tab Take 1 Tab by mouth daily. 19  Yes Tacos Soria MD   cyanocobalamin (VITAMIN B-12) 1,000 mcg tablet Take 1,000 mcg by mouth daily.    Yes Provider, Historical     No Known Allergies   Social History     Tobacco Use    Smoking status: Never Smoker    Smokeless tobacco: Never Used   Substance Use Topics    Alcohol use: No      Family History   Problem Relation Age of Onset    Cancer Mother         pancreatic    Diabetes Father     Stroke Father     Diabetes Sister     Hypertension Sister     Other Sister         Open heart surgery     Diabetes Brother     Hypertension Brother     Hypertension Brother     Asthma Daughter     Heart Attack Daughter     Other Daughter         hip replacement x 2       OBJECTIVE:     Vital Signs:       Visit Vitals  /86   Pulse 67   Temp 98.6 °F (37 °C)   Resp 16   Wt 98.9 kg (218 lb 0.6 oz)   SpO2 100%   BMI 33.15 kg/m²      Temp (24hrs), Av.3 °F (36.8 °C), Min:98 °F (36.7 °C), Max:98.6 °F (37 °C)     Intake/Output:     Last shift: 12/13 0701 - 12/13 1900  In: -   Out: 375 [Urine:375]    Last 3 shifts: 12/11 1901 - 12/13 0700  In: 462 [P.O.:462]  Out: 1475 [Urine:1475]          Intake/Output Summary (Last 24 hours) at 12/13/2019 7306  Last data filed at 12/13/2019 9278  Gross per 24 hour   Intake 462 ml   Output 1850 ml   Net -1388 ml       Physical Exam:                                        Exam Findings Other   General: No resp distress noted, appears stated age    [de-identified]:  No ulcers, JVD not elevated, no cervical LAD    Chest: No pectus deformity, normal chest rise b/l    HEART:  IRR, no murmurs/rubs/gallops    Lungs:  CTA b/l, diminished BS at bases    ABD: Soft/NT, non rigid mildly distended    EXT: No cyanosis/clubbing/edema, normal peripheral pulses    Skin: No rashes or ulcers, no mottling    Neuro: A/O x 3        Medications:  Current Facility-Administered Medications   Medication Dose Route Frequency    amLODIPine (NORVASC) tablet 5 mg  5 mg Oral DAILY    aspirin delayed-release tablet 81 mg  81 mg Oral DAILY    atorvastatin (LIPITOR) tablet 10 mg  10 mg Oral DAILY    cyanocobalamin (VITAMIN B12) tablet 1,000 mcg  1,000 mcg Oral DAILY    gabapentin (NEURONTIN) capsule 400 mg  400 mg Oral QHS    losartan (COZAAR) tablet 50 mg  50 mg Oral DAILY    pantoprazole (PROTONIX) tablet 40 mg  40 mg Oral DAILY    predniSONE (DELTASONE) tablet 30 mg  30 mg Oral DAILY    pyridostigmine (MESTINON) tablet 60 mg  60 mg Oral TID    sodium chloride (NS) flush 5-40 mL  5-40 mL IntraVENous Q8H    sodium chloride (NS) flush 5-40 mL  5-40 mL IntraVENous PRN    ondansetron (ZOFRAN) injection 4 mg  4 mg IntraVENous Q6H PRN    acetaminophen (TYLENOL) tablet 650 mg  650 mg Oral Q4H PRN    morphine injection 1 mg  1 mg IntraVENous Q4H PRN       Labs:  ABG No results for input(s): PHI, PCO2I, PO2I, HCO3I, SO2I, FIO2I in the last 72 hours.      CBC Recent Labs     12/12/19  1559   WBC 9.5   HGB 9.1*   HCT 32.6*      MCV 86.5   MCH 40.3*        Metabolic  Panel Recent Labs     12/12/19  1559      K 3.5   *   CO2 33*   *   BUN 37*   CREA 0.94   CA 8.6   ALB 3.6   SGOT 27   ALT 33        Pertinent Labs                Patric Devi PA-C  12/13/2019

## 2019-12-13 NOTE — PROGRESS NOTES
Cardiology Progress Note            Admit Date: 12/12/2019  Admit Diagnosis: Shortness of breath [R06.02]  SOB (shortness of breath) [R06.02]  Date: 12/13/2019     Time: 2:08 PM  Assessment/Plan/Discussion:Cardiology Attending:     Patient seen on the day of progress note and examined  and agree with Advance Practice Provider (BRENDA, NP,PA)  assessment and plans. Amaury Babb is a 82 y.o. male   Has responded to the diuretic with less edema and less sob  Echo with normal EF and mod MR, overall does not explain the severe leg swelling and FERNANDO present on admit, Neuro suggests wean prednisone and I would like to see if that improves the swelling  Continue diuresis, suggest once home continue diuretic  We will see PRN over weekend  Tami Ahumada MD     12/12/19   ECHO ADULT COMPLETE 12/13/2019 12/13/2019    Narrative · Normal cavity size and systolic function (ejection fraction normal). Severe concentric hypertrophy. Estimated left ventricular ejection   fraction is 50 - 55%. · Moderately dilated left atrium. · Moderate mitral valve regurgitation is present. · Mild pulmonic valve regurgitation is present. · Mildly dilated right atrium. · Mildly dilated right ventricle. Moderately reduced systolic function. · Mild to moderate tricuspid valve regurgitation is present. · Mild aortic valve regurgitation is present. · Mild to moderate pulmonary hypertension. · Mild aortic root dilatation. Signed by: Marcell Regan MD             Subjective:  Feeling better. Less SOB. Daughter bedside     Assessment and Plan     1. Dyspnea   - progressive   - MG, small effusions on CXR   - Pulm following  2. LE edema   - Not HF   12/12/19   ECHO ADULT COMPLETE 12/13/2019 12/13/2019    Narrative · Normal cavity size and systolic function (ejection fraction normal). Severe concentric hypertrophy.  Estimated left ventricular ejection fraction is 50 - 55%. · Moderately dilated left atrium. · Moderate mitral valve regurgitation is present. · Mild pulmonic valve regurgitation is present. · Mildly dilated right atrium. · Mildly dilated right ventricle. Moderately reduced systolic function. · Mild to moderate tricuspid valve regurgitation is present. · Mild aortic valve regurgitation is present. · Mild to moderate pulmonary hypertension. · Mild aortic root dilatation. Signed by: Karen Sanchez MD    - suspect 2/2 Prednisone. Also on Norvasc   - Start Lasix IV  3. Myasthenia gravis   - Neuro following. ? Diagnosis   - Wean Prednisone  4. Troponin elevation   - Trivial and flat around 0.16   - Demand related  5. CAD   - S/p Stent   - Plavix stopped 2/2 anemia   - ASA, Lipitor, Cozaar  6. PAF   - rate controlled   - No OAC with Anemia, ? Source   - GI to hold off on colonoscopy    EF 50-55%. No edema on CXR. Not HF. Suspect leg edema related to prednisone. Start Lasix in short. SOB per Pulm. Neuro following for MG. Daily BMP with Lasix      Past Medical History:   Diagnosis Date    Diverticulosis, sigmoid 8/2011    ED (erectile dysfunction) of organic origin     History of prostate cancer     Hypertension     Prostate cancer ([facility] [address]     Sebaceous cyst 4/1/2014    Shingles     Shingles       Social History     Tobacco Use    Smoking status: Never Smoker    Smokeless tobacco: Never Used   Substance Use Topics    Alcohol use: No    Drug use: No           Review of Systems:    [] Patient unable to provide secondary to condition    [x] All systems negative, except as checked below.   Constitutional:    []Weight Change  []Fever   []Chills   []Night Sweats  []Fatigue  []Malaise  []____  ENT/Mouth:     []Hearing Changes  []Ear Pain  []Nasal Congestion   []Sinus Pain  []Hoarseness   []Sore throat  []Rhinorrhea  []Swallowing Difficulty  []____  Eyes:    []Eye Pain  []Swelling  []Redness  []Foreign Body  []Discharge  []Vision Changes  []____  Cardiovascular:    []Chest Pain  []SOB  []PND  []FERNANDO  []Orthopnea  []Claudication  [x]Edema   []Palpitations  []____  Respiratory:    []Cough  []Sputum  []Wheezing,  [x]SOB  []Hemoptysis  []____  Gastrointestinal:    []Nausea  []Vomiting  []Diarrhea  []Constipation  []Pain  []Heartburn  []Anorexia  []Dysphagia  []Hematochezia  []Melena,  []Jaundice  []____  Genitourinary:    []Dysuria  []Urinary Frequency  []Hematuria  []Urinary Incontinence  []Urgency  []Flank Pain  []Hesitancy  []____  Musculoskeletal:    []Arthralgias  []Myalgias  []Joint Swelling  []Joint Stiffness  []Back Pain  []Neck Pain  []____  Skin:    []Skin Lesions  []Pruritis  []Hair Changes  []Skin rashes  []____  Neuro:    []Weakness  []Numbness  []Paresthesias  []Loss of Consciousness  []Syncope   []Dizziness  []Headache  []Coordination Changes  []Recent Falls  []____  Psych:    []Anxiety/Depression  []Insomnia  []Memory Changes  []Violence/Abuse Hx.  []____  Heme/Lymph:    []Bruising  []Bleeding  []Lymphadenopathy  []____  Endocrine:    []Polyuria  []Polydipsia  []Temperature Intolerance  []____         Objective:     Physical Exam:                Visit Vitals  /82 (BP 1 Location: Left arm, BP Patient Position: At rest)   Pulse 69   Temp 98.6 °F (37 °C)   Resp 15   Ht 5' 8\" (1.727 m)   Wt 218 lb (98.9 kg)   SpO2 97%   BMI 33.15 kg/m²        General Appearance:   Well developed, well nourished,alert and oriented x 3, and   individual in no acute distress. Ears/Nose/Mouth/Throat:    Hearing grossly normal.         Neck:  Supple. Chest:    Lungs crackles in bases  to auscultation bilaterally. Cardiovascular:   Irregular rate and rhythm, S1, S2 normal, no murmur. Abdomen:    Soft, non-tender, bowel sounds are active. Extremities:  2-3+ edema bilaterally. Skin:  Warm and dry.      Telemetry: AFIB     Data Review:    Labs:    Recent Results (from the past 24 hour(s))   EKG, 12 LEAD, INITIAL    Collection Time: 12/12/19 3:53 PM   Result Value Ref Range    Ventricular Rate 78 BPM    Atrial Rate 93 BPM    QRS Duration 90 ms    Q-T Interval 376 ms    QTC Calculation (Bezet) 428 ms    Calculated R Axis -44 degrees    Calculated T Axis -83 degrees    Diagnosis       Atrial fibrillation with premature ventricular or aberrantly conducted   complexes  Left axis deviation  Nonspecific ST and T wave abnormality  When compared with ECG of 30-MAY-2019 09:56,  Atrial fibrillation has replaced Sinus rhythm  Nonspecific T wave abnormality, worse in Inferior leads  Nonspecific T wave abnormality now evident in Lateral leads     CBC WITH AUTOMATED DIFF    Collection Time: 12/12/19  3:59 PM   Result Value Ref Range    WBC 9.5 4.1 - 11.1 K/uL    RBC 3.77 (L) 4.10 - 5.70 M/uL    HGB 9.1 (L) 12.1 - 17.0 g/dL    HCT 32.6 (L) 36.6 - 50.3 %    MCV 86.5 80.0 - 99.0 FL    MCH 24.1 (L) 26.0 - 34.0 PG    MCHC 27.9 (L) 30.0 - 36.5 g/dL    RDW 15.3 (H) 11.5 - 14.5 %    PLATELET 550 735 - 481 K/uL    MPV 10.5 8.9 - 12.9 FL    NRBC 0.0 0  WBC    ABSOLUTE NRBC 0.00 0.00 - 0.01 K/uL    NEUTROPHILS 84 (H) 32 - 75 %    LYMPHOCYTES 8 (L) 12 - 49 %    MONOCYTES 6 5 - 13 %    EOSINOPHILS 1 0 - 7 %    BASOPHILS 0 0 - 1 %    IMMATURE GRANULOCYTES 1 (H) 0.0 - 0.5 %    ABS. NEUTROPHILS 7.9 1.8 - 8.0 K/UL    ABS. LYMPHOCYTES 0.8 0.8 - 3.5 K/UL    ABS. MONOCYTES 0.6 0.0 - 1.0 K/UL    ABS. EOSINOPHILS 0.1 0.0 - 0.4 K/UL    ABS. BASOPHILS 0.0 0.0 - 0.1 K/UL    ABS. IMM.  GRANS. 0.1 (H) 0.00 - 0.04 K/UL    DF AUTOMATED      RBC COMMENTS HYPOCHROMIA  1+       METABOLIC PANEL, COMPREHENSIVE    Collection Time: 12/12/19  3:59 PM   Result Value Ref Range    Sodium 145 136 - 145 mmol/L    Potassium 3.5 3.5 - 5.1 mmol/L    Chloride 109 (H) 97 - 108 mmol/L    CO2 33 (H) 21 - 32 mmol/L    Anion gap 3 (L) 5 - 15 mmol/L    Glucose 101 (H) 65 - 100 mg/dL    BUN 37 (H) 6 - 20 MG/DL    Creatinine 0.94 0.70 - 1.30 MG/DL    BUN/Creatinine ratio 39 (H) 12 - 20      GFR est AA >60 >60 ml/min/1.73m2    GFR est non-AA >60 >60 ml/min/1.73m2    Calcium 8.6 8.5 - 10.1 MG/DL    Bilirubin, total 1.0 0.2 - 1.0 MG/DL    ALT (SGPT) 33 12 - 78 U/L    AST (SGOT) 27 15 - 37 U/L    Alk. phosphatase 62 45 - 117 U/L    Protein, total 6.3 (L) 6.4 - 8.2 g/dL    Albumin 3.6 3.5 - 5.0 g/dL    Globulin 2.7 2.0 - 4.0 g/dL    A-G Ratio 1.3 1.1 - 2.2     SAMPLES BEING HELD    Collection Time: 12/12/19  3:59 PM   Result Value Ref Range    SAMPLES BEING HELD 1red,1blue     COMMENT        Add-on orders for these samples will be processed based on acceptable specimen integrity and analyte stability, which may vary by analyte.    NT-PRO BNP    Collection Time: 12/12/19  3:59 PM   Result Value Ref Range    NT pro-BNP 10,542 (H) <450 PG/ML   TROPONIN I    Collection Time: 12/12/19  3:59 PM   Result Value Ref Range    Troponin-I, Qt. 0.13 (H) <0.05 ng/mL   NT-PRO BNP    Collection Time: 12/13/19  2:48 AM   Result Value Ref Range    NT pro-BNP 11,479 (H) <450 PG/ML   TROPONIN I    Collection Time: 12/13/19  2:48 AM   Result Value Ref Range    Troponin-I, Qt. 0.16 (H) <0.05 ng/mL   EKG, 12 LEAD, INITIAL    Collection Time: 12/13/19  8:11 AM   Result Value Ref Range    Ventricular Rate 59 BPM    Atrial Rate 65 BPM    QRS Duration 96 ms    Q-T Interval 408 ms    QTC Calculation (Bezet) 403 ms    Calculated R Axis -45 degrees    Calculated T Axis -97 degrees    Diagnosis       Atrial fibrillation with slow ventricular response  Left axis deviation  Low voltage QRS  Septal infarct , age undetermined  When compared with ECG of 12-DEC-2019 15:53,  Septal infarct is now present     TROPONIN I    Collection Time: 12/13/19  9:22 AM   Result Value Ref Range    Troponin-I, Qt. 0.16 (H) <0.05 ng/mL   IRON PROFILE    Collection Time: 12/13/19  9:22 AM   Result Value Ref Range    Iron 21 (L) 35 - 150 ug/dL    TIBC 276 250 - 450 ug/dL    Iron % saturation 8 (L) 20 - 50 %   FERRITIN    Collection Time: 12/13/19  9:22 AM   Result Value Ref Range Ferritin 53 26 - 388 NG/ML   ECHO ADULT COMPLETE    Collection Time: 12/13/19 10:03 AM   Result Value Ref Range    LA Volume 92.67 18 - 58 mL    Tapse 1.51 1.5 - 2.0 cm    Ao Root D 3.52 cm    Aortic Valve Systolic Peak Velocity 183.66 cm/s    Aortic Valve Area by Continuity of Peak Velocity 2.4 cm2    AoV PG 5.1 mmHg    LVIDd 4.95 4.2 - 5.9 cm    LVPWd 1.08 (A) 0.6 - 1.0 cm    LVIDs 3.55 cm    IVSd 1.19 (A) 0.6 - 1.0 cm    LVOT d 2.02 cm    LVOT Peak Velocity 84.56 cm/s    LVOT Peak Gradient 2.9 mmHg    Left Atrium to Aortic Root Ratio 1.04     RVIDd 4.16 cm    LA Vol 4C 80.87 (A) 18 - 58 mL    LA Vol 2C 88.33 (A) 18 - 58 mL    LA Area 4C 25.7 cm2    LV Mass .1 (A) 88 - 224 g    LV Mass AL Index 118.9 49 - 115 g/m2    RVSP 55.0 mmHg    Est. RA Pressure 15.0 mmHg    Left Atrium Major Axis 3.64 cm    Triscuspid Valve Regurgitation Peak Gradient 40.0 mmHg    Pulmonic Valve Max Velocity 58.19 cm/s    TR Max Velocity 316.35 cm/s    LA Vol Index 43.71 16 - 28 ml/m2    PASP 55.0 mmHg    LA Vol Index 41.66 16 - 28 ml/m2    LA Vol Index 38.14 16 - 28 ml/m2    Left Ventricular Fractional Shortening by 2D 49.654950693 %    AV Velocity Ratio 0.75     PV peak gradient 1.4 mmHg   POC G3 - PUL    Collection Time: 12/13/19 10:29 AM   Result Value Ref Range    pH (POC) 7.420 7.35 - 7.45      pCO2 (POC) 51.2 (H) 35.0 - 45.0 MMHG    pO2 (POC) 80 80 - 100 MMHG    HCO3 (POC) 33.2 (H) 22 - 26 MMOL/L    sO2 (POC) 96 92 - 97 %    Base excess (POC) 9 mmol/L    Site LEFT BRACHIAL      Device: NASAL CANNULA      Flow rate (POC) 2 L/M    Allens test (POC) YES      Specimen type (POC) ARTERIAL      Total resp.  rate 18            Radiology:        Current Facility-Administered Medications   Medication Dose Route Frequency    amLODIPine (NORVASC) tablet 5 mg  5 mg Oral DAILY    aspirin delayed-release tablet 81 mg  81 mg Oral DAILY    atorvastatin (LIPITOR) tablet 10 mg  10 mg Oral DAILY    cyanocobalamin (VITAMIN B12) tablet 1,000 mcg  1,000 mcg Oral DAILY    gabapentin (NEURONTIN) capsule 400 mg  400 mg Oral QHS    losartan (COZAAR) tablet 50 mg  50 mg Oral DAILY    pantoprazole (PROTONIX) tablet 40 mg  40 mg Oral DAILY    predniSONE (DELTASONE) tablet 30 mg  30 mg Oral DAILY    sodium chloride (NS) flush 5-40 mL  5-40 mL IntraVENous Q8H    sodium chloride (NS) flush 5-40 mL  5-40 mL IntraVENous PRN    ondansetron (ZOFRAN) injection 4 mg  4 mg IntraVENous Q6H PRN    acetaminophen (TYLENOL) tablet 650 mg  650 mg Oral Q4H PRN    morphine injection 1 mg  1 mg IntraVENous Q4H PRN       Shasta Mccall.  Elma Gann MD     [facility] [address] [place] [address], [address] [place] [address] [address]   Jeffry Munoz   [phone]

## 2019-12-13 NOTE — PROGRESS NOTES
Pulmonary PA input an order for patient to perform a NIF, PEF, & VC. All instruments for these test are located at the patients bedside. The patient and I have discussed these lung mechanics and there use. Patient did not have any questions and has been compliant, however after three attempts to perform these lung mechanics with this patient, patient is currently sleeping, patients family requests that I let the patient rest, and for someone to attempt this evening, I will report to the oncoming therapist to perform these lung mechanics with this patient.

## 2019-12-13 NOTE — CONSULTS
[address] [facility] Douglas NOTE  [facility]  [phone] NP in-hospital cell phone M-F until 4:30  After 5pm or on weekends, please call  for physician on call        NAME:  Tammie Bosch   :   1935   MRN:   [id number]       Referring Physician: Saima    Consult Date: 2019 10:36 AM    Chief Complaint: anemia     History of Present Illness:  Patient is a 82 y.o. who is seen in consultation at the request of Dr. Magali Jasmine for if colonoscopy wanted suggest in patient. Medical history as listed below includes myasthenia gravis, CAD (PCI 2019). He presented for dyspnea, cough, and LE edema. He was on Plavix PTA, last dose Tuesday. He reports chronic dysphagia only to water. He reports decreased appetite and eating smaller meals. His weight is up but he reports his clothes don't fit and pants are falling off. He denies nausea, reflux, change in bowel habits, melena, or hematochezia. Unclear duration of anemia, hgb 9.1 (12 in May). EGD 19 by Dr. Deon Cheadle: gastritis  Colonoscopy  by Dr. Campbell Coins: rectal polypectomy hyperplastic, sigmoid/ascending/transverse colon diverticulosis    I have reviewed the emergency room note, hospital admission note, notes by all other clinicians who have seen the patient during this hospitalization to date. I have reviewed the problem list and the reason for this hospitalization. I have reviewed the allergies and the medications the patient was taking at home prior to this hospitalization.     PMH:  Past Medical History:   Diagnosis Date    Diverticulosis, sigmoid 2011    ED (erectile dysfunction) of organic origin     History of prostate cancer     Hypertension     Prostate cancer ([facility] [address]     Sebaceous cyst 2014    Shingles     Shingles        PSH:  Past Surgical History:   Procedure Laterality Date    ENDOSCOPY, COLON, DIAGNOSTIC  >= 8-10years ago   [facility]  [id number]    HX CARPAL TUNNEL RELEASE  4/08    right,left    HX CHOLECYSTECTOMY      HX HERNIA REPAIR      HX PROSTATECTOMY  5/06       Allergies:  No Known Allergies    Home Medications:  Prior to Admission Medications   Prescriptions Last Dose Informant Patient Reported? Taking? albuterol (PROVENTIL HFA, VENTOLIN HFA, PROAIR HFA) 90 mcg/actuation inhaler 12/12/2019 at Unknown time  No Yes   Sig: Take 1-2 Puffs by inhalation every four (4) hours as needed for Wheezing. amLODIPine (NORVASC) 5 mg tablet 12/11/2019 at Unknown time  No Yes   Sig: TAKE 1 TABLET BY MOUTH EVERY DAY   aspirin delayed-release 81 mg tablet 12/11/2019 at Unknown time  Yes Yes   Sig: Take 81 mg by mouth daily. atorvastatin (LIPITOR) 10 mg tablet 12/11/2019 at Unknown time  No Yes   Sig: Take 1 Tab by mouth daily. clopidogrel (PLAVIX) 75 mg tab 12/11/2019 at Unknown time  No Yes   Sig: Take 1 Tab by mouth daily. cyanocobalamin (VITAMIN B-12) 1,000 mcg tablet 12/11/2019 at Unknown time  Yes Yes   Sig: Take 1,000 mcg by mouth daily. gabapentin (NEURONTIN) 400 mg capsule 12/11/2019 at Unknown time  Yes Yes   Sig: Take 400 mg by mouth nightly. losartan (COZAAR) 50 mg tablet 12/11/2019 at Unknown time  No Yes   Sig: Take 1 Tab by mouth daily. pantoprazole (PROTONIX) 40 mg tablet 12/11/2019 at Unknown time  Yes Yes   Sig: Take 40 mg by mouth daily. predniSONE (DELTASONE) 10 mg tablet 12/11/2019 at Unknown time  Yes Yes   Sig: Take 30 mg by mouth daily. pyridostigmine (MESTINON) 60 mg tablet 12/11/2019 at Unknown time  Yes Yes   Sig: Take 60 mg by mouth three (3) times daily.       Facility-Administered Medications: None       Hospital Medications:  Current Facility-Administered Medications   Medication Dose Route Frequency    amLODIPine (NORVASC) tablet 5 mg  5 mg Oral DAILY    aspirin delayed-release tablet 81 mg  81 mg Oral DAILY    atorvastatin (LIPITOR) tablet 10 mg 10 mg Oral DAILY    cyanocobalamin (VITAMIN B12) tablet 1,000 mcg  1,000 mcg Oral DAILY    gabapentin (NEURONTIN) capsule 400 mg  400 mg Oral QHS    losartan (COZAAR) tablet 50 mg  50 mg Oral DAILY    pantoprazole (PROTONIX) tablet 40 mg  40 mg Oral DAILY    predniSONE (DELTASONE) tablet 30 mg  30 mg Oral DAILY    pyridostigmine (MESTINON) tablet 60 mg  60 mg Oral TID    sodium chloride (NS) flush 5-40 mL  5-40 mL IntraVENous Q8H    sodium chloride (NS) flush 5-40 mL  5-40 mL IntraVENous PRN    ondansetron (ZOFRAN) injection 4 mg  4 mg IntraVENous Q6H PRN    acetaminophen (TYLENOL) tablet 650 mg  650 mg Oral Q4H PRN    morphine injection 1 mg  1 mg IntraVENous Q4H PRN       Social History:  Social History     Tobacco Use    Smoking status: Never Smoker    Smokeless tobacco: Never Used   Substance Use Topics    Alcohol use: No       Family History:  Family History   Problem Relation Age of Onset    Cancer Mother         pancreatic    Diabetes Father     Stroke Father     Diabetes Sister     Hypertension Sister     Other Sister         Open heart surgery     Diabetes Brother     Hypertension Brother     Hypertension Brother     Asthma Daughter     Heart Attack Daughter     Other Daughter         hip replacement x 2       Review of Systems:  Constitutional: negative fever, negative chills, +weight loss  Eyes:   negative visual changes  ENT:   negative sore throat, tongue or lip swelling  Respiratory:  +cough, +dyspnea  Cards:  negative for chest pain, palpitations,+ lower extremity edema  GI:   See HPI  :  negative for frequency, dysuria  Integument:  negative for rash and pruritus  Heme:  negative for easy bruising and gum/nose bleeding  Musculoskeletal:negative for myalgias, back pain and muscle weakness  Neuro:    negative for headaches, dizziness, vertigo  Psych:  negative for feelings of anxiety, depression     Objective:     Patient Vitals for the past 8 hrs:   BP Temp Pulse Resp SpO2 Height Weight   12/13/19 0937 176/83     5' 8\" (1.727 m) 98.9 kg (218 lb)   12/13/19 0914 172/86  67       12/13/19 0744 176/83 98.6 °F (37 °C) (!) 56 16 100 %     12/13/19 0330 169/88 98.3 °F (36.8 °C) 74 25 100 %  98.9 kg (218 lb 0.6 oz)     12/13 0701 - 12/13 1900  In: -   Out: 375 [Urine:375]  12/11 1901 - 12/13 0700  In: 462 [P.O.:462]  Out: 1475 [Urine:1475]    EXAM:     CONST:  Pleasant male lying in bed, no acute distress   NEURO:  alert and oriented x 3   HEENT: EOMI, no scleral icterus   LUNGS: Diminished    CARD:   S1 S2, irregular   ABD:  soft, no tenderness, no rebound, bowel sounds (+) all 4 quadrants, no masses, non distended   EXT:  warm   PSYCH: full, not anxious     Data Review     Recent Labs     12/12/19  1559   WBC 9.5   HGB 9.1*   HCT 32.6*        Recent Labs     12/12/19  1559      K 3.5   *   CO2 33*   BUN 37*   CREA 0.94   *   CA 8.6     Recent Labs     12/12/19  1559   SGOT 27   AP 62   TP 6.3*   ALB 3.6   GLOB 2.7     No results for input(s): INR, PTP, APTT, INREXT in the last 72 hours.        Assessment:     · Anemia: hgb 9.1, EGD 11/2019: esophagitis  · Myasthenia gravis  · Atrial fibrillation  · CAD status post PCI in 5/2019: Plavix PTA lat dose 12/10  · History of dysphagia only to water: outpatient SLP     Patient Active Problem List   Diagnosis Code    Hypertension I10    ED (erectile dysfunction) of organic origin N52.9    Diverticulosis, sigmoid K57.30    History of prostate cancer Z85.46    B12 deficiency E53.8    Sebaceous cyst L72.3    Advanced care planning/counseling discussion Z71.89    Gastroesophageal reflux disease without esophagitis K21.9    Shingles B02.9    Shortness of breath R06.02     Plan:       · Check iron profile and ferritin  · Monitor CBC  · PPI  · No need to do colonoscopy at this time given co-morbidities and asymptomatic, normal colonoscopy in 2015   · Patient discussed with and will be seen by  Abou-Assi  · Thank you for allowing me to participate in care of Sebas Shepard     Signed By: Allan Reed NP     12/13/2019  10:36 AM       Gastroenterology Attending Physician attestation statement and comments. This patient was seen and examined by me in a face-to-face visit today. I reviewed the medical record including lab work, imaging and other provider notes. I confirmed the history as described above. I spoke to the patient, reviewed the medical record including lab work, imaging and other provider notes. I discussed this case in detail with panda jean baptiste. I formulated an  assessment of this patient and developed a treatment plan. I agree with the above consultation note. I agree with the history, exam and assessment and plan as outlined in the note.   I would like to add the following:   Abdomen soft  No evidence of GI bleeding  No indication for endoscopic w/u at this time  Will follow

## 2019-12-13 NOTE — ROUTINE PROCESS
TRANSFER - OUT REPORT: 
 
Verbal report given to Zena(name) on Janelle Hammond  being transferred to [facility]) for routine progression of care Report consisted of patients Situation, Background, Assessment and  
Recommendations(SBAR). Information from the following report(s) SBAR, Kardex, ED Summary, Intake/Output, MAR, Recent Results, Med Rec Status and Cardiac Rhythm Afib was reviewed with the receiving nurse. Lines:  
Peripheral IV 12/12/19 Right Antecubital (Active) Site Assessment Clean, dry, & intact 12/13/2019  1:00 AM  
Phlebitis Assessment 0 12/13/2019  1:00 AM  
Infiltration Assessment 0 12/13/2019  1:00 AM  
Dressing Status Clean, dry, & intact 12/13/2019  1:00 AM  
Dressing Type Transparent 12/13/2019  1:00 AM  
Hub Color/Line Status Pink;Flushed;Capped 12/13/2019  1:00 AM  
Action Taken Open ports on tubing capped 12/13/2019  1:00 AM  
Alcohol Cap Used Yes 12/13/2019  1:00 AM  
  
 
Opportunity for questions and clarification was provided. Patient transported with: 
 Monitor O2 @ 2 liters Registered Nurse

## 2019-12-13 NOTE — PROGRESS NOTES
Potassium 40meq given at 1550, and Lasix 40mg given at 1550. Orders later d/c and put in again at 1800. Tried contacting MD Berny Fuentes covering for [facility]. No response. Contacted MD Swan, about possible duplicate orders. Orders to hold meds. Bedside and Verbal shift change report given to [address] (oncoming nurse) by Iam Chávez (offgoing nurse). Report included the following information SBAR, Kardex, Intake/Output, MAR, Accordion and Recent Results.      Problem: Gas Exchange - Impaired  Goal: *Absence of hypoxia  Outcome: Progressing Towards Goal     Problem: Heart Failure: Day 1  Goal: Activity/Safety  Outcome: Progressing Towards Goal  Goal: Diagnostic Test/Procedures  Outcome: Progressing Towards Goal  Goal: *Optimal pain control at patient's stated goal  Outcome: Progressing Towards Goal  Goal: *Anxiety reduced or absent  Outcome: Progressing Towards Goal

## 2019-12-13 NOTE — CONSULTS
Neurology  Consult  Bety Delcid FNP-C  Neurology NP  [phone]    Patient: Brigida Paulino MRN: [id number]  SSN: [id number]    YOB: 1935  Age: 82 y.o. Sex: male        Chief Complaint:SOB    Subjective:      Brigida Paulino is an 82 y.o. male who is being seen for SOB. He has a past medical history significant of CAD w/ PCI done in May 2019, atrial fibrillation, newly diagnosed with myasthenia gravis, and hypertension. The patient reports that he went to see his neurologist Dr. Viki Shearer yesterday due to his worsenings shortness of breath. The patient reports that over the past couple weeks he noticed that his shortness of breath has progressively gotten worse and also noticed bilateral lower extremity edema. He was recently admitted to the hospital with newly diagnosed myasthenia gravis and was treated with plasmapheresis. He reports that his shortness of breath had improved for about 7 to 10 days. On 11/11/2019 he was started on Mestinon and prednisone. He denies headaches, dizziness, blurred vision, double vision, generalized weakness. Musk antibodies 11/12/2019- negative. No EMG. Sniff test that showed decrease amplitude of movement but not paralysis. History   \" 11/12/2019 through 11/21/2019 for progressive shortness of breath with decreased diaphragmatic motion concerning for acute flareup of newly diagnosed myasthenia gravis.  He was admitted to complete plasmapheresis of which he completed 5 days of. Vicki Bruner was seen by both neurology and cardiology during his visit. Vicki Bruner had an endoscopy that showed abnormal esophageal motility and gastritis.  He has a history of atrial fibrillation.  Cardiology recommended holding anticoagulation given low platelets and anemia. \"                     Past Medical History:   Diagnosis Date    Diverticulosis, sigmoid 8/2011    ED (erectile dysfunction) of organic origin     History of prostate cancer     Hypertension     Prostate cancer ([facility] [address]     Sebaceous cyst 4/1/2014    Shingles     Shingles      Past Surgical History:   Procedure Laterality Date    ENDOSCOPY, COLON, DIAGNOSTIC  >= 8-10years ago   [employer] [facility] [address] [place] [address] [id number]    HX CARPAL TUNNEL RELEASE  4/08    right,left    HX CHOLECYSTECTOMY      HX HERNIA REPAIR      HX PROSTATECTOMY  5/06      Family History   Problem Relation Age of Onset    Cancer Mother         pancreatic    Diabetes Father     Stroke Father     Diabetes Sister     Hypertension Sister     Other Sister         Open heart surgery     Diabetes Brother     Hypertension Brother     Hypertension Brother     Asthma Daughter     Heart Attack Daughter     Other Daughter         hip replacement x 2     Social History     Tobacco Use    Smoking status: Never Smoker    Smokeless tobacco: Never Used   Substance Use Topics    Alcohol use: No      Current Facility-Administered Medications   Medication Dose Route Frequency Provider Last Rate Last Dose    amLODIPine (NORVASC) tablet 5 mg  5 mg Oral DAILY Gelacio Landaverde MD   5 mg at 12/13/19 9610    aspirin delayed-release tablet 81 mg  81 mg Oral DAILY Gelacio Landaverde MD   81 mg at 12/13/19 0919    atorvastatin (LIPITOR) tablet 10 mg  10 mg Oral DAILY Gelacio Landaverde MD   10 mg at 12/13/19 0914    cyanocobalamin (VITAMIN B12) tablet 1,000 mcg  1,000 mcg Oral DAILY Melany Landaverde MD   1,000 mcg at 12/13/19 0919    gabapentin (NEURONTIN) capsule 400 mg  400 mg Oral QHS Lakisha Landaverde MD   400 mg at 12/13/19 0149    losartan (COZAAR) tablet 50 mg  50 mg Oral DAILY Gelacio Landaverde MD   50 mg at 12/13/19 0916    pantoprazole (PROTONIX) tablet 40 mg  40 mg Oral DAILY Gelacio Landaverde MD   40 mg at 12/13/19 0916    predniSONE (DELTASONE) tablet 30 mg  30 mg Oral DAILY Gleacio Landaverde MD   30 mg at 12/13/19 0914    pyridostigmine (MESTINON) tablet 60 mg  60 mg Oral TID Gelacio Landaverde MD 60 mg at 12/13/19 8799    sodium chloride (NS) flush 5-40 mL  5-40 mL IntraVENous Q8H Myla Landaverde MD   10 mL at 12/13/19 0608    sodium chloride (NS) flush 5-40 mL  5-40 mL IntraVENous PRN Rafa Landaverde MD        ondansetron (ZOFRAN) injection 4 mg  4 mg IntraVENous Q6H PRN Rafa Landaverde MD        acetaminophen (TYLENOL) tablet 650 mg  650 mg Oral Q4H PRN Rafa Landaverde MD        morphine injection 1 mg  1 mg IntraVENous Q4H PRN Rafa Landaverde MD            No Known Allergies    Review of Systems:  Eyes: negative for visual disturbance  Respiratory: negative except for cough or dyspnea on exertion  Cardiovascular: negative for chest pain, chest pressure/discomfort, syncope  Musculoskeletal:negative except for leg weakness  Neurological: negative except for headaches, dizziness and paresthesia      Objective:     Vitals:    12/13/19 0330 12/13/19 0744 12/13/19 0914 12/13/19 0937   BP: 169/88 176/83 172/86 176/83   Pulse: 74 (!) 56 67    Resp: 25 16     Temp: 98.3 °F (36.8 °C) 98.6 °F (37 °C)     SpO2: 100% 100%     Weight: 98.9 kg (218 lb 0.6 oz)   98.9 kg (218 lb)   Height:    5' 8\" (1.727 m)        Physical Exam:  LUNG: clear to auscultation bilaterally  EXTREMITIES:  extremities normal, atraumatic, no cyanosis or edema, edema  + 3  NEUROLOGIC: negative findings: alert, oriented x3  speech normal in context and clarity  memory intact grossly  cranial nerves II-XII intact  motor strength: full proximally and distally, positive findings: patellar 3+ bilaterally/3+ bilaterally, ankle[de-identified] 3+ bilaterally/3+ bilaterally and plantar response upgoing bilateral  PSYCHIATRIC: non focal      Neurologic Exam:  Mental Status:  Alert and oriented x 4. Appropriate affect, mood and behavior. Language:    Normal fluency, repetition, comprehension and naming. Cranial Nerves:   Normal appearing fundi, sharp discs. Pupils equal, round and reactive to light.      Visual fields full to confrontation. Extraocular movements intact. Facial sensation intact. Full facial strength, no asymmetry. Hearing intact bilaterally. No dysarthria. Tongue protrudes to midline, palate elevates symmetrically. Shoulder shrug 5/5 bilaterally. Motor:    No pronator drift. Bulk and tone normal.      5/5 power in all extremities proximally and distally. No involuntary movements. Sensation:    Sensation intact throughout to light touch Reflexes:    Reflexes are 2+ at the biceps, triceps, patella and achilles     bilaterally.  Negative Babinskis    Coordination & Gait: Normal.    Recent Results (from the past 24 hour(s))   EKG, 12 LEAD, INITIAL    Collection Time: 12/12/19  3:53 PM   Result Value Ref Range    Ventricular Rate 78 BPM    Atrial Rate 93 BPM    QRS Duration 90 ms    Q-T Interval 376 ms    QTC Calculation (Bezet) 428 ms    Calculated R Axis -44 degrees    Calculated T Axis -83 degrees    Diagnosis       Atrial fibrillation with premature ventricular or aberrantly conducted   complexes  Left axis deviation  Nonspecific ST and T wave abnormality  When compared with ECG of 30-MAY-2019 09:56,  Atrial fibrillation has replaced Sinus rhythm  Nonspecific T wave abnormality, worse in Inferior leads  Nonspecific T wave abnormality now evident in Lateral leads     CBC WITH AUTOMATED DIFF    Collection Time: 12/12/19  3:59 PM   Result Value Ref Range    WBC 9.5 4.1 - 11.1 K/uL    RBC 3.77 (L) 4.10 - 5.70 M/uL    HGB 9.1 (L) 12.1 - 17.0 g/dL    HCT 32.6 (L) 36.6 - 50.3 %    MCV 86.5 80.0 - 99.0 FL    MCH 24.1 (L) 26.0 - 34.0 PG    MCHC 27.9 (L) 30.0 - 36.5 g/dL    RDW 15.3 (H) 11.5 - 14.5 %    PLATELET 249 771 - 995 K/uL    MPV 10.5 8.9 - 12.9 FL    NRBC 0.0 0  WBC    ABSOLUTE NRBC 0.00 0.00 - 0.01 K/uL    NEUTROPHILS 84 (H) 32 - 75 %    LYMPHOCYTES 8 (L) 12 - 49 %    MONOCYTES 6 5 - 13 %    EOSINOPHILS 1 0 - 7 %    BASOPHILS 0 0 - 1 %    IMMATURE GRANULOCYTES 1 (H) 0.0 - 0.5 %    ABS. NEUTROPHILS 7.9 1.8 - 8.0 K/UL    ABS. LYMPHOCYTES 0.8 0.8 - 3.5 K/UL    ABS. MONOCYTES 0.6 0.0 - 1.0 K/UL    ABS. EOSINOPHILS 0.1 0.0 - 0.4 K/UL    ABS. BASOPHILS 0.0 0.0 - 0.1 K/UL    ABS. IMM. GRANS. 0.1 (H) 0.00 - 0.04 K/UL    DF AUTOMATED      RBC COMMENTS HYPOCHROMIA  1+       METABOLIC PANEL, COMPREHENSIVE    Collection Time: 12/12/19  3:59 PM   Result Value Ref Range    Sodium 145 136 - 145 mmol/L    Potassium 3.5 3.5 - 5.1 mmol/L    Chloride 109 (H) 97 - 108 mmol/L    CO2 33 (H) 21 - 32 mmol/L    Anion gap 3 (L) 5 - 15 mmol/L    Glucose 101 (H) 65 - 100 mg/dL    BUN 37 (H) 6 - 20 MG/DL    Creatinine 0.94 0.70 - 1.30 MG/DL    BUN/Creatinine ratio 39 (H) 12 - 20      GFR est AA >60 >60 ml/min/1.73m2    GFR est non-AA >60 >60 ml/min/1.73m2    Calcium 8.6 8.5 - 10.1 MG/DL    Bilirubin, total 1.0 0.2 - 1.0 MG/DL    ALT (SGPT) 33 12 - 78 U/L    AST (SGOT) 27 15 - 37 U/L    Alk. phosphatase 62 45 - 117 U/L    Protein, total 6.3 (L) 6.4 - 8.2 g/dL    Albumin 3.6 3.5 - 5.0 g/dL    Globulin 2.7 2.0 - 4.0 g/dL    A-G Ratio 1.3 1.1 - 2.2     SAMPLES BEING HELD    Collection Time: 12/12/19  3:59 PM   Result Value Ref Range    SAMPLES BEING HELD 1red,1blue     COMMENT        Add-on orders for these samples will be processed based on acceptable specimen integrity and analyte stability, which may vary by analyte.    NT-PRO BNP    Collection Time: 12/12/19  3:59 PM   Result Value Ref Range    NT pro-BNP 10,542 (H) <450 PG/ML   TROPONIN I    Collection Time: 12/12/19  3:59 PM   Result Value Ref Range    Troponin-I, Qt. 0.13 (H) <0.05 ng/mL   NT-PRO BNP    Collection Time: 12/13/19  2:48 AM   Result Value Ref Range    NT pro-BNP 11,479 (H) <450 PG/ML   TROPONIN I    Collection Time: 12/13/19  2:48 AM   Result Value Ref Range    Troponin-I, Qt. 0.16 (H) <0.05 ng/mL   EKG, 12 LEAD, INITIAL    Collection Time: 12/13/19  8:11 AM   Result Value Ref Range    Ventricular Rate 59 BPM    Atrial Rate 65 BPM    QRS Duration 96 ms    Q-T Interval 408 ms    QTC Calculation (Bezet) 403 ms    Calculated R Axis -45 degrees    Calculated T Axis -97 degrees    Diagnosis       Atrial fibrillation with slow ventricular response  Left axis deviation  Low voltage QRS  Septal infarct , age undetermined  When compared with ECG of 12-DEC-2019 15:53,  Septal infarct is now present     TROPONIN I    Collection Time: 12/13/19  9:22 AM   Result Value Ref Range    Troponin-I, Qt. 0.16 (H) <0.05 ng/mL   ECHO ADULT COMPLETE    Collection Time: 12/13/19 10:03 AM   Result Value Ref Range    LA Volume 92.67 18 - 58 mL    Tapse 1.51 1.5 - 2.0 cm    Ao Root D 3.52 cm    Aortic Valve Systolic Peak Velocity 524.41 cm/s    Aortic Valve Area by Continuity of Peak Velocity 2.4 cm2    AoV PG 5.1 mmHg    LVIDd 4.95 4.2 - 5.9 cm    LVPWd 1.08 (A) 0.6 - 1.0 cm    LVIDs 3.55 cm    IVSd 1.19 (A) 0.6 - 1.0 cm    LVOT d 2.02 cm    LVOT Peak Velocity 84.56 cm/s    LVOT Peak Gradient 2.9 mmHg    Left Atrium to Aortic Root Ratio 1.04     RVIDd 4.16 cm    LA Vol 4C 80.87 (A) 18 - 58 mL    LA Vol 2C 88.33 (A) 18 - 58 mL    LA Area 4C 25.7 cm2    LV Mass .1 (A) 88 - 224 g    LV Mass AL Index 118.9 49 - 115 g/m2    RVSP 55.0 mmHg    Est. RA Pressure 15.0 mmHg    Left Atrium Major Axis 3.64 cm    Triscuspid Valve Regurgitation Peak Gradient 40.0 mmHg    Pulmonic Valve Max Velocity 58.19 cm/s    TR Max Velocity 316.35 cm/s    LA Vol Index 43.71 16 - 28 ml/m2    PASP 55.0 mmHg    LA Vol Index 41.66 16 - 28 ml/m2    LA Vol Index 38.14 16 - 28 ml/m2    Left Ventricular Fractional Shortening by 2D 83.879053583 %    AV Velocity Ratio 0.75     PV peak gradient 1.4 mmHg   POC G3 - PUL    Collection Time: 12/13/19 10:29 AM   Result Value Ref Range    pH (POC) 7.420 7.35 - 7.45      pCO2 (POC) 51.2 (H) 35.0 - 45.0 MMHG    pO2 (POC) 80 80 - 100 MMHG    HCO3 (POC) 33.2 (H) 22 - 26 MMOL/L    sO2 (POC) 96 92 - 97 %    Base excess (POC) 9 mmol/L    Site LEFT BRACHIAL      Device: NASAL CANNULA      Flow rate (POC) 2 L/M    Allens test (POC) YES      Specimen type (POC) ARTERIAL      Total resp. rate 18         Imaging:  MRI Results (most recent):  Results from Hospital Encounter encounter on 10/18/19   MRI CERV SPINE WO CONT    Narrative EXAM: MRI CERV SPINE WO CONT    INDICATION: Disorders of diaphragm. COMPARISON: None    TECHNIQUE: MR imaging of the cervical spine was performed using the following  sequences: sagittal T1, T2, STIR;  axial T2, T1.     CONTRAST:  None. FINDINGS:    There is mild reversal of curvature of the cervical spine at C6-7. No  significant listhesis. Vertebral body heights are maintained. Marrow signal is  normal.    The craniocervical junction is intact. The course, caliber, and signal intensity  of the spinal cord are normal.      The paraspinal soft tissues are within normal limits. The canal is congenitally narrow. C2-C3: Mild disc space narrowing. Mild central disc osteophyte complex with  thickening of ligamentum flavum causing mild spinal stenosis. There is mild  bilateral neural foraminal narrowing. C3-C4: Mild disc space narrowing. Mild based based disc osteophyte complex with  thickening of ligamentum flavum causing mild to moderate spinal stenosis. There  is moderate bilateral neural foraminal narrowing. C4-C5: Mild disc space narrowing. Mild broad-based disc osteophyte complex with  thickening of ligamentum flavum. There is mild to moderate spinal stenosis. There is moderate bilateral neural foraminal narrowing. C5-C6: Mild disc space narrowing. Mild broad-based disc osteophyte complex with  thickening of ligamentum flavum causing mild spinal stenosis. There is moderate  bilateral neural foraminal narrowing. C6-C7: Severe disc space narrowing. Mild broad-based disc osteophyte complex  with mild spinal stenosis. There is moderate to severe left and moderate right  neural foraminal narrowing. C7-T1: No herniation or stenosis. Impression IMPRESSION:  Mild reversal of curvature of the lower cervical spine with multilevel  spondylosis as above. Assessment:     Hospital Problems  Date Reviewed: 12/5/2019          Codes Class Noted POA    Shortness of breath ICD-10-CM: R06.02  ICD-9-CM: 786.05  12/12/2019 Unknown            Plan:   82-year-old male who presents to the emergency room with shortness of breath and lower extremity edema. Neurology was consulted due to his newly diagnosed myasthenia gravis prior to this admission. On exam, the patient displays 3+ pitting edema lower extremities, Brisk reflexes lower extremity, SOB, Dysphagia which is chronic. He does deny double vision headaches and upper extremity weakness. 1) Questionable myasthenia gravis   - Stop mestinon   -Wean off of prednisone 30 mg for 2 more days, 20 mg for 3 day, then stop   - will check TSH and CK    - SOB, more than likely related to  CHF, will defer to primary team      Thank you for allowing the Neurology Service the pleasure of participating in the care of your patient. This patient will be discussed with my collaborating care team physician  and she may have further recommendations regarding this patient's care.    .      Signed By: Mounika Vann NP     December 13, 2019

## 2019-12-13 NOTE — PROGRESS NOTES
daughter records [facility]     Echo 11-18-19 EF Definity for atrial fibrillation   Moderate to severe LVH   EF 60-65%  MAC, mild MR  Mod SERGIO   PASP 45     Dr Savanna Fung note   atrial fibrillation with RVR and charleen  CAD May 2019 Stent STM  Myasthenia gravis with plasmapharesis  HTN XOL, low platelets anemia  No OAC due to anemia, plts 130K    EKG 11-5-19 ,afib with left axis, inferior small Q waves minor lateral STT     Saw Dr Jamee Magallanes for esophageal dysmotility   EGD 11-20-19 gastritis   Hgb 8.7    11-11-19 Dr Fernando Cochran  Start Mestinon 60 mg daily  Multiple receptor tests     PFTs with [facility] 10-15-19  FEV1 2.44  Ratio 71  Mild obstructive pattern

## 2019-12-13 NOTE — PROGRESS NOTES
TRANSFER - IN REPORT:    Verbal report received from Rosemary pedersen RN(name) on Caridad Valero  being received from ER(unit) for routine progression of care      Report consisted of patients Situation, Background, Assessment and   Recommendations(SBAR). Information from the following report(s) SBAR, Kardex, ED Summary, Intake/Output, MAR, Accordion, Recent Results and Cardiac Rhythm A-Fib was reviewed with the receiving nurse. Opportunity for questions and clarification was provided. Assessment completed upon patients arrival to unit and care assumed.          Primary Nurse Bravo Dobbins RN performed a dual skin assessment on this patient No impairment noted  Catracho score is 20

## 2019-12-13 NOTE — H&P
HISTORY AND PHYSICAL  Kamryn Kasper MD        PCP: David Dinh MD        Chief Complaint:   Shortness of breath         History of present illness:   Patient is 70-year-old male with medical history significant for CAD s/p PCI in May 2019, A. fib, myasthenia gravis and hypertension who presents to the emergency department with complaints of worsening shortness of breath and lower extremity edema. Patient reports over the past week has been having progressively worsening shortness of breath bilateral upper leg edema associated with cough and increased weakness. He denies any fever, chills, nausea, vomiting, chest pain, palpitation, syncope or presyncope, urinary or bowel complaints. Per chart review, echo on 11/18/2019 was remarkable for EF 60-65%. In the emergency department, cardiology was on board, consider systemic process (MNG being treated), recommend obtaining echo and BNP. Lab work-ups: Troponin 0.13 , BNP 10,542  , EKG unremarkable for acute ischemia. CXR: Small bilateral pleural effusions and vague right perihilar airspace disease. PMH/PSH:  Past Medical History:   Diagnosis Date    Diverticulosis, sigmoid 8/2011    ED (erectile dysfunction) of organic origin     History of prostate cancer     Hypertension     Prostate cancer ([facility] [address]     Sebaceous cyst 4/1/2014    Shingles     Shingles      Past Surgical History:   Procedure Laterality Date    ENDOSCOPY, COLON, DIAGNOSTIC  >= 8-10years ago   [facility] [id number]    HX CARPAL TUNNEL RELEASE  4/08    right,left    HX CHOLECYSTECTOMY      HX HERNIA REPAIR      HX PROSTATECTOMY  5/06       Home meds:   Prior to Admission medications    Medication Sig Start Date End Date Taking? Authorizing Provider   gabapentin (NEURONTIN) 400 mg capsule Take 400 mg by mouth nightly. Yes Provider, Historical   predniSONE (DELTASONE) 10 mg tablet Take 30 mg by mouth daily.  11/21/19  Yes Provider, Historical   aspirin delayed-release 81 mg tablet Take 81 mg by mouth daily. Yes Provider, Historical   pantoprazole (PROTONIX) 40 mg tablet Take 40 mg by mouth daily. 11/21/19  Yes Provider, Historical   pyridostigmine (MESTINON) 60 mg tablet Take 60 mg by mouth three (3) times daily. 11/21/19  Yes Provider, Historical   losartan (COZAAR) 50 mg tablet Take 1 Tab by mouth daily. 12/5/19  Yes Vasiliy Swan MD   amLODIPine (NORVASC) 5 mg tablet TAKE 1 TABLET BY MOUTH EVERY DAY 12/5/19  Yes Vasiliy Swan MD   albuterol (PROVENTIL HFA, VENTOLIN HFA, PROAIR HFA) 90 mcg/actuation inhaler Take 1-2 Puffs by inhalation every four (4) hours as needed for Wheezing. 10/30/19  Yes Simran Abernathy MD   atorvastatin (LIPITOR) 10 mg tablet Take 1 Tab by mouth daily. 6/17/19  Yes Ramona Shannon MD   clopidogrel (PLAVIX) 75 mg tab Take 1 Tab by mouth daily. 5/30/19  Yes Ramona Shannon MD   cyanocobalamin (VITAMIN B-12) 1,000 mcg tablet Take 1,000 mcg by mouth daily. Yes Provider, Historical       Allergies:  No Known Allergies    FH:  Family History   Problem Relation Age of Onset   [facility] Cancer Mother         pancreatic    Diabetes Father     Stroke Father     Diabetes Sister     Hypertension Sister     Other Sister         Open heart surgery     Diabetes Brother     Hypertension Brother     Hypertension Brother     Asthma Daughter     Heart Attack Daughter     Other Daughter         hip replacement x 2       SH:  Social History     Tobacco Use    Smoking status: Never Smoker    Smokeless tobacco: Never Used   Substance Use Topics    Alcohol use: No       Review of Systems   Constitutional: Negative for chills and fever. HENT: Negative for hearing loss and tinnitus. Eyes: Negative for blurred vision and double vision. Respiratory: Negative for hemoptysis and sputum production. Cardiovascular: Negative for chest pain and palpitations. Gastrointestinal: Negative for heartburn and nausea.    Genitourinary: Negative for dysuria and urgency. Musculoskeletal: Negative for myalgias and neck pain. Skin: Negative for itching and rash. Neurological: Negative for dizziness and headaches. Endo/Heme/Allergies: Negative for environmental allergies. Does not bruise/bleed easily. Psychiatric/Behavioral: Negative for depression and suicidal ideas. All other systems reviewed and are negative. PHYSICAL EXAM:  Visit Vitals  /70   Pulse 72   Temp 98 °F (36.7 °C)   Resp 16   SpO2 97%       General:          Alert, cooperative, no distress, appears stated age. HEENT:           Atraumatic, anicteric sclerae, pink conjunctivae                          No oral ulcers, mucosa moist, throat clear, dentition fair  Neck:               Supple, symmetrical,  thyroid: non tender  Lungs:             Clear to auscultation bilaterally. No Wheezing or Rhonchi. No rales. Chest wall:      No tenderness  No Accessory muscle use. Heart:              Regular  rhythm,  No  murmur   No edema  Abdomen:        Soft, non-tender. Not distended. Bowel sounds normal  Extremities:     No cyanosis. No clubbing,                            Skin turgor normal, Capillary refill normal, Radial dial pulse 2+  Skin:                Not pale. Not Jaundiced  No rashes   Psych:             Not anxious or agitated. Neurologic:     Alert and oriented to PPT, CNII-XII intact. Motor and sensory exam grossly intact.   Labs/Imaging:  Recent Results (from the past 24 hour(s))   EKG, 12 LEAD, INITIAL    Collection Time: 12/12/19  3:53 PM   Result Value Ref Range    Ventricular Rate 78 BPM    Atrial Rate 93 BPM    QRS Duration 90 ms    Q-T Interval 376 ms    QTC Calculation (Bezet) 428 ms    Calculated R Axis -44 degrees    Calculated T Axis -83 degrees    Diagnosis       Atrial fibrillation with premature ventricular or aberrantly conducted   complexes  Left axis deviation  Nonspecific ST and T wave abnormality  When compared with ECG of 30-MAY-2019 09:56,  Atrial fibrillation has replaced Sinus rhythm  Nonspecific T wave abnormality, worse in Inferior leads  Nonspecific T wave abnormality now evident in Lateral leads     CBC WITH AUTOMATED DIFF    Collection Time: 12/12/19  3:59 PM   Result Value Ref Range    WBC 9.5 4.1 - 11.1 K/uL    RBC 3.77 (L) 4.10 - 5.70 M/uL    HGB 9.1 (L) 12.1 - 17.0 g/dL    HCT 32.6 (L) 36.6 - 50.3 %    MCV 86.5 80.0 - 99.0 FL    MCH 24.1 (L) 26.0 - 34.0 PG    MCHC 27.9 (L) 30.0 - 36.5 g/dL    RDW 15.3 (H) 11.5 - 14.5 %    PLATELET 395 195 - 724 K/uL    MPV 10.5 8.9 - 12.9 FL    NRBC 0.0 0  WBC    ABSOLUTE NRBC 0.00 0.00 - 0.01 K/uL    NEUTROPHILS 84 (H) 32 - 75 %    LYMPHOCYTES 8 (L) 12 - 49 %    MONOCYTES 6 5 - 13 %    EOSINOPHILS 1 0 - 7 %    BASOPHILS 0 0 - 1 %    IMMATURE GRANULOCYTES 1 (H) 0.0 - 0.5 %    ABS. NEUTROPHILS 7.9 1.8 - 8.0 K/UL    ABS. LYMPHOCYTES 0.8 0.8 - 3.5 K/UL    ABS. MONOCYTES 0.6 0.0 - 1.0 K/UL    ABS. EOSINOPHILS 0.1 0.0 - 0.4 K/UL    ABS. BASOPHILS 0.0 0.0 - 0.1 K/UL    ABS. IMM. GRANS. 0.1 (H) 0.00 - 0.04 K/UL    DF AUTOMATED      RBC COMMENTS HYPOCHROMIA  1+       METABOLIC PANEL, COMPREHENSIVE    Collection Time: 12/12/19  3:59 PM   Result Value Ref Range    Sodium 145 136 - 145 mmol/L    Potassium 3.5 3.5 - 5.1 mmol/L    Chloride 109 (H) 97 - 108 mmol/L    CO2 33 (H) 21 - 32 mmol/L    Anion gap 3 (L) 5 - 15 mmol/L    Glucose 101 (H) 65 - 100 mg/dL    BUN 37 (H) 6 - 20 MG/DL    Creatinine 0.94 0.70 - 1.30 MG/DL    BUN/Creatinine ratio 39 (H) 12 - 20      GFR est AA >60 >60 ml/min/1.73m2    GFR est non-AA >60 >60 ml/min/1.73m2    Calcium 8.6 8.5 - 10.1 MG/DL    Bilirubin, total 1.0 0.2 - 1.0 MG/DL    ALT (SGPT) 33 12 - 78 U/L    AST (SGOT) 27 15 - 37 U/L    Alk.  phosphatase 62 45 - 117 U/L    Protein, total 6.3 (L) 6.4 - 8.2 g/dL    Albumin 3.6 3.5 - 5.0 g/dL    Globulin 2.7 2.0 - 4.0 g/dL    A-G Ratio 1.3 1.1 - 2.2     SAMPLES BEING HELD    Collection Time: 12/12/19  3:59 PM   Result Value Ref Range    SAMPLES BEING HELD 1red,1blue     COMMENT        Add-on orders for these samples will be processed based on acceptable specimen integrity and analyte stability, which may vary by analyte. NT-PRO BNP    Collection Time: 12/12/19  3:59 PM   Result Value Ref Range    NT pro-BNP 10,542 (H) <450 PG/ML   TROPONIN I    Collection Time: 12/12/19  3:59 PM   Result Value Ref Range    Troponin-I, Qt. 0.13 (H) <0.05 ng/mL       Recent Labs     12/12/19  1559   WBC 9.5   HGB 9.1*   HCT 32.6*        Recent Labs     12/12/19  1559      K 3.5   *   CO2 33*   BUN 37*   CREA 0.94   *   CA 8.6     Recent Labs     12/12/19  1559   SGOT 27   ALT 33   AP 62   TBILI 1.0   TP 6.3*   ALB 3.6   GLOB 2.7       Recent Labs     12/12/19  1559   TROIQ 0.13*       No results for input(s): INR, PTP, APTT, INREXT in the last 72 hours. No results for input(s): PH, PCO2, PO2 in the last 72 hours. XR CHEST PA LAT  Narrative: INDICATION:   eval for airspace dx    COMPARISON: October 8, 2019    FINDINGS:    Frontal and lateral views of the chest demonstrate a normal cardiomediastinal  silhouette. The lungs are adequately expanded. Elevation left hemidiaphragm. Small bilateral pleural effusions. Vague right perihilar airspace disease. No  pulmonary edema or pneumothorax. The osseous structures are unremarkable. Impression: IMPRESSION:  Small bilateral pleural effusions and vague right perihilar airspace disease. Follow-up in 4-6 weeks recommended. Assessment & Plan:  ====================  Worsening dyspnea  With lower extremity edema  CHF vs ?  Systemic process (MG being treated)  Cardiology was on board, recommend checking echo and BNP  Troponin 0.13 , BNP 10,542  EKG A. fib with nonspecific T wave abnormality  CXR: Small bilateral pleural effusions and vague right perihilar airspace disease.   We will give a dose of IV Lasix 40 mg for now  Strict I&O , daily weight, fluid restrictions Follow-up with cardiology recommendation  echo on 11/18/2019 was remarkable for EF 60-65%. Echo in a.m. Elevated troponin  Likely demand type  EKG A. fib with nonspecific T wave abnormality  Will trend serial troponin  Cardiology consult    Hypertension  Continue home meds  , adjust as needed    A. Fib  Rate controlled  Not in any any anticoagulation, ? Bleeding risk    CAD s/p PCI in May 2019,  Continue ASA , losartan and statins  Plavix discontinued recently ? Bleeding risk    Acute on chronic anemia  Per chart review, GI plan to do inpatient colonoscopy given risk factors  GI consult placed    Other comorbidity, continue home meds adjust as needed. Patient's Baseline: Ambulates with walking  DVT ppx: SCD  Code status: Full   disposition: TBD  Care plan discussed with patient/family and nurse.                 Signed By: Sera Duenas MD     December 13, 2019

## 2019-12-13 NOTE — PROGRESS NOTES
Problem: Heart Failure: Day 1  Goal: Activity/Safety  Outcome: Progressing Towards Goal  Note:   Bed is in the lowest position and wheels are locked, call bell is within reach, bathroom light is on during evening hours, gripper socks are on and patient has been instructed to call out for assistance if needed. As of now, patient is free from falls and will continue to be monitored. Goal: Consults, if ordered  Outcome: Progressing Towards Goal  Note:   Patient has consults with cardiology, neurology and pulmonology  Goal: Medications  Outcome: Progressing Towards Goal  Note:   Patient is taking all medication as directed  Goal: Respiratory  Outcome: Progressing Towards Goal  Note:   Pt on 2L NC and his O2 saturations are remaining above 90%       0730---Bedside shift change report given to Kishan Jurado RN (oncoming nurse) by Kayla Fenton (offgoing nurse). Report included the following information SBAR, Kardex, ED Summary, Intake/Output, MAR, Accordion, Recent Results and Cardiac Rhythm A-Fib.

## 2019-12-13 NOTE — PHYSICIAN ADVISORY
Letter of Status Determination:  
Recommend hospitalization status upgraded from OBSERVATION  to INPATIENT  Status Pt Name:  Sally Christensen MR#  
MINERVA # [id number] / 
[id number] Payor: Rubin Bloch / Plan: [address] / Product Type: Medicare /   
TRACY#  [id number] [facility]  403/01  @ . [facility]  
Hospitalization date  12/12/2019  6:45 PM  
Current Attending Physician  Mee Nicolas MD  
Principal diagnosis  Shortness of breath [R06.02] Clinicals  82 y.o. y.o  male hospitalized with above diagnosis This pt is receiving complex care for respiratory failure possibly due to myasthenia gravis or other complex issues. Pulmonologist's note reviewed. It is apparent that the pt will require complex care in acute care setting beyond the usual observation period [facility]) criteria Does  NOT apply STATUS DETERMINATION  . This patient is at high risk of adverse events and deterioration based on documented clinical data, comorbid conditions and current acute care course. Mr. Sally Christensen is expected to meet Inpatient Admission status criteria in accordance with CMS regulation Section 43 .3. Specifically, due to medical necessity the patient's stay is expected to exceed Two Midnights. It is our recommendation that this patient's hospitalization status should be upgraded from  OBSERVATION to INPATIENT status. The final decision of the patient's hospitalization status depends on the attending physician's judgment. Additional comments Payor: [place] MEDICARE / Plan: [address] / Product Type: Medicare / The information in this document is a recommendation to be used for utilization review and utilization management purposes only. This recommendation is not an order.   
The recommendation is made based on the information reviewed at the time of the referral, is pursuant to the [facility] Conditions of Participation (42 CFR Part 482), and is neither a judgment nor an assessment with regard to the appropriateness or quality of clinical care. For all Managed Care patients: The Criteria are intended solely for use as screening guidelines with respect to the medical appropriateness of healthcare services and not for final clinical or payment determinations concerning the type or level of medical care provided, or proposed to be provided, to a patient. They help the reviewers determine whether a patient is appropriate for observation or inpatient admission at the time a decision to admit the patient is being made. All efforts are made to apply the pertinent payor criteria ([employer] or [employer]) as well as the clinical judgements based on the information reviewed at the time of the referral.\" Nothing in this document may be used to limit, alter, or affect clinical services provided to the patient named below. Zoraida Pinon MD MPH FACP Cell: [phone] Physician Advisor [address]  
   
Cell  [phone] Carolyn Mann

## 2019-12-13 NOTE — PROGRESS NOTES
Reason for Admission:   Admitted from home with complaints of shortness of breath. History includes CAD HTN and Myasthenia  Gravis. RRAT Score:    10              Do you (patient/family) have any concerns for transition/discharge? Plan for utilizing home health:   Ambulating prior to admit. Current Advanced Directive/Advance Care Plan:  Has AMD but not with him. Requested to update chart. Transition of Care Plan:    Cardio and Pulmo consult  Echo pending  No needs identified at this time. Will follow for transitions of care.     Care Management Interventions  PCP Verified by CM: Yes(Dr Dietrich)  Palliative Care Criteria Met (RRAT>21 & CHF Dx)?: No  Mode of Transport at Discharge: (private car)  Current Support Network: Lives Alone  Confirm Follow Up Transport: Self  Plan discussed with Pt/Family/Caregiver: Yes  [place]end[place] Resource Information Provided?: No    Blade Davidson RN CRM  Ext [phone]

## 2019-12-13 NOTE — PROGRESS NOTES
[facility] Adult  Hospitalist Group                                                                                          Hospitalist Progress Note  Margy Rockwell MD  Answering service: [phone] from in house phone        Date of Service:  2019  NAME:  Kleber Trivedi  :  1935  MRN:  [id number]      Admission Summary:     presents to the emergency department with complaints of worsening shortness of breath and lower extremity edema. Patient reports over the past week has been having progressively worsening shortness of breath bilateral upper leg edema associated with cough and increased weakness. Interval history / Subjective:       Patient has no acute complaint. Denies significant shortness of breath or chest pain at this time. Assessment & Plan:     Dyspnea  Patient has been evaluated by pulmonology, to start non-invasive ventilatory support tonight. Neuro also evaluating the patient with a recent diagnosis of myasthenia gravis. Cardiology also evaluating. Pending echo. Elevated troponin  Likely demand ischemia in the setting of hypoxia and respiratory distress. Check echocardiogram.  History of coronary artery disease, however Plavix has been discontinued in light of acute anemia. Lower extremity edema  Echo pending at this time. Evaluating the patient. No diuresis recommended at this time. Myasthenia gravis  Neuro evaluating the patient. Stopping Mestinon and tapering prednisone. Anemia  Acute on chronic anemia. GI evaluating the patient. No colonoscopy recommending at this time. Follow iron studies. Atrial fibrillation  Not currently on anticoagulation. Cardiology following. Coronary artery disease  Status post PCI May 2019. On aspirin and statin. Plavix has been discontinued. Hypertension  Stable blood pressure. Continue current medications.     Code status: FULL  DVT prophylaxis: SCDs    Care Plan discussed with: Patient/Family  Disposition: TBD     Hospital Problems  Date Reviewed: 12/5/2019          Codes Class Noted POA    SOB (shortness of breath) ICD-10-CM: R06.02  ICD-9-CM: 786.05  12/13/2019 Unknown        Shortness of breath ICD-10-CM: R06.02  ICD-9-CM: 786.05  12/12/2019 Unknown                Review of Systems:   A comprehensive review of systems was negative except for that written in the HPI. Vital Signs:    Last 24hrs VS reviewed since prior progress note. Most recent are:  Visit Vitals  /82 (BP 1 Location: Left arm, BP Patient Position: At rest)   Pulse 69   Temp 98.6 °F (37 °C)   Resp 15   Ht 5' 8\" (1.727 m)   Wt 98.9 kg (218 lb)   SpO2 97%   BMI 33.15 kg/m²         Intake/Output Summary (Last 24 hours) at 12/13/2019 1340  Last data filed at 12/13/2019 1255  Gross per 24 hour   Intake 904 ml   Output 2175 ml   Net -1271 ml        Physical Examination:             Constitutional:  No acute distress, cooperative, pleasant    ENT:  Oral mucous moist, oropharynx benign. Resp:  CTA bilaterally. No wheezing/rhonchi/rales. No accessory muscle use   CV:  Regular rhythm, normal rate, no murmurs, gallops, rubs    GI:  Soft, non distended, non tender. normoactive bowel sounds, no hepatosplenomegaly     Musculoskeletal:  Bilateral lower extremity edema    Neurologic:  Moves all extremities. AAOx3, CN II-XII reviewed     Skin:  Good turgor, no rashes or ulcers       Data Review:    Review and/or order of clinical lab test      Labs:     Recent Labs     12/12/19  1559   WBC 9.5   HGB 9.1*   HCT 32.6*        Recent Labs     12/12/19  1559      K 3.5   *   CO2 33*   BUN 37*   CREA 0.94   *   CA 8.6     Recent Labs     12/12/19  1559   SGOT 27   ALT 33   AP 62   TBILI 1.0   TP 6.3*   ALB 3.6   GLOB 2.7     No results for input(s): INR, PTP, APTT, INREXT in the last 72 hours.    Recent Labs     12/13/19  0922   TIBC 276   PSAT 8*   FERR 53      Lab Results   Component Value Date/Time Folate 14.8 04/27/2018 03:25 PM      No results for input(s): PH, PCO2, PO2 in the last 72 hours.   Recent Labs     12/13/19  0922 12/13/19  0248 12/12/19  1559   TROIQ 0.16* 0.16* 0.13*     Lab Results   Component Value Date/Time    Cholesterol, total 110 08/05/2019 12:19 PM    HDL Cholesterol 43 08/05/2019 12:19 PM    LDL, calculated 55 08/05/2019 12:19 PM    Triglyceride 60 08/05/2019 12:19 PM     No results found for: GLUCPOC  Lab Results   Component Value Date/Time    Color Yellow 04/02/2018 09:54 AM    Appearance Clear 04/02/2018 09:54 AM    pH (UA) 6.0 04/02/2018 09:54 AM    Ketone Negative 04/02/2018 09:54 AM    Bilirubin Negative 04/02/2018 09:54 AM    Nitrites Negative 04/02/2018 09:54 AM    Leukocyte Esterase Negative 04/02/2018 09:54 AM         Medications Reviewed:     Current Facility-Administered Medications   Medication Dose Route Frequency    amLODIPine (NORVASC) tablet 5 mg  5 mg Oral DAILY    aspirin delayed-release tablet 81 mg  81 mg Oral DAILY    atorvastatin (LIPITOR) tablet 10 mg  10 mg Oral DAILY    cyanocobalamin (VITAMIN B12) tablet 1,000 mcg  1,000 mcg Oral DAILY    gabapentin (NEURONTIN) capsule 400 mg  400 mg Oral QHS    losartan (COZAAR) tablet 50 mg  50 mg Oral DAILY    pantoprazole (PROTONIX) tablet 40 mg  40 mg Oral DAILY    predniSONE (DELTASONE) tablet 30 mg  30 mg Oral DAILY    sodium chloride (NS) flush 5-40 mL  5-40 mL IntraVENous Q8H    sodium chloride (NS) flush 5-40 mL  5-40 mL IntraVENous PRN    ondansetron (ZOFRAN) injection 4 mg  4 mg IntraVENous Q6H PRN    acetaminophen (TYLENOL) tablet 650 mg  650 mg Oral Q4H PRN    morphine injection 1 mg  1 mg IntraVENous Q4H PRN     ______________________________________________________________________  EXPECTED LENGTH OF STAY: - - -  ACTUAL LENGTH OF STAY:          0                 Rudy Murphy MD

## 2019-12-13 NOTE — PROGRESS NOTES
Admission Medication Reconciliation:    Information obtained from:  Patient provided medication list  RxQuery data available¹:  YES    Comments/Recommendations: Updated PTA meds/reviewed patient's allergies. Patient provided medication list and history, well versed on his regimen. Notes:  Prednisone: was told by out-patient MD to decrease prednisone to 20 mg effective today (due to SOB, extremity swelling), has not done so as he forgot his pill box due to urgency of situation today. Medication changes (since last review): Adjusted  Gabapentin to 400 mg QHS   ASA: added frequency  Mestinon: added frequency    Thank you for allowing me to participate in the care of your patient. Mikey Rose PharmD, RN #[phone] [facility] benefit data reflects medications filled and processed through the patient's insurance, however   this data does NOT capture whether the medication was picked up or is currently being taken by the patient. Allergies:  Patient has no known allergies. Significant PMH/Disease States:   Past Medical History:   Diagnosis Date    Diverticulosis, sigmoid 8/2011    ED (erectile dysfunction) of organic origin     History of prostate cancer     Hypertension     Prostate cancer ([facility] [address]     Sebaceous cyst 4/1/2014    Shingles     Shingles      Chief Complaint for this Admission:    Chief Complaint   Patient presents with    Shortness of Breath     Prior to Admission Medications:   Prior to Admission Medications   Prescriptions Last Dose Informant Patient Reported? Taking? albuterol (PROVENTIL HFA, VENTOLIN HFA, PROAIR HFA) 90 mcg/actuation inhaler 12/11/2019 at Unknown time  No Yes   Sig: Take 1-2 Puffs by inhalation every four (4) hours as needed for Wheezing. amLODIPine (NORVASC) 5 mg tablet 12/11/2019 at Unknown time  No Yes   Sig: TAKE 1 TABLET BY MOUTH EVERY DAY   aspirin delayed-release 81 mg tablet 12/11/2019 at Unknown time  Yes Yes   Sig: Take 81 mg by mouth daily. atorvastatin (LIPITOR) 10 mg tablet 12/11/2019 at Unknown time  No Yes   Sig: Take 1 Tab by mouth daily. clopidogrel (PLAVIX) 75 mg tab 12/11/2019 at Unknown time  No Yes   Sig: Take 1 Tab by mouth daily. cyanocobalamin (VITAMIN B-12) 1,000 mcg tablet 12/11/2019 at Unknown time  Yes Yes   Sig: Take 1,000 mcg by mouth daily. gabapentin (NEURONTIN) 400 mg capsule 12/11/2019 at Unknown time  Yes Yes   Sig: Take 400 mg by mouth nightly. losartan (COZAAR) 50 mg tablet 12/11/2019 at Unknown time  No Yes   Sig: Take 1 Tab by mouth daily. pantoprazole (PROTONIX) 40 mg tablet 12/11/2019 at Unknown time  Yes Yes   Sig: Take 40 mg by mouth daily. predniSONE (DELTASONE) 10 mg tablet 12/11/2019 at Unknown time  Yes Yes   Sig: Take 30 mg by mouth daily. pyridostigmine (MESTINON) 60 mg tablet 12/11/2019 at Unknown time  Yes Yes   Sig: Take 60 mg by mouth three (3) times daily. Facility-Administered Medications: None       Please contact the main inpatient pharmacy with any questions or concerns at [phone] and we will direct you to the clinical pharmacist covering this patient's care while in-house.    KONRAD Nye

## 2019-12-14 LAB
ANION GAP SERPL CALC-SCNC: 2 MMOL/L (ref 5–15)
ATRIAL RATE: 65 BPM
ATRIAL RATE: 93 BPM
BUN SERPL-MCNC: 27 MG/DL (ref 6–20)
BUN/CREAT SERPL: 32 (ref 12–20)
CALCIUM SERPL-MCNC: 8.6 MG/DL (ref 8.5–10.1)
CALCULATED R AXIS, ECG10: -44 DEGREES
CALCULATED R AXIS, ECG10: -45 DEGREES
CALCULATED T AXIS, ECG11: -83 DEGREES
CALCULATED T AXIS, ECG11: -97 DEGREES
CHLORIDE SERPL-SCNC: 105 MMOL/L (ref 97–108)
CO2 SERPL-SCNC: 35 MMOL/L (ref 21–32)
CREAT SERPL-MCNC: 0.84 MG/DL (ref 0.7–1.3)
DIAGNOSIS, 93000: NORMAL
DIAGNOSIS, 93000: NORMAL
ERYTHROCYTE [DISTWIDTH] IN BLOOD BY AUTOMATED COUNT: 15 % (ref 11.5–14.5)
GLUCOSE SERPL-MCNC: 119 MG/DL (ref 65–100)
HCT VFR BLD AUTO: 31.6 % (ref 36.6–50.3)
HGB BLD-MCNC: 8.9 G/DL (ref 12.1–17)
MCH RBC QN AUTO: 24.1 PG (ref 26–34)
MCHC RBC AUTO-ENTMCNC: 28.2 G/DL (ref 30–36.5)
MCV RBC AUTO: 85.4 FL (ref 80–99)
NRBC # BLD: 0 K/UL (ref 0–0.01)
NRBC BLD-RTO: 0 PER 100 WBC
PLATELET # BLD AUTO: 144 K/UL (ref 150–400)
PMV BLD AUTO: 10.5 FL (ref 8.9–12.9)
POTASSIUM SERPL-SCNC: 3.4 MMOL/L (ref 3.5–5.1)
Q-T INTERVAL, ECG07: 376 MS
Q-T INTERVAL, ECG07: 408 MS
QRS DURATION, ECG06: 90 MS
QRS DURATION, ECG06: 96 MS
QTC CALCULATION (BEZET), ECG08: 403 MS
QTC CALCULATION (BEZET), ECG08: 428 MS
RBC # BLD AUTO: 3.7 M/UL (ref 4.1–5.7)
SODIUM SERPL-SCNC: 142 MMOL/L (ref 136–145)
TSH SERPL DL<=0.05 MIU/L-ACNC: 1.23 UIU/ML (ref 0.36–3.74)
VENTRICULAR RATE, ECG03: 59 BPM
VENTRICULAR RATE, ECG03: 78 BPM
WBC # BLD AUTO: 6.2 K/UL (ref 4.1–11.1)

## 2019-12-14 PROCEDURE — 74011250637 HC RX REV CODE- 250/637: Performed by: SPECIALIST

## 2019-12-14 PROCEDURE — 74011636637 HC RX REV CODE- 636/637: Performed by: INTERNAL MEDICINE

## 2019-12-14 PROCEDURE — 74011250637 HC RX REV CODE- 250/637: Performed by: STUDENT IN AN ORGANIZED HEALTH CARE EDUCATION/TRAINING PROGRAM

## 2019-12-14 PROCEDURE — 84443 ASSAY THYROID STIM HORMONE: CPT

## 2019-12-14 PROCEDURE — 77010033678 HC OXYGEN DAILY

## 2019-12-14 PROCEDURE — 36415 COLL VENOUS BLD VENIPUNCTURE: CPT

## 2019-12-14 PROCEDURE — 80048 BASIC METABOLIC PNL TOTAL CA: CPT

## 2019-12-14 PROCEDURE — 85027 COMPLETE CBC AUTOMATED: CPT

## 2019-12-14 PROCEDURE — 74011250636 HC RX REV CODE- 250/636: Performed by: SPECIALIST

## 2019-12-14 PROCEDURE — 65660000000 HC RM CCU STEPDOWN

## 2019-12-14 RX ORDER — PREDNISONE 20 MG/1
20 TABLET ORAL DAILY
Status: DISCONTINUED | OUTPATIENT
Start: 2019-12-14 | End: 2019-12-16 | Stop reason: HOSPADM

## 2019-12-14 RX ADMIN — FUROSEMIDE 40 MG: 10 INJECTION, SOLUTION INTRAMUSCULAR; INTRAVENOUS at 08:58

## 2019-12-14 RX ADMIN — ASPIRIN 81 MG: 81 TABLET, COATED ORAL at 08:57

## 2019-12-14 RX ADMIN — GABAPENTIN 400 MG: 100 CAPSULE ORAL at 21:00

## 2019-12-14 RX ADMIN — FUROSEMIDE 40 MG: 10 INJECTION, SOLUTION INTRAMUSCULAR; INTRAVENOUS at 18:20

## 2019-12-14 RX ADMIN — PANTOPRAZOLE SODIUM 40 MG: 40 TABLET, DELAYED RELEASE ORAL at 08:57

## 2019-12-14 RX ADMIN — POTASSIUM CHLORIDE 40 MEQ: 750 TABLET, FILM COATED, EXTENDED RELEASE ORAL at 08:58

## 2019-12-14 RX ADMIN — ATORVASTATIN CALCIUM 10 MG: 10 TABLET, FILM COATED ORAL at 08:57

## 2019-12-14 RX ADMIN — AMLODIPINE BESYLATE 5 MG: 5 TABLET ORAL at 08:57

## 2019-12-14 RX ADMIN — Medication 10 ML: at 13:16

## 2019-12-14 RX ADMIN — PREDNISONE 20 MG: 20 TABLET ORAL at 11:05

## 2019-12-14 RX ADMIN — LOSARTAN POTASSIUM 50 MG: 50 TABLET, FILM COATED ORAL at 08:57

## 2019-12-14 RX ADMIN — POTASSIUM CHLORIDE 40 MEQ: 750 TABLET, FILM COATED, EXTENDED RELEASE ORAL at 18:21

## 2019-12-14 RX ADMIN — Medication 1000 MCG: at 09:04

## 2019-12-14 RX ADMIN — Medication 10 ML: at 21:00

## 2019-12-14 NOTE — PROGRESS NOTES
[facility]  Hospitalist Group                                                                                          Hospitalist Progress Note  Sarah Toth MD  Answering service: [phone] OR [phone] from in house phone        Date of Service:  2019 2  TRAVIS:  Saul Arthur  :  1935  MRN:  [id number]      Admission Summary:     Presents to the emergency department with complaints of worsening shortness of breath and lower extremity edema.  Patient reports over the past week has been having progressively worsening shortness of breath bilateral upper leg edema associated with cough and increased weakness. Interval history / Subjective:       Patient has no acute complaint. Denies significant shortness of breath or chest pain at this time. Assessment & Plan:     Dyspnea  Patient has been evaluated by pulmonology. Neuro also evaluating the patient with a recent presumed diagnosis of myasthenia gravis. Cardiology also evaluating. Echo with normal EF and LVH.     Elevated troponin  Likely demand ischemia in the setting of hypoxia and respiratory distress. Check echocardiogram.  History of coronary artery disease, however Plavix has been discontinued in light of acute anemia.     Lower extremity edema  Likely secondary to prednisone and norvasc. On lasix IV. Weaning prednisone.      Presumed diagnosis of myasthenia gravis  Neuro evaluating the patient. Mestinon discontinued and tapering prednisone. Patient to continue work up with neurology as outpatient.     Anemia  Acute on chronic anemia. GI evaluating the patient. No colonoscopy recommending at this time. Follow iron studies.     Atrial fibrillation  Not currently on anticoagulation. Cardiology following.     Coronary artery disease  Status post PCI May 2019. On aspirin and statin. Plavix has been discontinued.     Hypertension  Stable blood pressure.   Continue current medications.     Code status: FULL  DVT prophylaxis: SCDs     Care Plan discussed with: Patient/Family  Disposition: TBD     Hospital Problems  Date Reviewed: 12/5/2019          Codes Class Noted POA    SOB (shortness of breath) ICD-10-CM: R06.02  ICD-9-CM: 786.05  12/13/2019 Unknown        Shortness of breath ICD-10-CM: R06.02  ICD-9-CM: 786.05  12/12/2019 Unknown                Review of Systems:   A comprehensive review of systems was negative except for that written in the HPI. Vital Signs:    Last 24hrs VS reviewed since prior progress note. Most recent are:  Visit Vitals  BP (!) 154/95 (BP 1 Location: Left arm, BP Patient Position: Sitting)   Pulse 64   Temp 97.9 °F (36.6 °C)   Resp 19   Ht 5' 8\" (1.727 m)   Wt 88.2 kg (194 lb 8 oz)   SpO2 97%   BMI 29.57 kg/m²         Intake/Output Summary (Last 24 hours) at 12/14/2019 1223  Last data filed at 12/14/2019 1218  Gross per 24 hour   Intake 800 ml   Output 3275 ml   Net -2475 ml        Physical Examination:             Constitutional:  No acute distress, cooperative, pleasant    ENT:  Oral mucous moist, oropharynx benign. Resp:  CTA bilaterally. No wheezing/rhonchi/rales. No accessory muscle use   CV:  Regular rhythm, normal rate, no murmurs, gallops, rubs    GI:  Soft, non distended, non tender. normoactive bowel sounds, no hepatosplenomegaly     Musculoskeletal:  LE edema much improved, warm, 2+ pulses throughout    Neurologic:  Moves all extremities.   AAOx3, CN II-XII reviewed     Skin:  Good turgor, no rashes or ulcers       Data Review:    Review and/or order of clinical lab test      Labs:     Recent Labs     12/14/19  0455 12/12/19  1559   WBC 6.2 9.5   HGB 8.9* 9.1*   HCT 31.6* 32.6*   * 153     Recent Labs     12/14/19  0455 12/12/19  1559    145   K 3.4* 3.5    109*   CO2 35* 33*   BUN 27* 37*   CREA 0.84 0.94   * 101*   CA 8.6 8.6     Recent Labs     12/12/19  1559   SGOT 27   ALT 33   AP 62   TBILI 1.0   TP 6.3*   ALB 3.6   GLOB 2.7     No results for input(s): INR, PTP, APTT, INREXT in the last 72 hours. Recent Labs     12/13/19 0922   TIBC 276   PSAT 8*   FERR 53      Lab Results   Component Value Date/Time    Folate 14.8 04/27/2018 03:25 PM      No results for input(s): PH, PCO2, PO2 in the last 72 hours.   Recent Labs     12/13/19  0922 12/13/19  0248 12/12/19  1559   TROIQ 0.16* 0.16* 0.13*     Lab Results   Component Value Date/Time    Cholesterol, total 110 08/05/2019 12:19 PM    HDL Cholesterol 43 08/05/2019 12:19 PM    LDL, calculated 55 08/05/2019 12:19 PM    Triglyceride 60 08/05/2019 12:19 PM     No results found for: [facility]  Lab Results   Component Value Date/Time    Color Yellow 04/02/2018 09:54 AM    Appearance Clear 04/02/2018 09:54 AM    pH (UA) 6.0 04/02/2018 09:54 AM    Ketone Negative 04/02/2018 09:54 AM    Bilirubin Negative 04/02/2018 09:54 AM    Nitrites Negative 04/02/2018 09:54 AM    Leukocyte Esterase Negative 04/02/2018 09:54 AM         Medications Reviewed:     Current Facility-Administered Medications   Medication Dose Route Frequency    predniSONE (DELTASONE) tablet 20 mg  20 mg Oral DAILY    amLODIPine (NORVASC) tablet 5 mg  5 mg Oral DAILY    aspirin delayed-release tablet 81 mg  81 mg Oral DAILY    atorvastatin (LIPITOR) tablet 10 mg  10 mg Oral DAILY    cyanocobalamin (VITAMIN B12) tablet 1,000 mcg  1,000 mcg Oral DAILY    gabapentin (NEURONTIN) capsule 400 mg  400 mg Oral QHS    losartan (COZAAR) tablet 50 mg  50 mg Oral DAILY    pantoprazole (PROTONIX) tablet 40 mg  40 mg Oral DAILY    sodium chloride (NS) flush 5-40 mL  5-40 mL IntraVENous Q8H    sodium chloride (NS) flush 5-40 mL  5-40 mL IntraVENous PRN    ondansetron (ZOFRAN) injection 4 mg  4 mg IntraVENous Q6H PRN    acetaminophen (TYLENOL) tablet 650 mg  650 mg Oral Q4H PRN    morphine injection 1 mg  1 mg IntraVENous Q4H PRN    furosemide (LASIX) injection 40 mg  40 mg IntraVENous BID    potassium chloride SR (KLOR-CON 10) tablet 40 mEq  40 mEq Oral BID     ______________________________________________________________________  EXPECTED LENGTH OF STAY: - - -  ACTUAL LENGTH OF STAY:          1                 Jj Bills MD

## 2019-12-14 NOTE — PROGRESS NOTES
Cardiology Progress Note            Admit Date: 12/12/2019  Admit Diagnosis: Shortness of breath [R06.02]  SOB (shortness of breath) [R06.02]  Date: 12/14/2019     Time: 2:08 PM  Assessment/Plan/Discussion:Cardiology Attending:     Merlyn Doreen is a 82 y.o. male   Has responded to the diuretic with less edema and less sob  Echo with normal EF and mod MR, overall does not explain the severe leg swelling and FERNANDO present on admit, Neuro suggests wean prednisone and I would like to see if that improves the swelling  Continue diuresis, suggest once home continue diuretic    This am swelling is doing a lto better, will wean prednisone to 20mg as his dose is due and no adjustment made yet. Defer to attending and neuro for further wean     12/12/19   ECHO ADULT COMPLETE 12/13/2019 12/13/2019    Narrative · Normal cavity size and systolic function (ejection fraction normal). Severe concentric hypertrophy. Estimated left ventricular ejection   fraction is 50 - 55%. · Moderately dilated left atrium. · Moderate mitral valve regurgitation is present. · Mild pulmonic valve regurgitation is present. · Mildly dilated right atrium. · Mildly dilated right ventricle. Moderately reduced systolic function. · Mild to moderate tricuspid valve regurgitation is present. · Mild aortic valve regurgitation is present. · Mild to moderate pulmonary hypertension. · Mild aortic root dilatation. Signed by: Hali Garza MD             Subjective:  Feeling better. Less SOB. Daughter bedside     Assessment and Plan     1. Dyspnea   - progressive   - MG, small effusions on CXR   - Pulm following  2. LE edema   - Not HF   12/12/19   ECHO ADULT COMPLETE 12/13/2019 12/13/2019    Narrative · Normal cavity size and systolic function (ejection fraction normal). Severe concentric hypertrophy. Estimated left ventricular ejection   fraction is 50 - 55%.   · Moderately dilated left atrium. · Moderate mitral valve regurgitation is present. · Mild pulmonic valve regurgitation is present. · Mildly dilated right atrium. · Mildly dilated right ventricle. Moderately reduced systolic function. · Mild to moderate tricuspid valve regurgitation is present. · Mild aortic valve regurgitation is present. · Mild to moderate pulmonary hypertension. · Mild aortic root dilatation. Signed by: Di Wen MD    - suspect 2/2 Prednisone. Also on Norvasc   - Start Lasix IV  3. Myasthenia gravis   - Neuro following. ? Diagnosis   - Wean Prednisone  4. Troponin elevation   - Trivial and flat around 0.16   - Demand related  5. CAD   - S/p Stent   - Plavix stopped 2/2 anemia   - ASA, Lipitor, Cozaar  6. PAF   - rate controlled   - No OAC with Anemia, ? Source   - GI to hold off on colonoscopy    EF 50-55%. No edema on CXR. Not HF. Suspect leg edema related to prednisone. Start Lasix in short. SOB per Pulm. Neuro following for MG. Daily BMP with Lasix      Past Medical History:   Diagnosis Date    Diverticulosis, sigmoid 8/2011    ED (erectile dysfunction) of organic origin     History of prostate cancer     Hypertension     Prostate cancer ([facility] [address]     Sebaceous cyst 4/1/2014    Shingles     Shingles       Social History     Tobacco Use    Smoking status: Never Smoker    Smokeless tobacco: Never Used   Substance Use Topics    Alcohol use: No    Drug use: No           Review of Systems:    [] Patient unable to provide secondary to condition    [x] All systems negative, except as checked below.   Constitutional:    []Weight Change  []Fever   []Chills   []Night Sweats  []Fatigue  []Malaise  []____  ENT/Mouth:     []Hearing Changes  []Ear Pain  []Nasal Congestion   []Sinus Pain  []Hoarseness   []Sore throat  []Rhinorrhea  []Swallowing Difficulty  []____  Eyes:    []Eye Pain  []Swelling  []Redness  []Foreign Body  []Discharge  []Vision Changes []____  Cardiovascular:    []Chest Pain  []SOB  []PND  []FERNANDO  []Orthopnea  []Claudication  [x]Edema   []Palpitations  []____  Respiratory:    []Cough  []Sputum  []Wheezing,  [x]SOB  []Hemoptysis  []____  Gastrointestinal:    []Nausea  []Vomiting  []Diarrhea  []Constipation  []Pain  []Heartburn  []Anorexia  []Dysphagia  []Hematochezia  []Melena,  []Jaundice  []____  Genitourinary:    []Dysuria  []Urinary Frequency  []Hematuria  []Urinary Incontinence  []Urgency  []Flank Pain  []Hesitancy  []____  Musculoskeletal:    []Arthralgias  []Myalgias  []Joint Swelling  []Joint Stiffness  []Back Pain  []Neck Pain  []____  Skin:    []Skin Lesions  []Pruritis  []Hair Changes  []Skin rashes  []____  Neuro:    []Weakness  []Numbness  []Paresthesias  []Loss of Consciousness  []Syncope   []Dizziness  []Headache  []Coordination Changes  []Recent Falls  []____  Psych:    []Anxiety/Depression  []Insomnia  []Memory Changes  []Violence/Abuse Hx.  []____  Heme/Lymph:    []Bruising  []Bleeding  []Lymphadenopathy  []____  Endocrine:    []Polyuria  []Polydipsia  []Temperature Intolerance  []____         Objective:     Physical Exam:                Visit Vitals  /90 (BP 1 Location: Left arm, BP Patient Position: At rest)   Pulse 60   Temp 98.6 °F (37 °C)   Resp 12   Ht 5' 8\" (1.727 m)   Wt 194 lb 8 oz (88.2 kg)   SpO2 93%   BMI 29.57 kg/m²        General Appearance:   Well developed, well nourished,alert and oriented x 3, and   individual in no acute distress. Ears/Nose/Mouth/Throat:    Hearing grossly normal.         Neck:  Supple. Chest:    Lungs crackles in bases  to auscultation bilaterally. Cardiovascular:   Irregular rate and rhythm, S1, S2 normal, no murmur. Abdomen:    Soft, non-tender, bowel sounds are active. Extremities:  2-3+ edema bilaterally. Skin:  Warm and dry.      Telemetry: AFIB     Data Review:    Labs:    Recent Results (from the past 24 hour(s))   TROPONIN I    Collection Time: 12/13/19  9:22 AM Result Value Ref Range    Troponin-I, Qt. 0.16 (H) <0.05 ng/mL   IRON PROFILE    Collection Time: 12/13/19  9:22 AM   Result Value Ref Range    Iron 21 (L) 35 - 150 ug/dL    TIBC 276 250 - 450 ug/dL    Iron % saturation 8 (L) 20 - 50 %   FERRITIN    Collection Time: 12/13/19  9:22 AM   Result Value Ref Range    Ferritin 53 26 - 388 NG/ML   ECHO ADULT COMPLETE    Collection Time: 12/13/19 10:03 AM   Result Value Ref Range    LA Volume 92.67 18 - 58 mL    Tapse 1.51 1.5 - 2.0 cm    Ao Root D 3.52 cm    Aortic Valve Systolic Peak Velocity 324.26 cm/s    Aortic Valve Area by Continuity of Peak Velocity 2.4 cm2    AoV PG 5.1 mmHg    LVIDd 4.95 4.2 - 5.9 cm    LVPWd 1.08 (A) 0.6 - 1.0 cm    LVIDs 3.55 cm    IVSd 1.19 (A) 0.6 - 1.0 cm    LVOT d 2.02 cm    LVOT Peak Velocity 84.56 cm/s    LVOT Peak Gradient 2.9 mmHg    Left Atrium to Aortic Root Ratio 1.04     RVIDd 4.16 cm    LA Vol 4C 80.87 (A) 18 - 58 mL    LA Vol 2C 88.33 (A) 18 - 58 mL    LA Area 4C 25.7 cm2    LV Mass .1 (A) 88 - 224 g    LV Mass AL Index 118.9 49 - 115 g/m2    RVSP 55.0 mmHg    Est. RA Pressure 15.0 mmHg    Left Atrium Major Axis 3.64 cm    Triscuspid Valve Regurgitation Peak Gradient 40.0 mmHg    Pulmonic Valve Max Velocity 58.19 cm/s    TR Max Velocity 316.35 cm/s    LA Vol Index 43.71 16 - 28 ml/m2    PASP 55.0 mmHg    LA Vol Index 41.66 16 - 28 ml/m2    LA Vol Index 38.14 16 - 28 ml/m2    Left Ventricular Fractional Shortening by 2D 06.363241378 %    AV Velocity Ratio 0.75     PV peak gradient 1.4 mmHg   POC G3 - PUL    Collection Time: 12/13/19 10:29 AM   Result Value Ref Range    pH (POC) 7.420 7.35 - 7.45      pCO2 (POC) 51.2 (H) 35.0 - 45.0 MMHG    pO2 (POC) 80 80 - 100 MMHG    HCO3 (POC) 33.2 (H) 22 - 26 MMOL/L    sO2 (POC) 96 92 - 97 %    Base excess (POC) 9 mmol/L    Site LEFT BRACHIAL      Device: NASAL CANNULA      Flow rate (POC) 2 L/M    Allens test (POC) YES      Specimen type (POC) ARTERIAL      Total resp.  rate 18     CBC W/O DIFF    Collection Time: 12/14/19  4:55 AM   Result Value Ref Range    WBC 6.2 4.1 - 11.1 K/uL    RBC 3.70 (L) 4.10 - 5.70 M/uL    HGB 8.9 (L) 12.1 - 17.0 g/dL    HCT 31.6 (L) 36.6 - 50.3 %    MCV 85.4 80.0 - 99.0 FL    MCH 24.1 (L) 26.0 - 34.0 PG    MCHC 28.2 (L) 30.0 - 36.5 g/dL    RDW 15.0 (H) 11.5 - 14.5 %    PLATELET 452 (L) 666 - 400 K/uL    MPV 10.5 8.9 - 12.9 FL    NRBC 0.0 0  WBC    ABSOLUTE NRBC 0.00 0.00 - 0.01 K/uL   TSH 3RD GENERATION    Collection Time: 12/14/19  4:55 AM   Result Value Ref Range    TSH 1.23 0.36 - 1.32 uIU/mL   METABOLIC PANEL, BASIC    Collection Time: 12/14/19  4:55 AM   Result Value Ref Range    Sodium 142 136 - 145 mmol/L    Potassium 3.4 (L) 3.5 - 5.1 mmol/L    Chloride 105 97 - 108 mmol/L    CO2 35 (H) 21 - 32 mmol/L    Anion gap 2 (L) 5 - 15 mmol/L    Glucose 119 (H) 65 - 100 mg/dL    BUN 27 (H) 6 - 20 MG/DL    Creatinine 0.84 0.70 - 1.30 MG/DL    BUN/Creatinine ratio 32 (H) 12 - 20      GFR est AA >60 >60 ml/min/1.73m2    GFR est non-AA >60 >60 ml/min/1.73m2    Calcium 8.6 8.5 - 10.1 MG/DL          Radiology:        Current Facility-Administered Medications   Medication Dose Route Frequency    amLODIPine (NORVASC) tablet 5 mg  5 mg Oral DAILY    aspirin delayed-release tablet 81 mg  81 mg Oral DAILY    atorvastatin (LIPITOR) tablet 10 mg  10 mg Oral DAILY    cyanocobalamin (VITAMIN B12) tablet 1,000 mcg  1,000 mcg Oral DAILY    gabapentin (NEURONTIN) capsule 400 mg  400 mg Oral QHS    losartan (COZAAR) tablet 50 mg  50 mg Oral DAILY    pantoprazole (PROTONIX) tablet 40 mg  40 mg Oral DAILY    predniSONE (DELTASONE) tablet 30 mg  30 mg Oral DAILY    sodium chloride (NS) flush 5-40 mL  5-40 mL IntraVENous Q8H    sodium chloride (NS) flush 5-40 mL  5-40 mL IntraVENous PRN    ondansetron (ZOFRAN) injection 4 mg  4 mg IntraVENous Q6H PRN    acetaminophen (TYLENOL) tablet 650 mg  650 mg Oral Q4H PRN    morphine injection 1 mg  1 mg IntraVENous Q4H PRN    furosemide (LASIX) injection 40 mg  40 mg IntraVENous BID    potassium chloride SR (KLOR-CON 10) tablet 40 mEq  40 mEq Oral BID       Evertt Krabbe, MD       [facility] [address] [place] [address] Drive, [address] [place] [address] [address]   Jeffry Munoz   [phone]

## 2019-12-14 NOTE — PROGRESS NOTES
Problem: Heart Failure: Day 1  Goal: Activity/Safety  Outcome: Progressing Towards Goal    Patient able to ambulated within the room, PRN to chair   Goal: Consults, if ordered  Outcome: Progressing Towards Goal    Cardiology consult  Goal: Diagnostic Test/Procedures  Outcome: Progressing Towards Goal    Strict intake and output, daily weights, vital sign q4h   Goal: Medications  Outcome: Progressing Towards Goal  Lasix, lisinopril, losartan    1935-Bedside shift change report given to Marely JIMENEZ  (oncoming nurse) by Cristhian Horne RN  (offgoing nurse). Report included the following information SBAR, Kardex, Intake/Output, MAR and Recent Results.

## 2019-12-14 NOTE — PROGRESS NOTES
Problem: Heart Failure: Day 1  Goal: Off Pathway (Use only if patient is Off Pathway)  Outcome: Progressing Towards Goal  Goal: Activity/Safety  Outcome: Progressing Towards Goal  Goal: Consults, if ordered  Outcome: Progressing Towards Goal  Goal: Diagnostic Test/Procedures  Outcome: Progressing Towards Goal  Goal: Nutrition/Diet  Outcome: Progressing Towards Goal  Goal: Discharge Planning  Outcome: Progressing Towards Goal  Goal: Medications  Outcome: Progressing Towards Goal  Goal: Respiratory  Outcome: Progressing Towards Goal     Problem: Falls - Risk of  Goal: *Absence of Falls  Description  Document Sorin Childs Fall Risk and appropriate interventions in the flowsheet.   Outcome: Progressing Towards Goal  Note: Fall Risk Interventions:  Mobility Interventions: Communicate number of staff needed for ambulation/transfer, Patient to call before getting OOB         Medication Interventions: Assess postural VS orthostatic hypotension, Patient to call before getting OOB, Teach patient to arise slowly    Elimination Interventions: Call light in reach, Patient to call for help with toileting needs, Urinal in reach

## 2019-12-14 NOTE — PROGRESS NOTES
Last 3 Recorded Weights in this Encounter    12/13/19 0937 12/14/19 0702 12/14/19 0705   Weight: 98.9 kg (218 lb) 89.2 kg (196 lb 9.6 oz) 88.2 kg (194 lb 8 oz)     Pt received diuretics yesterday and reports that previous scale was reportedly malfunctioning, plan to f/u with charge RN.

## 2019-12-14 NOTE — PROGRESS NOTES
[address], [address] [place] [address]    GI PROGRESS NOTE    NAME: Lata Gutierrez   :  1935   MRN:  [id number]       Subjective:     Doing well, no GI bleed  Review of Systems    Constitutional: negative fever, negative chills, negative weight loss  Eyes:   negative visual changes  ENT:   negative sore throat, tongue or lip swelling  Respiratory:  negative cough, negative dyspnea  Cards:  negative for chest pain, palpitations, lower extremity edema  GI:   See HPI  :  negative for frequency, dysuria  Integument:  negative for rash and pruritus  Heme:  negative for easy bruising and gum/nose bleeding  Musculoskel: negative for myalgias,  back pain and muscle weakness  Neuro: negative for headaches, dizziness, vertigo  Psych:  negative for feelings of anxiety, depression         Objective:     VITALS:   Last 24hrs VS reviewed since prior progress note. Most recent are:  Visit Vitals  BP (!) 149/92 (BP 1 Location: Left arm, BP Patient Position: At rest)   Pulse 67   Temp 97.9 °F (36.6 °C)   Resp 16   Ht 5' 8\" (1.727 m)   Wt 88.2 kg (194 lb 8 oz)   SpO2 96%   BMI 29.57 kg/m²       Intake/Output Summary (Last 24 hours) at 2019 1706  Last data filed at 2019 1652  Gross per 24 hour   Intake 850 ml   Output 3130 ml   Net -2280 ml     PHYSICAL EXAM:  General: WD, WN. Alert, cooperative, no acute distress    HEENT: NC, Atraumatic. PERRLA, EOMI. Anicteric sclerae. Lungs:  CTA Bilaterally. No Wheezing/Rhonchi/Rales. Heart:  Regular  rhythm,  No murmur (), No Rubs, No Gallops  Abdomen: Soft, Non distended, Non tender.  +Bowel sounds, no HSM  Extremities: No c/c/e  Neurologic:  CN 2-12 gi, Alert and oriented X 3. No acute neurological distress   Psych:   Good insight. Not anxious nor agitated.     Lab Data Reviewed:   Recent Labs     19  7959 19  1559   WBC 6.2 9.5   HGB 8.9* 9.1*   HCT 31.6* 32.6*   * 153     Recent Labs     19  8574 12/12/19  1559    145   K 3.4* 3.5    109*   CO2 35* 33*   BUN 27* 37*   CREA 0.84 0.94   * 101*   CA 8.6 8.6     Recent Labs     12/12/19  1559   SGOT 27   AP 62   TP 6.3*   ALB 3.6   GLOB 2.7       ________________________________________________________________________       Assessment:   · Anemia, no GI bleed     Patient Active Problem List   Diagnosis Code    Hypertension I10    ED (erectile dysfunction) of organic origin N52.9    Diverticulosis, sigmoid K57.30    History of prostate cancer Z85.46    B12 deficiency E53.8    Sebaceous cyst L72.3    Advanced care planning/counseling discussion Z71.89    Gastroesophageal reflux disease without esophagitis K21.9    Shingles B02.9    Shortness of breath R06.02    SOB (shortness of breath) R06.02     Plan:   · No need for endoscopic w/u  · Monitor H/H  · Will follow on monday     Signed By: Donn Nava MD     12/14/2019  5:06 PM

## 2019-12-15 LAB
ANION GAP SERPL CALC-SCNC: 1 MMOL/L (ref 5–15)
BUN SERPL-MCNC: 22 MG/DL (ref 6–20)
BUN/CREAT SERPL: 23 (ref 12–20)
CALCIUM SERPL-MCNC: 8.8 MG/DL (ref 8.5–10.1)
CHLORIDE SERPL-SCNC: 102 MMOL/L (ref 97–108)
CO2 SERPL-SCNC: 38 MMOL/L (ref 21–32)
CREAT SERPL-MCNC: 0.95 MG/DL (ref 0.7–1.3)
GLUCOSE SERPL-MCNC: 98 MG/DL (ref 65–100)
POTASSIUM SERPL-SCNC: 4 MMOL/L (ref 3.5–5.1)
SODIUM SERPL-SCNC: 141 MMOL/L (ref 136–145)

## 2019-12-15 PROCEDURE — 74011636637 HC RX REV CODE- 636/637: Performed by: INTERNAL MEDICINE

## 2019-12-15 PROCEDURE — 74011250636 HC RX REV CODE- 250/636: Performed by: SPECIALIST

## 2019-12-15 PROCEDURE — 80048 BASIC METABOLIC PNL TOTAL CA: CPT

## 2019-12-15 PROCEDURE — 74011250637 HC RX REV CODE- 250/637: Performed by: SPECIALIST

## 2019-12-15 PROCEDURE — 65660000000 HC RM CCU STEPDOWN

## 2019-12-15 PROCEDURE — 77010033678 HC OXYGEN DAILY

## 2019-12-15 PROCEDURE — 74011250637 HC RX REV CODE- 250/637: Performed by: STUDENT IN AN ORGANIZED HEALTH CARE EDUCATION/TRAINING PROGRAM

## 2019-12-15 PROCEDURE — 36415 COLL VENOUS BLD VENIPUNCTURE: CPT

## 2019-12-15 RX ADMIN — PANTOPRAZOLE SODIUM 40 MG: 40 TABLET, DELAYED RELEASE ORAL at 08:33

## 2019-12-15 RX ADMIN — ATORVASTATIN CALCIUM 10 MG: 10 TABLET, FILM COATED ORAL at 08:33

## 2019-12-15 RX ADMIN — POTASSIUM CHLORIDE 40 MEQ: 750 TABLET, FILM COATED, EXTENDED RELEASE ORAL at 08:32

## 2019-12-15 RX ADMIN — LOSARTAN POTASSIUM 50 MG: 50 TABLET, FILM COATED ORAL at 08:32

## 2019-12-15 RX ADMIN — ASPIRIN 81 MG: 81 TABLET, COATED ORAL at 08:33

## 2019-12-15 RX ADMIN — Medication 10 ML: at 05:06

## 2019-12-15 RX ADMIN — Medication 10 ML: at 14:00

## 2019-12-15 RX ADMIN — FUROSEMIDE 40 MG: 10 INJECTION, SOLUTION INTRAMUSCULAR; INTRAVENOUS at 08:32

## 2019-12-15 RX ADMIN — Medication 1000 MCG: at 08:32

## 2019-12-15 RX ADMIN — PREDNISONE 20 MG: 20 TABLET ORAL at 08:33

## 2019-12-15 RX ADMIN — AMLODIPINE BESYLATE 5 MG: 5 TABLET ORAL at 08:33

## 2019-12-15 RX ADMIN — FUROSEMIDE 40 MG: 10 INJECTION, SOLUTION INTRAMUSCULAR; INTRAVENOUS at 17:23

## 2019-12-15 RX ADMIN — GABAPENTIN 400 MG: 100 CAPSULE ORAL at 21:09

## 2019-12-15 RX ADMIN — POTASSIUM CHLORIDE 40 MEQ: 750 TABLET, FILM COATED, EXTENDED RELEASE ORAL at 17:23

## 2019-12-15 RX ADMIN — Medication 10 ML: at 21:09

## 2019-12-15 NOTE — PROGRESS NOTES
Problem: Gas Exchange - Impaired  Goal: *Absence of hypoxia  Outcome: Progressing Towards Goal     Problem: Heart Failure: Day 2  Goal: Activity/Safety  Outcome: Progressing Towards Goal  Goal: Consults, if ordered  Outcome: Progressing Towards Goal  Goal: Diagnostic Test/Procedures  Outcome: Progressing Towards Goal  Goal: Nutrition/Diet  Outcome: Progressing Towards Goal  Goal: Discharge Planning  Outcome: Progressing Towards Goal  Goal: Medications  Outcome: Progressing Towards Goal  Goal: Respiratory  Outcome: Progressing Towards Goal  Goal: Treatments/Interventions/Procedures  Outcome: Progressing Towards Goal  Goal: Psychosocial  Outcome: Progressing Towards Goal  Goal: *Oxygen saturation within defined limits  Outcome: Progressing Towards Goal  Goal: *Hemodynamically stable  Outcome: Progressing Towards Goal  Goal: *Optimal pain control at patient's stated goal  Outcome: Progressing Towards Goal  Goal: *Anxiety reduced or absent  Outcome: Progressing Towards Goal  Goal: *Demonstrates progressive activity  Outcome: Progressing Towards Goal     Problem: Falls - Risk of  Goal: *Absence of Falls  Description  Document Rhonda Torres Fall Risk and appropriate interventions in the flowsheet.   Outcome: Progressing Towards Goal  Note: Fall Risk Interventions:  Mobility Interventions: Communicate number of staff needed for ambulation/transfer         Medication Interventions: Evaluate medications/consider consulting pharmacy    Elimination Interventions: Call light in reach, Toilet paper/wipes in reach, Urinal in reach

## 2019-12-15 NOTE — PROGRESS NOTES
Cardiology Progress Note            Admit Date: 12/12/2019  Admit Diagnosis: Shortness of breath [R06.02]  SOB (shortness of breath) [R06.02]  Date: 12/15/2019     Time: 2:08 PM  Assessment/Plan/Discussion:Cardiology Attending:     Caridad Valero is a 82 y.o. male   Has responded to the diuretic with less edema and less sob  Echo with normal EF and mod MR, overall does not explain the severe leg swelling and FERNANDO present on admit, Neuro suggests wean prednisone and I would like to see if that improves the swelling  Continue diuresis, suggest once home continue diuretic    This am feeling well, swelling is way down, tolerating pred at 20mg. Crt stable, lytes are fine. Cont for mary. Trop nonspecific at . 16.     12/12/19   ECHO ADULT COMPLETE 12/13/2019 12/13/2019    Narrative · Normal cavity size and systolic function (ejection fraction normal). Severe concentric hypertrophy. Estimated left ventricular ejection   fraction is 50 - 55%. · Moderately dilated left atrium. · Moderate mitral valve regurgitation is present. · Mild pulmonic valve regurgitation is present. · Mildly dilated right atrium. · Mildly dilated right ventricle. Moderately reduced systolic function. · Mild to moderate tricuspid valve regurgitation is present. · Mild aortic valve regurgitation is present. · Mild to moderate pulmonary hypertension. · Mild aortic root dilatation. Signed by: Sherwin Maddox MD             Subjective:  Feeling better. Less SOB. Assessment and Plan     1. Dyspnea   - progressive   - MG, small effusions on CXR   - Pulm following  2. LE edema   - Not HF   12/12/19   ECHO ADULT COMPLETE 12/13/2019 12/13/2019    Narrative · Normal cavity size and systolic function (ejection fraction normal). Severe concentric hypertrophy. Estimated left ventricular ejection   fraction is 50 - 55%. · Moderately dilated left atrium.   · Moderate mitral valve regurgitation is present. · Mild pulmonic valve regurgitation is present. · Mildly dilated right atrium. · Mildly dilated right ventricle. Moderately reduced systolic function. · Mild to moderate tricuspid valve regurgitation is present. · Mild aortic valve regurgitation is present. · Mild to moderate pulmonary hypertension. · Mild aortic root dilatation. Signed by: Andreia Negron MD    - suspect 2/2 Prednisone. Also on Norvasc   - Lasix IV  3. Myasthenia gravis   - Neuro following. ? Diagnosis   - Wean Prednisone  4. Troponin elevation   - Trivial and flat around 0.16   - Demand related  5. CAD   - S/p Stent   - Plavix stopped 2/2 anemia   - ASA, Lipitor, Cozaar  6. PAF   - rate controlled   - No OAC with Anemia, ? Source   - GI to hold off on colonoscopy    EF 50-55%. No edema on CXR. Not HF. Suspect leg edema related to prednisone. Start Lasix in short. SOB per Pulm. Neuro following for MG. Daily BMP with Lasix      Past Medical History:   Diagnosis Date    Diverticulosis, sigmoid 8/2011    ED (erectile dysfunction) of organic origin     History of prostate cancer     Hypertension     Prostate cancer ([facility] [address]     Sebaceous cyst 4/1/2014    Shingles     Shingles       Social History     Tobacco Use    Smoking status: Never Smoker    Smokeless tobacco: Never Used   Substance Use Topics    Alcohol use: No    Drug use: No           Review of Systems:    [] Patient unable to provide secondary to condition    [x] All systems negative, except as checked below.   Constitutional:    []Weight Change  []Fever   []Chills   []Night Sweats  []Fatigue  []Malaise  []____  ENT/Mouth:     []Hearing Changes  []Ear Pain  []Nasal Congestion   []Sinus Pain  []Hoarseness   []Sore throat  []Rhinorrhea  []Swallowing Difficulty  []____  Eyes:    []Eye Pain  []Swelling  []Redness  []Foreign Body  []Discharge  []Vision Changes  []____  Cardiovascular:    []Chest Pain  []SOB  []PND  []FERNANDO []Orthopnea  []Claudication  [x]Edema   []Palpitations  []____  Respiratory:    []Cough  []Sputum  []Wheezing,  [x]SOB  []Hemoptysis  []____  Gastrointestinal:    []Nausea  []Vomiting  []Diarrhea  []Constipation  []Pain  []Heartburn  []Anorexia  []Dysphagia  []Hematochezia  []Melena,  []Jaundice  []____  Genitourinary:    []Dysuria  []Urinary Frequency  []Hematuria  []Urinary Incontinence  []Urgency  []Flank Pain  []Hesitancy  []____  Musculoskeletal:    []Arthralgias  []Myalgias  []Joint Swelling  []Joint Stiffness  []Back Pain  []Neck Pain  []____  Skin:    []Skin Lesions  []Pruritis  []Hair Changes  []Skin rashes  []____  Neuro:    []Weakness  []Numbness  []Paresthesias  []Loss of Consciousness  []Syncope   []Dizziness  []Headache  []Coordination Changes  []Recent Falls  []____  Psych:    []Anxiety/Depression  []Insomnia  []Memory Changes  []Violence/Abuse Hx.  []____  Heme/Lymph:    []Bruising  []Bleeding  []Lymphadenopathy  []____  Endocrine:    []Polyuria  []Polydipsia  []Temperature Intolerance  []____         Objective:     Physical Exam:                Visit Vitals  /72 (BP 1 Location: Right arm, BP Patient Position: At rest)   Pulse (!) 51   Temp 98.1 °F (36.7 °C)   Resp 19   Ht 5' 8\" (1.727 m)   Wt 185 lb 11.2 oz (84.2 kg)   SpO2 99%   BMI 28.24 kg/m²        General Appearance:   Well developed, well nourished,alert and oriented x 3, and   individual in no acute distress. Ears/Nose/Mouth/Throat:    Hearing grossly normal.         Neck:  Supple. Chest:    Lungs crackles in bases  to auscultation bilaterally. Cardiovascular:   Irregular rate and rhythm, S1, S2 normal, no murmur. Abdomen:    Soft, non-tender, bowel sounds are active. Extremities:  2-3+ edema bilaterally. Skin:  Warm and dry.      Telemetry: AFIB     Data Review:    Labs:    Recent Results (from the past 24 hour(s))   METABOLIC PANEL, BASIC    Collection Time: 12/15/19  5:04 AM   Result Value Ref Range    Sodium 141 136 - 145 mmol/L    Potassium 4.0 3.5 - 5.1 mmol/L    Chloride 102 97 - 108 mmol/L    CO2 38 (H) 21 - 32 mmol/L    Anion gap 1 (L) 5 - 15 mmol/L    Glucose 98 65 - 100 mg/dL    BUN 22 (H) 6 - 20 MG/DL    Creatinine 0.95 0.70 - 1.30 MG/DL    BUN/Creatinine ratio 23 (H) 12 - 20      GFR est AA >60 >60 ml/min/1.73m2    GFR est non-AA >60 >60 ml/min/1.73m2    Calcium 8.8 8.5 - 10.1 MG/DL          Radiology:        Current Facility-Administered Medications   Medication Dose Route Frequency    predniSONE (DELTASONE) tablet 20 mg  20 mg Oral DAILY    amLODIPine (NORVASC) tablet 5 mg  5 mg Oral DAILY    aspirin delayed-release tablet 81 mg  81 mg Oral DAILY    atorvastatin (LIPITOR) tablet 10 mg  10 mg Oral DAILY    cyanocobalamin (VITAMIN B12) tablet 1,000 mcg  1,000 mcg Oral DAILY    gabapentin (NEURONTIN) capsule 400 mg  400 mg Oral QHS    losartan (COZAAR) tablet 50 mg  50 mg Oral DAILY    pantoprazole (PROTONIX) tablet 40 mg  40 mg Oral DAILY    sodium chloride (NS) flush 5-40 mL  5-40 mL IntraVENous Q8H    sodium chloride (NS) flush 5-40 mL  5-40 mL IntraVENous PRN    ondansetron (ZOFRAN) injection 4 mg  4 mg IntraVENous Q6H PRN    acetaminophen (TYLENOL) tablet 650 mg  650 mg Oral Q4H PRN    morphine injection 1 mg  1 mg IntraVENous Q4H PRN    furosemide (LASIX) injection 40 mg  40 mg IntraVENous BID    potassium chloride SR (KLOR-CON 10) tablet 40 mEq  40 mEq Oral BID       Leeanna Bruce MD       [facility] [address] [facility] [address] [facility] [address]   Jeffry Munoz   [phone]

## 2019-12-15 NOTE — PROGRESS NOTES
Problem: Gas Exchange - Impaired  Goal: *Absence of hypoxia  12/15/2019 1158 by Vero Ceja RN  Outcome: Progressing Towards Goal  12/15/2019 1059 by Vero Ceja RN  Outcome: Progressing Towards Goal  Note:   Patient on RA     Problem: Heart Failure: Day 3  Goal: Activity/Safety  12/15/2019 1158 by Vero Ceja RN  Outcome: Progressing Towards Goal  Note:   Patient ambulated to bathroom with no assistance   12/15/2019 1059 by Vero Ceja RN  Outcome: Progressing Towards Goal  Note:   Pt. Ambulating to bathroom   Goal: Nutrition/Diet  Outcome: Progressing Towards Goal  Goal: Discharge Planning  Outcome: Progressing Towards Goal  Goal: Respiratory  Outcome: Progressing Towards Goal  Note:   Pt. RA  Goal: Psychosocial  Outcome: Progressing Towards Goal     Problem: Falls - Risk of  Goal: *Absence of Falls  Description  Document Marbella Nance Fall Risk and appropriate interventions in the flowsheet. Outcome: Progressing Towards Goal  Note: Fall Risk Interventions:  Mobility Interventions: Assess mobility with egress test, Mechanical lift, OT consult for ADLs, PT Consult for mobility concerns    Medication Interventions: Assess postural VS orthostatic hypotension, Patient to call before getting OOB    Elimination Interventions: Call light in reach, Patient to call for help with toileting needs, Stay With Me (per policy)  Problem: Pressure Injury - Risk of  Goal: *Prevention of pressure injury  Description  Document Catracho Scale and appropriate interventions in the flowsheet.   Outcome: Progressing Towards Goal  Note: Pressure Injury Interventions:  Sensory Interventions: Avoid rigorous massage over bony prominences, Discuss PT/OT consult with provider, Float heels, Keep linens dry and wrinkle-free, Minimize linen layers, Monitor skin under medical devices    Moisture Interventions: Apply protective barrier, creams and emollients, Check for incontinence Q2 hours and as needed, Limit adult briefs, Maintain skin hydration (lotion/cream), Minimize layers, Offer toileting Q_hr    Activity Interventions: Assess need for specialty bed, Increase time out of bed, PT/OT evaluation    Mobility Interventions: Assess need for specialty bed, Float heels, PT/OT evaluation, Turn and reposition approx. every two hours(pillow and wedges)    Nutrition Interventions: Document food/fluid/supplement intake    Friction and Shear Interventions: Apply protective barrier, creams and emollients, Feet elevated on foot rest, Lift sheet, Lift team/patient mobility team    Bedside and Verbal shift change report given to Matt Davenport RN (oncoming nurse) by Veronica De La Paz RN (offgoing nurse). Report included the following information SBAR, Kardex, MAR, Recent Results and Cardiac Rhythm AFIB.      Patient Vitals for the past 12 hrs:   Temp Pulse Resp BP SpO2   12/15/19 1723  61  128/73    12/15/19 1619 98.6 °F (37 °C) 68 18 135/73 93 %   12/15/19 1600     94 %   12/15/19 1200     93 %   12/15/19 1140 98 °F (36.7 °C) 60 18 136/73 92 %   12/15/19 0831 97.7 °F (36.5 °C) 60 19 172/82 95 %   12/15/19 0800     96 %

## 2019-12-15 NOTE — PROGRESS NOTES
[address] [facility] Adult  Hospitalist Group                                                                                          Hospitalist Progress Note  Sarah Toth MD  Answering service: [phone] OR [phone] from in house phone        Date of Service:  12/15/2019  NAME:  Saul Arthur  :  1935  MRN:  [id number]      Admission Summary:     Presents to the emergency department with complaints of worsening shortness of breath and lower extremity edema.  Patient reports over the past week has been having progressively worsening shortness of breath bilateral upper leg edema associated with cough and increased weakness.      Interval history / Subjective:       Patient has no acute complaint.  Denies significant shortness of breath or chest pain at this time. Assessment & Plan:     Dyspnea  Patient has been evaluated by pulmonology. Raine Artis also evaluating the patient with a recent presumed diagnosis of myasthenia gravis.  Cardiology also evaluating.  Echo with normal EF and LVH.     Elevated troponin  Likely demand ischemia in the setting of hypoxia and respiratory distress.  Check echocardiogram.  History of coronary artery disease, however Plavix has been discontinued in light of acute anemia.     Lower extremity edema  Likely secondary to prednisone and possibly norvasc. On lasix IV. Weaning prednisone.      Presumed diagnosis of myasthenia gravis  Neuro evaluating the patient. [facility] discontinued and tapering prednisone. Patient to continue work up with neurology as outpatient.     Anemia  Acute on chronic anemia.  GI evaluating the patient. Lisseth Metcalf colonoscopy recommending at this time.  Follow iron studies.     Atrial fibrillation  Not currently on anticoagulation.  Cardiology following.     Coronary artery disease  Status post PCI May 2019.  On aspirin and statin.  Plavix has been discontinued.     Hypertension  Stable blood pressure.  Continue current medications.     Code status: FULL  DVT prophylaxis: [facility]     Hospital Problems  Date Reviewed: 12/5/2019          Codes Class Noted POA    SOB (shortness of breath) ICD-10-CM: R06.02  ICD-9-CM: 786.05  12/13/2019 Unknown        Shortness of breath ICD-10-CM: R06.02  ICD-9-CM: 786.05  12/12/2019 Unknown                Review of Systems:   A comprehensive review of systems was negative except for that written in the HPI. Vital Signs:    Last 24hrs VS reviewed since prior progress note. Most recent are:  Visit Vitals  /73 (BP 1 Location: Right arm, BP Patient Position: At rest)   Pulse 60   Temp 98 °F (36.7 °C)   Resp 18   Ht 5' 8\" (1.727 m)   Wt 84.2 kg (185 lb 11.2 oz)   SpO2 92%   BMI 28.24 kg/m²         Intake/Output Summary (Last 24 hours) at 12/15/2019 1228  Last data filed at 12/15/2019 1000  Gross per 24 hour   Intake 390 ml   Output 2630 ml   Net -2240 ml        Physical Examination:             Constitutional:  No acute distress, cooperative, pleasant    ENT:  Oral mucous moist, oropharynx benign. Resp:  CTA bilaterally. No wheezing/rhonchi/rales. No accessory muscle use   CV:  Regular rhythm, normal rate, no murmurs, gallops, rubs    GI:  Soft, non distended, non tender. normoactive bowel sounds, no hepatosplenomegaly     Musculoskeletal:  No edema, warm, 2+ pulses throughout    Neurologic:  Moves all extremities.   AAOx3, CN II-XII reviewed     Skin:  Good turgor, no rashes or ulcers       Data Review:    Review and/or order of clinical lab test      Labs:     Recent Labs     12/14/19  0455 12/12/19  1559   WBC 6.2 9.5   HGB 8.9* 9.1*   HCT 31.6* 32.6*   * 153     Recent Labs     12/15/19  0504 12/14/19  0455 12/12/19  1559    142 145   K 4.0 3.4* 3.5    105 109*   CO2 38* 35* 33*   BUN 22* 27* 37*   CREA 0.95 0.84 0.94   GLU 98 119* 101*   CA 8.8 8.6 8.6     Recent Labs     12/12/19  1559   SGOT 27   ALT 33   AP 62   TBILI 1.0   TP 6.3*   ALB 3.6   GLOB 2.7     No results for input(s): INR, PTP, APTT, INREXT in the last 72 hours. Recent Labs     12/13/19 0922   TIBC 276   PSAT 8*   FERR 53      Lab Results   Component Value Date/Time    Folate 14.8 04/27/2018 03:25 PM      No results for input(s): PH, PCO2, PO2 in the last 72 hours.   Recent Labs     12/13/19  0922 12/13/19  0248 12/12/19  1559   TROIQ 0.16* 0.16* 0.13*     Lab Results   Component Value Date/Time    Cholesterol, total 110 08/05/2019 12:19 PM    HDL Cholesterol 43 08/05/2019 12:19 PM    LDL, calculated 55 08/05/2019 12:19 PM    Triglyceride 60 08/05/2019 12:19 PM     No results found for: [facility]  Lab Results   Component Value Date/Time    Color Yellow 04/02/2018 09:54 AM    Appearance Clear 04/02/2018 09:54 AM    pH (UA) 6.0 04/02/2018 09:54 AM    Ketone Negative 04/02/2018 09:54 AM    Bilirubin Negative 04/02/2018 09:54 AM    Nitrites Negative 04/02/2018 09:54 AM    Leukocyte Esterase Negative 04/02/2018 09:54 AM         Medications Reviewed:     Current Facility-Administered Medications   Medication Dose Route Frequency    predniSONE (DELTASONE) tablet 20 mg  20 mg Oral DAILY    amLODIPine (NORVASC) tablet 5 mg  5 mg Oral DAILY    aspirin delayed-release tablet 81 mg  81 mg Oral DAILY    atorvastatin (LIPITOR) tablet 10 mg  10 mg Oral DAILY    cyanocobalamin (VITAMIN B12) tablet 1,000 mcg  1,000 mcg Oral DAILY    gabapentin (NEURONTIN) capsule 400 mg  400 mg Oral QHS    losartan (COZAAR) tablet 50 mg  50 mg Oral DAILY    pantoprazole (PROTONIX) tablet 40 mg  40 mg Oral DAILY    sodium chloride (NS) flush 5-40 mL  5-40 mL IntraVENous Q8H    sodium chloride (NS) flush 5-40 mL  5-40 mL IntraVENous PRN    ondansetron (ZOFRAN) injection 4 mg  4 mg IntraVENous Q6H PRN    acetaminophen (TYLENOL) tablet 650 mg  650 mg Oral Q4H PRN    morphine injection 1 mg  1 mg IntraVENous Q4H PRN    furosemide (LASIX) injection 40 mg  40 mg IntraVENous BID    potassium chloride SR (KLOR-CON 10) tablet 40 mEq  40 mEq Oral BID ______________________________________________________________________  EXPECTED LENGTH OF STAY: - - -  ACTUAL LENGTH OF STAY:          2                 Israel Sanders MD

## 2019-12-15 NOTE — ED PROVIDER NOTES
Patient is an 82-year-old male presenting for shortness of breath and chest discomfort. Patient was sent to the emergency department by Dr. Sandro Rodriguez for likely admission of congestive heart failure. Patient is noticed increased shortness of breath has well as bilateral lower extremity edema. Patient was noted to be 89 to 91% on room air in triage. Patient was placed on 2 L nasal cannula.            Past Medical History:   Diagnosis Date    Diverticulosis, sigmoid 8/2011    ED (erectile dysfunction) of organic origin     History of prostate cancer     Hypertension     Prostate cancer ([facility] [address]     Sebaceous cyst 4/1/2014    Shingles     Shingles        Past Surgical History:   Procedure Laterality Date    ENDOSCOPY, COLON, DIAGNOSTIC  >= 8-10years ago   [employer] BACK SURGERY  1968 [id number]    HX CARPAL TUNNEL RELEASE  4/08    right,left    HX CHOLECYSTECTOMY      HX HERNIA REPAIR      HX PROSTATECTOMY  5/06         Family History:   Problem Relation Age of Onset    Cancer Mother         pancreatic    Diabetes Father     Stroke Father     Diabetes Sister     Hypertension Sister     Other Sister         Open heart surgery     Diabetes Brother     Hypertension Brother     Hypertension Brother     Asthma Daughter     Heart Attack Daughter     Other Daughter         hip replacement x 2       Social History     Socioeconomic History    Marital status:      Spouse name: Not on file    Number of children: Not on file    Years of education: Not on file    Highest education level: Not on file   Occupational History    Not on file   Social Needs    Financial resource strain: Not on file    Food insecurity:     Worry: Not on file     Inability: Not on file    Transportation needs:     Medical: Not on file     Non-medical: Not on file   Tobacco Use    Smoking status: Never Smoker    Smokeless tobacco: Never Used   Substance and Sexual Activity    Alcohol use: No    Drug use: No    Sexual activity: Not Currently     Partners: Female   Lifestyle    Physical activity:     Days per week: Not on file     Minutes per session: Not on file    Stress: Not on file   Relationships    Social connections:     Talks on phone: Not on file     Gets together: Not on file     Attends Sikhism service: Not on file     Active member of club or organization: Not on file     Attends meetings of clubs or organizations: Not on file     Relationship status: Not on file    Intimate partner violence:     Fear of current or ex partner: Not on file     Emotionally abused: Not on file     Physically abused: Not on file     Forced sexual activity: Not on file   Other Topics Concern    Not on file   Social History Narrative    Not on file         ALLERGIES: Patient has no known allergies. Review of Systems   Constitutional: Positive for activity change and fatigue. Respiratory: Positive for chest tightness and shortness of breath. Cardiovascular: Positive for leg swelling. All other systems reviewed and are negative. Vitals:    12/14/19 1649 12/14/19 1820 12/14/19 1943 12/15/19 0032   BP: (!) 149/92 159/88 143/56 118/55   Pulse: 67  69 97   Resp: 16  22 14   Temp: 97.9 °F (36.6 °C)  98.3 °F (36.8 °C) 97 °F (36.1 °C)   SpO2: 96%  100% 97%   Weight:       Height:                Physical Exam  Constitutional:       Appearance: He is well-developed. HENT:      Head: Normocephalic and atraumatic. Neck:      Musculoskeletal: Normal range of motion and neck supple. Cardiovascular:      Rate and Rhythm: Normal rate and regular rhythm. Pulmonary:      Breath sounds: Examination of the right-lower field reveals decreased breath sounds and rales. Examination of the left-lower field reveals decreased breath sounds and rales. Decreased breath sounds and rales present. Musculoskeletal:      Right lower leg: Edema present. Left lower leg: Edema present. Skin:     General: Skin is warm and dry. Neurological:      General: No focal deficit present. Mental Status: He is alert and oriented to person, place, and time. Psychiatric:         Mood and Affect: Mood normal.         Behavior: Behavior normal.          MDM  Number of Diagnoses or Management Options  Acute on chronic congestive heart failure, unspecified heart failure type [facility]):   Diagnosis management comments: A/P: Congestive heart failure exacerbation. 82-year-old male presenting with dyspnea on exertion worsening shortness of breath as well as bilateral lower extremity edema. Patient sent to ED for admission by cardiology however patient's got a history of myasthenia gravis therefore patient will require admission by the hospitalist with cardiology consult. Will start diuresis with 40 mg of Lasix IV.        Amount and/or Complexity of Data Reviewed  Clinical lab tests: reviewed and ordered  Tests in the radiology section of CPT®: reviewed and ordered  Review and summarize past medical records: yes  Independent visualization of images, tracings, or specimens: yes    Risk of Complications, Morbidity, and/or Mortality  Presenting problems: moderate  Diagnostic procedures: moderate  Management options: moderate    Patient Progress  Patient progress: stable         Procedures

## 2019-12-16 VITALS
BODY MASS INDEX: 27.31 KG/M2 | RESPIRATION RATE: 22 BRPM | SYSTOLIC BLOOD PRESSURE: 125 MMHG | HEART RATE: 62 BPM | WEIGHT: 180.2 LBS | DIASTOLIC BLOOD PRESSURE: 85 MMHG | HEIGHT: 68 IN | OXYGEN SATURATION: 92 % | TEMPERATURE: 98.1 F

## 2019-12-16 LAB
ANION GAP SERPL CALC-SCNC: 2 MMOL/L (ref 5–15)
BUN SERPL-MCNC: 23 MG/DL (ref 6–20)
BUN/CREAT SERPL: 22 (ref 12–20)
CALCIUM SERPL-MCNC: 8.7 MG/DL (ref 8.5–10.1)
CHLORIDE SERPL-SCNC: 103 MMOL/L (ref 97–108)
CO2 SERPL-SCNC: 32 MMOL/L (ref 21–32)
CREAT SERPL-MCNC: 1.04 MG/DL (ref 0.7–1.3)
GLUCOSE SERPL-MCNC: 92 MG/DL (ref 65–100)
POTASSIUM SERPL-SCNC: 4.5 MMOL/L (ref 3.5–5.1)
SODIUM SERPL-SCNC: 137 MMOL/L (ref 136–145)

## 2019-12-16 PROCEDURE — 74011250637 HC RX REV CODE- 250/637: Performed by: STUDENT IN AN ORGANIZED HEALTH CARE EDUCATION/TRAINING PROGRAM

## 2019-12-16 PROCEDURE — 36415 COLL VENOUS BLD VENIPUNCTURE: CPT

## 2019-12-16 PROCEDURE — 80048 BASIC METABOLIC PNL TOTAL CA: CPT

## 2019-12-16 PROCEDURE — 74011250636 HC RX REV CODE- 250/636: Performed by: SPECIALIST

## 2019-12-16 PROCEDURE — 74011636637 HC RX REV CODE- 636/637: Performed by: INTERNAL MEDICINE

## 2019-12-16 PROCEDURE — 74011250637 HC RX REV CODE- 250/637: Performed by: SPECIALIST

## 2019-12-16 RX ORDER — PREDNISONE 10 MG/1
10 TABLET ORAL DAILY
Qty: 4 TAB | Refills: 0 | Status: SHIPPED
Start: 2019-12-16 | End: 2019-12-20

## 2019-12-16 RX ORDER — FUROSEMIDE 40 MG/1
40 TABLET ORAL DAILY
Qty: 30 TAB | Refills: 0 | Status: SHIPPED | OUTPATIENT
Start: 2019-12-16 | End: 2020-02-18

## 2019-12-16 RX ORDER — POTASSIUM CHLORIDE 1500 MG/1
40 TABLET, FILM COATED, EXTENDED RELEASE ORAL DAILY
Qty: 30 TAB | Refills: 0 | Status: SHIPPED | OUTPATIENT
Start: 2019-12-16 | End: 2020-04-08 | Stop reason: SDUPTHER

## 2019-12-16 RX ORDER — FUROSEMIDE 40 MG/1
40 TABLET ORAL DAILY
Status: DISCONTINUED | OUTPATIENT
Start: 2019-12-17 | End: 2019-12-16 | Stop reason: HOSPADM

## 2019-12-16 RX ADMIN — ASPIRIN 81 MG: 81 TABLET, COATED ORAL at 09:11

## 2019-12-16 RX ADMIN — FUROSEMIDE 40 MG: 10 INJECTION, SOLUTION INTRAMUSCULAR; INTRAVENOUS at 09:12

## 2019-12-16 RX ADMIN — POTASSIUM CHLORIDE 40 MEQ: 750 TABLET, FILM COATED, EXTENDED RELEASE ORAL at 09:12

## 2019-12-16 RX ADMIN — ATORVASTATIN CALCIUM 10 MG: 10 TABLET, FILM COATED ORAL at 09:12

## 2019-12-16 RX ADMIN — PANTOPRAZOLE SODIUM 40 MG: 40 TABLET, DELAYED RELEASE ORAL at 09:11

## 2019-12-16 RX ADMIN — AMLODIPINE BESYLATE 5 MG: 5 TABLET ORAL at 09:11

## 2019-12-16 RX ADMIN — PREDNISONE 20 MG: 20 TABLET ORAL at 09:11

## 2019-12-16 RX ADMIN — LOSARTAN POTASSIUM 50 MG: 50 TABLET, FILM COATED ORAL at 09:12

## 2019-12-16 RX ADMIN — Medication 1000 MCG: at 09:11

## 2019-12-16 NOTE — PROGRESS NOTES
[address]  [address] [facility], [address]       GI PROGRESS NOTE  Morgan Mckinnon, [facility] office  [phone] NP in-hospital cell phone M-F until 4:30  After 5pm or on weekends, please call  for physician on call      NAME: Merlyn Logan   :  1935   MRN:  [id number]       Subjective:   No complains, no GI blood loss. Objective:     VITALS:   Last 24hrs VS reviewed since prior progress note. Most recent are:  Visit Vitals  /87 (BP 1 Location: Right arm, BP Patient Position: Sitting)   Pulse 74   Temp 97.9 °F (36.6 °C)   Resp 18   Ht 5' 8\" (1.727 m)   Wt 82.2 kg (181 lb 4.8 oz)   SpO2 94%   BMI 27.57 kg/m²       PHYSICAL EXAM:  General: Cooperative, no acute distress    Neurologic:  Alert and oriented X 3. HEENT: EOMI, no scleral icterus   Lungs:  Diminished   Heart:  S1 S2   Abdomen: Soft, non-distended, no tenderness. +Bowel sounds  Extremities:  warm  Psych:   Good insight. Not anxious or agitated.     Lab Data Reviewed:     Recent Results (from the past 24 hour(s))   METABOLIC PANEL, BASIC    Collection Time: 19  5:31 AM   Result Value Ref Range    Sodium 137 136 - 145 mmol/L    Potassium 4.5 3.5 - 5.1 mmol/L    Chloride 103 97 - 108 mmol/L    CO2 32 21 - 32 mmol/L    Anion gap 2 (L) 5 - 15 mmol/L    Glucose 92 65 - 100 mg/dL    BUN 23 (H) 6 - 20 MG/DL    Creatinine 1.04 0.70 - 1.30 MG/DL    BUN/Creatinine ratio 22 (H) 12 - 20      GFR est AA >60 >60 ml/min/1.73m2    GFR est non-AA >60 >60 ml/min/1.73m2    Calcium 8.7 8.5 - 10.1 MG/DL         Assessment:   · Anemia: hgb 8.9, EGD 2019: gastritis  · Myasthenia gravis  · Atrial fibrillation  · CAD status post PCI in 2019: Plavix PTA lat dose 12/10  · History of dysphagia only to water: outpatient      Patient Active Problem List   Diagnosis Code    Hypertension I10    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 I have reviewed the chart and agree with the documentation recorded by the NP, including the assessment, treatment plan, and disposition.         Holden Teran MD

## 2019-12-16 NOTE — PROGRESS NOTES
Problem: Heart Failure: Day 3  Goal: Activity/Safety  Outcome: Progressing Towards Goal  Goal: Nutrition/Diet  Outcome: Progressing Towards Goal  Goal: Medications  Outcome: Progressing Towards Goal

## 2019-12-16 NOTE — DISCHARGE SUMMARY
Discharge Summary       PATIENT ID: Shasta Schreiber  MRN: [id number]   YOB: 1935    DATE OF ADMISSION: 12/12/2019  6:45 PM    DATE OF DISCHARGE: 12/16/2019   PRIMARY CARE PROVIDER: Marissa Rodriguez MD     ATTENDING PHYSICIAN: Marybel Estevez  DISCHARGING PROVIDER: Harinder Kauffman MD    To contact this individual call [phone] and ask the  to page. If unavailable ask to be transferred the Adult Hospitalist Department. CONSULTATIONS: IP CONSULT TO NEUROLOGY  IP CONSULT TO GASTROENTEROLOGY  IP CONSULT TO PULMONOLOGY    PROCEDURES/SURGERIES: * No surgery found *    ADMITTING DIAGNOSES & HOSPITAL COURSE:     HPI  Presents to the emergency department with complaints of worsening shortness of breath and lower extremity edema.  Patient reports over the past week has been having progressively worsening shortness of breath bilateral upper leg edema associated with cough and increased weakness.   [facility] Course  Patient admitted dyspnea. Echo shows normal EF with some LVH. Cardiology does not think that patient's dyspnea is related to CHF. Lower extremity edema is likely secondary to side effect of prednisone and possibly likely to Norvasc. Patient's edema improved with Lasix. Patient to continue taking p.o. Lasix at home. Prednisone to be weaned off. To take 10 mg of prednisone for total of 4 more days and then discontinue. Pulmonology follows the patient and patient follow-up with pulmonology in 1 week and sleep clinic for further evaluation of sleep apnea. And to continue follow-up outpatient with neurology to continue work-up for diagnosis of myasthenia gravis. In the interim, neurology recommended discontinuing Mestinon and tapering of prednisone. DISCHARGE DIAGNOSES / PLAN:      1. Dyspnea  2. Lower extremity edema likely related to prednisone  3. Elevated troponin of non-clinical significance  4. Anemia  5. Atrial fibrillation  6. Coronary artery disease  7.  Hypertension ADDITIONAL CARE RECOMMENDATIONS:     None     PENDING TEST RESULTS:   At the time of discharge the following test results are still pending: None    FOLLOW UP APPOINTMENTS:    Follow-up Information     Follow up With Specialties Details Why Contact Info    Margareth Olivarez MD [place] [id number] [phone]               DIET: Cardiac Diet  Oral Nutritional Supplements: No Oral Supplement prescribed    ACTIVITY: Activity as tolerated    WOUND CARE: None    EQUIPMENT needed: None      DISCHARGE MEDICATIONS:  Current Discharge Medication List      START taking these medications    Details   furosemide (LASIX) 40 mg tablet Take 1 Tab by mouth daily. Qty: 30 Tab, Refills: 0      potassium chloride SR (K-TAB) 20 mEq tablet Take 2 Tabs by mouth daily. Qty: 30 Tab, Refills: 0         CONTINUE these medications which have CHANGED    Details   predniSONE (DELTASONE) 10 mg tablet Take 10 mg by mouth daily for 4 days. Stop taking prednisone after 4 days  Qty: 4 Tab, Refills: 0         CONTINUE these medications which have NOT CHANGED    Details   gabapentin (NEURONTIN) 400 mg capsule Take 400 mg by mouth nightly. aspirin delayed-release 81 mg tablet Take 81 mg by mouth daily. pantoprazole (PROTONIX) 40 mg tablet Take 40 mg by mouth daily. losartan (COZAAR) 50 mg tablet Take 1 Tab by mouth daily. Qty: 90 Tab, Refills: 1    Comments: **Patient requests 90 days supply**  Associated Diagnoses: Essential hypertension with goal blood pressure less than 140/90      amLODIPine (NORVASC) 5 mg tablet TAKE 1 TABLET BY MOUTH EVERY DAY  Qty: 90 Tab, Refills: 1    Associated Diagnoses: Essential hypertension with goal blood pressure less than 140/90      albuterol (PROVENTIL HFA, VENTOLIN HFA, PROAIR HFA) 90 mcg/actuation inhaler Take 1-2 Puffs by inhalation every four (4) hours as needed for Wheezing.   Qty: 1 Inhaler, Refills: 5    Associated Diagnoses: Pneumonia of right lower lobe due to infectious organism (HCC)      atorvastatin (LIPITOR) 10 mg tablet Take 1 Tab by mouth daily. Qty: 90 Tab, Refills: 1      clopidogrel (PLAVIX) 75 mg tab Take 1 Tab by mouth daily. Qty: 90 Tab, Refills: 3      cyanocobalamin (VITAMIN B-12) 1,000 mcg tablet Take 1,000 mcg by mouth daily. STOP taking these medications       pyridostigmine (MESTINON) 60 mg tablet Comments:   Reason for Stopping:                 NOTIFY YOUR PHYSICIAN FOR ANY OF THE FOLLOWING:   Fever over 101 degrees for 24 hours. Chest pain, shortness of breath, fever, chills, nausea, vomiting, diarrhea, change in mentation, falling, weakness, bleeding. Severe pain or pain not relieved by medications. Or, any other signs or symptoms that you may have questions about. DISPOSITION:    Home With:   OT  PT  HH  RN       Long term SNF/Inpatient Rehab    Independent/assisted living    Hospice    Other:       PATIENT CONDITION AT DISCHARGE:     Functional status    Poor     Deconditioned     Independent      Cognition     Lucid     Forgetful     Dementia      Catheters/lines (plus indication)    Lopez     PICC     PEG     None      Code status     Full code     DNR      PHYSICAL EXAMINATION AT DISCHARGE:  General:          Alert, cooperative, no distress, appears stated age. HEENT:           Atraumatic, anicteric sclerae, pink conjunctivae                          No oral ulcers, mucosa moist, throat clear, dentition fair  Neck:               Supple, symmetrical  Lungs:             Clear to auscultation bilaterally. No Wheezing or Rhonchi. No rales. Chest wall:      No tenderness  No Accessory muscle use. Heart:              Regular  rhythm,  No  murmur   No edema  Abdomen:        Soft, non-tender. Not distended. Bowel sounds normal  Extremities:     No cyanosis. No clubbing,                            Skin turgor normal, Capillary refill normal  Skin:                Not pale.   Not Jaundiced  No rashes   Psych: Not anxious or agitated. Neurologic:      Alert, moves all extremities, answers questions appropriately and responds to commands       CHRONIC MEDICAL DIAGNOSES:  Problem List as of 12/16/2019 Date Reviewed: 12/5/2019          Codes Class Noted - Resolved    SOB (shortness of breath) ICD-10-CM: R06.02  ICD-9-CM: 786.05  12/13/2019 - Present        Shortness of breath ICD-10-CM: R06.02  ICD-9-CM: 786.05  12/12/2019 - Present        Shingles ICD-10-CM: B02.9  ICD-9-CM: 053.9  Unknown - Present        Gastroesophageal reflux disease without esophagitis ICD-10-CM: K21.9  ICD-9-CM: 530.81  11/2/2016 - Present        Advanced care planning/counseling discussion ICD-10-CM: Z71.89  ICD-9-CM: V65.49  5/9/2016 - Present    Overview Signed 5/9/2016  3:41 PM by Terry Dill MD     Patient given [facility] Directive form and booklet. Patient advised to follow up with me or contact nurse navigator to submit these form to medical chart. I advised Patient of the benefits of Advance Care Plan with regard to end of life care and benefits of filling forms out in case Patient cannot speak for themselves.                Sebaceous cyst ICD-10-CM: L72.3  ICD-9-CM: 706.2  4/1/2014 - Present        B12 deficiency ICD-10-CM: E53.8  ICD-9-CM: 266.2  12/13/2013 - Present        History of prostate cancer ICD-10-CM: Z85.46  ICD-9-CM: V10.46  Unknown - Present        Diverticulosis, sigmoid ICD-10-CM: K57.30  ICD-9-CM: 562.10  Unknown - Present        ED (erectile dysfunction) of organic origin ICD-10-CM: N52.9  ICD-9-CM: 607.84  Unknown - Present        Hypertension ICD-10-CM: I10  ICD-9-CM: 401.9  Unknown - Present              Greater than 30 minutes were spent with the patient on counseling and coordination of care    Signed:   Kaitlynn Ceballos MD  12/16/2019  3:39 PM

## 2019-12-16 NOTE — PROGRESS NOTES
Cardiology Progress Note            Admit Date: 12/12/2019  Admit Diagnosis: Shortness of breath [R06.02]  SOB (shortness of breath) [R06.02]  Date: 12/16/2019     Time: 11:00AM  Assessment/Plan/Discussion:Cardiology Attending:     Patient seen on the day of progress note and examined  and agree with Advance Practice Provider (BRENDA, NP,PA)  assessment and plans. Ade Domingo is a 82 y.o. male   Future Appointments   Date Time Provider Samantha Jil   12/20/2019  2:40 PM Sophie Kuo  [address]   2/3/2020 10:00 AM Jennifer Dietrich MD [facility] SCHED   10/19/2020  9:20 AM Sophie Kuo  [address]      Is back with no edema nad less sob  Can go home on laxis 40 mg and reduce the prednisone  See me Friday as planned  Anselmo Dumont MD          Subjective:  Feeling better. Less SOB. Assessment and Plan     1. Dyspnea: Says he can ambulate to bathroom without SOB now   - progressive   - MG, small effusions on CXR   - Pulm following. Sleep study planned. pcO2 51  2. LE edema: no edema on exam today   - Not HF   12/12/19   ECHO ADULT COMPLETE 12/13/2019 12/13/2019    Narrative · Normal cavity size and systolic function (ejection fraction normal). Severe concentric hypertrophy. Estimated left ventricular ejection   fraction is 50 - 55%. · Moderately dilated left atrium. · Moderate mitral valve regurgitation is present. · Mild pulmonic valve regurgitation is present. · Mildly dilated right atrium. · Mildly dilated right ventricle. Moderately reduced systolic function. · Mild to moderate tricuspid valve regurgitation is present. · Mild aortic valve regurgitation is present. · Mild to moderate pulmonary hypertension. · Mild aortic root dilatation. Signed by: Lauri Castillo MD    - suspect 2/2 Prednisone. Also on Norvasc   - Lasix-change to lasix 40 mg po daily.    3. Possible Myasthenia gravis   - Neuro following- needs EMS/nerve stimulation studies outpatient. - Wean Prednisone (neuro agrees)  4. Troponin elevation   - Trivial and flat around 0.16   - Demand related  5. CAD   - S/p Stent   - Plavix stopped 2/2 anemia   - ASA, Lipitor, Cozaar  6. Anemia: hgb 8.9 from 9.2   -GI following, no endocsopy currently planned. -GI to see again today. 7. PAF: currently afib   -Asymptomatic   - rate controlled   - CHA2DS2vasc=3: No OAC with Anemia, ? Source, GI following. EF 50-55%. No edema on CXR. Not HF. Mild- mod pulmonary HTN noted. Suspect leg edema related to prednisone. No SOB now with walking to bathroom. Lasix to po?- will d/w Dr. Rajiv Sampson.  Pt currently in afib, asymptomatic and rate is controlled on no rate control medications. No OAC d/t anemia with unknown source. Discussed with Dr. Rajiv Sampson. CHange lasix to po today. Past Medical History:   Diagnosis Date    Diverticulosis, sigmoid 8/2011    ED (erectile dysfunction) of organic origin     History of prostate cancer     Hypertension     Prostate cancer ([facility] [address]     Sebaceous cyst 4/1/2014    Shingles     Shingles       Social History     Tobacco Use    Smoking status: Never Smoker    Smokeless tobacco: Never Used   Substance Use Topics    Alcohol use: No    Drug use: No           Review of Systems:    [] Patient unable to provide secondary to condition    [x] All systems negative, except as checked below.   Constitutional:    []Weight Change  []Fever   []Chills   []Night Sweats  []Fatigue  []Malaise  []____  ENT/Mouth:     []Hearing Changes  []Ear Pain  []Nasal Congestion   []Sinus Pain  []Hoarseness   []Sore throat  []Rhinorrhea  []Swallowing Difficulty  []____  Eyes:    []Eye Pain  []Swelling  []Redness  []Foreign Body  []Discharge  []Vision Changes  []____  Cardiovascular:    []Chest Pain  []SOB  []PND  []FERNANDO  []Orthopnea  []Claudication  [x]Edema   []Palpitations  []____  Respiratory:    []Cough []Sputum  []Wheezing,  [x]SOB  []Hemoptysis  []____  Gastrointestinal:    []Nausea  []Vomiting  []Diarrhea  []Constipation  []Pain  []Heartburn  []Anorexia  []Dysphagia  []Hematochezia  []Melena,  []Jaundice  []____  Genitourinary:    []Dysuria  []Urinary Frequency  []Hematuria  []Urinary Incontinence  []Urgency  []Flank Pain  []Hesitancy  []____  Musculoskeletal:    []Arthralgias  []Myalgias  []Joint Swelling  []Joint Stiffness  []Back Pain  []Neck Pain  []____  Skin:    []Skin Lesions  []Pruritis  []Hair Changes  []Skin rashes  []____  Neuro:    []Weakness  []Numbness  []Paresthesias  []Loss of Consciousness  []Syncope   []Dizziness  []Headache  []Coordination Changes  []Recent Falls  []____  Psych:    []Anxiety/Depression  []Insomnia  []Memory Changes  []Violence/Abuse Hx.  []____  Heme/Lymph:    []Bruising  []Bleeding  []Lymphadenopathy  []____  Endocrine:    []Polyuria  []Polydipsia  []Temperature Intolerance  []____         Objective:     Physical Exam:                Visit Vitals  /87 (BP 1 Location: Right arm, BP Patient Position: Sitting)   Pulse 74   Temp 97.9 °F (36.6 °C)   Resp 18   Ht 5' 8\" (1.727 m)   Wt 181 lb 4.8 oz (82.2 kg)   SpO2 94%   BMI 27.57 kg/m²        General Appearance:   Well developed, well nourished,alert and oriented x 3, and   individual in no acute distress. Ears/Nose/Mouth/Throat:    Hearing grossly normal.         Neck:  Supple. Chest:    Lungs crackles in bases  to auscultation bilaterally. Cardiovascular:   Irregular rate and rhythm, S1, S2 normal, no murmur. Abdomen:    Soft, non-tender, bowel sounds are active. Extremities:  2-3+ edema bilaterally. Skin:  Warm and dry.      Telemetry: AFIB     Data Review:    Labs:    Recent Results (from the past 24 hour(s))   METABOLIC PANEL, BASIC    Collection Time: 12/16/19  5:31 AM   Result Value Ref Range    Sodium 137 136 - 145 mmol/L    Potassium 4.5 3.5 - 5.1 mmol/L    Chloride 103 97 - 108 mmol/L    CO2 32 21 - 32 mmol/L    Anion gap 2 (L) 5 - 15 mmol/L    Glucose 92 65 - 100 mg/dL    BUN 23 (H) 6 - 20 MG/DL    Creatinine 1.04 0.70 - 1.30 MG/DL    BUN/Creatinine ratio 22 (H) 12 - 20      GFR est AA >60 >60 ml/min/1.73m2    GFR est non-AA >60 >60 ml/min/1.73m2    Calcium 8.7 8.5 - 10.1 MG/DL          Radiology:        Current Facility-Administered Medications   Medication Dose Route Frequency    predniSONE (DELTASONE) tablet 20 mg  20 mg Oral DAILY    amLODIPine (NORVASC) tablet 5 mg  5 mg Oral DAILY    aspirin delayed-release tablet 81 mg  81 mg Oral DAILY    atorvastatin (LIPITOR) tablet 10 mg  10 mg Oral DAILY    cyanocobalamin (VITAMIN B12) tablet 1,000 mcg  1,000 mcg Oral DAILY    gabapentin (NEURONTIN) capsule 400 mg  400 mg Oral QHS    losartan (COZAAR) tablet 50 mg  50 mg Oral DAILY    pantoprazole (PROTONIX) tablet 40 mg  40 mg Oral DAILY    sodium chloride (NS) flush 5-40 mL  5-40 mL IntraVENous Q8H    sodium chloride (NS) flush 5-40 mL  5-40 mL IntraVENous PRN    ondansetron (ZOFRAN) injection 4 mg  4 mg IntraVENous Q6H PRN    acetaminophen (TYLENOL) tablet 650 mg  650 mg Oral Q4H PRN    morphine injection 1 mg  1 mg IntraVENous Q4H PRN    furosemide (LASIX) injection 40 mg  40 mg IntraVENous BID    potassium chloride SR (KLOR-CON 10) tablet 40 mEq  40 mEq Oral BID       Bradd Gum.  MATHIEU Zheng       [facility] [address] [place] [address], [address] [place] [address] [address]   Jeffry Munoz   [phone]

## 2019-12-16 NOTE — PROGRESS NOTES
Pulmonary, Critical Care, and Sleep Medicine~Progress Note    Name: Brionna Bejarano MRN: [id number]   : 1935 Hospital: . [address]   Date: 2019 9:21 AM Admission: 2019     Impression Plan   1. Progressive dyspnea in the setting of OHD and MS  2. Myasthenia gravis; 10/15/19 PFTs: FVC 2.3L @ 66%, FEV1 1.49L @ 61%, VC 2.3L @ 66%, PIMAX 52%, PEMAX 46%   3. A fib  4. Pedal edema:   Echo; EF 50-55%, mod MV regurgitation, dilated RV, mild to moderate pulm hypertension, PASP 55mmHg, TV max velocity 316.35cm/s   5. CAD s/p PCI in   6. HTN  7. Dysphagia hx; working with SLP as an outpatient    8. Anemia  1. abg noted; he is not hypercapnic enough to qualify for trilogy   2. Check VC/ NIF today via RT and placing those in the chart daily for now    3. Mestinon/prednione per neuro  4. O2 titration above 90%  5. Since he is in IMCU lets start NIV at night for now   6. We will get him back in to see Dr Mellissa Sanches in 1-2 wks   7. He will need follow up in our sleep clinic next available to screen for central apneas; he is at risk. Daily Progression:    ~ VC 0.9L   ~ NIF 40 cmH2o    VC 1.1L   12/15~not completed   He is feeling well today. extrem edema improving       Consult Note requested by hospitalist service. Patient admitted by the request of Neuro. He reported over the past week that he has seen progressive dyspnea and pedal edema. + cough and generalized weakness, but no fevers, purulent sputum, wheeze, hx of asthma/copd. Known MS and a fib. SNIFF test in October was normal.     I have reviewed the labs and previous days notes. Pertinent items are noted in HPI.   Past Medical History:   Diagnosis Date    Diverticulosis, sigmoid 2011    ED (erectile dysfunction) of organic origin     History of prostate cancer     Hypertension     Prostate cancer ([facility] [address]     Sebaceous cyst 2014    Shingles     Shingles       Past Surgical History:   Procedure Laterality Date    ENDOSCOPY, COLON, DIAGNOSTIC  >= 8-10years ago   [facility]  [id number]    HX CARPAL TUNNEL RELEASE  4/08    right,left    HX CHOLECYSTECTOMY      HX HERNIA REPAIR      HX PROSTATECTOMY  5/06      Prior to Admission medications    Medication Sig Start Date End Date Taking? Authorizing Provider   gabapentin (NEURONTIN) 400 mg capsule Take 400 mg by mouth nightly. Yes Provider, Historical   predniSONE (DELTASONE) 10 mg tablet Take 30 mg by mouth daily. 11/21/19  Yes Provider, Historical   aspirin delayed-release 81 mg tablet Take 81 mg by mouth daily. Yes Provider, Historical   pantoprazole (PROTONIX) 40 mg tablet Take 40 mg by mouth daily. 11/21/19  Yes Provider, Historical   pyridostigmine (MESTINON) 60 mg tablet Take 60 mg by mouth three (3) times daily. 11/21/19  Yes Provider, Historical   losartan (COZAAR) 50 mg tablet Take 1 Tab by mouth daily. 12/5/19  Yes Anai Price MD   amLODIPine (NORVASC) 5 mg tablet TAKE 1 TABLET BY MOUTH EVERY DAY 12/5/19  Yes Anai Price MD   albuterol (PROVENTIL HFA, VENTOLIN HFA, PROAIR HFA) 90 mcg/actuation inhaler Take 1-2 Puffs by inhalation every four (4) hours as needed for Wheezing. 10/30/19  Yes Lino Willoughby MD   atorvastatin (LIPITOR) 10 mg tablet Take 1 Tab by mouth daily. 6/17/19  Yes Aaron Egan MD   clopidogrel (PLAVIX) 75 mg tab Take 1 Tab by mouth daily. 5/30/19  Yes Aaron Egan MD   cyanocobalamin (VITAMIN B-12) 1,000 mcg tablet Take 1,000 mcg by mouth daily.    Yes Provider, Historical     No Known Allergies   Social History     Tobacco Use    Smoking status: Never Smoker    Smokeless tobacco: Never Used   Substance Use Topics    Alcohol use: No      Family History   Problem Relation Age of Onset   Quiroz Noon Cancer Mother         pancreatic    Diabetes Father     Stroke Father     Diabetes Sister     Hypertension Sister     Other Sister         Open heart surgery     Diabetes Brother     Hypertension Brother     Hypertension Brother     Asthma Daughter     Heart Attack Daughter     Other Daughter         hip replacement x 2       OBJECTIVE:     Vital Signs:       Visit Vitals  /87 (BP 1 Location: Right arm, BP Patient Position: Sitting)   Pulse 74   Temp 97.9 °F (36.6 °C)   Resp 18   Ht 5' 8\" (1.727 m)   Wt 82.2 kg (181 lb 4.8 oz)   SpO2 92%   BMI 27.57 kg/m²      Temp (24hrs), Av.9 °F (36.6 °C), Min:97.4 °F (36.3 °C), Max:98.6 °F (37 °C)     Intake/Output:     Last shift: No intake/output data recorded.     Last 3 shifts:  1901 -  0700  In: 480 [P.O.:480]  Out: 4175 [Urine:4175]          Intake/Output Summary (Last 24 hours) at 2019 7561  Last data filed at 2019 0029  Gross per 24 hour   Intake 240 ml   Output 2000 ml   Net -1760 ml       Physical Exam:                                        Exam Findings Other   General: No resp distress noted, appears stated age    [de-identified]:  No ulcers, JVD not elevated, no cervical LAD    Chest: No pectus deformity, normal chest rise b/l    HEART:  IRR, no murmurs/rubs/gallops    Lungs:  CTA b/l, diminished BS at bases    ABD: Soft/NT, non rigid mildly distended    EXT: normal peripheral pulses    Skin: No rashes or ulcers, no mottling    Neuro: A/O x 3        Medications:  Current Facility-Administered Medications   Medication Dose Route Frequency    predniSONE (DELTASONE) tablet 20 mg  20 mg Oral DAILY    amLODIPine (NORVASC) tablet 5 mg  5 mg Oral DAILY    aspirin delayed-release tablet 81 mg  81 mg Oral DAILY    atorvastatin (LIPITOR) tablet 10 mg  10 mg Oral DAILY    cyanocobalamin (VITAMIN B12) tablet 1,000 mcg  1,000 mcg Oral DAILY    gabapentin (NEURONTIN) capsule 400 mg  400 mg Oral QHS    losartan (COZAAR) tablet 50 mg  50 mg Oral DAILY    pantoprazole (PROTONIX) tablet 40 mg  40 mg Oral DAILY    sodium chloride (NS) flush 5-40 mL  5-40 mL IntraVENous Q8H    sodium chloride (NS) flush 5-40 mL  5-40 mL IntraVENous PRN    ondansetron (ZOFRAN) injection 4 mg  4 mg IntraVENous Q6H PRN    acetaminophen (TYLENOL) tablet 650 mg  650 mg Oral Q4H PRN    morphine injection 1 mg  1 mg IntraVENous Q4H PRN    furosemide (LASIX) injection 40 mg  40 mg IntraVENous BID    potassium chloride SR (KLOR-CON 10) tablet 40 mEq  40 mEq Oral BID       Labs:  ABG Recent Labs     12/13/19  1029   PHI 7.420   PCO2I 51.2*   PO2I 80   HCO3I 33.2*   SO2I 96        CBC Recent Labs     12/14/19  0455   WBC 6.2   HGB 8.9*   HCT 31.6*   *   MCV 85.4   MCH 59.6*        Metabolic  Panel Recent Labs     12/16/19  0531 12/15/19  0504 12/14/19  0455    141 142   K 4.5 4.0 3.4*    102 105   CO2 32 38* 35*   GLU 92 98 119*   BUN 23* 22* 27*   CREA 1.04 0.95 0.84   CA 8.7 8.8 8.6        Pertinent Labs                Patric Hall PA-C  12/16/2019

## 2019-12-17 ENCOUNTER — PATIENT OUTREACH (OUTPATIENT)
Dept: FAMILY MEDICINE CLINIC | Age: 84
End: 2019-12-17

## 2019-12-17 NOTE — PROGRESS NOTES
Hospital Discharge Follow-Up      Date/Time:  2019 1:39 PM    Patient was admitted to [facility] on 19 and discharged on 19 for dyspnea . The physician discharge summary was available at the time of outreach. Patient was contacted within 1 business days of discharge. Top Challenges reviewed with the provider   Dyspnea   Echo shows normal EF with some LVH. Cardiology does not think that patient's dyspnea is related to CHF  Lower extremity edema is likely secondary to side effect of prednisone and possibly likely to Norvasc. Patient's edema improved with Lasix. Patient to continue taking p.o. Lasix at home  Pulmonology follows the patient and patient follow-up with pulmonology in 1 week and sleep clinic for further evaluation of sleep apnea  Advance Care Planning:   Does patient have an Advance Directive:  not on file        Method of communication with provider :staff message    Inpatient RRAT score: 10  Was this a readmission? no   Patient stated reason for the readmission:     Care Transition Nurse (CTN) contacted the patient by telephone to perform post hospital discharge assessment. Verified name and  with patient as identifiers. Provided introduction to self, and explanation of the CTN role. Patient received hospital discharge instructions. CTN reviewed discharge instructions and red flags with patient who verbalized understanding. Patient given an opportunity to ask questions and does not have any further questions or concerns at this time. The patient agrees to contact the PCP office for questions related to their healthcare. CTN provided contact information for future reference.     Disease Specific:       Patients top risk factors for readmission:  None    Home Health orders at discharge: none    Medication(s):   New Medications at Discharge: lasix  k-tab  Changed Medications at Discharge: prednisone  Discontinued Medications at Discharge: mestinon    Medication reconciliation was performed with patient, who verbalizes understanding of administration of home medications. There were no barriers to obtaining medications identified at this time. Referral to Pharm D needed: no     Current Outpatient Medications   Medication Sig    predniSONE (DELTASONE) 10 mg tablet Take 10 mg by mouth daily for 4 days. Stop taking prednisone after 4 days    furosemide (LASIX) 40 mg tablet Take 1 Tab by mouth daily.  potassium chloride SR (K-TAB) 20 mEq tablet Take 2 Tabs by mouth daily.  gabapentin (NEURONTIN) 400 mg capsule Take 400 mg by mouth nightly.  aspirin delayed-release 81 mg tablet Take 81 mg by mouth daily.  pantoprazole (PROTONIX) 40 mg tablet Take 40 mg by mouth daily.  losartan (COZAAR) 50 mg tablet Take 1 Tab by mouth daily.  amLODIPine (NORVASC) 5 mg tablet TAKE 1 TABLET BY MOUTH EVERY DAY    albuterol (PROVENTIL HFA, VENTOLIN HFA, PROAIR HFA) 90 mcg/actuation inhaler Take 1-2 Puffs by inhalation every four (4) hours as needed for Wheezing.  atorvastatin (LIPITOR) 10 mg tablet Take 1 Tab by mouth daily.  clopidogrel (PLAVIX) 75 mg tab Take 1 Tab by mouth daily.  cyanocobalamin (VITAMIN B-12) 1,000 mcg tablet Take 1,000 mcg by mouth daily. No current facility-administered medications for this visit. There are no discontinued medications. [facility] follow up appointment(s): patient is setup to see Simone Hughes NP on 12/19/19 at 3p  Future Appointments   Date Time Provider Samantha Jil   12/20/2019  2:40 PM Therese Jalloh  [address]   2/3/2020 10:00 AM Luli Dietrich MD [facility]   10/19/2020  9:20 AM Therese Jalloh  [address]      Non-[facility] follow up appointment(s): patient is calling [facility]  To set up sleep study  [facility]:  [address] [place] [address] Attends follow-up appointments as directed. 12/17/19 spoke about the importance of following up with PCP.

## 2019-12-19 ENCOUNTER — OFFICE VISIT (OUTPATIENT)
Dept: FAMILY MEDICINE CLINIC | Age: 84
End: 2019-12-19

## 2019-12-19 VITALS
BODY MASS INDEX: 28.43 KG/M2 | WEIGHT: 187.6 LBS | RESPIRATION RATE: 18 BRPM | TEMPERATURE: 97.8 F | HEART RATE: 64 BPM | SYSTOLIC BLOOD PRESSURE: 126 MMHG | OXYGEN SATURATION: 98 % | DIASTOLIC BLOOD PRESSURE: 45 MMHG | HEIGHT: 68 IN

## 2019-12-19 DIAGNOSIS — D50.0 IRON DEFICIENCY ANEMIA DUE TO CHRONIC BLOOD LOSS: ICD-10-CM

## 2019-12-19 DIAGNOSIS — I48.0 PAROXYSMAL ATRIAL FIBRILLATION (HCC): ICD-10-CM

## 2019-12-19 DIAGNOSIS — R60.9 PERIPHERAL EDEMA: ICD-10-CM

## 2019-12-19 DIAGNOSIS — G70.00 MYASTHENIA GRAVIS (HCC): ICD-10-CM

## 2019-12-19 DIAGNOSIS — K29.61 OTHER GASTRITIS WITH BLEEDING, UNSPECIFIED CHRONICITY: ICD-10-CM

## 2019-12-19 DIAGNOSIS — R06.09 OTHER FORM OF DYSPNEA: Primary | ICD-10-CM

## 2019-12-19 DIAGNOSIS — I25.10 CORONARY ARTERY DISEASE INVOLVING NATIVE CORONARY ARTERY OF NATIVE HEART WITHOUT ANGINA PECTORIS: ICD-10-CM

## 2019-12-19 PROBLEM — B02.29 POSTHERPETIC NEURALGIA: Status: ACTIVE | Noted: 2019-12-19

## 2019-12-19 RX ORDER — LANOLIN ALCOHOL/MO/W.PET/CERES
325 CREAM (GRAM) TOPICAL
Qty: 30 TAB | Refills: 2 | Status: SHIPPED | OUTPATIENT
Start: 2019-12-19 | End: 2020-07-20

## 2019-12-19 NOTE — PROGRESS NOTES
[address] [facility] Note    Mr. Dorann Halsted is a 82y.o. year old male, he is seen today for Transition of Care services following a hospital discharge for dyspnea on 12/16/19. Our office Nurse Navigator performed an outreach to Mr. Victor M Khan on 12/17/19 (within 2 business days of discharge) to complete medication reconciliation and a telephonic assessment of his condition. Subjective:  Hospital Follow up  Patient seen for follow up of dyspnea and peripheral edema. Patient presented to [address] [place] [address] ED on 12/12/19 with shortness of breath. He was admitted for evaluation of CHF. Echocardiogram showed normal EF with some LVH. Cardiology does not think that patient's dyspnea is related to CHF. Lower extremity edema is likely secondary to side effect of prednisone and possibly amlodipine. Patient's edema improved with Lasix. Weight down 20 lbs since admission. Patient continuing Lasix and KCl daily. Prednisone decreased to 10 mg with goal to discontinue in 4 days. Patient to follow up with pulmonology, Dr. Josiah Vasquez as outpatient. He will also be evaluated for sleep apnea. Patient was previously admitted to [facility] from 11/12/2019 through 11/21/2019 for progressive shortness of breath with decreased diaphragmatic motion concerning for acute flareup of ? myasthenia gravis. He was admitted to complete plasmapheresis of which he completed 5 days. He had an endoscopy that showed abnormal esophageal motility and gastritis. He developed atrial fibrillation during this admission. Anticoagulation differed given low platelets and anemia. Prior to Admission medications    Medication Sig Start Date End Date Taking? Authorizing Provider   ferrous sulfate 325 mg (65 mg iron) tablet Take 1 Tab by mouth Daily (before breakfast). 12/19/19  Yes Mark Rater, NP   predniSONE (DELTASONE) 10 mg tablet Take 10 mg by mouth daily for 4 days.  Stop taking prednisone after 4 days 12/16/19 12/20/19 Yes Horacio Doran MD   potassium chloride SR (K-TAB) 20 mEq tablet Take 2 Tabs by mouth daily. 12/16/19  Yes Horacio Doran MD   gabapentin (NEURONTIN) 400 mg capsule Take 400 mg by mouth nightly. Yes Provider, Historical   aspirin delayed-release 81 mg tablet Take 81 mg by mouth daily. Yes Provider, Historical   pantoprazole (PROTONIX) 40 mg tablet Take 40 mg by mouth daily. 11/21/19  Yes Provider, Historical   losartan (COZAAR) 50 mg tablet Take 1 Tab by mouth daily. 12/5/19  Yes Bjorn Montano MD   amLODIPine (NORVASC) 5 mg tablet TAKE 1 TABLET BY MOUTH EVERY DAY 12/5/19  Yes Bjorn Montano MD   albuterol (PROVENTIL HFA, VENTOLIN HFA, PROAIR HFA) 90 mcg/actuation inhaler Take 1-2 Puffs by inhalation every four (4) hours as needed for Wheezing. 10/30/19  Yes Huang Higuera MD   atorvastatin (LIPITOR) 10 mg tablet Take 1 Tab by mouth daily. 6/17/19  Yes Rosezetta Leyden, MD   clopidogrel (PLAVIX) 75 mg tab Take 1 Tab by mouth daily. 5/30/19  Yes Rosezetta Leyden, MD   cyanocobalamin (VITAMIN B-12) 1,000 mcg tablet Take 1,000 mcg by mouth daily. Yes Provider, Historical   furosemide (LASIX) 40 mg tablet Take 1 Tab by mouth daily. 12/16/19   Horacio Doran MD          No Known Allergies        ROS  See HPI    Objective:   Physical Exam  Vitals signs and nursing note reviewed. Constitutional:       Appearance: He is well-developed. Neck:      Musculoskeletal: Normal range of motion and neck supple. Thyroid: No thyromegaly. Vascular: No carotid bruit or JVD. Cardiovascular:      Rate and Rhythm: Normal rate and regular rhythm. Heart sounds: No murmur. No friction rub. No gallop. Pulmonary:      Effort: Pulmonary effort is normal. No respiratory distress. Breath sounds: Normal breath sounds. Musculoskeletal:      Right lower leg: Edema (trace) present. Left lower leg: Edema (trace) present. Lymphadenopathy:      Cervical: No cervical adenopathy.    Neurological: Mental Status: He is alert and oriented to person, place, and time. Psychiatric:         Behavior: Behavior normal.         Visit Vitals  /45 (BP 1 Location: Right arm, BP Patient Position: Sitting)   Pulse 64   Temp 97.8 °F (36.6 °C) (Oral)   Resp 18   Ht 5' 8\" (1.727 m)   Wt 187 lb 9.6 oz (85.1 kg)   SpO2 98%   BMI 28.52 kg/m²       Assessment & Plan:  Diagnoses and all orders for this visit:    1. Other form of dyspnea  Resolved. Etiology unclear. Follow up with pulmonology and cardiology as scheduled. 2. Peripheral edema  Improved. Patient to discuss d/c of furosemide with cardiology tomorrow. 3. Iron deficiency anemia due to chronic blood loss  S/t to gastritis. Begin iron supplement. Recheck labs in 2 weeks. -     ferrous sulfate 325 mg (65 mg iron) tablet; Take 1 Tab by mouth Daily (before breakfast). 4. Coronary artery disease involving native coronary artery of native heart without angina pectoris  Remains on clopidogrel and ASA. Managed by cardiology, no changes. 5. Paroxysmal atrial fibrillation (HCC)  NSR at present. Managed by cardiology, no changes. 6. Other gastritis with bleeding, unspecified chronicity  Continue PPI. 7. Myasthenia gravis  Appears to be a questionable diagnosis at this time given negative EMG. Patient to follow up with neurology for further testing. I have discussed the diagnosis with the patient and the intended plan as seen in the above orders. The patient has received an after-visit summary along with patient information handout. I have discussed medication side effects and warnings with the patient as well. Follow-up and Dispositions    · Return in about 2 weeks (around 1/2/2020) for disease management.            Eddie Whitmore NP

## 2019-12-19 NOTE — PROGRESS NOTES
Brionna Bejarano is a 82 y.o. male  Chief Complaint   Patient presents with   [facility] Follow Up     Patient was at [facility] 12/12/2019. For Dyspnea. Health Maintenance Due   Topic Date Due    GLAUCOMA SCREENING Q2Y  09/15/2017    DTaP/Tdap/Td series (2 - Tdap) 02/14/2019    COLONOSCOPY  03/18/2020     Visit Vitals  /45 (BP 1 Location: Right arm, BP Patient Position: Sitting)   Pulse 64   Temp 97.8 °F (36.6 °C) (Oral)   Resp 18   Ht 5' 8\" (1.727 m)   Wt 187 lb 9.6 oz (85.1 kg)   SpO2 98%   BMI 28.52 kg/m²     1. Have you been to the ER, urgent care clinic since your last visit? Hospitalized since your last visit? [facility] for dyspnea on 12/12/2019    2. Have you seen or consulted any other health care providers outside of the [address] [place] [address] since your last visit? Include any pap smears or colon screening. Drake garcia [facility]

## 2019-12-19 NOTE — PATIENT INSTRUCTIONS
Iron Deficiency Anemia: Care Instructions Your Care Instructions Anemia means that you do not have enough red blood cells. Red blood cells carry oxygen around your body. When you have anemia, it can make you pale, weak, and tired. Many things can cause anemia. The most common cause is loss of blood. This can happen if you have heavy menstrual periods. It can also happen if you have bleeding in your stomach or bowel. You can also get anemia if you don't have enough iron in your diet or if it's hard for your body to absorb iron. In some cases, pregnancy causes anemia. That's because a pregnant woman needs more iron. Your doctor may do more tests to find the cause of your anemia. If a disease or other health problem is causing it, your doctor will treat that problem. It's important to follow up with your doctor to make sure that your iron level returns to normal. 
Follow-up care is a key part of your treatment and safety. Be sure to make and go to all appointments, and call your doctor if you are having problems. It's also a good idea to know your test results and keep a list of the medicines you take. How can you care for yourself at home? · If your doctor recommended iron pills, take them as directed. ? Try to take the pills on an empty stomach. You can do this about 1 hour before or 2 hours after meals. But you may need to take iron with food to avoid an upset stomach. ? Do not take antacids or drink milk or anything with caffeine within 2 hours of when you take your iron. They can keep your body from absorbing the iron well. ? Vitamin C helps your body absorb iron. You may want to take iron pills with a glass of orange juice or some other food high in vitamin C. 
? Iron pills may cause stomach problems. These include heartburn, nausea, diarrhea, constipation, and cramps. It can help to drink plenty of fluids and include fruits, vegetables, and fiber in your diet. ? It's normal for iron pills to make your stool a greenish or grayish black. But internal bleeding can also cause dark stool. So it's important to tell your doctor about any color changes. ? Call your doctor if you think you are having a problem with your iron pills. Even after you start to feel better, it will take several months for your body to build up its supply of iron. ? If you miss a pill, don't take a double dose. ? Keep iron pills out of the reach of small children. Too much iron can be very dangerous. · Eat foods with a lot of iron. These include red meat, shellfish, poultry, and eggs. They also include beans, raisins, whole-grain bread, and leafy green vegetables. · Steam your vegetables. This is the best way to prepare them if you want to get as much iron as possible. · Be safe with medicines. Do not take nonsteroidal anti-inflammatory pain relievers unless your doctor tells you to. These include aspirin, naproxen (Aleve), and ibuprofen (Advil, Motrin). · Liquid iron can stain your teeth. But you can mix it with water or juice and drink it with a straw. Then it won't get on your teeth. When should you call for help? Call 911 anytime you think you may need emergency care. For example, call if: 
  · You passed out (lost consciousness).  
 Call your doctor now or seek immediate medical care if: 
  · You are short of breath.  
  · You are dizzy or light-headed, or you feel like you may faint.  
  · You have new or worse bleeding.  
 Watch closely for changes in your health, and be sure to contact your doctor if: 
  · You feel weaker or more tired than usual.  
  · You do not get better as expected. Where can you learn more? Go to http://marques-mei.info/. Enter D260 in the search box to learn more about \"Iron Deficiency Anemia: Care Instructions. \" Current as of: March 28, 2019 Content Version: 12.2 © 4607-1147 [employer].  Care instructions adapted under license by [address] (which disclaims liability or warranty for this information). If you have questions about a medical condition or this instruction, always ask your healthcare professional. [address] any warranty or liability for your use of this information.

## 2019-12-20 ENCOUNTER — OFFICE VISIT (OUTPATIENT)
Dept: CARDIOLOGY CLINIC | Age: 84
End: 2019-12-20

## 2019-12-20 VITALS
OXYGEN SATURATION: 98 % | HEART RATE: 62 BPM | WEIGHT: 186.4 LBS | HEIGHT: 68 IN | RESPIRATION RATE: 16 BRPM | BODY MASS INDEX: 28.25 KG/M2 | SYSTOLIC BLOOD PRESSURE: 120 MMHG | DIASTOLIC BLOOD PRESSURE: 70 MMHG

## 2019-12-20 DIAGNOSIS — J98.6 ELEVATED HEMIDIAPHRAGM: ICD-10-CM

## 2019-12-20 DIAGNOSIS — R60.0 PEDAL EDEMA: ICD-10-CM

## 2019-12-20 DIAGNOSIS — I25.10 CORONARY ARTERY DISEASE INVOLVING NATIVE CORONARY ARTERY OF NATIVE HEART WITHOUT ANGINA PECTORIS: ICD-10-CM

## 2019-12-20 DIAGNOSIS — G70.00 MYASTHENIA GRAVIS (HCC): ICD-10-CM

## 2019-12-20 DIAGNOSIS — I10 ESSENTIAL HYPERTENSION: ICD-10-CM

## 2019-12-20 DIAGNOSIS — I48.19 PERSISTENT ATRIAL FIBRILLATION (HCC): Primary | ICD-10-CM

## 2019-12-20 NOTE — PROGRESS NOTES
Balwinder Mcguire Qamarurjilihaja     1935       Gaby Sampson MD, [facility] [place]  Date of Visit-12/20/2019   PCP is Marissa Rodriguez MD   [facility]  [facility]  HPI:  Shasta Schreiber is a 82 y.o. male   Pt has CAD with prior stent this year. However his main issue is possibly myasthenia gravis. He sees Dr. Donald Roberson and was hospitalized at [facility]. He underwent plasmapheresis and then put on steroids. At that point he became markedly edemetous and I admitted him on 12/12/19. He received IV diuretics and was discharged on 12/16. Pulmonary will follow him up for DAYO. His prednisone was decreased significantly. He stopped Mestinon. He also has anemia and saw Marissa Rodriguez MD yesterday. Pt is present with his sister. Pt reports that today was the last day of taking Prednisone. He notes some SOB, but states this depends on his activity. Pt claims that he was recently mopping the floor and felt short of breath after. He felt like he overdid it. Pt is wondering the ideology of his cardiac problems and notes that his sxs started after getting shingles. He notes that currently he feels a little fatigued. Pt was previously stayed active playing handball, but states that he no longer feels like he can play handball. Denies chest pain, edema, syncope, has no tachycardia, palpitations or sense of arrhythmia. 12/12/19   ECHO ADULT COMPLETE 12/13/2019 12/13/2019    Narrative · Normal cavity size and systolic function (ejection fraction normal). Severe concentric hypertrophy. Estimated left ventricular ejection   fraction is 50 - 55%. · Moderately dilated left atrium. · Moderate mitral valve regurgitation is present. · Mild pulmonic valve regurgitation is present. · Mildly dilated right atrium. · Mildly dilated right ventricle. Moderately reduced systolic function. · Mild to moderate tricuspid valve regurgitation is present.   · Mild aortic valve regurgitation is present. · Mild to moderate pulmonary hypertension. · Mild aortic root dilatation. Signed by: Sameer Woods MD      Assessment/Plan:     1. Persistent atrial fibrillation  Rate is controlled. EF is normal. Given his anemia and recent illness we have chosen to treat with ASA and Plavix, but not add an [address]. As his anemia improves we may consider cardioversion or rhythm therapy in the future. I will reduce his Lasix and K to 3x a week. 2. Essential hypertension  BP Readings from Last 6 Encounters:   12/20/19 120/70   12/19/19 126/45   12/16/19 125/85   12/05/19 154/78   10/21/19 130/70   08/05/19 135/68       3. Coronary artery disease involving native coronary artery of native heart without angina pectoris  Recent stent has had no angina  4. Elevated hemidiaphragm  Work-up for shortness of breath found an elevated hemidiaphragm he has been diagnosed with myasthenia gravis he then got very sick after prednisone notes not clear what the source of that acute edema was I suspect it related to this kind of swelling an echo was done his shortness of breath is better as well as edema. He has been hospitalized [facility] and [address] [place] [address] over the past few months    Follow up in 3 months. Future Appointments   Date Time Provider Samantha Ley   1/2/2020 10:30 Jesusita Rubio MD PAF HIRAM SCHED   2/3/2020 10:00 AM MD MARIUSZ Romero HIRAM SCHED   3/18/2020  2:00 PM Alexa Rose  [address]   10/19/2020  9:20 AM Alexa Rose  [address]      Patient Instructions   Decrease lasix and potassium to only 3 days a week (Monday, Wednesday, and Friday)    Follow up in 3 months     Key CAD CHF Meds             furosemide (LASIX) 40 mg tablet (Taking) Take 1 Tab by mouth daily. aspirin delayed-release 81 mg tablet (Taking) Take 81 mg by mouth daily. losartan (COZAAR) 50 mg tablet (Taking) Take 1 Tab by mouth daily.     amLODIPine (NORVASC) 5 mg tablet (Taking) TAKE 1 TABLET BY MOUTH EVERY DAY    atorvastatin (LIPITOR) 10 mg tablet (Taking) Take 1 Tab by mouth daily. clopidogrel (PLAVIX) 75 mg tab (Taking) Take 1 Tab by mouth daily. Impression:   1. Persistent atrial fibrillation    2. Essential hypertension    3. Coronary artery disease involving native coronary artery of native heart without angina pectoris    4. Elevated hemidiaphragm    5. Pedal edema    6. Myasthenia gravis [facility])       Cardiac History:   No specialty comments available. ROS-except as noted above. . A complete cardiac and respiratory are reviewed and negative except as above ; Resp-denies wheezing  or productive cough,. Const- No unusual weight loss or fever; Neuro-no recent seizure or CVA ; GI- No BRBPR, abdom pain, bloating ; - no  hematuria   see supplement sheet, initialed and to be scanned by staff  Past Medical History:   Diagnosis Date    CAD (coronary artery disease)     Diverticulosis, sigmoid 8/2011    ED (erectile dysfunction) of organic origin     Hypertension     Postherpetic neuralgia     Prostate cancer ([facility]     Sebaceous cyst 4/1/2014    Shingles 02/2019      Social Hx= reports that he has never smoked. He has never used smokeless tobacco. He reports that he does not drink alcohol or use drugs. Exam and Labs:  /70 (BP 1 Location: Left arm, BP Patient Position: Sitting)   Pulse 62   Resp 16   Ht 5' 8\" (1.727 m)   Wt 186 lb 6.4 oz (84.6 kg)   SpO2 98%   BMI 28.34 kg/m² Constitutional:  NAD, comfortable  Head: NC,AT. Eyes: No scleral icterus. Neck:  Neck supple. No JVD present. Throat: moist mucous membranes. Chest: Effort normal & normal respiratory excursion . Neurological: alert, conversant and oriented . Skin: Skin is not cold. No obvious systemic rash noted. Not diaphoretic. No erythema. Psychiatric:  Grossly normal mood and affect. Behavior appears normal. Extremities:  no clubbing or cyanosis.  Abdomen: non distended    Lungs:breath sounds normal. No stridor. distress, wheezes or  Rales. Heart: normal rate, regular rhythm, normal S1, S2, no murmurs, rubs, clicks or gallops , PMI non displaced. Edema: Edema is none. Lab Results   Component Value Date/Time    Cholesterol, total 110 08/05/2019 12:19 PM    HDL Cholesterol 43 08/05/2019 12:19 PM    LDL, calculated 55 08/05/2019 12:19 PM    Triglyceride 60 08/05/2019 12:19 PM     Lab Results   Component Value Date/Time    Sodium 137 12/16/2019 05:31 AM    Potassium 4.5 12/16/2019 05:31 AM    Chloride 103 12/16/2019 05:31 AM    CO2 32 12/16/2019 05:31 AM    Anion gap 2 (L) 12/16/2019 05:31 AM    Glucose 92 12/16/2019 05:31 AM    BUN 23 (H) 12/16/2019 05:31 AM    Creatinine 1.04 12/16/2019 05:31 AM    BUN/Creatinine ratio 22 (H) 12/16/2019 05:31 AM    GFR est AA >60 12/16/2019 05:31 AM    GFR est non-AA >60 12/16/2019 05:31 AM    Calcium 8.7 12/16/2019 05:31 AM      Wt Readings from Last 3 Encounters:   12/20/19 186 lb 6.4 oz (84.6 kg)   12/19/19 187 lb 9.6 oz (85.1 kg)   12/16/19 180 lb 3.2 oz (81.7 kg)      BP Readings from Last 3 Encounters:   12/20/19 120/70   12/19/19 126/45   12/16/19 125/85      Current Outpatient Medications   Medication Sig    ferrous sulfate 325 mg (65 mg iron) tablet Take 1 Tab by mouth Daily (before breakfast).  predniSONE (DELTASONE) 10 mg tablet Take 10 mg by mouth daily for 4 days. Stop taking prednisone after 4 days    furosemide (LASIX) 40 mg tablet Take 1 Tab by mouth daily.  potassium chloride SR (K-TAB) 20 mEq tablet Take 2 Tabs by mouth daily.  gabapentin (NEURONTIN) 400 mg capsule Take 400 mg by mouth nightly.  aspirin delayed-release 81 mg tablet Take 81 mg by mouth daily.  pantoprazole (PROTONIX) 40 mg tablet Take 40 mg by mouth daily.  losartan (COZAAR) 50 mg tablet Take 1 Tab by mouth daily.     amLODIPine (NORVASC) 5 mg tablet TAKE 1 TABLET BY MOUTH EVERY DAY    albuterol (PROVENTIL HFA, VENTOLIN HFA, PROAIR HFA) 90 mcg/actuation inhaler Take 1-2 Puffs by inhalation every four (4) hours as needed for Wheezing.  atorvastatin (LIPITOR) 10 mg tablet Take 1 Tab by mouth daily.  clopidogrel (PLAVIX) 75 mg tab Take 1 Tab by mouth daily.  cyanocobalamin (VITAMIN B-12) 1,000 mcg tablet Take 1,000 mcg by mouth daily. No current facility-administered medications for this visit. Impression see above.       Written by Jenifer Muniz, as dictated by Blanquita Subramanian MD.

## 2019-12-20 NOTE — PROGRESS NOTES
Visit Vitals  /70 (BP 1 Location: Left arm, BP Patient Position: Sitting)   Pulse 62   Resp 16   Ht 5' 8\" (1.727 m)   Wt 186 lb 6.4 oz (84.6 kg)   SpO2 98%   BMI 28.34 kg/m²

## 2019-12-20 NOTE — PATIENT INSTRUCTIONS
Decrease lasix and potassium to only 3 days a week (Monday, Wednesday, and Friday) Follow up in 3 months

## 2019-12-20 NOTE — Clinical Note
12/20/19 Patient: Jt Lopez YOB: 1935 Date of Visit: 12/20/2019 Meri Millan MD 
[id number] [facility] [phone] [id number] VIA In Basket Dear Meri Millan MD, Thank you for referring Mr. Wilfred Hammond to [facility] for evaluation. My notes for this consultation are attached. If you have questions, please do not hesitate to call me. I look forward to following your patient along with you.  
 
 
Sincerely, 
 
Fidel Barnes MD

## 2019-12-29 PROBLEM — I48.19 PERSISTENT ATRIAL FIBRILLATION (HCC): Status: ACTIVE | Noted: 2019-12-29

## 2019-12-29 PROBLEM — I25.10 CORONARY ARTERY DISEASE INVOLVING NATIVE CORONARY ARTERY OF NATIVE HEART WITHOUT ANGINA PECTORIS: Status: ACTIVE | Noted: 2019-12-29

## 2019-12-29 PROBLEM — J98.6 ELEVATED HEMIDIAPHRAGM: Status: ACTIVE | Noted: 2019-12-29

## 2019-12-29 PROBLEM — G70.00 MYASTHENIA GRAVIS (HCC): Status: ACTIVE | Noted: 2019-12-29

## 2019-12-29 PROBLEM — R60.0 PEDAL EDEMA: Status: ACTIVE | Noted: 2019-12-29

## 2020-01-02 ENCOUNTER — HOSPITAL ENCOUNTER (OUTPATIENT)
Dept: LAB | Age: 85
Discharge: HOME | End: 2020-01-02
Payer: MEDICARE

## 2020-01-02 ENCOUNTER — OFFICE VISIT (OUTPATIENT)
Dept: FAMILY MEDICINE CLINIC | Age: 85
End: 2020-01-02

## 2020-01-02 VITALS
RESPIRATION RATE: 19 BRPM | HEIGHT: 68 IN | DIASTOLIC BLOOD PRESSURE: 64 MMHG | HEART RATE: 52 BPM | BODY MASS INDEX: 28.34 KG/M2 | TEMPERATURE: 97.8 F | SYSTOLIC BLOOD PRESSURE: 131 MMHG | OXYGEN SATURATION: 97 % | WEIGHT: 187 LBS

## 2020-01-02 DIAGNOSIS — I25.10 CORONARY ARTERY DISEASE INVOLVING NATIVE CORONARY ARTERY OF NATIVE HEART WITHOUT ANGINA PECTORIS: ICD-10-CM

## 2020-01-02 DIAGNOSIS — I10 ESSENTIAL HYPERTENSION WITH GOAL BLOOD PRESSURE LESS THAN 140/90: Primary | ICD-10-CM

## 2020-01-02 DIAGNOSIS — G70.00 MYASTHENIA GRAVIS (HCC): ICD-10-CM

## 2020-01-02 DIAGNOSIS — D50.9 IRON DEFICIENCY ANEMIA, UNSPECIFIED IRON DEFICIENCY ANEMIA TYPE: ICD-10-CM

## 2020-01-02 DIAGNOSIS — I48.0 PAROXYSMAL ATRIAL FIBRILLATION (HCC): ICD-10-CM

## 2020-01-02 DIAGNOSIS — M79.642 LEFT HAND PAIN: ICD-10-CM

## 2020-01-02 PROCEDURE — 83550 IRON BINDING TEST: CPT

## 2020-01-02 PROCEDURE — 36415 COLL VENOUS BLD VENIPUNCTURE: CPT

## 2020-01-02 PROCEDURE — 85025 COMPLETE CBC W/AUTO DIFF WBC: CPT

## 2020-01-02 NOTE — PROGRESS NOTES
Chief Complaint   Patient presents with    Hypertension     follow up        1. Have you been to the ER, urgent care clinic since your last visit? Hospitalized since your last visit? No    2. Have you seen or consulted any other health care providers outside of the [address] [place] [address] since your last visit? Include any pap smears or colon screening.  No

## 2020-01-02 NOTE — PROGRESS NOTES
HPI  Manual Woods 82 y.o. male  presents to the office today for follow up on HTN. Blood pressure 131/64, pulse (!) 52, temperature 97.8 °F (36.6 °C), temperature source Oral, resp. rate 19, height 5' 8\" (1.727 m), weight 187 lb (84.8 kg), SpO2 97 %. Body mass index is 28.43 kg/m². Chief Complaint   Patient presents with    Hypertension     follow up         Hypertension: BP at office today 131/64. Pt continues with Lasix 40 mg/BID every other day, Cozaar 50 mg/d, and Norvasc 5 mg/d. Hx of myasthenia gravis. Paroxysmal atrial fibrillation/CAD: Pt is under care of cardiology Dr. Sandip Toth; last OV was on 12/20/19. Pt was admitted to [address] [place] [address] from 12/13-12/16 for SOB. Pt had afib episode during his hospital stay. Iron deficiency anemia: Last iron was 21 on 12/13/19. Pt inquires about iron infusion. Admits that he feels fatigue and low energy levels. Pt is currently taking Ferrous sulfate 325 mg. Health Maintenance: Pt is due for colonoscopy in Mar 2020; last colonoscopy was in 2015. Pt was seen by neurology Dr. Kelley for postherpetic neuralgia. Pt still complains of pain of middle and ring fingers of left hand. Pt will be doing PT. Pt hopes to go back to playing handball soon. Current Outpatient Medications   Medication Sig Dispense Refill    ferrous sulfate 325 mg (65 mg iron) tablet Take 1 Tab by mouth Daily (before breakfast). 30 Tab 2    furosemide (LASIX) 40 mg tablet Take 1 Tab by mouth daily. (Patient taking differently: Take 40 mg by mouth BID Mon Wed & Fri.) 30 Tab 0    potassium chloride SR (K-TAB) 20 mEq tablet Take 2 Tabs by mouth daily. (Patient taking differently: Take 40 mEq by mouth every Monday, Wednesday, Friday.) 30 Tab 0    gabapentin (NEURONTIN) 400 mg capsule Take 400 mg by mouth nightly.  aspirin delayed-release 81 mg tablet Take 81 mg by mouth daily.  pantoprazole (PROTONIX) 40 mg tablet Take 40 mg by mouth daily.       losartan (COZAAR) 50 mg tablet Take 1 Tab by mouth daily. 90 Tab 1    amLODIPine (NORVASC) 5 mg tablet TAKE 1 TABLET BY MOUTH EVERY DAY 90 Tab 1    albuterol (PROVENTIL HFA, VENTOLIN HFA, PROAIR HFA) 90 mcg/actuation inhaler Take 1-2 Puffs by inhalation every four (4) hours as needed for Wheezing. 1 Inhaler 5    atorvastatin (LIPITOR) 10 mg tablet Take 1 Tab by mouth daily. 90 Tab 1    clopidogrel (PLAVIX) 75 mg tab Take 1 Tab by mouth daily. 90 Tab 3    cyanocobalamin (VITAMIN B-12) 1,000 mcg tablet Take 1,000 mcg by mouth daily. No Known Allergies  Past Medical History:   Diagnosis Date    CAD (coronary artery disease)     Diverticulosis, sigmoid 8/2011    ED (erectile dysfunction) of organic origin     Hypertension     Postherpetic neuralgia     Prostate cancer ([facility] [address] [address]     Sebaceous cyst 4/1/2014    Shingles 02/2019     Past Surgical History:   Procedure Laterality Date    ENDOSCOPY, COLON, DIAGNOSTIC  >= 8-10years ago    [id number]    HX CARPAL TUNNEL RELEASE  4/08    right,left    HX CHOLECYSTECTOMY      HX HEART CATHETERIZATION  05/2019    HX HERNIA REPAIR      HX PROSTATECTOMY  5/06     Family History   Problem Relation Age of Onset    Cancer Mother         pancreatic    Diabetes Father     Stroke Father     Diabetes Sister     Hypertension Sister     Other Sister         Open heart surgery     Diabetes Brother     Hypertension Brother     Hypertension Brother     Asthma Daughter     Heart Attack Daughter     Other Daughter         hip replacement x 2     Social History     Tobacco Use    Smoking status: Never Smoker    Smokeless tobacco: Never Used   Substance Use Topics    Alcohol use: No        Review of Systems   Constitutional: Positive for malaise/fatigue. Negative for chills and fever. HENT: Negative for hearing loss and tinnitus. Eyes: Negative for blurred vision and double vision. Respiratory: Negative for cough and shortness of breath.     Cardiovascular: Negative for chest pain and palpitations. Gastrointestinal: Negative for nausea and vomiting. Genitourinary: Negative for dysuria and frequency. Musculoskeletal: Positive for joint pain. Negative for back pain and falls. Skin: Negative for itching and rash. Neurological: Negative for dizziness, loss of consciousness and headaches. Psychiatric/Behavioral: Negative for depression. The patient is not nervous/anxious. Physical Exam  Vitals signs and nursing note reviewed. Constitutional:       Appearance: Normal appearance. He is well-developed. HENT:      Head: Normocephalic and atraumatic. Right Ear: External ear normal.      Left Ear: External ear normal.      Nose: Nose normal.   Eyes:      Conjunctiva/sclera: Conjunctivae normal.      Pupils: Pupils are equal, round, and reactive to light. Neck:      Musculoskeletal: Normal range of motion and neck supple. Cardiovascular:      Rate and Rhythm: Normal rate and regular rhythm. Pulses: Normal pulses. Heart sounds: Normal heart sounds. Pulmonary:      Effort: Pulmonary effort is normal.      Breath sounds: Normal breath sounds. Abdominal:      General: Bowel sounds are normal.      Palpations: Abdomen is soft. Musculoskeletal: Normal range of motion. Skin:     General: Skin is warm and dry. Neurological:      Mental Status: He is alert and oriented to person, place, and time. Psychiatric:         Speech: Speech normal.         Behavior: Behavior normal.         Thought Content: Thought content normal.         Judgment: Judgment normal.           ASSESSMENT and PLAN  Diagnoses and all orders for this visit:    1. Essential hypertension with goal blood pressure less than 140/90  BP is at goal today in office. Advised pt to continue with  Lasix 40 mg/BID every other day, Cozaar 50 mg/d, and Norvasc 5 mg/d. 2. [facility]ia [facility] ([facility]  Assessment & Plan:   This condition is managed by Specialist.  Lab Results Component Value Date/Time    WBC 6.2 12/14/2019 04:55 AM    HGB 8.9 12/14/2019 04:55 AM    HCT 31.6 12/14/2019 04:55 AM    PLATELET 431 48/97/5987 04:55 AM    Creatinine 1.04 12/16/2019 05:31 AM    BUN 23 12/16/2019 05:31 AM    Potassium 4.5 12/16/2019 05:31 AM     3. Paroxysmal atrial fibrillation (HCC)  Pt is under care of cardiology Dr. Cedric Cerda with Plavix 75 mg.     4. Coronary artery disease involving native coronary artery of native heart without angina pectoris  Pt is under care of cardiology Dr. Mayda Phelan. Geetha Bread with Plavix 75 mg.     5. Left hand pain  Advised pt to comply with PT. Pt can go back to exercise as tolerated and slowly increase heart rate. 6. Iron deficiency anemia, unspecified iron deficiency anemia type  Last iron was 21. Presumed stable, will assess levels today. Provided pt with hematology Dr. Mariah Brennan for iron infusion, as I think that iron tablets are not optimal for long run.   -     CBC WITH AUTOMATED DIFF  -     IRON PROFILE  -     REFERRAL TO HEMATOLOGY ONCOLOGY    Follow-up and Dispositions    · Return in about 3 months (around 4/2/2020) for hypertension follow up, hyperlipidemia follow up. Medication risks/benefits/costs/interactions/alternatives discussed with patient. Advised patient to call back or return to office if symptoms worsen/change/persist.  If patient cannot reach us or should anything more severe/urgent arise he/she should proceed directly to the nearest emergency department. Discussed expected course/resolution/complications of diagnosis in detail with patient. Patient given a written after visit summary which includes her diagnoses, current medications and vitals. Patient expressed understanding with the diagnosis and plan. Written by kellee Cooper, as dictated by Geovanna Thompson M.D.    11:01 AM - 11:27 AM    Total time spent with the patient 26 minutes, greater than 50% of time spent counseling patient.

## 2020-01-02 NOTE — ASSESSMENT & PLAN NOTE
This condition is managed by Specialist.  Lab Results   Component Value Date/Time    WBC 6.2 12/14/2019 04:55 AM    HGB 8.9 12/14/2019 04:55 AM    HCT 31.6 12/14/2019 04:55 AM    PLATELET 623 45/86/3348 04:55 AM    Creatinine 1.04 12/16/2019 05:31 AM    BUN 23 12/16/2019 05:31 AM    Potassium 4.5 12/16/2019 05:31 AM

## 2020-01-03 LAB
BASOPHILS # BLD AUTO: 0 X10E3/UL (ref 0–0.2)
BASOPHILS NFR BLD AUTO: 0 %
EOSINOPHIL # BLD AUTO: 0.1 X10E3/UL (ref 0–0.4)
EOSINOPHIL NFR BLD AUTO: 2 %
ERYTHROCYTE [DISTWIDTH] IN BLOOD BY AUTOMATED COUNT: 14.6 % (ref 12.3–15.4)
HCT VFR BLD AUTO: 33 % (ref 37.5–51)
HGB BLD-MCNC: 9.5 G/DL (ref 13–17.7)
IMM GRANULOCYTES # BLD AUTO: 0 X10E3/UL (ref 0–0.1)
IMM GRANULOCYTES NFR BLD AUTO: 1 %
IRON SATN MFR SERPL: 20 % (ref 15–55)
IRON SERPL-MCNC: 60 UG/DL (ref 38–169)
LYMPHOCYTES # BLD AUTO: 1.1 X10E3/UL (ref 0.7–3.1)
LYMPHOCYTES NFR BLD AUTO: 18 %
MCH RBC QN AUTO: 23.7 PG (ref 26.6–33)
MCHC RBC AUTO-ENTMCNC: 28.8 G/DL (ref 31.5–35.7)
MCV RBC AUTO: 82 FL (ref 79–97)
MONOCYTES # BLD AUTO: 0.7 X10E3/UL (ref 0.1–0.9)
MONOCYTES NFR BLD AUTO: 10 %
NEUTROPHILS # BLD AUTO: 4.5 X10E3/UL (ref 1.4–7)
NEUTROPHILS NFR BLD AUTO: 69 %
PLATELET # BLD AUTO: 213 X10E3/UL (ref 150–450)
RBC # BLD AUTO: 4.01 X10E6/UL (ref 4.14–5.8)
TIBC SERPL-MCNC: 295 UG/DL (ref 250–450)
UIBC SERPL-MCNC: 235 UG/DL (ref 111–343)
WBC # BLD AUTO: 6.5 X10E3/UL (ref 3.4–10.8)

## 2020-01-06 NOTE — PROGRESS NOTES
pts iron levels are up but hg/hct are still low  He is encourages to follow up with Dr. Julio Baez on 1/17/20.     If he has any questions let me know

## 2020-01-13 RX ORDER — ATORVASTATIN CALCIUM 10 MG/1
TABLET, FILM COATED ORAL
Qty: 90 TAB | Refills: 1 | Status: SHIPPED | OUTPATIENT
Start: 2020-01-13 | End: 2020-08-13

## 2020-01-13 NOTE — TELEPHONE ENCOUNTER
Request for Lipitor 10mg QHS. Last office visit 12-20-19, next office visit 3-18-20.  Refills per verbal order from Dr. Beto Smith.

## 2020-01-13 NOTE — PROGRESS NOTES
Reviewed labs with patient,  verified. Patient states that he will go to his appointment with Dr. Alisson Zabala. Labs mailed to patient per his request. Address verified.

## 2020-01-17 ENCOUNTER — OFFICE VISIT (OUTPATIENT)
Dept: ONCOLOGY | Age: 85
End: 2020-01-17

## 2020-01-17 VITALS
BODY MASS INDEX: 29.15 KG/M2 | OXYGEN SATURATION: 94 % | HEIGHT: 68 IN | HEART RATE: 61 BPM | TEMPERATURE: 97.8 F | DIASTOLIC BLOOD PRESSURE: 83 MMHG | SYSTOLIC BLOOD PRESSURE: 146 MMHG | WEIGHT: 192.3 LBS

## 2020-01-17 DIAGNOSIS — D50.0 IRON DEFICIENCY ANEMIA DUE TO CHRONIC BLOOD LOSS: Primary | ICD-10-CM

## 2020-01-17 DIAGNOSIS — G70.00 MYASTHENIA GRAVIS (HCC): ICD-10-CM

## 2020-01-17 DIAGNOSIS — I25.10 CORONARY ARTERY DISEASE INVOLVING NATIVE CORONARY ARTERY OF NATIVE HEART WITHOUT ANGINA PECTORIS: ICD-10-CM

## 2020-01-17 RX ORDER — DIPHENHYDRAMINE HYDROCHLORIDE 50 MG/ML
50 INJECTION, SOLUTION INTRAMUSCULAR; INTRAVENOUS AS NEEDED
Status: CANCELLED
Start: 2020-01-28

## 2020-01-17 RX ORDER — DIPHENHYDRAMINE HYDROCHLORIDE 50 MG/ML
50 INJECTION, SOLUTION INTRAMUSCULAR; INTRAVENOUS AS NEEDED
Status: CANCELLED
Start: 2020-01-21

## 2020-01-17 RX ORDER — EPINEPHRINE 1 MG/ML
0.3 INJECTION, SOLUTION, CONCENTRATE INTRAVENOUS AS NEEDED
Status: CANCELLED | OUTPATIENT
Start: 2020-01-28

## 2020-01-17 RX ORDER — ONDANSETRON 2 MG/ML
8 INJECTION INTRAMUSCULAR; INTRAVENOUS AS NEEDED
Status: CANCELLED | OUTPATIENT
Start: 2020-01-28

## 2020-01-17 RX ORDER — SODIUM CHLORIDE 9 MG/ML
10 INJECTION INTRAMUSCULAR; INTRAVENOUS; SUBCUTANEOUS AS NEEDED
Status: CANCELLED | OUTPATIENT
Start: 2020-01-28

## 2020-01-17 RX ORDER — ACETAMINOPHEN 325 MG/1
650 TABLET ORAL AS NEEDED
Status: CANCELLED
Start: 2020-01-28

## 2020-01-17 RX ORDER — EPINEPHRINE 1 MG/ML
0.3 INJECTION, SOLUTION, CONCENTRATE INTRAVENOUS AS NEEDED
Status: CANCELLED | OUTPATIENT
Start: 2020-01-21

## 2020-01-17 RX ORDER — HEPARIN 100 UNIT/ML
300-500 SYRINGE INTRAVENOUS AS NEEDED
Status: CANCELLED
Start: 2020-01-28

## 2020-01-17 RX ORDER — HEPARIN 100 UNIT/ML
300-500 SYRINGE INTRAVENOUS AS NEEDED
Status: CANCELLED
Start: 2020-01-21

## 2020-01-17 RX ORDER — HYDROCORTISONE SODIUM SUCCINATE 100 MG/2ML
100 INJECTION, POWDER, FOR SOLUTION INTRAMUSCULAR; INTRAVENOUS AS NEEDED
Status: CANCELLED | OUTPATIENT
Start: 2020-01-21

## 2020-01-17 RX ORDER — ONDANSETRON 2 MG/ML
8 INJECTION INTRAMUSCULAR; INTRAVENOUS AS NEEDED
Status: CANCELLED | OUTPATIENT
Start: 2020-01-21

## 2020-01-17 RX ORDER — SODIUM CHLORIDE 0.9 % (FLUSH) 0.9 %
10 SYRINGE (ML) INJECTION AS NEEDED
Status: CANCELLED
Start: 2020-01-28

## 2020-01-17 RX ORDER — SODIUM CHLORIDE 9 MG/ML
10 INJECTION INTRAMUSCULAR; INTRAVENOUS; SUBCUTANEOUS AS NEEDED
Status: CANCELLED | OUTPATIENT
Start: 2020-01-21

## 2020-01-17 RX ORDER — ACETAMINOPHEN 325 MG/1
650 TABLET ORAL AS NEEDED
Status: CANCELLED
Start: 2020-01-21

## 2020-01-17 RX ORDER — HYDROCORTISONE SODIUM SUCCINATE 100 MG/2ML
100 INJECTION, POWDER, FOR SOLUTION INTRAMUSCULAR; INTRAVENOUS AS NEEDED
Status: CANCELLED | OUTPATIENT
Start: 2020-01-28

## 2020-01-17 RX ORDER — ALBUTEROL SULFATE 0.83 MG/ML
2.5 SOLUTION RESPIRATORY (INHALATION) AS NEEDED
Status: CANCELLED
Start: 2020-01-21

## 2020-01-17 RX ORDER — SODIUM CHLORIDE 0.9 % (FLUSH) 0.9 %
10 SYRINGE (ML) INJECTION AS NEEDED
Status: CANCELLED
Start: 2020-01-21

## 2020-01-17 RX ORDER — ALBUTEROL SULFATE 0.83 MG/ML
2.5 SOLUTION RESPIRATORY (INHALATION) AS NEEDED
Status: CANCELLED
Start: 2020-01-28

## 2020-01-17 NOTE — PROGRESS NOTES
Xuan Montes is a 82 y.o. male  Chief Complaint   Patient presents with    New Patient    Anemia     1. Have you been to the ER, urgent care clinic since your last visit? Hospitalized since your last visit? 12/12/2019-allergic reaction  2. Have you seen or consulted any other health care providers outside of the [address] [place] [address] since your last visit? Include any pap smears or colon screening.  11/19-jef worley-admitted

## 2020-01-17 NOTE — PROGRESS NOTES
[facility] at [address], [id number] [place] [address] Road, [address] [place] [address]  W: [phone]  F: [phone]    Reason for Visit:   Uri Antony is a 82 y.o. male who is seen in consultation at the request of Dr. Sarah Pulliam  for evaluation of anemia. History of Present Illness:   Patient is a 82 y.o. male with CAD and h/o prostate cancer who is seen for anemia     He has progressive anemia since 5/2019 with Hb having dropped from 12 g/dl to 9 g/dl by 12/2019. He does have a h/o iron deficiency with iron studies on 12/13/2019 showing a TSAT of 8% though ferritin was 53. When rechecked 1/2/2020 he was iron replete but anemia had barely improved to 9.5 g/dl. Now sent for evaluation. He was admitted around 11/2019 at [facility] with SOB and anemia. He was seen by Dr. Joey Jaimes. EGD 11/2019 had shown some esophagitis. Colonoscopy was not considered necessary. He also was diagnosed with Myasthenia gravis. He has plasma exchange. He was then placed on prednisone. He was readmitted with SOB in Dec 2019 thought to be due to prednisone and was taken off this. He was seen by Neurology and was then told that he has no myasthenia. He also had a normal SPEP at that time and a normal MMA. He started iron sulfate 1 pill a day and has been taking it for about a month  He states that he has no bleeding, he has no tarry stools, somewhat darker on iron, regular BMs. No abdominal pain. He has no blood in urine. He is not a vegetarian. He has a stable weight. He has fatigue, he has no pica. He has no dizziness and no chest pain. He had a colonoscopy about 4 years ago and was reportedly normal. He has no fevers, chills.           He had a prostatectomy 2006    He has never smoked  Mother had pancreatic cancer  Sister had Lymphoma  Daughter had lung cancer    Past Medical History:   Diagnosis Date    CAD (coronary artery disease)     Diverticulosis, sigmoid 8/2011    ED (erectile dysfunction) of organic origin     Hypertension     PAF (paroxysmal atrial fibrillation) ([facility] 11/18/2019    [address] admit nov 2019 no OAC due to anemia    Postherpetic neuralgia     Prostate cancer ([facility]     Sebaceous cyst 4/1/2014    Shingles 02/2019      Past Surgical History:   Procedure Laterality Date    ENDOSCOPY, COLON, DIAGNOSTIC  >= 8-10years ago     [facility] [id number]    HX CARPAL TUNNEL RELEASE  4/08    right,left    HX CHOLECYSTECTOMY      HX HEART CATHETERIZATION  05/2019    HX HERNIA REPAIR      HX PROSTATECTOMY  5/06      Social History     Tobacco Use    Smoking status: Never Smoker    Smokeless tobacco: Never Used   Substance Use Topics    Alcohol use: No      Family History   Problem Relation Age of Onset    Cancer Mother         pancreatic    Diabetes Father     Stroke Father     Diabetes Sister     Hypertension Sister     Other Sister         Open heart surgery     Diabetes Brother     Hypertension Brother     Hypertension Brother     Asthma Daughter     Heart Attack Daughter     Other Daughter         hip replacement x 2     Current Outpatient Medications   Medication Sig    atorvastatin (LIPITOR) 10 mg tablet TAKE 1 TABLET BY MOUTH DAILY    furosemide (LASIX) 40 mg tablet Take 1 Tab by mouth daily. (Patient taking differently: Take 40 mg by mouth BID Mon Wed & Fri.)    losartan (COZAAR) 50 mg tablet Take 1 Tab by mouth daily.  albuterol (PROVENTIL HFA, VENTOLIN HFA, PROAIR HFA) 90 mcg/actuation inhaler Take 1-2 Puffs by inhalation every four (4) hours as needed for Wheezing.  clopidogrel (PLAVIX) 75 mg tab Take 1 Tab by mouth daily.  ferrous sulfate 325 mg (65 mg iron) tablet Take 1 Tab by mouth Daily (before breakfast).  potassium chloride SR (K-TAB) 20 mEq tablet Take 2 Tabs by mouth daily. (Patient taking differently: Take 40 mEq by mouth every Monday, Wednesday, Friday.)    gabapentin (NEURONTIN) 400 mg capsule Take 400 mg by mouth nightly.     aspirin delayed-release 81 mg tablet Take 81 mg by mouth daily.  pantoprazole (PROTONIX) 40 mg tablet Take 40 mg by mouth daily.  amLODIPine (NORVASC) 5 mg tablet TAKE 1 TABLET BY MOUTH EVERY DAY    cyanocobalamin (VITAMIN B-12) 1,000 mcg tablet Take 1,000 mcg by mouth daily. No current facility-administered medications for this visit. No Known Allergies     Review of Systems: A complete review of systems was obtained, negative except as described above. Physical Exam:     Visit Vitals  /83   Pulse 61   Temp 97.8 °F (36.6 °C)   Ht 5' 8\" (1.727 m)   Wt 192 lb 4.8 oz (87.2 kg)   SpO2 94%   BMI 29.24 kg/m²     ECOG PS:1  General: No distress  Eyes: PERRLA, anicteric sclerae  HENT: Atraumatic, OP clear  Neck: Supple  Lymphatic: No cervical, supraclavicular, or inguinal adenopathy  Respiratory: normal respiratory effort  CV: Normal rate,  no peripheral edema  GI: Soft, nontender, nondistended, no masses, no hepatomegaly, no splenomegaly  MS: Normal gait and station. Digits without clubbing or cyanosis. Skin: No rashes, ecchymoses, or petechiae. Normal temperature, turgor, and texture. Psych: Alert, oriented, appropriate affect, normal judgment/insight    Results:     Lab Results   Component Value Date/Time    WBC 6.5 01/02/2020 11:51 AM    HGB 9.5 (L) 01/02/2020 11:51 AM    HCT 33.0 (L) 01/02/2020 11:51 AM    PLATELET 533 91/84/1390 11:51 AM    MCV 82 01/02/2020 11:51 AM    ABS.  NEUTROPHILS 4.5 01/02/2020 11:51 AM     Lab Results   Component Value Date/Time    Sodium 137 12/16/2019 05:31 AM    Potassium 4.5 12/16/2019 05:31 AM    Chloride 103 12/16/2019 05:31 AM    CO2 32 12/16/2019 05:31 AM    Glucose 92 12/16/2019 05:31 AM    BUN 23 (H) 12/16/2019 05:31 AM    Creatinine 1.04 12/16/2019 05:31 AM    GFR est AA >60 12/16/2019 05:31 AM    GFR est non-AA >60 12/16/2019 05:31 AM    Calcium 8.7 12/16/2019 05:31 AM     Lab Results   Component Value Date/Time    Bilirubin, total 1.0 12/12/2019 03:59 PM ALT (SGPT) 33 12/12/2019 03:59 PM    AST (SGOT) 27 12/12/2019 03:59 PM    Alk. phosphatase 62 12/12/2019 03:59 PM    Protein, total 6.3 (L) 12/12/2019 03:59 PM    Albumin 3.6 12/12/2019 03:59 PM    Globulin 2.7 12/12/2019 03:59 PM       Lab Results   Component Value Date/Time    Iron % saturation 20 01/02/2020 11:51 AM    TIBC 295 01/02/2020 11:51 AM    Ferritin 53 12/13/2019 09:22 AM    Vitamin B12 238 04/27/2018 03:25 PM    Folate 14.8 04/27/2018 03:25 PM    Sed rate (ESR) 20 05/24/2019 12:50 PM    C-Reactive Protein, Qt 0.6 12/12/2018 05:04 PM    C-Reactive Protein, Cardiac 10.91 (H) 05/24/2019 12:50 PM    TSH 1.23 12/14/2019 04:55 AM     No results found for: INR, APTT, DDIMSQ, DDIME, [id number], Yareli Balzarine, FDPLT, Lilyan Console, [address] [facility], [address], [id number], [id number], [id number], [id number], [id number], [id number], Tammi Carter  Results for Gagandeep Vinson (MRN [id number]) as of 1/17/2020 08:32   Ref. Range 12/13/2019 09:22   Iron Latest Ref Range: 35 - 150 ug/dL 21 (L)   TIBC Latest Ref Range: 250 - 450 ug/dL 276   Iron % saturation Latest Ref Range: 20 - 50 % 8 (L)   Ferritin Latest Ref Range: 26 - 388 NG/ML 53     Records reviewed and summarized above. Pathology report(s) reviewed above. Radiology report(s) reviewed above.       Assessment:   1) Anemia- progressive    He did have evidence of iron deficiency when checked 12/14/2019  TSAT was 8%  Was on 65 mg of elemental iron and in 2 weeks TSAT falsely normal at 20% , Hb improved some but not normal    He clearly has iron deficiency but I would give him more iron and more time to assess the response  If he then remains anemic additional work up would be considered    It does appear to have had a negative SPEP , normal MMA and B12 levels and no evidence of hemolysis on work up at [facility]    I explained the entire process of hematopoiesis to his sister    Had an EGD with gastritis in nov 2019  Not convinced that explains his iron deficiency and hence once he is stable from # 4 a colonoscopy may be considered    2) H/O Prostate cancer  Prostatectomy in 2006    3) CAD  On plavix    4) MG  This is not a certain diagnosis  He follows with Dr. Enma Garcias:     ·  Injectafer x 2  · RTC 10 weeks with cbc, smear, iron profile, ferritin, LD and haptoglobin    All questions answered    I appreciate the opportunity to participate in Mr. Thrasher Or Stephany's care.     Signed By: Юлия Hays MD

## 2020-01-21 ENCOUNTER — HOSPITAL ENCOUNTER (OUTPATIENT)
Dept: INFUSION THERAPY | Age: 85
Discharge: HOME | End: 2020-01-21
Payer: MEDICARE

## 2020-01-21 VITALS
DIASTOLIC BLOOD PRESSURE: 87 MMHG | TEMPERATURE: 97.3 F | SYSTOLIC BLOOD PRESSURE: 140 MMHG | RESPIRATION RATE: 18 BRPM | HEART RATE: 59 BPM

## 2020-01-21 DIAGNOSIS — D50.0 IRON DEFICIENCY ANEMIA DUE TO CHRONIC BLOOD LOSS: Primary | ICD-10-CM

## 2020-01-21 PROCEDURE — 96365 THER/PROPH/DIAG IV INF INIT: CPT

## 2020-01-21 PROCEDURE — 74011250636 HC RX REV CODE- 250/636: Performed by: REGISTERED NURSE

## 2020-01-21 RX ORDER — SODIUM CHLORIDE 9 MG/ML
500 INJECTION, SOLUTION INTRAVENOUS ONCE
Status: COMPLETED | OUTPATIENT
Start: 2020-01-21 | End: 2020-01-21

## 2020-01-21 RX ORDER — SODIUM CHLORIDE 0.9 % (FLUSH) 0.9 %
10 SYRINGE (ML) INJECTION AS NEEDED
Status: ACTIVE | OUTPATIENT
Start: 2020-01-21 | End: 2020-01-21

## 2020-01-21 RX ADMIN — SODIUM CHLORIDE 750 MG: 900 INJECTION, SOLUTION INTRAVENOUS at 09:43

## 2020-01-21 RX ADMIN — Medication 10 ML: at 09:07

## 2020-01-21 RX ADMIN — SODIUM CHLORIDE 250 ML: 900 INJECTION, SOLUTION INTRAVENOUS at 09:08

## 2020-01-21 NOTE — PROGRESS NOTES
OPIC Short Note                       Date: 2020    Name: Terry Dawn    MRN: [id number]         : 1935      0900 Pt admit to [facility] for [facility] ambulatory in stable condition. Assessment completed. No concerns voiced. Spoke with patient and spouse about s/s of potential adverse reactions, lab values, and his return visit. Oriented to [facility]. Both verbalized understanding. Peripheral IV established with positive blood return. Line connected to NS at [facility]. Mr. Hammond's vitals were reviewed prior to and after treatment. Patient Vitals for the past 12 hrs:   Temp Pulse Resp BP   20 1034 -- (!) 59 18 140/87   20 0900 97.3 °F (36.3 °C) 72 18 116/75     Medications given:   Medications Administered     0.9% sodium chloride infusion 500 mL     Admin Date  2020 Action  New Bag Dose  250 mL Rate  25 mL/hr Route  IntraVENous Administered By  Casandra Navarrete RN          ferric carboxymaltose (INJECTAFER) 750 mg in 0.9% sodium chloride 250 mL, overfill volume 25 mL IVPB     Admin Date  2020 Action  Given Dose  750 mg Rate  870 mL/hr Route  IntraVENous Administered By  Casandra Navarrete RN          saline peripheral flush soln 10 mL     Admin Date  2020 Action  Given Dose  10 mL Route  InterCATHeter Administered By  Casandra Navarrete RN                PIV maintained positive blood return. Line flushed and removed per protocol. Mr. Gumaro Bruce tolerated the infusion, had no complaints, and was discharged from [place] [address] in stable condition at 1035.      Future Appointments   Date Time Provider Samantha Ley   2020  9:00  [facility]   3/17/2020  9:00 AM Pj Arzate MD [facility] 6199   3/18/2020  2:00 PM Steven Diaz  [address]   2020  9:30 AM Lilly Dietrich MD [facility] SCHED   10/19/2020  9:20 AM Steven Diaz  Nemesio Óscar, BARBARA  January 21, 2020  12:28 PM

## 2020-01-27 ENCOUNTER — PATIENT OUTREACH (OUTPATIENT)
Dept: FAMILY MEDICINE CLINIC | Age: 85
End: 2020-01-27

## 2020-01-28 ENCOUNTER — HOSPITAL ENCOUNTER (OUTPATIENT)
Dept: INFUSION THERAPY | Age: 85
Discharge: HOME | End: 2020-01-28
Payer: MEDICARE

## 2020-01-28 VITALS
RESPIRATION RATE: 18 BRPM | HEART RATE: 58 BPM | TEMPERATURE: 98.4 F | DIASTOLIC BLOOD PRESSURE: 66 MMHG | SYSTOLIC BLOOD PRESSURE: 144 MMHG

## 2020-01-28 DIAGNOSIS — D50.0 IRON DEFICIENCY ANEMIA DUE TO CHRONIC BLOOD LOSS: Primary | ICD-10-CM

## 2020-01-28 PROCEDURE — 74011250636 HC RX REV CODE- 250/636: Performed by: REGISTERED NURSE

## 2020-01-28 PROCEDURE — 96365 THER/PROPH/DIAG IV INF INIT: CPT

## 2020-01-28 RX ORDER — HEPARIN 100 UNIT/ML
300-500 SYRINGE INTRAVENOUS AS NEEDED
Status: ACTIVE | OUTPATIENT
Start: 2020-01-28 | End: 2020-01-28

## 2020-01-28 RX ORDER — SODIUM CHLORIDE 0.9 % (FLUSH) 0.9 %
10 SYRINGE (ML) INJECTION AS NEEDED
Status: ACTIVE | OUTPATIENT
Start: 2020-01-28 | End: 2020-01-28

## 2020-01-28 RX ORDER — SODIUM CHLORIDE 9 MG/ML
10 INJECTION INTRAMUSCULAR; INTRAVENOUS; SUBCUTANEOUS AS NEEDED
Status: ACTIVE | OUTPATIENT
Start: 2020-01-28 | End: 2020-01-28

## 2020-01-28 RX ORDER — SODIUM CHLORIDE 9 MG/ML
500 INJECTION, SOLUTION INTRAVENOUS ONCE
Status: COMPLETED | OUTPATIENT
Start: 2020-01-28 | End: 2020-01-28

## 2020-01-28 RX ADMIN — SODIUM CHLORIDE 750 MG: 900 INJECTION, SOLUTION INTRAVENOUS at 09:50

## 2020-01-28 RX ADMIN — Medication 10 ML: at 09:00

## 2020-01-28 RX ADMIN — SODIUM CHLORIDE 500 ML: 900 INJECTION, SOLUTION INTRAVENOUS at 09:00

## 2020-01-28 NOTE — PROGRESS NOTES
[facility] VISIT NOTE    0845  Pt arrived at [facility] ambulatory and in no distress for Injectafer 2/2. Assessment completed, no new complaints at this time. PIV started in [facility] with no difficulty. Positive blood return noted. Medications received:  NS at [facility] IV    Patient Vitals for the past 12 hrs:   Temp Pulse Resp BP   01/28/20 1025 98.4 °F (36.9 °C) (!) 58 18 144/66   01/28/20 0849 98 °F (36.7 °C) 80 18 143/81     Tolerated treatment well, no adverse reaction noted. PIV removed per protocol. 1030  D/C'd from [facility] ambulatory and in no distress. Next appointment is to be scheduled by MD/pt.

## 2020-01-28 NOTE — PROGRESS NOTES
Ambulatory Care Management Note    Date/Time:  1/27/2020 8:10 AM    This patient was received as a referral from Ernesto Montelongo outreached to patient today to offer care management services. Introduction to self and role of care manager provided. Patient accepted care management services at this time. Follow up call scheduled at this time. Patient scheduled for appointment at infusion center 1/28/2020. Patient has Ambulatory Care Manager's contact number for for any questions or concerns.

## 2020-02-10 ENCOUNTER — PATIENT OUTREACH (OUTPATIENT)
Dept: FAMILY MEDICINE CLINIC | Age: 85
End: 2020-02-10

## 2020-02-10 NOTE — PROGRESS NOTES
Ambulatory Care Management Note    Date/Time:  2/10/2020 9:34 AM    This Ambulatory Care Manager (ACM) reviewed and updated the following screenings during this call; general assessment of needs. Patient's challenges to self management identified:   Functional ability, still having some weakness with left arm and hand. Unable to use middle fingers on that hand. Medication Management:  admits to good adherence and understanding  Current Outpatient Medications   Medication Sig    atorvastatin (LIPITOR) 10 mg tablet TAKE 1 TABLET BY MOUTH DAILY    ferrous sulfate 325 mg (65 mg iron) tablet Take 1 Tab by mouth Daily (before breakfast).  furosemide (LASIX) 40 mg tablet Take 1 Tab by mouth daily. (Patient taking differently: Take 40 mg by mouth BID Mon Wed & Fri.)    potassium chloride SR (K-TAB) 20 mEq tablet Take 2 Tabs by mouth daily. (Patient taking differently: Take 40 mEq by mouth every Monday, Wednesday, Friday.)    gabapentin (NEURONTIN) 400 mg capsule Take 400 mg by mouth nightly.  aspirin delayed-release 81 mg tablet Take 81 mg by mouth daily.  pantoprazole (PROTONIX) 40 mg tablet Take 40 mg by mouth daily.  losartan (COZAAR) 50 mg tablet Take 1 Tab by mouth daily.  amLODIPine (NORVASC) 5 mg tablet TAKE 1 TABLET BY MOUTH EVERY DAY    albuterol (PROVENTIL HFA, VENTOLIN HFA, PROAIR HFA) 90 mcg/actuation inhaler Take 1-2 Puffs by inhalation every four (4) hours as needed for Wheezing.  clopidogrel (PLAVIX) 75 mg tab Take 1 Tab by mouth daily.  cyanocobalamin (VITAMIN B-12) 1,000 mcg tablet Take 1,000 mcg by mouth daily. No current facility-administered medications for this visit.         Advance Care Planning:   Does patient have an Advance Directive:  not on file; education provided    [employer], Referrals, and Durable Medical Equipment:       Health Maintenance Due   Topic Date Due    GLAUCOMA SCREENING Q2Y  09/15/2017    DTaP/Tdap/Td series (2 - Tdap) 02/14/2019  Colonoscopy  03/18/2020     Health Maintenance reviewed - Reminded of Health Maintenance gaps that are due. Patient was asked to consider health care goals that they would like to focus on with this ACM. ACM will follow up with patient to discuss goals and establish care plan in the next 7-14 days.        PCP/Specialist follow up:   Future Appointments   Date Time Provider Samantha Ley   3/17/2020  9:00 AM [facility]   3/18/2020  2:00 PM Sterling Nunez  [address]   4/8/2020  9:30 AM Lalo Dietrich MD PAFP [facility] SCHED   10/19/2020  9:20 AM Sterling Nunez  [address]

## 2020-02-11 DIAGNOSIS — B02.29 POSTHERPETIC NEURALGIA: Primary | ICD-10-CM

## 2020-02-11 RX ORDER — GABAPENTIN 400 MG/1
400 CAPSULE ORAL
Qty: 90 CAP | Refills: 1 | Status: SHIPPED | OUTPATIENT
Start: 2020-02-11 | End: 2021-01-01 | Stop reason: SDUPTHER

## 2020-02-11 NOTE — TELEPHONE ENCOUNTER
LOV 1/2/2020  The Prescription Monitoring Program has been reviewed for recent activity regarding controlled substances for this patient.      Per  last refill 10/30/2019 #90

## 2020-02-18 NOTE — TELEPHONE ENCOUNTER
Patient daughter is calling stating he doesn't have any medication, and his leg has swelling, x be sent right away  .   Requested Prescriptions     Pending Prescriptions Disp Refills    furosemide (LASIX) 40 mg tablet [Pharmacy Med Name: FUROSEMIDE 40MG TABLETS] 30 Tab 0     Sig: TAKE 1 TABLET BY MOUTH DAILY     Last refill : 12/16/19 by HAILY HOSKINS Dr  Pharmacy verified

## 2020-02-20 RX ORDER — FUROSEMIDE 40 MG/1
TABLET ORAL
Qty: 30 TAB | Refills: 5 | Status: SHIPPED | OUTPATIENT
Start: 2020-02-20 | End: 2020-02-26 | Stop reason: ALTCHOICE

## 2020-02-26 ENCOUNTER — HOSPITAL ENCOUNTER (OUTPATIENT)
Dept: LAB | Age: 85
Discharge: HOME | End: 2020-02-26
Payer: MEDICARE

## 2020-02-26 ENCOUNTER — OFFICE VISIT (OUTPATIENT)
Dept: FAMILY MEDICINE CLINIC | Age: 85
End: 2020-02-26

## 2020-02-26 ENCOUNTER — TELEPHONE (OUTPATIENT)
Dept: FAMILY MEDICINE CLINIC | Age: 85
End: 2020-02-26

## 2020-02-26 VITALS
HEART RATE: 55 BPM | OXYGEN SATURATION: 96 % | TEMPERATURE: 96.9 F | HEIGHT: 68 IN | SYSTOLIC BLOOD PRESSURE: 132 MMHG | BODY MASS INDEX: 29.52 KG/M2 | DIASTOLIC BLOOD PRESSURE: 86 MMHG | RESPIRATION RATE: 18 BRPM | WEIGHT: 194.8 LBS

## 2020-02-26 DIAGNOSIS — J06.9 UPPER RESPIRATORY TRACT INFECTION, UNSPECIFIED TYPE: Primary | ICD-10-CM

## 2020-02-26 DIAGNOSIS — R60.9 EDEMA, UNSPECIFIED TYPE: ICD-10-CM

## 2020-02-26 DIAGNOSIS — I48.0 PAROXYSMAL ATRIAL FIBRILLATION (HCC): ICD-10-CM

## 2020-02-26 PROCEDURE — 83550 IRON BINDING TEST: CPT

## 2020-02-26 PROCEDURE — 36415 COLL VENOUS BLD VENIPUNCTURE: CPT

## 2020-02-26 PROCEDURE — 82728 ASSAY OF FERRITIN: CPT

## 2020-02-26 PROCEDURE — 83615 LACTATE (LD) (LDH) ENZYME: CPT

## 2020-02-26 PROCEDURE — 83010 ASSAY OF HAPTOGLOBIN QUANT: CPT

## 2020-02-26 RX ORDER — TORSEMIDE 20 MG/1
20 TABLET ORAL DAILY
Qty: 30 TAB | Refills: 1 | Status: SHIPPED | OUTPATIENT
Start: 2020-02-26 | End: 2020-02-27

## 2020-02-26 RX ORDER — DOXYCYCLINE 100 MG/1
100 TABLET ORAL 2 TIMES DAILY
Qty: 20 TAB | Refills: 0 | Status: SHIPPED | OUTPATIENT
Start: 2020-02-26 | End: 2020-03-07

## 2020-02-26 NOTE — PROGRESS NOTES
HPI  Reford Campos 82 y.o. male  presents to the office today c/o sore throat. Blood pressure 132/86, pulse (!) 55, temperature 96.9 °F (36.1 °C), temperature source Oral, resp. rate 18, height 5' 8\" (1.727 m), weight 194 lb 12.8 oz (88.4 kg), SpO2 96 %. Body mass index is 29.62 kg/m². Chief Complaint   Patient presents with    Sore Throat      Pt reports that since his daughter is sick, pt has been staying away in another room. This past weekend, pt started having \"stratchy\" throat and coughing up green colored phlegm. Pt has tried drinking hot tea with turmeric, which usually should help clearing his throat, but does not work this time. Pt's daughter mentioned to me about his condition this AM during her appointment and requested ABX for him, so I sent in Adoxa 100 mg BID x 10 days. Pt states that he is advised to walk as much as possible on treadmill and bicycle, but pt experiences lightheadedness on exertion, along with CP with deep breath and edema bilaterally. Pt is currently on Lasix 40 mg which he usually takes in AM. Pt denies palpitations, but admits to occasion \"shock waves\" when turning his body at sleep, particularly when on his left side. Most recent EKG on 12/13/19 revealed Atrial fibrillation with slow ventricular response, Left axis deviation, Low voltage QRS, and Septal infarct (newly present). Pt is scheduled to follow up with neurology Dr. Lance Miller on Friday and also has upcoming appointments with cardiology Dr. Jazmine John and pulmonology Dr. Clare Pastor. Pt underwent 2 iron infusions so far and notes that he has been feeling much better and has more energy. Current Outpatient Medications   Medication Sig Dispense Refill    doxycycline (ADOXA) 100 mg tablet Take 1 Tab by mouth two (2) times a day for 10 days. 20 Tab 0    torsemide (DEMADEX) 20 mg tablet Take 1 Tab by mouth daily. 30 Tab 1    gabapentin (NEURONTIN) 400 mg capsule Take 1 Cap by mouth nightly.  Max Daily Amount: 400 mg. 90 Cap 1    atorvastatin (LIPITOR) 10 mg tablet TAKE 1 TABLET BY MOUTH DAILY 90 Tab 1    ferrous sulfate 325 mg (65 mg iron) tablet Take 1 Tab by mouth Daily (before breakfast). 30 Tab 2    potassium chloride SR (K-TAB) 20 mEq tablet Take 2 Tabs by mouth daily. (Patient taking differently: Take 40 mEq by mouth every Monday, Wednesday, Friday.) 30 Tab 0    aspirin delayed-release 81 mg tablet Take 81 mg by mouth daily.  pantoprazole (PROTONIX) 40 mg tablet Take 40 mg by mouth daily.  losartan (COZAAR) 50 mg tablet Take 1 Tab by mouth daily. 90 Tab 1    amLODIPine (NORVASC) 5 mg tablet TAKE 1 TABLET BY MOUTH EVERY DAY 90 Tab 1    albuterol (PROVENTIL HFA, VENTOLIN HFA, PROAIR HFA) 90 mcg/actuation inhaler Take 1-2 Puffs by inhalation every four (4) hours as needed for Wheezing. 1 Inhaler 5    clopidogrel (PLAVIX) 75 mg tab Take 1 Tab by mouth daily. 90 Tab 3    cyanocobalamin (VITAMIN B-12) 1,000 mcg tablet Take 1,000 mcg by mouth daily.        No Known Allergies  Past Medical History:   Diagnosis Date    CAD (coronary artery disease)     Diverticulosis, sigmoid 8/2011    ED (erectile dysfunction) of organic origin     Hypertension     PAF (paroxysmal atrial fibrillation) ([address] 11/18/2019    [address] admit nov 2019 no OAC due to anemia    Postherpetic neuralgia     Prostate cancer ([address]     Sebaceous cyst 4/1/2014    Shingles 02/2019     Past Surgical History:   Procedure Laterality Date    ENDOSCOPY, COLON, DIAGNOSTIC  >= 8-10years ago   [facility] D[facility] SURGERY  1968 [id number]    HX CARPAL TUNNEL RELEASE  4/08    right,left    HX CHOLECYSTECTOMY      HX HEART CATHETERIZATION  05/2019    HX HERNIA REPAIR      HX PROSTATECTOMY  5/06     Family History   Problem Relation Age of Onset    Cancer Mother         pancreatic    Diabetes Father     Stroke Father     Diabetes Sister     Hypertension Sister     Other Sister         Open heart surgery     Diabetes Brother    [facility] Hypertension Brother     Hypertension Brother     Asthma Daughter     Heart Attack Daughter     Other Daughter         hip replacement x 2     Social History     Tobacco Use    Smoking status: Never Smoker    Smokeless tobacco: Never Used   Substance Use Topics    Alcohol use: No        Review of Systems   Constitutional: Negative for chills and fever. HENT: Positive for sore throat. Negative for hearing loss and tinnitus. Eyes: Negative for blurred vision and double vision. Respiratory: Positive for sputum production (green). Negative for cough and shortness of breath. Cardiovascular: Positive for chest pain and leg swelling. Negative for palpitations. Gastrointestinal: Negative for nausea and vomiting. Genitourinary: Negative for dysuria and frequency. Musculoskeletal: Negative for back pain and falls. Skin: Negative for itching and rash. Neurological: Positive for dizziness. Negative for loss of consciousness and headaches. Psychiatric/Behavioral: Negative for depression. The patient is not nervous/anxious. Physical Exam  Vitals signs and nursing note reviewed. Constitutional:       Appearance: Normal appearance. He is well-developed. HENT:      Head: Normocephalic and atraumatic. Right Ear: External ear normal.      Left Ear: External ear normal.      Nose: Nose normal.   Eyes:      Conjunctiva/sclera: Conjunctivae normal.      Pupils: Pupils are equal, round, and reactive to light. Neck:      Musculoskeletal: Normal range of motion and neck supple. Cardiovascular:      Rate and Rhythm: Normal rate. Rhythm irregularly irregular. Pulses: Normal pulses. Heart sounds: Normal heart sounds. Pulmonary:      Effort: Pulmonary effort is normal.      Breath sounds: Normal breath sounds. Abdominal:      General: Bowel sounds are normal.      Palpations: Abdomen is soft. Musculoskeletal: Normal range of motion. Right lower le+ Pitting Edema present. Left lower le+ Pitting Edema present. Skin:     General: Skin is warm and dry. Neurological:      Mental Status: He is alert and oriented to person, place, and time. Psychiatric:         Speech: Speech normal.         Behavior: Behavior normal.         Thought Content: Thought content normal.         Judgment: Judgment normal.           ASSESSMENT and PLAN  Diagnoses and all orders for this visit:    1. Upper respiratory tract infection, unspecified type  Already provided Adoxa 100 mg BID x 10 days for him this AM per his daughter's request. Jackelin Culp pt to complete this course of ABX and let me know if sx's do not improve or worsen. 2. Paroxysmal atrial fibrillation ([facility]  Most recent EKG on 19 revealed Atrial fibrillation with slow ventricular response, Left axis deviation, Low voltage QRS, and Septal infarct (newly present). Pt is under care of cardiology Dr. Jason Rouse pt to monitor and let me know if noticing anything abnormal such as skipped heartbeat, palpitations or CP. 3. Edema, unspecified type  2+ pitting edema noted. Advised pt to discontinue Lasix and start on Torsemide 20 mg/d.   -     torsemide (DEMADEX) 20 mg tablet; Take 1 Tab by mouth daily. Follow-up and Dispositions    · Return if symptoms worsen or fail to improve, for URI, Edema. Medication risks/benefits/costs/interactions/alternatives discussed with patient. Advised patient to call back or return to office if symptoms worsen/change/persist.  If patient cannot reach us or should anything more severe/urgent arise he/she should proceed directly to the nearest emergency department. Discussed expected course/resolution/complications of diagnosis in detail with patient. Patient given a written after visit summary which includes diagnoses, current medications and vitals. Patient expressed understanding with the diagnosis and plan.     Written by klelee Paniagua, as dictated by Lara Willis M.D.    6:07 PM - 6:31 PM    Total time spent with the patient 24 minutes, greater than 50% of time spent counseling patient.

## 2020-02-26 NOTE — TELEPHONE ENCOUNTER
Pt has URI to weak to come will send in doxycycline 100 mg BID x 10 days    Advised to follow up in 48 hours if no improvement in symptoms

## 2020-02-26 NOTE — PATIENT INSTRUCTIONS

## 2020-02-26 NOTE — PROGRESS NOTES
Chief Complaint   Patient presents with    Sore Throat     1. Have you been to the ER, urgent care clinic since your last visit? Hospitalized since your last visit? No    2. Have you seen or consulted any other health care providers outside of the [address] [place] [address] since your last visit? Include any pap smears or colon screening.  No

## 2020-02-27 RX ORDER — TORSEMIDE 20 MG/1
TABLET ORAL
Qty: 90 TAB | Refills: 0 | Status: SHIPPED | OUTPATIENT
Start: 2020-02-27 | End: 2020-07-20

## 2020-02-28 LAB
BASOPHILS # BLD AUTO: 0 X10E3/UL (ref 0–0.2)
BASOPHILS NFR BLD AUTO: 0 %
EOSINOPHIL # BLD AUTO: 0.2 X10E3/UL (ref 0–0.4)
EOSINOPHIL NFR BLD AUTO: 4 %
ERYTHROCYTE [DISTWIDTH] IN BLOOD BY AUTOMATED COUNT: 16.9 % (ref 11.6–15.4)
FERRITIN SERPL-MCNC: 430 NG/ML (ref 30–400)
HAPTOGLOB SERPL-MCNC: 173 MG/DL (ref 38–329)
HCT VFR BLD AUTO: 32.9 % (ref 37.5–51)
HGB BLD-MCNC: 10.1 G/DL (ref 13–17.7)
IMM GRANULOCYTES # BLD AUTO: 0 X10E3/UL (ref 0–0.1)
IMM GRANULOCYTES NFR BLD AUTO: 0 %
IRON SATN MFR SERPL: 16 % (ref 15–55)
IRON SERPL-MCNC: 44 UG/DL (ref 38–169)
LDH SERPL-CCNC: 210 IU/L (ref 121–224)
LYMPHOCYTES # BLD AUTO: 0.9 X10E3/UL (ref 0.7–3.1)
LYMPHOCYTES NFR BLD AUTO: 15 %
MCH RBC QN AUTO: 27.2 PG (ref 26.6–33)
MCHC RBC AUTO-ENTMCNC: 30.7 G/DL (ref 31.5–35.7)
MCV RBC AUTO: 88 FL (ref 79–97)
MONOCYTES # BLD AUTO: 0.6 X10E3/UL (ref 0.1–0.9)
MONOCYTES NFR BLD AUTO: 11 %
NEUTROPHILS # BLD AUTO: 4 X10E3/UL (ref 1.4–7)
NEUTROPHILS NFR BLD AUTO: 70 %
PATH REV BLD -IMP: ABNORMAL
PATHOLOGIST NAME: ABNORMAL
PLATELET # BLD AUTO: 144 X10E3/UL (ref 150–450)
RBC # BLD AUTO: 3.72 X10E6/UL (ref 4.14–5.8)
TIBC SERPL-MCNC: 280 UG/DL (ref 250–450)
UIBC SERPL-MCNC: 236 UG/DL (ref 111–343)
WBC # BLD AUTO: 5.7 X10E3/UL (ref 3.4–10.8)

## 2020-03-11 ENCOUNTER — PATIENT OUTREACH (OUTPATIENT)
Dept: FAMILY MEDICINE CLINIC | Age: 85
End: 2020-03-11

## 2020-03-11 NOTE — PROGRESS NOTES
Ambulatory Care Management Note      Date/Time:  3/11/2020 10:14 AM    Top Challenges reviewed with the provider   · CAD, HTN and Myasthenia  Gravis. · Admits to decreased lower extremity edema since starting Demax, still there  · No ACP on File  · Occasional chest discomfort, has scheduled follow-up with cardiology on 3/18/20       Ambulatory  contacted patient for discussion and case management of Edema, CHF and self care   Summary of patients top problems:   1. Weakness- Start PT at [facility] this week. Continue to receive Iron therapy  2. Lower extremity Edema- chronic, better since on Demadex, will follow up with cardiology    PCP/Specialist follow up:   Future Appointments   Date Time Provider Samantha Ley   3/17/2020  9:00 AM Lucina Sherman MD [facility] 6199   3/18/2020  2:00 PM Adilene Padron  [address]   4/8/2020  9:30 AM Chandler Dietrich MD [facility] SCHED   10/19/2020  9:20 AM Adilene Padron MD [facility] 258 Patient/Family verbalizes understanding of self-management of chronic disease. 03/11/20  · Assessment for self management of CAD HTN and Myasthenia  Gravis. · Admits to have taken all ordered antibiotics as was ordered for URI  · Says he is not spitting up the tannish secretions like he was doing  · Starts outpatient PT at [facility] for strenthen[facility] this week   · Reviewed all upcomming follow-up, patient verbalized an understanding  · Discussed upcoming appointment  · Denies barriers at this time  · Given ACM contact information, will follow in 7-10 days.  pk Current Outpatient Medications   Medication Sig    torsemide (DEMADEX) 20 mg tablet TAKE 1 TABLET BY MOUTH DAILY    gabapentin (NEURONTIN) 400 mg capsule Take 1 Cap by mouth nightly. Max Daily Amount: 400 mg.    atorvastatin (LIPITOR) 10 mg tablet TAKE 1 TABLET BY MOUTH DAILY    ferrous sulfate 325 mg (65 mg iron) tablet Take 1 Tab by mouth Daily (before breakfast).  potassium chloride SR (K-TAB) 20 mEq tablet Take 2 Tabs by mouth daily. (Patient taking differently: Take 40 mEq by mouth every Monday, Wednesday, Friday.)    aspirin delayed-release 81 mg tablet Take 81 mg by mouth daily.  pantoprazole (PROTONIX) 40 mg tablet Take 40 mg by mouth daily.  losartan (COZAAR) 50 mg tablet Take 1 Tab by mouth daily.  amLODIPine (NORVASC) 5 mg tablet TAKE 1 TABLET BY MOUTH EVERY DAY    albuterol (PROVENTIL HFA, VENTOLIN HFA, PROAIR HFA) 90 mcg/actuation inhaler Take 1-2 Puffs by inhalation every four (4) hours as needed for Wheezing.  clopidogrel (PLAVIX) 75 mg tab Take 1 Tab by mouth daily.     cyanocobalamin (VITAMIN B-12) 1,000 mcg tablet Take 1,000 mcg by mouth daily. No current facility-administered medications for this visit. Patient verbalized understanding of all information discussed. Patient has this Nurse Navigators contact information for any further questions, concerns, or needs.

## 2020-03-16 ENCOUNTER — DOCUMENTATION ONLY (OUTPATIENT)
Dept: ONCOLOGY | Age: 85
End: 2020-03-16

## 2020-03-16 ENCOUNTER — TELEPHONE (OUTPATIENT)
Dept: ONCOLOGY | Age: 85
End: 2020-03-16

## 2020-03-16 DIAGNOSIS — D50.0 IRON DEFICIENCY ANEMIA DUE TO CHRONIC BLOOD LOSS: Primary | ICD-10-CM

## 2020-03-16 NOTE — PROGRESS NOTES
Called patient and confirmed x2 identifiers   Informed patient of lab results; also in formed patient to limit exposure to COVID 19 and ensure patient safety that follow is to be scheduled in 2 months with repeat lab work   Patient agreed with plan   No new questions or concerns at this time

## 2020-03-16 NOTE — PROGRESS NOTES
Labs show improvement in anemia and hoping that it will improve some more in the ensuing weeks    Please call and let him know that for safety during covid 19 I advice that he reschedule his appointment with me in 2 months with repeat CBC and iron profile

## 2020-03-18 ENCOUNTER — OFFICE VISIT (OUTPATIENT)
Dept: CARDIOLOGY CLINIC | Age: 85
End: 2020-03-18

## 2020-03-18 VITALS
RESPIRATION RATE: 18 BRPM | OXYGEN SATURATION: 98 % | WEIGHT: 197 LBS | BODY MASS INDEX: 29.86 KG/M2 | SYSTOLIC BLOOD PRESSURE: 140 MMHG | HEIGHT: 68 IN | DIASTOLIC BLOOD PRESSURE: 80 MMHG | HEART RATE: 55 BPM

## 2020-03-18 DIAGNOSIS — I10 ESSENTIAL HYPERTENSION: ICD-10-CM

## 2020-03-18 DIAGNOSIS — J98.6 ELEVATED HEMIDIAPHRAGM: ICD-10-CM

## 2020-03-18 DIAGNOSIS — I25.10 CORONARY ARTERY DISEASE INVOLVING NATIVE CORONARY ARTERY OF NATIVE HEART WITHOUT ANGINA PECTORIS: ICD-10-CM

## 2020-03-18 DIAGNOSIS — I10 ESSENTIAL HYPERTENSION WITH GOAL BLOOD PRESSURE LESS THAN 140/90: ICD-10-CM

## 2020-03-18 DIAGNOSIS — I48.19 PERSISTENT ATRIAL FIBRILLATION (HCC): Primary | ICD-10-CM

## 2020-03-18 DIAGNOSIS — R60.0 PEDAL EDEMA: ICD-10-CM

## 2020-03-18 DIAGNOSIS — G70.00 MYASTHENIA GRAVIS (HCC): ICD-10-CM

## 2020-03-18 DIAGNOSIS — I51.89 DIASTOLIC DYSFUNCTION: ICD-10-CM

## 2020-03-18 RX ORDER — LOSARTAN POTASSIUM 100 MG/1
100 TABLET ORAL DAILY
Qty: 90 TAB | Refills: 1 | Status: SHIPPED | OUTPATIENT
Start: 2020-03-18 | End: 2020-10-21

## 2020-03-18 NOTE — PROGRESS NOTES
[place] Riser Stephany     1935       Gaby Her MD, [facility]  Date of Visit-3/18/2020   PCP is Andrew Ochoa MD   [facility] [place]  [facility]  HPI:  Ingrid Rangel is a 82 y.o. male   · CAD- JOSEFINA-Cath done 5/30/19. OM1 with 70% blockage had JOSEFINA  · CT scan on 10/8/19 which  Showed low lung volumes, diaphragm on the left was elevated with atelectasis on the left with mucus plugging. · hospitalized at [facility], from 11/12/2019 through 11/21/2019,Atrial fibrillation with RVR possible MG, underwent plasmapheresis/ steroids. Then  became markedly edemetous and I admitted him on 12/12/19. · Echo 11-18-19 EF Definity for atrial fibrillation Moderate to severe LVH EF 60-65%MAC, mild MRMod SERGIO PASP 45  · Echo 12-13-19 EF 50-55% severe LVH, mod LAE, mod MR, TR  · HTN, XOL, anemia Hgb 8.7 11-20-19      Pt has CAD with prior stent in 2019. However his main issue is possibly myasthenia gravis. He sees Dr. Gopal Wu and was hospitalized at [facility]. He underwent plasmapheresis and then put on steroids. At that point he became markedly edemetous and I admitted him on 12/12/19. He received IV diuretics and was discharged on 12/16/19. Pulmonary follow him sup for DAYO. His prednisone was decreased significantly. He stopped Mestinon. Overall the pt states he is doing well. He reports that he occasionally has some chest tightness with exertion. Pt states that he recently was gardening and when he went to pull a plant out of the ground he felt some chest tightness. Pt notes that he has slight LE edema, that has improved since last visit. Pt reports that his SBP at other office visits runs 150-160. Pt started PT last week at [address] [place] [address]. Pt is seeing Dr. Gopal Wu next week. Pt states he is taking Lasix. Denies syncope or shortness of breath at rest, has no tachycardia, palpitations or sense of arrhythmia. EKG: Atrial fibrillation  and LAFB    Assessment/Plan:     1. Persistent atrial fibrillation  Rate well controlled. EF normal. Has had anemia and we chose not to add an [address] due to bleeding risk. He is here to see today if he is symptomatic enough to need a cardioversion. I feel his breathing is better and probably related to his MG. His Lasix and potassium were at 3 days week but I need to clarify if that has been changed to Torsemide. (chart review shows he was switched to Torsemide)    2. Essential hypertension  HCVD with likely diastolic dysfunction    relation to previous edema to prednisone and that is improved  Elevated today. Has seen values as high as 150 at times. I will increase Losartan to 100 mg and encourage him to keep a BP diary. NOT At goal , meds and possible side effects reviewed and patient denies  Key CAD CHF Meds             losartan (COZAAR) 100 mg tablet (Taking) Take 1 Tab by mouth daily. torsemide (DEMADEX) 20 mg tablet (Taking) TAKE 1 TABLET BY MOUTH DAILY    atorvastatin (LIPITOR) 10 mg tablet (Taking) TAKE 1 TABLET BY MOUTH DAILY    amLODIPine (NORVASC) 5 mg tablet (Taking) TAKE 1 TABLET BY MOUTH EVERY DAY    clopidogrel (PLAVIX) 75 mg tab (Taking) Take 1 Tab by mouth daily. aspirin delayed-release 81 mg tablet Take 81 mg by mouth daily. BP Readings from Last 6 Encounters:   03/18/20 140/80   02/26/20 132/86   01/28/20 144/66   01/21/20 140/87   01/17/20 146/83   01/02/20 131/64          3. Coronary artery disease involving native coronary artery of native heart without angina pectoris  5/30/2019 OM1 with 70% blockage with JOSEFINA  Pain free. Previous stent. Had some chest pain with physical exertion, we have told him to call us back if there is more pain. We discussed pain he gets in the garden it seems to be fairly minimal stable and for now we will manage medically; he knows to call back if it worsens or becomes more easily induced. 4. Elevated hemidiaphragm  Diagnosed with MG, but has improved clinically after steroids.  Has follow up with Dr. Babar Kaufman next week. 11-11-19 Dr Maia Milan  Start Mestinon 60 mg daily  Multiple receptor tests     [facility]  FEV1 2.44  Ratio 71  Mild obstructive pattern    F/u in 4 months  Future Appointments   Date Time Provider Samantha Hanccoki   4/8/2020  9:30 AM Sean Patel MD [facility] SCHED   5/26/2020 11:30 AM Juancho Collazo MD [facility] 6199   7/20/2020  9:40 AM Roxi Bowman  [address]   10/19/2020  9:20 AM Roxi Bowman MD [facility] SCHED         12/12/19   ECHO ADULT COMPLETE 12/13/2019 12/13/2019    Narrative · Normal cavity size and systolic function (ejection fraction normal). Severe concentric hypertrophy. Estimated left ventricular ejection   fraction is 50 - 55%. · Moderately dilated left atrium. · Moderate mitral valve regurgitation is present. · Mild pulmonic valve regurgitation is present. · Mildly dilated right atrium. · Mildly dilated right ventricle. Moderately reduced systolic function. · Mild to moderate tricuspid valve regurgitation is present. · Mild aortic valve regurgitation is present. · Mild to moderate pulmonary hypertension. · Mild aortic root dilatation. Signed by: Dee Rodriguez MD      Lab Results   Component Value Date/Time    NT pro-BNP 11,479 (H) 12/13/2019 02:48 AM    NT pro-BNP 10,542 (H) 12/12/2019 03:59 PM     Lab Results   Component Value Date/Time    Troponin-I, Qt. 0.16 (H) 12/13/2019 09:22 AM      Impression:   1. Persistent atrial fibrillation    2. Essential hypertension    3. Diastolic dysfunction    4. Coronary artery disease involving native coronary artery of native heart without angina pectoris    5. Elevated hemidiaphragm    6. Pedal edema    7. Myasthenia gravis ([address]    8.  Essential hypertension with goal blood pressure less than 140/90       Cardiac History:   [facility] Admit November 2019  --New onset Afib , rate controlled, w/up for MG, plasmapharesis done, no OAC due to risk with low plts and anemia when seen by Cards at [facility] Hgb was 8.5  ECHO [facility] 11-18-19  Definity  EF 60-65% mod severe LVH, mild MR, MAC, LAE, mod OUMAR PASP 45    CAD   S/p PCI May 2019 [facility]     ROS-except as noted above. . A complete cardiac and respiratory are reviewed and negative except as above ; Resp-denies wheezing  or productive cough,. Const- No unusual weight loss or fever; Neuro-no recent seizure or CVA ; GI- No BRBPR, abdom pain, bloating ; - no  hematuria   see supplement sheet, initialed and to be scanned by staff  Past Medical History:   Diagnosis Date    CAD (coronary artery disease)     Diverticulosis, sigmoid 8/2011    ED (erectile dysfunction) of organic origin     Hypertension     PAF (paroxysmal atrial fibrillation) ([facility] 11/18/2019    [facility] admit nov 2019 no OAC due to anemia    Postherpetic neuralgia     Prostate cancer ([facility]     Sebaceous cyst 4/1/2014    Shingles 02/2019      Social Hx= reports that he has never smoked. He has never used smokeless tobacco. He reports that he does not drink alcohol or use drugs. Exam and Labs:  /80 (BP 1 Location: Right arm, BP Patient Position: Sitting)   Pulse (!) 55   Resp 18   Ht 5' 8\" (1.727 m)   Wt 197 lb (89.4 kg)   SpO2 98%   BMI 29.95 kg/m² Constitutional:  NAD, comfortable  Head: NC,AT. Eyes: No scleral icterus. Neck:  Neck supple. No JVD present. Throat: moist mucous membranes. Chest: Effort normal & normal respiratory excursion . Neurological: alert, conversant and oriented . Skin: Skin is not cold. No obvious systemic rash noted. Not diaphoretic. No erythema. Psychiatric:  Grossly normal mood and affect. Behavior appears normal. Extremities:  no clubbing or cyanosis. Abdomen: non distended    Lungs:breath sounds normal. No stridor. distress, wheezes or  Rales. Heart: normal rate, regular rhythm, normal S1, S2, no murmurs, rubs, clicks or gallops , PMI non displaced. Edema: Edema is 1+ to the mid-shin.   Lab Results Component Value Date/Time    Cholesterol, total 110 08/05/2019 12:19 PM    HDL Cholesterol 43 08/05/2019 12:19 PM    LDL, calculated 55 08/05/2019 12:19 PM    Triglyceride 60 08/05/2019 12:19 PM     Lab Results   Component Value Date/Time    Sodium 137 12/16/2019 05:31 AM    Potassium 4.5 12/16/2019 05:31 AM    Chloride 103 12/16/2019 05:31 AM    CO2 32 12/16/2019 05:31 AM    Anion gap 2 (L) 12/16/2019 05:31 AM    Glucose 92 12/16/2019 05:31 AM    BUN 23 (H) 12/16/2019 05:31 AM    Creatinine 1.04 12/16/2019 05:31 AM    BUN/Creatinine ratio 22 (H) 12/16/2019 05:31 AM    GFR est AA >60 12/16/2019 05:31 AM    GFR est non-AA >60 12/16/2019 05:31 AM    Calcium 8.7 12/16/2019 05:31 AM      Wt Readings from Last 3 Encounters:   03/18/20 197 lb (89.4 kg)   02/26/20 194 lb 12.8 oz (88.4 kg)   01/17/20 192 lb 4.8 oz (87.2 kg)      BP Readings from Last 3 Encounters:   03/18/20 140/80   02/26/20 132/86   01/28/20 144/66      Current Outpatient Medications   Medication Sig    losartan (COZAAR) 100 mg tablet Take 1 Tab by mouth daily.  torsemide (DEMADEX) 20 mg tablet TAKE 1 TABLET BY MOUTH DAILY    gabapentin (NEURONTIN) 400 mg capsule Take 1 Cap by mouth nightly. Max Daily Amount: 400 mg.    atorvastatin (LIPITOR) 10 mg tablet TAKE 1 TABLET BY MOUTH DAILY    ferrous sulfate 325 mg (65 mg iron) tablet Take 1 Tab by mouth Daily (before breakfast).  potassium chloride SR (K-TAB) 20 mEq tablet Take 2 Tabs by mouth daily. (Patient taking differently: Take 40 mEq by mouth every Monday, Wednesday, Friday.)    pantoprazole (PROTONIX) 40 mg tablet Take 40 mg by mouth daily.  amLODIPine (NORVASC) 5 mg tablet TAKE 1 TABLET BY MOUTH EVERY DAY    albuterol (PROVENTIL HFA, VENTOLIN HFA, PROAIR HFA) 90 mcg/actuation inhaler Take 1-2 Puffs by inhalation every four (4) hours as needed for Wheezing.  clopidogrel (PLAVIX) 75 mg tab Take 1 Tab by mouth daily.     cyanocobalamin (VITAMIN B-12) 1,000 mcg tablet Take 1,000 mcg by mouth daily.  aspirin delayed-release 81 mg tablet Take 81 mg by mouth daily. No current facility-administered medications for this visit. Impression see above.       Written by Peace Martínez, as dictated by Chandni Souza MD.

## 2020-03-18 NOTE — PATIENT INSTRUCTIONS
Please increase Losartan to 100 mg daily You will need to follow up in clinic with Dr. Tariq Msoher in 4 months.

## 2020-03-18 NOTE — PROGRESS NOTES
Visit Vitals  /80 (BP 1 Location: Right arm, BP Patient Position: Sitting)   Pulse (!) 55   Resp 18   Ht 5' 8\" (1.727 m)   Wt 197 lb (89.4 kg)   SpO2 98%   BMI 29.95 kg/m²

## 2020-03-18 NOTE — Clinical Note
3/18/20 Patient: Britt Mejia YOB: 1935 Date of Visit: 3/18/2020 Sergio Ayers MD 
[id number] [id number] VIA In Basket Dear Sergio Ayers MD, Thank you for referring Mr. Wilfred Hammond to [facility] for evaluation. My notes for this consultation are attached. If you have questions, please do not hesitate to call me. I look forward to following your patient along with you.  
 
 
Sincerely, 
 
Ellis Martinez MD

## 2020-03-24 DIAGNOSIS — D50.0 IRON DEFICIENCY ANEMIA DUE TO CHRONIC BLOOD LOSS: ICD-10-CM

## 2020-03-27 PROBLEM — I51.89 DIASTOLIC DYSFUNCTION: Status: ACTIVE | Noted: 2020-03-27

## 2020-04-08 ENCOUNTER — VIRTUAL VISIT (OUTPATIENT)
Dept: FAMILY MEDICINE CLINIC | Age: 85
End: 2020-04-08

## 2020-04-08 VITALS
RESPIRATION RATE: 18 BRPM | SYSTOLIC BLOOD PRESSURE: 120 MMHG | HEART RATE: 88 BPM | WEIGHT: 197 LBS | BODY MASS INDEX: 29.86 KG/M2 | TEMPERATURE: 98.7 F | DIASTOLIC BLOOD PRESSURE: 80 MMHG | HEIGHT: 68 IN

## 2020-04-08 DIAGNOSIS — E78.5 HYPERLIPIDEMIA, UNSPECIFIED HYPERLIPIDEMIA TYPE: ICD-10-CM

## 2020-04-08 DIAGNOSIS — I10 ESSENTIAL HYPERTENSION WITH GOAL BLOOD PRESSURE LESS THAN 140/90: Primary | ICD-10-CM

## 2020-04-08 DIAGNOSIS — D50.9 IRON DEFICIENCY ANEMIA, UNSPECIFIED IRON DEFICIENCY ANEMIA TYPE: ICD-10-CM

## 2020-04-08 DIAGNOSIS — E87.6 HYPOKALEMIA: ICD-10-CM

## 2020-04-08 DIAGNOSIS — I25.10 CORONARY ARTERY DISEASE INVOLVING NATIVE CORONARY ARTERY OF NATIVE HEART WITHOUT ANGINA PECTORIS: ICD-10-CM

## 2020-04-08 RX ORDER — POTASSIUM CHLORIDE 1500 MG/1
40 TABLET, FILM COATED, EXTENDED RELEASE ORAL
Qty: 30 TAB | Refills: 5 | Status: ON HOLD | OUTPATIENT
Start: 2020-04-08 | End: 2021-01-01

## 2020-04-08 NOTE — PROGRESS NOTES
Chief Complaint   Patient presents with    Follow-up     1. Have you been to the ER, urgent care clinic since your last visit? Hospitalized since your last visit? No    2. Have you seen or consulted any other health care providers outside of the [address] [place] [address] since your last visit? Include any pap smears or colon screening.  No

## 2020-04-08 NOTE — PROGRESS NOTES
Eva Villalpando is a 82 y.o. male who was seen by synchronous (real-time) audio-video technology on 4/8/2020. Consent:  Services were provided through a video synchronous discussion virtually to substitute for in-person appointment. He and/or his healthcare decision maker is aware that this patient-initiated Telehealth encounter is a billable service, with coverage as determined by his insurance carrier. He is aware that he may receive a bill and has provided verbal consent to proceed: Yes    I was in the office while conducting this encounter. Subjective:   Eva Villalpando was seen for for follow up on chronic conditions. Hypertension: No vitals taken today. Pt continues with Cozaar 100 mg/d and Norvasc 5 mg/d. Pt admits that he has not been checking his BP at home since his BP monitor is broken. Pt plans to get a new monitor today. Endorses that his daughter [employer] who is a nurse did check his BP on Sunday night and reading appeared to be normal.     CAD: Stable, based on history, physical exam and review of pertinent labs, studies and medications; meds reconciled; continue current treatment plan. Hyperlipidemia: Lipid panel on 8/5/19 notable for total cholesterol 110, HDL 43, LDL 55, and triglycerides 60. Pt continues with Atorvastatin 10 mg/d. Iron deficiency anemia: Stable, based on history, physical exam and review of pertinent labs, studies and medications; meds reconciled; continue current treatment plan. Hypokalemia: Pt requests refill for Potassium. Overall pt does not have any acute complaints. He also wonders if he still needs to take all of his current medications. Discussed with pt that we might be able to wean him off of Neurontin, but I would need to see him in person first; Protonix can be PRN. Pt states that he mostly stays home now due to COVID-19, but sometimes he does yard work and states that he feels good doing so.  Notes that his wife goes grocery shopping regularly. Prior to Admission medications    Medication Sig Start Date End Date Taking? Authorizing Provider   potassium chloride SR (K-TAB) 20 mEq tablet Take 2 Tabs by mouth every Monday, Wednesday, Friday. 4/8/20  Yes Deedee Loving MD   losartan (COZAAR) 100 mg tablet Take 1 Tab by mouth daily. 3/18/20  Yes Laura Stearns MD   torsemide (DEMADEX) 20 mg tablet TAKE 1 TABLET BY MOUTH DAILY 2/27/20  Yes Deedee Loving MD   gabapentin (NEURONTIN) 400 mg capsule Take 1 Cap by mouth nightly. Max Daily Amount: 400 mg. 2/11/20  Yes Deedee Loving MD   atorvastatin (LIPITOR) 10 mg tablet TAKE 1 TABLET BY MOUTH DAILY 1/13/20  Yes Laura Stearns MD   ferrous sulfate 325 mg (65 mg iron) tablet Take 1 Tab by mouth Daily (before breakfast). 12/19/19  Yes Nelida Michaels NP   aspirin delayed-release 81 mg tablet Take 81 mg by mouth daily. Yes Provider, Historical   pantoprazole (PROTONIX) 40 mg tablet Take 40 mg by mouth daily. 11/21/19  Yes Provider, Historical   amLODIPine (NORVASC) 5 mg tablet TAKE 1 TABLET BY MOUTH EVERY DAY 12/5/19  Yes Rigoberto Burrows MD   albuterol (PROVENTIL HFA, VENTOLIN HFA, PROAIR HFA) 90 mcg/actuation inhaler Take 1-2 Puffs by inhalation every four (4) hours as needed for Wheezing. 10/30/19  Yes Deedee Loving MD   clopidogrel (PLAVIX) 75 mg tab Take 1 Tab by mouth daily. 5/30/19  Yes Laura Stearns MD   cyanocobalamin (VITAMIN B-12) 1,000 mcg tablet Take 1,000 mcg by mouth daily. Yes Provider, Historical   potassium chloride SR (K-TAB) 20 mEq tablet Take 2 Tabs by mouth daily. Patient taking differently: Take 40 mEq by mouth every Monday, Wednesday, Friday.  12/16/19 4/8/20  Holden Sharp MD     No Known Allergies  Past Medical History:   Diagnosis Date    CAD (coronary artery disease)     Diverticulosis, sigmoid 8/2011    ED (erectile dysfunction) of organic origin     Hypertension     PAF (paroxysmal atrial fibrillation) ([address] 11/18/2019    [facility] admit nov 2019 no OAC due to anemia    Postherpetic neuralgia     Prostate cancer ([facility] [address]     Sebaceous cyst 4/1/2014    Shingles 02/2019     Past Surgical History:   Procedure Laterality Date    ENDOSCOPY, COLON, DIAGNOSTIC  >= 8-10years ago   Meria Dessert SURGERY  1968 [id number]    HX CARPAL TUNNEL RELEASE  4/08    right,left    HX CHOLECYSTECTOMY      HX HEART CATHETERIZATION  05/2019    HX HERNIA REPAIR      HX PROSTATECTOMY  5/06     Family History   Problem Relation Age of Onset    Cancer Mother         pancreatic    Diabetes Father     Stroke Father     Diabetes Sister     Hypertension Sister     Other Sister         Open heart surgery     Diabetes Brother     Hypertension Brother     Hypertension Brother     Asthma Daughter     Heart Attack Daughter     Other Daughter         hip replacement x 2     Social History     Tobacco Use    Smoking status: Never Smoker    Smokeless tobacco: Never Used   Substance Use Topics    Alcohol use: No        Review of Systems   Constitutional: Negative for chills and fever. HENT: Negative for hearing loss and tinnitus. Eyes: Negative for blurred vision and double vision. Respiratory: Negative for cough and shortness of breath. Cardiovascular: Negative for chest pain and palpitations. Gastrointestinal: Negative for nausea and vomiting. Genitourinary: Negative for dysuria and frequency. Musculoskeletal: Negative for back pain and falls. Skin: Negative for itching and rash. Neurological: Negative for dizziness, loss of consciousness and headaches. Psychiatric/Behavioral: Negative for depression. The patient is not nervous/anxious.         PHYSICAL EXAMINATION:  Vital Signs: (As obtained by patient/caregiver at home)  Visit Vitals  /80   Pulse 88   Temp 98.7 °F (37.1 °C)   Resp 18   Ht 5' 8\" (1.727 m)   Wt 197 lb (89.4 kg)   BMI 29.95 kg/m²        Constitutional: [x] Appears well-developed and well-nourished [x] No apparent distress      [] Abnormal -     Mental status: [x] Alert and awake  [x] Oriented to person/place/time [x] Able to follow commands    [] Abnormal -     Eyes:   EOM    [x]  Normal    [] Abnormal -   Sclera  [x]  Normal    [] Abnormal -          Discharge [x]  None visible   [] Abnormal -     HENT: [x] Normocephalic, atraumatic  [] Abnormal -   [x] Mouth/Throat: Mucous membranes are moist    External Ears [x] Normal  [] Abnormal -    Neck: [x] No visualized mass [] Abnormal -     Pulmonary/Chest: [x] Respiratory effort normal   [x] No visualized signs of difficulty breathing or respiratory distress        [] Abnormal -      Musculoskeletal:   [x] Normal gait with no signs of ataxia         [x] Normal range of motion of neck        [] Abnormal -     Neurological:        [x] No Facial Asymmetry (Cranial nerve 7 motor function) (limited exam due to video visit)          [x] No gaze palsy        [] Abnormal -          Skin:        [x] No significant exanthematous lesions or discoloration noted on facial skin         [] Abnormal -            Psychiatric:       [x] Normal Affect [] Abnormal -        [x] No Hallucinations    Other pertinent observable physical exam findings:-none    Assessment & Plan:   Diagnoses and all orders for this visit:    1. Essential hypertension with goal blood pressure less than 140/90  Advised pt to continue with  Cozaar 100 mg/d and Norvasc 5 mg/d    2. Coronary artery disease involving native coronary artery of native heart without angina pectoris  Stable, based on history, physical exam and review of pertinent labs, studies and medications; meds reconciled; continue current treatment plan. 3. Hyperlipidemia, unspecified hyperlipidemia type  Pt continues with Atorvastatin 10 mg/d.     4. Iron deficiency anemia, unspecified iron deficiency anemia type  Stable, based on history, physical exam and review of pertinent labs, studies and medications; meds reconciled; continue current treatment plan.     5. Hypokalemia  Stable, based on history, physical exam and review of pertinent labs, studies and medications; meds reconciled; continue current treatment plan. Refill provided. -     potassium chloride SR (K-TAB) 20 mEq tablet; Take 2 Tabs by mouth every Monday, Wednesday, Friday. Due to recent oubreak of COVID-19, advised patients to cover cough or sneeze, wash hands regularly with water and soap, avoid touching eyes nose mouth with unwashed hands, avoid close contact with sick individuals, clean and disinfect frequently touched objects and surfaces, etc. Hand  should be at least 60% alcohol content or contains persistent antiseptic to be effective. Advised patients with young children, elders or anyone who is immune compromised to be extra cautious and stay home if presenting with any symptoms such as cough, SOB, fever, sore throat, etc. Patients should wear masks and gloves in public to minimize risks of transmission and be cautious when handling mails/packages. Travelling should be limited at this time. Follow-up and Dispositions    · Return in about 3 months (around 7/8/2020). We discussed the expected course, resolution and complications of the diagnosis(es) in detail. Medication risks, benefits, costs, interactions, and alternatives were discussed as indicated. I advised her to contact the office if her condition worsens, changes or fails to improve as anticipated. She expressed understanding with the diagnosis(es) and plan. Pursuant to the emergency declaration under the [employer] [facility], 1135 waiver authority and the [employer] and [employer] General Act, this Virtual Visit was conducted, with patient's consent, to reduce the patient's risk of exposure to COVID-19 and provide continuity of care for an established patient.     Services were provided through a video synchronous discussion virtually to substitute for in-person clinic visit. Written by josef Garcia, as dictated by Tracy Ventura M.D.    9:02 AM - 9:11 AM    Total time spent with the patient 9 minutes, greater than 50% of time spent counseling patient.

## 2020-05-22 LAB
BASOPHILS # BLD AUTO: 0 X10E3/UL (ref 0–0.2)
BASOPHILS NFR BLD AUTO: 0 %
EOSINOPHIL # BLD AUTO: 0.1 X10E3/UL (ref 0–0.4)
EOSINOPHIL NFR BLD AUTO: 2 %
ERYTHROCYTE [DISTWIDTH] IN BLOOD BY AUTOMATED COUNT: 12.4 % (ref 11.6–15.4)
FERRITIN SERPL-MCNC: 152 NG/ML (ref 30–400)
HAPTOGLOB SERPL-MCNC: 167 MG/DL (ref 38–329)
HCT VFR BLD AUTO: 37.8 % (ref 37.5–51)
HGB BLD-MCNC: 12.2 G/DL (ref 13–17.7)
IMM GRANULOCYTES # BLD AUTO: 0 X10E3/UL (ref 0–0.1)
IMM GRANULOCYTES NFR BLD AUTO: 0 %
IRON SATN MFR SERPL: 22 % (ref 15–55)
IRON SERPL-MCNC: 68 UG/DL (ref 38–169)
LDH SERPL-CCNC: 188 IU/L (ref 121–224)
LYMPHOCYTES # BLD AUTO: 1.4 X10E3/UL (ref 0.7–3.1)
LYMPHOCYTES NFR BLD AUTO: 23 %
MCH RBC QN AUTO: 28.9 PG (ref 26.6–33)
MCHC RBC AUTO-ENTMCNC: 32.3 G/DL (ref 31.5–35.7)
MCV RBC AUTO: 90 FL (ref 79–97)
MONOCYTES # BLD AUTO: 0.5 X10E3/UL (ref 0.1–0.9)
MONOCYTES NFR BLD AUTO: 9 %
NEUTROPHILS # BLD AUTO: 3.9 X10E3/UL (ref 1.4–7)
NEUTROPHILS NFR BLD AUTO: 66 %
PLATELET # BLD AUTO: 180 X10E3/UL (ref 150–450)
RBC # BLD AUTO: 4.22 X10E6/UL (ref 4.14–5.8)
TIBC SERPL-MCNC: 310 UG/DL (ref 250–450)
UIBC SERPL-MCNC: 242 UG/DL (ref 111–343)
WBC # BLD AUTO: 5.9 X10E3/UL (ref 3.4–10.8)

## 2020-05-26 ENCOUNTER — VIRTUAL VISIT (OUTPATIENT)
Dept: ONCOLOGY | Age: 85
End: 2020-05-26

## 2020-05-26 DIAGNOSIS — D50.0 IRON DEFICIENCY ANEMIA DUE TO CHRONIC BLOOD LOSS: Primary | ICD-10-CM

## 2020-05-26 NOTE — Clinical Note
Please mail labs for 12 weeks Also requests his recent labs results from 5/21 be mailed  RTC 3 months

## 2020-05-26 NOTE — PROGRESS NOTES
[facility] at [address], [address], [address]  W: [phone]  F: [phone]    Reason for Visit:   Yong Saeed is a 82 y.o. male who is seen by synchronous (real-time) audio-video technology on 5/26/2020 for follow up of anemia. History of Present Illness:   Patient is a 82 y.o. male with CAD and h/o prostate cancer who is seen for follow up of anemia     He has progressive anemia since 5/2019 with Hb having dropped from 12 g/dl to 9 g/dl by 12/2019. He does have a h/o iron deficiency with iron studies on 12/13/2019 showing a TSAT of 8% though ferritin was 53. When rechecked 1/2/2020 he was iron replete but anemia had barely improved to 9.5 g/dl. He was admitted around 11/2019 at [facility] with SOB and anemia. He was seen by Dr. Patel Nation Dr. Barbi Cary. EGD 11/2019 had shown some esophagitis. Colonoscopy was not considered necessary. He also was diagnosed with Myasthenia gravis. He had plasma exchange. He was then placed on prednisone. He was readmitted with SOB in Dec 2019 thought to be due to prednisone and was taken off this. He was seen by Neurology and was then told that he has no myasthenia. He also had a normal SPEP at that time and a normal MMA. His work up was most suggestive of iron deficiency. He received Injectafer x 2 in Jan 2020. Now seen for follow up    He has continued to take iron sulfate 1 pill a day, dark stools on iron  Has more energy but still has FERNANDO  He has no visible bleeding' no pica and no dizziness   He has a stable weight. He had a colonoscopy about 4 years ago and was reportedly normal. He has no fevers, chills.     He had a prostatectomy 2006    He has never smoked  Mother had pancreatic cancer  Sister had Lymphoma  Daughter had lung cancer    Past Medical History:   Diagnosis Date    CAD (coronary artery disease)     Diverticulosis, sigmoid 8/2011    ED (erectile dysfunction) of organic origin     Hypertension     PAF (paroxysmal atrial fibrillation) ([facility] [address] 11/18/2019    [facility] admit nov 2019 no OAC due to anemia    Postherpetic neuralgia     Prostate cancer ([facility] [address]     Sebaceous cyst 4/1/2014    Shingles 02/2019      Past Surgical History:   Procedure Laterality Date    ENDOSCOPY, COLON, DIAGNOSTIC  >= 8-10years ago     [id number]    HX CARPAL TUNNEL RELEASE  4/08    right,left    HX CHOLECYSTECTOMY      HX HEART CATHETERIZATION  05/2019    HX HERNIA REPAIR      HX PROSTATECTOMY  5/06      Social History     Tobacco Use    Smoking status: Never Smoker    Smokeless tobacco: Never Used   Substance Use Topics    Alcohol use: No      Family History   Problem Relation Age of Onset    Cancer Mother         pancreatic    Diabetes Father     Stroke Father     Diabetes Sister     Hypertension Sister     Other Sister         Open heart surgery     Diabetes Brother     Hypertension Brother     Hypertension Brother     Asthma Daughter     Heart Attack Daughter     Other Daughter         hip replacement x 2     Current Outpatient Medications   Medication Sig    potassium chloride SR (K-TAB) 20 mEq tablet Take 2 Tabs by mouth every Monday, Wednesday, Friday.  losartan (COZAAR) 100 mg tablet Take 1 Tab by mouth daily.  torsemide (DEMADEX) 20 mg tablet TAKE 1 TABLET BY MOUTH DAILY    gabapentin (NEURONTIN) 400 mg capsule Take 1 Cap by mouth nightly. Max Daily Amount: 400 mg.    atorvastatin (LIPITOR) 10 mg tablet TAKE 1 TABLET BY MOUTH DAILY    ferrous sulfate 325 mg (65 mg iron) tablet Take 1 Tab by mouth Daily (before breakfast).  aspirin delayed-release 81 mg tablet Take 81 mg by mouth daily.  pantoprazole (PROTONIX) 40 mg tablet Take 40 mg by mouth daily.  amLODIPine (NORVASC) 5 mg tablet TAKE 1 TABLET BY MOUTH EVERY DAY    albuterol (PROVENTIL HFA, VENTOLIN HFA, PROAIR HFA) 90 mcg/actuation inhaler Take 1-2 Puffs by inhalation every four (4) hours as needed for Wheezing.     clopidogrel (PLAVIX) 75 mg tab Take 1 Tab by mouth daily.  cyanocobalamin (VITAMIN B-12) 1,000 mcg tablet Take 1,000 mcg by mouth daily. No current facility-administered medications for this visit. No Known Allergies     Review of Systems: A complete review of systems was obtained, negative except as described above. Physical Exam:     Due to this being a TeleHealth evaluation, many elements of the physical examination are unable to be assessed. Constitutional: Appears well-developed and well-nourished in no apparent distress   Mental status: Alert and awake, Oriented to person/place/time, Able to follow commands  Eyes: EOM normal, Sclera normal, No visible ocular discharge  HENT: Normocephalic, atraumatic; Mouth/Throat: Moist mucous membranes, External Ears normal  Neurological: No facial asymmetry (Cranial nerve 7 motor function), No gaze palsy  Skin: No significant exanthematous lesions or discoloration noted on facial skin  Psychiatric: Normal affect, normal judgment/insight. No hallucinations       Results:     Lab Results   Component Value Date/Time    WBC 5.9 05/21/2020 09:05 AM    HGB 12.2 (L) 05/21/2020 09:05 AM    HCT 37.8 05/21/2020 09:05 AM    PLATELET 996 13/90/9101 09:05 AM    MCV 90 05/21/2020 09:05 AM    ABS. NEUTROPHILS 3.9 05/21/2020 09:05 AM     Lab Results   Component Value Date/Time    Sodium 137 12/16/2019 05:31 AM    Potassium 4.5 12/16/2019 05:31 AM    Chloride 103 12/16/2019 05:31 AM    CO2 32 12/16/2019 05:31 AM    Glucose 92 12/16/2019 05:31 AM    BUN 23 (H) 12/16/2019 05:31 AM    Creatinine 1.04 12/16/2019 05:31 AM    GFR est AA >60 12/16/2019 05:31 AM    GFR est non-AA >60 12/16/2019 05:31 AM    Calcium 8.7 12/16/2019 05:31 AM     Lab Results   Component Value Date/Time    Bilirubin, total 1.0 12/12/2019 03:59 PM    ALT (SGPT) 33 12/12/2019 03:59 PM    AST (SGOT) 27 12/12/2019 03:59 PM    Alk.  phosphatase 62 12/12/2019 03:59 PM    Protein, total 6.3 (L) 12/12/2019 03:59 PM    Albumin 3.6 12/12/2019 03:59 PM    Globulin 2.7 12/12/2019 03:59 PM       Lab Results   Component Value Date/Time    Iron % saturation 22 05/21/2020 09:05 AM    TIBC 310 05/21/2020 09:05 AM    Ferritin 152 05/21/2020 09:05 AM    Vitamin B12 238 04/27/2018 03:25 PM    Folate 14.8 04/27/2018 03:25 PM    Haptoglobin 167 05/21/2020 09:05 AM     05/21/2020 09:05 AM    Sed rate (ESR) 20 05/24/2019 12:50 PM    C-Reactive Protein, Qt 0.6 12/12/2018 05:04 PM    C-Reactive Protein, Cardiac 10.91 (H) 05/24/2019 12:50 PM    TSH 1.23 12/14/2019 04:55 AM     No results found for: INR, APTT, DDIMSQ, DDIME, [id number], Jean-Claude Hench, FDPLT, Ofilia Ivan, [address] [facility], [id number], [id number], [id number], [id number], [id number], [id number], [employer]  Results for Mendez Yoder (MRN [id number]) as of 1/17/2020 08:32   Ref. Range 12/13/2019 09:22   Iron Latest Ref Range: 35 - 150 ug/dL 21 (L)   TIBC Latest Ref Range: 250 - 450 ug/dL 276   Iron % saturation Latest Ref Range: 20 - 50 % 8 (L)   Ferritin Latest Ref Range: 26 - 388 NG/ML 53     Records reviewed and summarized above. Pathology report(s) reviewed above. Radiology report(s) reviewed above. Assessment:   1) Anemia-    He did have evidence of iron deficiency when checked 12/14/2019  TSAT was 8%  Was on 65 mg of elemental iron and in 2 weeks TSAT falsely normal at 20% , Hb improved some but not normal  Received Injecatfer x 2 in Jan 2020 with Hb having improved as of 5/21/2020 to 12.2 g/dl.   Iron stores have normalized    It does appear to have had a negative SPEP , normal MMA and B12 levels and no evidence of hemolysis on work up at [address]      Had an EGD with gastritis in nov 2019  Not convinced that explains his iron deficiency and will have a colonoscopy with Dr. Behzad Wilkins as scheduled    2) H/O Prostate cancer  Prostatectomy in 2006    3) CAD  On plavix    4) MG  This is not a certain diagnosis  He follows with Dr. Sergo Pepe:     · RTC 12 weeks with cbc, iron profile, ferritin    All questions answered and results were reviewed  Copies mailed and co ordinated with nursing    I appreciate the opportunity to participate in Mr. Martin Hammond's care. Signed By: Tresia Burkitt, MD      I was in the office while conducting this encounter. The patient was at his home. Consent:  He and/or his healthcare decision maker is aware that this patient-initiated Telehealth encounter is a billable service, with coverage as determined by his insurance carrier. He is aware that he may receive a bill and has provided verbal consent to proceed: Yes    Pursuant to the emergency declaration under the [address] and the [facility], 1135 waiver authority and the [employer] Act, this Virtual  Visit was conducted, with patient's (and/or legal guardian's) consent, to reduce the patient's risk of exposure to COVID-19 and provide necessary medical care. Services were provided through a video synchronous discussion virtually to substitute for in-person visit.

## 2020-06-15 DIAGNOSIS — I10 ESSENTIAL HYPERTENSION WITH GOAL BLOOD PRESSURE LESS THAN 140/90: ICD-10-CM

## 2020-06-16 RX ORDER — AMLODIPINE BESYLATE 5 MG/1
TABLET ORAL
Qty: 90 TAB | Refills: 1 | Status: SHIPPED | OUTPATIENT
Start: 2020-06-16 | End: 2020-07-20

## 2020-06-18 RX ORDER — CLOPIDOGREL BISULFATE 75 MG/1
75 TABLET ORAL DAILY
Qty: 90 TAB | Refills: 3 | Status: SHIPPED | OUTPATIENT
Start: 2020-06-18 | End: 2020-07-20

## 2020-07-20 ENCOUNTER — OFFICE VISIT (OUTPATIENT)
Dept: CARDIOLOGY CLINIC | Age: 85
End: 2020-07-20

## 2020-07-20 VITALS
DIASTOLIC BLOOD PRESSURE: 90 MMHG | RESPIRATION RATE: 16 BRPM | WEIGHT: 185 LBS | HEART RATE: 53 BPM | HEIGHT: 68 IN | SYSTOLIC BLOOD PRESSURE: 150 MMHG | OXYGEN SATURATION: 99 % | BODY MASS INDEX: 28.04 KG/M2

## 2020-07-20 DIAGNOSIS — I25.110 CORONARY ARTERY DISEASE INVOLVING NATIVE CORONARY ARTERY OF NATIVE HEART WITH UNSTABLE ANGINA PECTORIS (HCC): ICD-10-CM

## 2020-07-20 DIAGNOSIS — G70.00 MYASTHENIA GRAVIS (HCC): ICD-10-CM

## 2020-07-20 DIAGNOSIS — I48.19 PERSISTENT ATRIAL FIBRILLATION (HCC): Primary | ICD-10-CM

## 2020-07-20 DIAGNOSIS — I51.89 DIASTOLIC DYSFUNCTION: ICD-10-CM

## 2020-07-20 DIAGNOSIS — J98.6 ELEVATED HEMIDIAPHRAGM: ICD-10-CM

## 2020-07-20 DIAGNOSIS — R60.9 EDEMA, UNSPECIFIED TYPE: ICD-10-CM

## 2020-07-20 DIAGNOSIS — I10 ESSENTIAL HYPERTENSION: ICD-10-CM

## 2020-07-20 DIAGNOSIS — R60.0 PEDAL EDEMA: ICD-10-CM

## 2020-07-20 DIAGNOSIS — I10 ESSENTIAL HYPERTENSION WITH GOAL BLOOD PRESSURE LESS THAN 140/90: ICD-10-CM

## 2020-07-20 RX ORDER — TORSEMIDE 20 MG/1
20 TABLET ORAL AS NEEDED
Qty: 90 TAB | Refills: 0
Start: 2020-07-20 | End: 2021-01-06 | Stop reason: SDUPTHER

## 2020-07-20 NOTE — PROGRESS NOTES
Cedric Footeroderickjisaturnino     1935       Gaby Penny MD, [facility]  Date of Visit-7/20/2020   PCP is Goldie Germain MD   [facility] [place]  [facility]  HPI:  Ade Domingo is a 82 y.o. male   Fu visit  · CAD- JOSEFINA-Cath done 5/30/19. OM1 with 70% blockage had JOSEFINA  · CT scan on 10/8/19 which  Showed low lung volumes, diaphragm on the left was elevated with atelectasis on the left with mucus plugging. · hospitalized at [facility], from 11/12/2019 through 11/21/2019,Atrial fibrillation with RVR possible MG, underwent plasmapheresis/ steroids. Then  became markedly edemetous and I admitted him on 12/12/19. · Echo 11-18-19 EF Definity for atrial fibrillation Moderate to severe LVH EF 60-65%MAC, mild MRMod SERGIO PASP 45  · Echo 12-13-19 EF 50-55% severe LVH, mod LAE, mod MR, TR  · HTN, XOL, anemia Hgb 8.7 11-20-19    Pt has CAD with prior stent in 2019. Also admitted in 2019 by Neurology  Dr. Radha Ojeda and was hospitalized at [facility]. He underwent plasmapheresis for possible neuromuscular disorder and then put on steroids. He became markedly edemetous and  admitted him on 12/12/19 to [facility]. He received IV diuretics and was discharged on 12/16/19. Pulmonary follow up  for DAYO. His Prednisone was decreased significantly. He stopped Mestinon. Pt saw Dr. Steven Gonzalez in May in f/u of anemia. The source of anemia is still not clear and a colonoscopy was planned. Pt is still in physical therapy at [address] [place] [address] and does report some discomfort in his chest when doing the exercises or when taking the garbage out. He rates the pain as mild, but does not think this discomfort is happening more often than it was before. He reports it happening about 1-2x a week. Pt notes that when he lays in bed at night on his left hand side, he will have difficulty breathing and sleeping. He also states that he will feel SOB with activity during the day.  He also has been experiencing some dizziness upon standing. He believes that he has the dizziness about 2-3x a week. Pt checks his BP everyday and reports that it typically 120 or below. He did check it this morning and his SBP was in the 140's. He also reports mild edema. Pt has not yet had a colonoscopy. Denies syncope, has no tachycardia, palpitations or sense of arrhythmia. EKG: Atrial fibrillation  at a rate of 53, left axis deviation    Assessment/Plan:     1. Coronary artery disease involving native coronary artery of native heart with unstable angina pectoris [facility])  Describes that as he has increased activity he has increased the chest discomfort. He had an OM1 blockage a year ago with successful JOSEFINA. He does not appear to have an obvious other trigger. I am going to get a stress nuclear but he can continue with physical therapy. Need to check when the last HGB was done. He can stop Plavix since he is one year out from PCI. Consider Imdur if stress test is normal.     2. Persistent atrial fibrillation (HCC)  Rate well controlled. EF normal. Has had anemia and we chose not to add an [address] due to bleeding risk. The shortness of breath he has at night and seems not to be CHF, so I am not sure if cardioversion will benefit him. 3. Essential hypertension  HCVD with likely diastolic dysfunction. The edema is fairly minimal. He can continue on Torsemide as a PRN. I am going to stop Amlodipine because he is getting dizzy and his home BP's are often below 120. Office bp may be overestimating  BP Readings from Last 6 Encounters:   07/20/20 150/90   04/08/20 120/80   03/18/20 140/80   02/26/20 132/86   01/28/20 144/66   01/21/20 140/87       4. Diastolic dysfunction  Looks like he does not have myasthenia gravis and has f/u with neurology upcoming.  [facility]  FEV1 2.44  Ratio 71  Mild obstructive pattern       F/u with a virtual visit a week after testing and an in-person visit in 3 months  Future Appointments   Date Time Provider Samantha Ley   8/7/2020 10:00 AM NUCLEAR, [address]   8/7/2020 11:00 AM VASCULAR, [address]   8/18/2020 11:00 AM Carlin De Los Santos MD [facility]   10/19/2020  9:20 AM Juancho Worley  [address]   11/16/2020 11:20 AM Juancho Worley  [address]         Patient Instructions   Please stop your Plavix and amlodipine (norvasc). Please take your torsemide as needed. You will be scheduled for a nuclear stress test and an echocardiogram. You will be scheduled for a virtual visit about a week later to discuss testing. We will see you back in the office in 3-4 months. You will be scheduled for nuclear stress testing after your appointment today. Wear comfortable clothing (shorts or pants with a shirt or blouse- no underwire bras) and walking or athletic shoes. Do not eat or drink anything, except water, for at least 2 hours prior to your appointment. Avoid tobacco products for at least 6 hours prior to your test.    Do not eat or drink anything containing caffeine, including but not limited to the following: chocolate, regular and decaffeinated coffee, soft drinks, or tea for at least 12-24 hours prior to your test.    Do not hold your scheduled medications prior to your test. If you are a diabetic, please ask your physician how to adjust your food and insulin prior to your test. Please bring all medications you are currently taking. You will need to inform our office if you are pregnant, nursing, or think you may be pregnant. Your test will be performed on a 1 day protocol. This is determined by your height, weight, and other risk factors. For a 2 day test, please allow for 2 hours in the office each day. For a 1 day test, please allow for 4 hours in the office that day.     The radioactive isotope used for your testing is different from any of the dyes that are commonly used in x-ray procedures, and is ordered specially for your test. Please call to cancel or reschedule your appointment at least 24 hours prior to your scheduled appointment to avoid being billed for the expensive isotope. Impression:   1. Persistent atrial fibrillation ([facility]    2. Coronary artery disease involving native coronary artery of native heart with unstable angina pectoris ([facility]    3. Essential hypertension    4. Diastolic dysfunction    5. Elevated hemidiaphragm    6. Pedal edema    7. Myasthenia gravis ([facility]    8. Essential hypertension with goal blood pressure less than 140/90    9. Edema, unspecified type       Cardiac History:   [facility] Admit November 2019  --New onset Afib , rate controlled, w/up for MG, plasmapharesis done, no OAC due to risk with low plts and anemia when seen by Cards at [facility] Hgb was 8.5  ECHO [facility] 11-18-19  Definity  EF 60-65% mod severe LVH, mild MR, MAC, LAE, mod OUMAR PASP 45    CAD   S/p PCI May 2019 [facility]     ROS-except as noted above. . A complete cardiac and respiratory are reviewed and negative except as above ; Resp-denies wheezing  or productive cough,. Const- No unusual weight loss or fever; Neuro-no recent seizure or CVA ; GI- No BRBPR, abdom pain, bloating ; - no  hematuria   see supplement sheet, initialed and to be scanned by staff  Past Medical History:   Diagnosis Date    CAD (coronary artery disease)     Diverticulosis, sigmoid 8/2011    ED (erectile dysfunction) of organic origin     Hypertension     PAF (paroxysmal atrial fibrillation) ([facility] 11/18/2019    [facility] admit nov 2019 no OAC due to anemia    Postherpetic neuralgia     Prostate cancer ([facility]     Sebaceous cyst 4/1/2014    Shingles 02/2019      Social Hx= reports that he has never smoked. He has never used smokeless tobacco. He reports that he does not drink alcohol or use drugs.      Exam and Labs:  /90   Pulse (!) 53   Resp 16   Ht 5' 8\" (1.727 m)   Wt 185 lb (83.9 kg)   SpO2 99%   BMI 28.13 kg/m² Constitutional:  NAD, comfortable  Head: NC,AT. Eyes: No scleral icterus. Neck:  Neck supple. No JVD present. Throat: moist mucous membranes. Chest: Effort normal & normal respiratory excursion . Neurological: alert, conversant and oriented . Skin: Skin is not cold. No obvious systemic rash noted. Not diaphoretic. No erythema. Psychiatric:  Grossly normal mood and affect. Behavior appears normal. Extremities:  no clubbing or cyanosis. Abdomen: non distended    Lungs:breath sounds normal. No stridor. distress, wheezes or  Rales. Heart: IRIR with a 1/6 KEVIN normal S1, S2, no rubs, clicks or gallops , PMI non displaced. Edema: Edema is to the sock-line bilaterally. Lab Results   Component Value Date/Time    Cholesterol, total 110 08/05/2019 12:19 PM    HDL Cholesterol 43 08/05/2019 12:19 PM    LDL, calculated 55 08/05/2019 12:19 PM    Triglyceride 60 08/05/2019 12:19 PM     Lab Results   Component Value Date/Time    Sodium 137 12/16/2019 05:31 AM    Potassium 4.5 12/16/2019 05:31 AM    Chloride 103 12/16/2019 05:31 AM    CO2 32 12/16/2019 05:31 AM    Anion gap 2 (L) 12/16/2019 05:31 AM    Glucose 92 12/16/2019 05:31 AM    BUN 23 (H) 12/16/2019 05:31 AM    Creatinine 1.04 12/16/2019 05:31 AM    BUN/Creatinine ratio 22 (H) 12/16/2019 05:31 AM    GFR est AA >60 12/16/2019 05:31 AM    GFR est non-AA >60 12/16/2019 05:31 AM    Calcium 8.7 12/16/2019 05:31 AM      Wt Readings from Last 3 Encounters:   07/20/20 185 lb (83.9 kg)   04/08/20 197 lb (89.4 kg)   03/18/20 197 lb (89.4 kg)      BP Readings from Last 3 Encounters:   07/20/20 150/90   04/08/20 120/80   03/18/20 140/80      Current Outpatient Medications   Medication Sig    torsemide (DEMADEX) 20 mg tablet Take 1 Tab by mouth as needed (swelling, shortness of breath).  potassium chloride SR (K-TAB) 20 mEq tablet Take 2 Tabs by mouth every Monday, Wednesday, Friday.  losartan (COZAAR) 100 mg tablet Take 1 Tab by mouth daily.  gabapentin (NEURONTIN) 400 mg capsule Take 1 Cap by mouth nightly.  Max Daily Amount: 400 mg.    atorvastatin (LIPITOR) 10 mg tablet TAKE 1 TABLET BY MOUTH DAILY    aspirin delayed-release 81 mg tablet Take 81 mg by mouth daily.  pantoprazole (PROTONIX) 40 mg tablet Take 40 mg by mouth daily.  albuterol (PROVENTIL HFA, VENTOLIN HFA, PROAIR HFA) 90 mcg/actuation inhaler Take 1-2 Puffs by inhalation every four (4) hours as needed for Wheezing.  cyanocobalamin (VITAMIN B-12) 1,000 mcg tablet Take 1,000 mcg by mouth daily.  ferrous sulfate 325 mg (65 mg iron) tablet Take 1 Tab by mouth Daily (before breakfast). No current facility-administered medications for this visit. Impression see above.       Written by Claire Rosa, as dictated by Ramakrishna De Paz MD.

## 2020-07-20 NOTE — PROGRESS NOTES
Visit Vitals  /90   Pulse (!) 53   Resp 16   Ht 5' 8\" (1.727 m)   Wt 185 lb (83.9 kg)   SpO2 99%   BMI 28.13 kg/m²

## 2020-07-20 NOTE — Clinical Note
7/20/20 Patient: Fernando Lawson YOB: 1935 Date of Visit: 7/20/2020 Keith Nichols MD 
[facility] [address] VIA In Basket Dear Keith Nichols MD, Thank you for referring Mr. Wilfred Hammond to [facility] for evaluation. My notes for this consultation are attached. If you have questions, please do not hesitate to call me. I look forward to following your patient along with you.  
 
 
Sincerely, 
 
Jose Gamble MD

## 2020-07-20 NOTE — PATIENT INSTRUCTIONS
Please stop your Plavix and amlodipine (norvasc). Please take your torsemide as needed. You will be scheduled for a nuclear stress test and an echocardiogram. You will be scheduled for a virtual visit about a week later to discuss testing. We will see you back in the office in 3-4 months. You will be scheduled for nuclear stress testing after your appointment today. Wear comfortable clothing (shorts or pants with a shirt or blouse- no underwire bras) and walking or athletic shoes. Do not eat or drink anything, except water, for at least 2 hours prior to your appointment. Avoid tobacco products for at least 6 hours prior to your test.    Do not eat or drink anything containing caffeine, including but not limited to the following: chocolate, regular and decaffeinated coffee, soft drinks, or tea for at least 12-24 hours prior to your test.    Do not hold your scheduled medications prior to your test. If you are a diabetic, please ask your physician how to adjust your food and insulin prior to your test. Please bring all medications you are currently taking. You will need to inform our office if you are pregnant, nursing, or think you may be pregnant. Your test will be performed on a 1 day protocol. This is determined by your height, weight, and other risk factors. For a 2 day test, please allow for 2 hours in the office each day. For a 1 day test, please allow for 4 hours in the office that day. The radioactive isotope used for your testing is different from any of the dyes that are commonly used in x-ray procedures, and is ordered specially for your test. Please call to cancel or reschedule your appointment at least 24 hours prior to your scheduled appointment to avoid being billed for the expensive isotope.

## 2020-08-06 ENCOUNTER — TELEPHONE (OUTPATIENT)
Dept: CARDIOLOGY CLINIC | Age: 85
End: 2020-08-06

## 2020-08-06 NOTE — TELEPHONE ENCOUNTER
LM to conf appt w instruction, advised if pt had been around anyone w COVID or was having any new symptoms to call and r/s appt. Also advised to call w questions.

## 2020-08-07 ENCOUNTER — ANCILLARY PROCEDURE (OUTPATIENT)
Dept: CARDIOLOGY CLINIC | Age: 85
End: 2020-08-07
Payer: MEDICARE

## 2020-08-07 ENCOUNTER — CLINICAL SUPPORT (OUTPATIENT)
Dept: CARDIOLOGY CLINIC | Age: 85
End: 2020-08-07
Payer: MEDICARE

## 2020-08-07 VITALS
HEIGHT: 68 IN | SYSTOLIC BLOOD PRESSURE: 124 MMHG | WEIGHT: 185 LBS | BODY MASS INDEX: 28.04 KG/M2 | DIASTOLIC BLOOD PRESSURE: 70 MMHG

## 2020-08-07 VITALS — HEIGHT: 68 IN | BODY MASS INDEX: 28.04 KG/M2 | WEIGHT: 185 LBS

## 2020-08-07 DIAGNOSIS — I51.89 DIASTOLIC DYSFUNCTION: ICD-10-CM

## 2020-08-07 DIAGNOSIS — I48.19 PERSISTENT ATRIAL FIBRILLATION (HCC): ICD-10-CM

## 2020-08-07 DIAGNOSIS — I10 ESSENTIAL HYPERTENSION: ICD-10-CM

## 2020-08-07 DIAGNOSIS — I10 ESSENTIAL HYPERTENSION WITH GOAL BLOOD PRESSURE LESS THAN 140/90: ICD-10-CM

## 2020-08-07 DIAGNOSIS — I25.110 CORONARY ARTERY DISEASE INVOLVING NATIVE CORONARY ARTERY OF NATIVE HEART WITH UNSTABLE ANGINA PECTORIS (HCC): ICD-10-CM

## 2020-08-07 DIAGNOSIS — R60.0 PEDAL EDEMA: ICD-10-CM

## 2020-08-07 DIAGNOSIS — R60.9 EDEMA, UNSPECIFIED TYPE: ICD-10-CM

## 2020-08-07 DIAGNOSIS — G70.00 MYASTHENIA GRAVIS (HCC): ICD-10-CM

## 2020-08-07 DIAGNOSIS — J98.6 ELEVATED HEMIDIAPHRAGM: ICD-10-CM

## 2020-08-07 PROCEDURE — 78452 HT MUSCLE IMAGE SPECT MULT: CPT | Performed by: SPECIALIST

## 2020-08-07 PROCEDURE — A9500 TC99M SESTAMIBI: HCPCS

## 2020-08-07 PROCEDURE — 93016 CV STRESS TEST SUPVJ ONLY: CPT | Performed by: SPECIALIST

## 2020-08-07 PROCEDURE — 93306 TTE W/DOPPLER COMPLETE: CPT | Performed by: SPECIALIST

## 2020-08-07 PROCEDURE — 93017 CV STRESS TEST TRACING ONLY: CPT | Performed by: SPECIALIST

## 2020-08-07 PROCEDURE — 93018 CV STRESS TEST I&R ONLY: CPT | Performed by: SPECIALIST

## 2020-08-07 RX ORDER — TETRAKIS(2-METHOXYISOBUTYLISOCYANIDE)COPPER(I) TETRAFLUOROBORATE 1 MG/ML
30 INJECTION, POWDER, LYOPHILIZED, FOR SOLUTION INTRAVENOUS ONCE
Status: COMPLETED | OUTPATIENT
Start: 2020-08-07 | End: 2020-08-07

## 2020-08-07 RX ORDER — TETRAKIS(2-METHOXYISOBUTYLISOCYANIDE)COPPER(I) TETRAFLUOROBORATE 1 MG/ML
10 INJECTION, POWDER, LYOPHILIZED, FOR SOLUTION INTRAVENOUS ONCE
Status: COMPLETED | OUTPATIENT
Start: 2020-08-07 | End: 2020-08-07

## 2020-08-07 RX ADMIN — TECHNETIUM TC 99M SESTAMIBI 26.1 MILLICURIE: 1 INJECTION INTRAVENOUS at 12:00

## 2020-08-07 RX ADMIN — TETRAKIS(2-METHOXYISOBUTYLISOCYANIDE)COPPER(I) TETRAFLUOROBORATE 8.6 MILLICURIE: 1 INJECTION, POWDER, LYOPHILIZED, FOR SOLUTION INTRAVENOUS at 10:30

## 2020-08-07 RX ADMIN — REGADENOSON 0.4 MG: 0.08 INJECTION, SOLUTION INTRAVENOUS at 12:00

## 2020-08-13 DIAGNOSIS — I25.10 CORONARY ARTERY DISEASE INVOLVING NATIVE CORONARY ARTERY OF NATIVE HEART WITHOUT ANGINA PECTORIS: Primary | ICD-10-CM

## 2020-08-13 RX ORDER — ATORVASTATIN CALCIUM 10 MG/1
10 TABLET, FILM COATED ORAL DAILY
Qty: 90 TAB | Refills: 3 | Status: ON HOLD | OUTPATIENT
Start: 2020-08-13 | End: 2021-01-01 | Stop reason: SDUPTHER

## 2020-08-13 NOTE — TELEPHONE ENCOUNTER
Request for Lipitor 10mg daily. Last office visit 7-20-20, next office visit 10-19-20.  Refills per verbal order from Dr. Monica Jiménez.

## 2020-08-14 PROBLEM — I48.0 PAF (PAROXYSMAL ATRIAL FIBRILLATION) (HCC): Status: ACTIVE | Noted: 2019-11-18

## 2020-08-14 LAB
ECHO AO ASC DIAM: 3.64 CM
ECHO AO ROOT DIAM: 3.14 CM
ECHO AV AREA PEAK VELOCITY: 2.06 CM2
ECHO AV AREA VTI: 2.05 CM2
ECHO AV AREA/BSA PEAK VELOCITY: 1 CM2/M2
ECHO AV AREA/BSA VTI: 1 CM2/M2
ECHO AV MEAN GRADIENT: 3 MMHG
ECHO AV PEAK GRADIENT: 6.04 MMHG
ECHO AV PEAK VELOCITY: 122.85 CM/S
ECHO AV VTI: 29.31 CM
ECHO LA AREA 4C: 24.84 CM2
ECHO LA MAJOR AXIS: 4.38 CM
ECHO LA MINOR AXIS: 2.22 CM
ECHO LA VOL 2C: 97.83 ML (ref 18–58)
ECHO LA VOL 4C: 79.23 ML (ref 18–58)
ECHO LA VOL BP: 96.11 ML (ref 18–58)
ECHO LA VOL/BSA BIPLANE: 48.61 ML/M2 (ref 16–28)
ECHO LA VOLUME INDEX A2C: 49.48 ML/M2 (ref 16–28)
ECHO LA VOLUME INDEX A4C: 40.07 ML/M2 (ref 16–28)
ECHO LV E' LATERAL VELOCITY: 5.95 CM/S
ECHO LV E' SEPTAL VELOCITY: 4.56 CM/S
ECHO LV EDV A2C: 111.5 ML
ECHO LV EDV A4C: 139.04 ML
ECHO LV EDV BP: 129.24 ML (ref 67–155)
ECHO LV EDV INDEX A4C: 70.3 ML/M2
ECHO LV EDV INDEX BP: 65.4 ML/M2
ECHO LV EDV NDEX A2C: 56.4 ML/M2
ECHO LV EJECTION FRACTION A2C: 55 PERCENT
ECHO LV EJECTION FRACTION A4C: 56 PERCENT
ECHO LV EJECTION FRACTION BIPLANE: 57 PERCENT (ref 55–100)
ECHO LV ESV A2C: 49.99 ML
ECHO LV ESV A4C: 61.44 ML
ECHO LV ESV BP: 55.63 ML (ref 22–58)
ECHO LV ESV INDEX A2C: 25.3 ML/M2
ECHO LV ESV INDEX A4C: 31.1 ML/M2
ECHO LV ESV INDEX BP: 28.1 ML/M2
ECHO LV INTERNAL DIMENSION DIASTOLIC: 4.67 CM (ref 4.2–5.9)
ECHO LV INTERNAL DIMENSION SYSTOLIC: 3.62 CM
ECHO LV IVSD: 1.61 CM (ref 0.6–1)
ECHO LV MASS 2D: 321.4 G (ref 88–224)
ECHO LV MASS INDEX 2D: 162.6 G/M2 (ref 49–115)
ECHO LV POSTERIOR WALL DIASTOLIC: 1.59 CM (ref 0.6–1)
ECHO LVOT DIAM: 1.91 CM
ECHO LVOT PEAK GRADIENT: 3.11 MMHG
ECHO LVOT PEAK VELOCITY: 88.18 CM/S
ECHO LVOT SV: 60 ML
ECHO LVOT VTI: 20.88 CM
ECHO MV REGURGITANT VTIA: 176.87 CM
ECHO PV REGURGITANT MAX VELOCITY: 468.1 CM/S
ECHO RV INTERNAL DIMENSION: 4.04 CM
ECHO TV REGURGITANT MAX VELOCITY: 268.63 CM/S
ECHO TV REGURGITANT PEAK GRADIENT: 28.86 MMHG
LA VOL DISK BP: 89.26 ML (ref 18–58)
STRESS BASELINE DIAS BP: 70 MMHG
STRESS BASELINE HR: 60 BPM
STRESS BASELINE SYS BP: 124 MMHG
STRESS O2 SAT PEAK: 98 %
STRESS O2 SAT REST: 100 %
STRESS PEAK DIAS BP: 74 MMHG
STRESS PEAK SYS BP: 104 MMHG
STRESS PERCENT HR ACHIEVED: 51 %
STRESS POST PEAK HR: 70 BPM
STRESS RATE PRESSURE PRODUCT: 7280 BPM*MMHG
STRESS ST DEPRESSION: 0 MM
STRESS ST ELEVATION: 0 MM
STRESS TARGET HR: 138 BPM

## 2020-08-14 NOTE — PROGRESS NOTES
Nuclear looks fine, no new CAD, good perfusion and EF is ok, but echo better for EF and I will look at that early next week when I return to office  Good news so far!

## 2020-08-14 NOTE — PROGRESS NOTES
Two patient identifiers verified. Per MD patient called and given results. Patient reports a feeling of pain on his left side after eating. Will notify MD. Patient verbalized understanding and denies any further questions or concerns at this time.

## 2020-08-17 NOTE — PROGRESS NOTES
Echo EF looks fine and nuke last week was good too    Findings are thus far stable  No changes in plans   Seems symptoms may be GI, did he see Gi?   Future Appointments  9/22/2020  11:00 AM   Jesse Cisneros MD          [address] [facility]         BS AMB  10/19/2020 9:20 AM    Gaby Aranda MD [facility]         BS AMB  11/16/2020 11:20 AM   Gaby Aranda MD [facility]         BS AMB

## 2020-08-18 NOTE — PROGRESS NOTES
Two patient identifiers verified. Per MD patient called and given results. Patient has follow up with GI this month. Confirmed follow up. Patient verbalized understanding and denies any further questions or concerns at this time.

## 2020-08-26 DIAGNOSIS — D50.0 IRON DEFICIENCY ANEMIA DUE TO CHRONIC BLOOD LOSS: ICD-10-CM

## 2020-09-12 ENCOUNTER — HOSPITAL ENCOUNTER (OUTPATIENT)
Dept: PREADMISSION TESTING | Age: 85
Discharge: HOME | End: 2020-09-12
Payer: MEDICARE

## 2020-09-12 DIAGNOSIS — Z01.812 PRE-PROCEDURE LAB EXAM: ICD-10-CM

## 2020-09-12 PROCEDURE — 87635 SARS-COV-2 COVID-19 AMP PRB: CPT

## 2020-09-13 LAB — SARS-COV-2, COV2NT: NOT DETECTED

## 2020-09-15 ENCOUNTER — HOSPITAL ENCOUNTER (OUTPATIENT)
Dept: LAB | Age: 85
Discharge: HOME | End: 2020-09-15
Payer: MEDICARE

## 2020-09-15 PROCEDURE — 36415 COLL VENOUS BLD VENIPUNCTURE: CPT

## 2020-09-15 PROCEDURE — 82728 ASSAY OF FERRITIN: CPT

## 2020-09-15 PROCEDURE — 83550 IRON BINDING TEST: CPT

## 2020-09-15 PROCEDURE — 85025 COMPLETE CBC W/AUTO DIFF WBC: CPT

## 2020-09-16 ENCOUNTER — HOSPITAL ENCOUNTER (OUTPATIENT)
Age: 85
Setting detail: OUTPATIENT SURGERY
Discharge: HOME | End: 2020-09-16
Attending: INTERNAL MEDICINE | Admitting: INTERNAL MEDICINE
Payer: MEDICARE

## 2020-09-16 ENCOUNTER — ANESTHESIA EVENT (OUTPATIENT)
Dept: ENDOSCOPY | Age: 85
End: 2020-09-16
Payer: MEDICARE

## 2020-09-16 ENCOUNTER — ANESTHESIA (OUTPATIENT)
Dept: ENDOSCOPY | Age: 85
End: 2020-09-16
Payer: MEDICARE

## 2020-09-16 VITALS
OXYGEN SATURATION: 93 % | TEMPERATURE: 97.5 F | DIASTOLIC BLOOD PRESSURE: 93 MMHG | RESPIRATION RATE: 16 BRPM | BODY MASS INDEX: 28.79 KG/M2 | SYSTOLIC BLOOD PRESSURE: 152 MMHG | WEIGHT: 190 LBS | HEIGHT: 68 IN | HEART RATE: 54 BPM

## 2020-09-16 LAB
BASOPHILS # BLD AUTO: 0 X10E3/UL (ref 0–0.2)
BASOPHILS NFR BLD AUTO: 0 %
EOSINOPHIL # BLD AUTO: 0.1 X10E3/UL (ref 0–0.4)
EOSINOPHIL NFR BLD AUTO: 2 %
ERYTHROCYTE [DISTWIDTH] IN BLOOD BY AUTOMATED COUNT: 13.1 % (ref 11.6–15.4)
FERRITIN SERPL-MCNC: 21 NG/ML (ref 30–400)
HCT VFR BLD AUTO: 34 % (ref 37.5–51)
HGB BLD-MCNC: 10.3 G/DL (ref 13–17.7)
IMM GRANULOCYTES # BLD AUTO: 0 X10E3/UL (ref 0–0.1)
IMM GRANULOCYTES NFR BLD AUTO: 1 %
IRON SATN MFR SERPL: 7 % (ref 15–55)
IRON SERPL-MCNC: 29 UG/DL (ref 38–169)
LYMPHOCYTES # BLD AUTO: 1.4 X10E3/UL (ref 0.7–3.1)
LYMPHOCYTES NFR BLD AUTO: 19 %
MCH RBC QN AUTO: 25.6 PG (ref 26.6–33)
MCHC RBC AUTO-ENTMCNC: 30.3 G/DL (ref 31.5–35.7)
MCV RBC AUTO: 85 FL (ref 79–97)
MONOCYTES # BLD AUTO: 0.8 X10E3/UL (ref 0.1–0.9)
MONOCYTES NFR BLD AUTO: 10 %
NEUTROPHILS # BLD AUTO: 5.3 X10E3/UL (ref 1.4–7)
NEUTROPHILS NFR BLD AUTO: 68 %
PLATELET # BLD AUTO: 207 X10E3/UL (ref 150–450)
RBC # BLD AUTO: 4.02 X10E6/UL (ref 4.14–5.8)
TIBC SERPL-MCNC: 390 UG/DL (ref 250–450)
UIBC SERPL-MCNC: 361 UG/DL (ref 111–343)
WBC # BLD AUTO: 7.7 X10E3/UL (ref 3.4–10.8)

## 2020-09-16 PROCEDURE — 76060000031 HC ANESTHESIA FIRST 0.5 HR: Performed by: INTERNAL MEDICINE

## 2020-09-16 PROCEDURE — 74011250636 HC RX REV CODE- 250/636: Performed by: NURSE ANESTHETIST, CERTIFIED REGISTERED

## 2020-09-16 PROCEDURE — 77030021593 HC FCPS BIOP ENDOSC BSC -A: Performed by: INTERNAL MEDICINE

## 2020-09-16 PROCEDURE — 88342 IMHCHEM/IMCYTCHM 1ST ANTB: CPT

## 2020-09-16 PROCEDURE — 88305 TISSUE EXAM BY PATHOLOGIST: CPT

## 2020-09-16 PROCEDURE — 76040000019: Performed by: INTERNAL MEDICINE

## 2020-09-16 PROCEDURE — 2709999900 HC NON-CHARGEABLE SUPPLY: Performed by: INTERNAL MEDICINE

## 2020-09-16 RX ORDER — FENTANYL CITRATE 50 UG/ML
25-200 INJECTION, SOLUTION INTRAMUSCULAR; INTRAVENOUS
Status: DISCONTINUED | OUTPATIENT
Start: 2020-09-16 | End: 2020-09-16 | Stop reason: HOSPADM

## 2020-09-16 RX ORDER — MIDAZOLAM HYDROCHLORIDE 1 MG/ML
.25-5 INJECTION, SOLUTION INTRAMUSCULAR; INTRAVENOUS
Status: DISCONTINUED | OUTPATIENT
Start: 2020-09-16 | End: 2020-09-16 | Stop reason: HOSPADM

## 2020-09-16 RX ORDER — SODIUM CHLORIDE 9 MG/ML
INJECTION, SOLUTION INTRAVENOUS
Status: DISCONTINUED | OUTPATIENT
Start: 2020-09-16 | End: 2020-09-16 | Stop reason: HOSPADM

## 2020-09-16 RX ORDER — EPINEPHRINE 0.1 MG/ML
1 INJECTION INTRACARDIAC; INTRAVENOUS
Status: DISCONTINUED | OUTPATIENT
Start: 2020-09-16 | End: 2020-09-16 | Stop reason: HOSPADM

## 2020-09-16 RX ORDER — ATROPINE SULFATE 0.1 MG/ML
0.5 INJECTION INTRAVENOUS
Status: DISCONTINUED | OUTPATIENT
Start: 2020-09-16 | End: 2020-09-16 | Stop reason: HOSPADM

## 2020-09-16 RX ORDER — NALOXONE HYDROCHLORIDE 0.4 MG/ML
0.4 INJECTION, SOLUTION INTRAMUSCULAR; INTRAVENOUS; SUBCUTANEOUS
Status: DISCONTINUED | OUTPATIENT
Start: 2020-09-16 | End: 2020-09-16 | Stop reason: HOSPADM

## 2020-09-16 RX ORDER — FLUMAZENIL 0.1 MG/ML
0.2 INJECTION INTRAVENOUS
Status: DISCONTINUED | OUTPATIENT
Start: 2020-09-16 | End: 2020-09-16 | Stop reason: HOSPADM

## 2020-09-16 RX ORDER — SODIUM CHLORIDE 0.9 % (FLUSH) 0.9 %
5-40 SYRINGE (ML) INJECTION AS NEEDED
Status: DISCONTINUED | OUTPATIENT
Start: 2020-09-16 | End: 2020-09-16 | Stop reason: HOSPADM

## 2020-09-16 RX ORDER — SODIUM CHLORIDE 9 MG/ML
50 INJECTION, SOLUTION INTRAVENOUS CONTINUOUS
Status: DISCONTINUED | OUTPATIENT
Start: 2020-09-16 | End: 2020-09-16 | Stop reason: HOSPADM

## 2020-09-16 RX ORDER — DEXTROMETHORPHAN/PSEUDOEPHED 2.5-7.5/.8
1.2 DROPS ORAL
Status: DISCONTINUED | OUTPATIENT
Start: 2020-09-16 | End: 2020-09-16 | Stop reason: HOSPADM

## 2020-09-16 RX ORDER — PROPOFOL 10 MG/ML
INJECTION, EMULSION INTRAVENOUS AS NEEDED
Status: DISCONTINUED | OUTPATIENT
Start: 2020-09-16 | End: 2020-09-16 | Stop reason: HOSPADM

## 2020-09-16 RX ORDER — SODIUM CHLORIDE 0.9 % (FLUSH) 0.9 %
5-40 SYRINGE (ML) INJECTION EVERY 8 HOURS
Status: DISCONTINUED | OUTPATIENT
Start: 2020-09-16 | End: 2020-09-16 | Stop reason: HOSPADM

## 2020-09-16 RX ADMIN — PROPOFOL 20 MG: 10 INJECTION, EMULSION INTRAVENOUS at 16:04

## 2020-09-16 RX ADMIN — SODIUM CHLORIDE: 900 INJECTION, SOLUTION INTRAVENOUS at 15:58

## 2020-09-16 RX ADMIN — PROPOFOL 50 MG: 10 INJECTION, EMULSION INTRAVENOUS at 16:01

## 2020-09-16 RX ADMIN — PROPOFOL 50 MG: 10 INJECTION, EMULSION INTRAVENOUS at 15:58

## 2020-09-16 NOTE — PERIOP NOTES
DC instructions given to patient. Understanding verbalized. Signature pad not available. Pt transported to car via wheelchair by this RN. Pt alert and oriented. VSS.

## 2020-09-16 NOTE — ANESTHESIA PREPROCEDURE EVALUATION
Relevant Problems   No relevant active problems       Anesthetic History   No history of anesthetic complications            Review of Systems / Medical History  Patient summary reviewed, nursing notes reviewed and pertinent labs reviewed    Pulmonary                   Neuro/Psych              Cardiovascular    Hypertension        Dysrhythmias   CAD    Exercise tolerance: >4 METS     GI/Hepatic/Renal               Comments: dysphagia Endo/Other  Within defined limits           Other Findings            Physical Exam    Airway  Mallampati: I  TM Distance: > 6 cm  Neck ROM: normal range of motion   Mouth opening: Normal     Cardiovascular    Rhythm: regular  Rate: normal         Dental  No notable dental hx       Pulmonary  Breath sounds clear to auscultation               Abdominal         Other Findings            Anesthetic Plan    ASA: 3  Anesthesia type: MAC          Induction: Intravenous  Anesthetic plan and risks discussed with: Patient

## 2020-09-16 NOTE — DISCHARGE INSTRUCTIONS
[address]  [facility] [address]    EGD DISCHARGE INSTRUCTIONS    Amaury Babb  [id number]  1935    Discomfort:  Sore throat- throat lozenges or warm salt water gargle  redness at IV site- apply warm compress to area; if redness or soreness persist- contact your physician  Gaseous discomfort- walking, belching will help relieve any discomfort  You may not operate a vehicle for 12 hours  You may not engage in an occupation involving machinery or appliances for rest of today  You may not drink alcoholic beverages for at least 12 hours  Avoid making any critical decisions for at least 24 hour  DIET  You may resume your regular diet - however -  remember your colon is empty and a heavy meal will produce gas. Avoid these foods:  vegetables, fried / greasy foods, carbonated drinks    ACTIVITY  You may resume your normal daily activities   Spend the remainder of the day resting -  avoid any strenuous activity. CALL M.D. ANY SIGN OF   Increasing pain, nausea, vomiting  Abdominal distension (swelling)  New increased bleeding (oral or rectal)  Fever (chills)  Pain in chest area  Bloody discharge from nose or mouth  Shortness of breath    Follow-up Instructions:   Call Dr. Jameel Johnson for any questions or problems and follow up in 2 to 3 months  Telephone # [phone]  Avoid NSAIDS      ENDOSCOPY FINDINGS:   Your endoscopy showed inflammation in your stomach ( gastritis) biopsies taken, rest of exam was normal.    Signed By: Jameel Johnson MD     9/16/2020  4:25 PM         Learning About Coronavirus (COVID-19)  Coronavirus (COVID-19): Overview  What is coronavirus (COVID-19)? The coronavirus disease (COVID-19) is caused by a virus. It is an illness that was first found in [place], [place], in December 2019. It has since spread worldwide. The virus can cause fever, cough, and trouble breathing.  In severe cases, it can cause pneumonia and make it hard to breathe without help. It can cause death. Coronaviruses are a large group of viruses. They cause the common cold. They also cause more serious illnesses like Middle East respiratory syndrome (MERS) and severe acute respiratory syndrome (SARS). COVID-19 is caused by a novel coronavirus. That means it's a new type that has not been seen in people before. This virus spreads person-to-person through droplets from coughing and sneezing. It can also spread when you are close to someone who is infected. And it can spread when you touch something that has the virus on it, such as a doorknob or a tabletop. What can you do to protect yourself from coronavirus (COVID-19)? The best way to protect yourself from getting sick is to:  · Avoid areas where there is an outbreak. · Avoid contact with people who may be infected. · Wash your hands often with soap or alcohol-based hand sanitizers. · Avoid crowds and try to stay at least 6 feet away from other people. · Wash your hands often, especially after you cough or sneeze. Use soap and water, and scrub for at least 20 seconds. If soap and water aren't available, use an alcohol-based hand . · Avoid touching your mouth, nose, and eyes. What can you do to avoid spreading the virus to others? To help avoid spreading the virus to others:  · Cover your mouth with a tissue when you cough or sneeze. Then throw the tissue in the trash. · Use a disinfectant to clean things that you touch often. · Stay home if you are sick or have been exposed to the virus. Don't go to school, work, or public areas. And don't use public transportation. · If you are sick:  ? Leave your home only if you need to get medical care. But call the doctor's office first so they know you're coming. And wear a face mask, if you have one.  ? If you have a face mask, wear it whenever you're around other people. It can help stop the spread of the virus when you cough or sneeze. ?  Clean and disinfect your home every day. Use household  and disinfectant wipes or sprays. Take special care to clean things that you grab with your hands. These include doorknobs, remote controls, phones, and handles on your refrigerator and microwave. And don't forget countertops, tabletops, bathrooms, and computer keyboards. When to call for help  Call 911 anytime you think you may need emergency care. For example, call if:  · You have severe trouble breathing. (You can't talk at all.)  · You have constant chest pain or pressure. · You are severely dizzy or lightheaded. · You are confused or can't think clearly. · Your face and lips have a blue color. · You pass out (lose consciousness) or are very hard to wake up. Call your doctor now if you develop symptoms such as:  · Shortness of breath. · Fever. · Cough. If you need to get care, call ahead to the doctor's office for instructions before you go. Make sure you wear a face mask, if you have one, to prevent exposing other people to the virus. Where can you get the latest information? The following health organizations are tracking and studying this virus. Their websites contain the most up-to-date information. Kisha Hallo also learn what to do if you think you may have been exposed to the virus. · U.S. Centers for Disease Control and Prevention (CDC): The CDC provides updated news about the disease and travel advice. The website also tells you how to prevent the spread of infection. www.cdc.gov  · World Health Organization [facility]): WHO offers information about the virus outbreaks. WHO also has travel advice. www.who.int  Current as of: April 1, 2020               Content Version: 12.4  © 7866-3724 [employer].    Care instructions adapted under license by your healthcare professional. If you have questions about a medical condition or this instruction, always ask your healthcare professional. [address] any warranty or liability for your use of this information.

## 2020-09-16 NOTE — PROCEDURES
[address], [address] [place] [address]        [facility] (EGD) Procedure Note    Brigida Paulino  1935  [id number]      Procedure: Endoscopic Gastroduodenoscopy with biopsy    Indication:  Dysphagia/odynophagia     Pre-operative Diagnosis: see indication above    Post-operative Diagnosis: see findings below    : Stephenie Nissen, MD    Surgical Assistant: None    Implants:  None    Referring Provider:  Nieves Conner MD      Anesthesia/Sedation:  MAC anesthesia Propofol        Procedure Details     After infomed consent was obtained for the procedure, with all risks and benefits of procedure explained the patient was taken to the endoscopy suite and placed in the left lateral decubitus position. Following sequential administration of sedation as per above, the endoscope was inserted into the mouth and advanced under direct vision to third portion of the duodenum. A careful inspection was made as the gastroscope was withdrawn, including a retroflexed view of the proximal stomach; findings and interventions are described below. Findings:   Esophagus:normal, random biopsies taken  Stomach: gastritis in antrum, with atrophic looking mucosa, multiple biopsies taken  Duodenum/jejunum: normal      Therapies:  none    Specimens: as above         EBL: None      Complications:   None; patient tolerated the procedure well. Impression:    -See post-procedure diagnoses. Recommendations:  -Continue acid suppression.   -f/u on pathology  -f/u in 2 months  -will consider esophageal manometry if not better    Signed By: Stephenie Nissen, MD     9/16/2020  4:23 PM

## 2020-09-16 NOTE — H&P
The patient is a 82year old male who presents with a complaint of Dysphagia. The patient presents for due to reoccurrence of symptoms. The onset of the dysphagia has been chronic and has been occurring for months. The symptoms have been associated with abdominal pain and heartburn, while the symptoms have not been associated with abdominal discomfort, anxiety, chest pain, depression, dryness of mouth, frequent pneumonia, halitosis, hiccups, hoarseness following dysphagia, ingestion of chemicals, long history of heartburn, loss of appetite, nasal regurgitation, neck pain, neck swelling, neurological disease, odynophagia, Raynaud's phenomenon, throat pain, tracheobronchial aspiration, vomiting, weight loss, wheezing, prior gastric bypass surgery, history of dementia, history of diabetes mellitus, history of esophageal stricture, history of endoscopic dilation, history of Nissen fundoplication, history of sclerotherapy, history of esophageal banding, history of radiation, history of achalasia, history of esophageal dysmotility, history of esophagectomy with gastric pull-up, history of esophagectomy with colonic interposition, history of neurological disease, history of connective tissue disease, Spivey's esophagus, eructation or other. Note for \"Dysphagia\": he stoped pllavix, followed by Dr Valery Martínez and normal recent cardiac w/u for SOB per pt, he is also followed by Dr Aguilar Sidhu, on protonix      Problem List/Past Medical Valente Joshi; 8/28/2020 12:44 PM)  Difficulty breathing (786.09  R06.89)    Hypertension    Erectile dysfunction    Diverticulosis    Prostate Cancer    B12 deficiency (266.2  E53.8)    Sebaceous cyst (706.2  L72. 3)    Gastroesophageal Reflux Disease    Dysphagia (787.20  R13.10)    Bloating (787.3  R14.0)    Heartburn (787.1  R12)    History of colon polyps (V12.72  Z86.010)      Past Surgical History Valente Joshi; 8/28/2020 12:44 PM)  Prostatectomy;  Abdominal    Inguinal Hernia Repair    Tonsillectomy    Carpal Tunnel Surgery - Both    Back Surgery      Allergies Dayton Santiagokatherin; 8/28/2020 12:44 PM)  No Known Drug Allergies  [04/11/2016]:  No Known Allergies  [04/11/2016]: Family History Maritzajennifer Santiagokatherin; 8/28/2020 12:44 PM)  Pancreatic cancer (157.9  C25.9)    Diabetes Mellitus   Sister, Brother, Father. Hypertension   Sister, Brother. Stroke   Father. Heart Disease   Sister. Asthma   Daughter. Heart attack (410.90  I21.9)   Daughter. Social History Dayton Henderson; 8/28/2020 12:44 PM)  Blood Transfusion   No.  Alcohol Use   Has never drank. Employment status   Retired. Marital status   . Tobacco Use   Never smoker. Diagnostic Studies History Dayton Henderson; 8/28/2020 12:44 PM)  Colonoscopy   Date: 3/2015. Endoscopy      Health Maintenance History Dayton Henderson; 8/28/2020 12:44 PM)  SHINGLES VACCINATION (63326)  [2018]: series 1 and 2  Flu Vaccine  [2019]: Date: 12/2018. Pneumovax   Date: 10/2015. Other Problems Dayton Henderson; 8/28/2020 12:44 PM)  Special screening for malignant neoplasm of colon (V76.51  Z12.11)          Review of Systems Dayton Henderson; 8/28/2020 12:44 PM)  General Not Present- Chronic Fatigue, Poor Appetite, Weight Gain and Weight Loss. Skin Not Present- Itching, Rash and Skin Color Changes. HEENT Not Present- Hearing Loss and Vertigo. Respiratory Not Present- Difficulty Breathing and TB exposure. Cardiovascular Not Present- Chest Pain, Use of Antibiotics before Dental Procedures and Use of Blood Thinners. Gastrointestinal Present- See HPI. Musculoskeletal Not Present- Arthritis, Hip Replacement Surgery and Knee Replacement Surgery. Neurological Not Present- Weakness. Psychiatric Not Present- Depression. Endocrine Not Present- Diabetes and Thyroid Problems. Hematology Not Present- Anemia.     Vitals Maritzajennifer Santiago; 8/28/2020 12:45 PM)  8/28/2020 12:44 PM  Weight: 192 lb   Height: 68 in   Body Surface Area: 2.01 m²   Body Mass Index: 29.19 kg/m²                Assessment & Plan Esa Ricketts MD; 8/28/2020 1:56 PM)  Dysphagia (787.20  R13.10)  Impression: he stoped pllavix, followed by Dr Ritu Mueller and normal recent cardiac w/u for SOB per pt, he is also followed by Dr Flory Albert, on protonix  EGD to assess esophagus for any esophagitis, stricture and neoplasm  Current Plans  Due to this being a TeleHealth encounter (During MASUU-80 public health emergency), evaluation of the following organ systems was limited: Vitals/Constitutional/EENT/Resp/CV/GI//MS/Neuro/Skin/Heme-Lymph-Imm. Pursuant to the emergency declaration under the [address] [place] [address] and the [employer] Act, this Virtual Visit was conducted with patient's (and/or legal guardian's) consent, to reduce the risk of exposure to COVID-19 and provide necessary medical care. Services were provided through a video synchronous discussion virtually to substitute for in-person encounter. Endoscopy (53364) (Discussed risks and benefits with the patient to include: perforation, post polypectomy, or post biopsy bleeding, missed lesions, and sedation reactions.)  Heartburn (787.1  R12)  Date of Surgery Update:  Jael Scruggs was seen and examined. History and physical has been reviewed. The patient has been examined. There have been no significant clinical changes since the completion of the originally dated History and Physical.  The patient was counseled at length about the risks of margarito Covid-19 in the davide-operative and post-operative states including the recovery window of their procedure. The patient was made aware that margarito Covid-19 after a surgical procedure may worsen their prognosis for recovering from the virus and lend to a higher morbidity and or mortality risk. The patient was given the options of postponing their procedure.  All of the risks, benefits, and alternatives were discussed. The patient does  wish to proceed with the procedure.     Signed By: Ernie Pace MD     September 16, 2020 3:38 PM

## 2020-09-16 NOTE — PERIOP NOTES

## 2020-09-16 NOTE — ANESTHESIA POSTPROCEDURE EVALUATION
Procedure(s):  ESOPHAGOGASTRODUODENOSCOPY (EGD)    :-  ESOPHAGOGASTRODUODENAL (EGD) BIOPSY. MAC    Anesthesia Post Evaluation      Multimodal analgesia: multimodal analgesia not used between 6 hours prior to anesthesia start to PACU discharge  Patient location during evaluation: PACU  Patient participation: complete - patient participated  Level of consciousness: awake  Pain score: 0  Pain management: adequate  Airway patency: patent  Anesthetic complications: no  Cardiovascular status: acceptable  Respiratory status: acceptable  Hydration status: acceptable  Comments: I have evaluated the patient and meets criteria for discharge from PACU. Jennifer Raymundo MD        INITIAL Post-op Vital signs:   Vitals Value Taken Time   /68 9/16/2020  4:17 PM   Temp     Pulse 49 9/16/2020  4:19 PM   Resp 66 9/16/2020  4:19 PM   SpO2 94 % 9/16/2020  4:19 PM   Vitals shown include unvalidated device data.

## 2020-09-22 ENCOUNTER — OFFICE VISIT (OUTPATIENT)
Dept: ONCOLOGY | Age: 85
End: 2020-09-22
Payer: MEDICARE

## 2020-09-22 VITALS
WEIGHT: 199 LBS | SYSTOLIC BLOOD PRESSURE: 172 MMHG | HEIGHT: 68 IN | DIASTOLIC BLOOD PRESSURE: 87 MMHG | HEART RATE: 80 BPM | OXYGEN SATURATION: 95 % | TEMPERATURE: 97 F | BODY MASS INDEX: 30.16 KG/M2

## 2020-09-22 DIAGNOSIS — I25.10 CORONARY ARTERY DISEASE INVOLVING NATIVE CORONARY ARTERY OF NATIVE HEART WITHOUT ANGINA PECTORIS: ICD-10-CM

## 2020-09-22 DIAGNOSIS — D50.0 IRON DEFICIENCY ANEMIA DUE TO CHRONIC BLOOD LOSS: Primary | ICD-10-CM

## 2020-09-22 DIAGNOSIS — G70.00 MYASTHENIA GRAVIS (HCC): ICD-10-CM

## 2020-09-22 PROCEDURE — G8427 DOCREV CUR MEDS BY ELIG CLIN: HCPCS | Performed by: INTERNAL MEDICINE

## 2020-09-22 PROCEDURE — G8753 SYS BP > OR = 140: HCPCS | Performed by: INTERNAL MEDICINE

## 2020-09-22 PROCEDURE — G8754 DIAS BP LESS 90: HCPCS | Performed by: INTERNAL MEDICINE

## 2020-09-22 PROCEDURE — G0463 HOSPITAL OUTPT CLINIC VISIT: HCPCS | Performed by: INTERNAL MEDICINE

## 2020-09-22 PROCEDURE — G8536 NO DOC ELDER MAL SCRN: HCPCS | Performed by: INTERNAL MEDICINE

## 2020-09-22 PROCEDURE — 1101F PT FALLS ASSESS-DOCD LE1/YR: CPT | Performed by: INTERNAL MEDICINE

## 2020-09-22 PROCEDURE — G8417 CALC BMI ABV UP PARAM F/U: HCPCS | Performed by: INTERNAL MEDICINE

## 2020-09-22 PROCEDURE — 99214 OFFICE O/P EST MOD 30 MIN: CPT | Performed by: INTERNAL MEDICINE

## 2020-09-22 PROCEDURE — G8510 SCR DEP NEG, NO PLAN REQD: HCPCS | Performed by: INTERNAL MEDICINE

## 2020-09-22 RX ORDER — SODIUM CHLORIDE 9 MG/ML
10 INJECTION INTRAMUSCULAR; INTRAVENOUS; SUBCUTANEOUS AS NEEDED
Status: CANCELLED | OUTPATIENT
Start: 2020-10-08

## 2020-09-22 RX ORDER — HYDROCORTISONE SODIUM SUCCINATE 100 MG/2ML
100 INJECTION, POWDER, FOR SOLUTION INTRAMUSCULAR; INTRAVENOUS AS NEEDED
Status: CANCELLED | OUTPATIENT
Start: 2020-10-01

## 2020-09-22 RX ORDER — ALBUTEROL SULFATE 0.83 MG/ML
2.5 SOLUTION RESPIRATORY (INHALATION) AS NEEDED
Status: CANCELLED
Start: 2020-10-01

## 2020-09-22 RX ORDER — SODIUM CHLORIDE 0.9 % (FLUSH) 0.9 %
10 SYRINGE (ML) INJECTION AS NEEDED
Status: CANCELLED | OUTPATIENT
Start: 2020-10-01

## 2020-09-22 RX ORDER — HYDROCORTISONE SODIUM SUCCINATE 100 MG/2ML
100 INJECTION, POWDER, FOR SOLUTION INTRAMUSCULAR; INTRAVENOUS AS NEEDED
Status: CANCELLED | OUTPATIENT
Start: 2020-10-08

## 2020-09-22 RX ORDER — EPINEPHRINE 1 MG/ML
0.3 INJECTION, SOLUTION, CONCENTRATE INTRAVENOUS AS NEEDED
Status: CANCELLED | OUTPATIENT
Start: 2020-10-01

## 2020-09-22 RX ORDER — HEPARIN 100 UNIT/ML
300-500 SYRINGE INTRAVENOUS AS NEEDED
Status: CANCELLED
Start: 2020-10-08

## 2020-09-22 RX ORDER — HEPARIN 100 UNIT/ML
300-500 SYRINGE INTRAVENOUS AS NEEDED
Status: CANCELLED
Start: 2020-10-01

## 2020-09-22 RX ORDER — ACETAMINOPHEN 325 MG/1
650 TABLET ORAL AS NEEDED
Status: CANCELLED
Start: 2020-10-01

## 2020-09-22 RX ORDER — SODIUM CHLORIDE 9 MG/ML
10 INJECTION INTRAMUSCULAR; INTRAVENOUS; SUBCUTANEOUS AS NEEDED
Status: CANCELLED | OUTPATIENT
Start: 2020-10-01

## 2020-09-22 RX ORDER — DIPHENHYDRAMINE HYDROCHLORIDE 50 MG/ML
25 INJECTION, SOLUTION INTRAMUSCULAR; INTRAVENOUS AS NEEDED
Status: CANCELLED
Start: 2020-10-08

## 2020-09-22 RX ORDER — EPINEPHRINE 1 MG/ML
0.3 INJECTION, SOLUTION, CONCENTRATE INTRAVENOUS AS NEEDED
Status: CANCELLED | OUTPATIENT
Start: 2020-10-08

## 2020-09-22 RX ORDER — DIPHENHYDRAMINE HYDROCHLORIDE 50 MG/ML
25 INJECTION, SOLUTION INTRAMUSCULAR; INTRAVENOUS AS NEEDED
Status: CANCELLED
Start: 2020-10-01

## 2020-09-22 RX ORDER — ALBUTEROL SULFATE 0.83 MG/ML
2.5 SOLUTION RESPIRATORY (INHALATION) AS NEEDED
Status: CANCELLED
Start: 2020-10-08

## 2020-09-22 RX ORDER — SODIUM CHLORIDE 0.9 % (FLUSH) 0.9 %
10 SYRINGE (ML) INJECTION AS NEEDED
Status: CANCELLED | OUTPATIENT
Start: 2020-10-08

## 2020-09-22 RX ORDER — ACETAMINOPHEN 325 MG/1
650 TABLET ORAL AS NEEDED
Status: CANCELLED
Start: 2020-10-08

## 2020-09-22 RX ORDER — ONDANSETRON 2 MG/ML
8 INJECTION INTRAMUSCULAR; INTRAVENOUS AS NEEDED
Status: CANCELLED | OUTPATIENT
Start: 2020-10-08

## 2020-09-22 RX ORDER — ONDANSETRON 2 MG/ML
8 INJECTION INTRAMUSCULAR; INTRAVENOUS AS NEEDED
Status: CANCELLED | OUTPATIENT
Start: 2020-10-01

## 2020-09-22 RX ORDER — DIPHENHYDRAMINE HYDROCHLORIDE 50 MG/ML
50 INJECTION, SOLUTION INTRAMUSCULAR; INTRAVENOUS AS NEEDED
Status: CANCELLED
Start: 2020-10-08

## 2020-09-22 RX ORDER — DIPHENHYDRAMINE HYDROCHLORIDE 50 MG/ML
50 INJECTION, SOLUTION INTRAMUSCULAR; INTRAVENOUS AS NEEDED
Status: CANCELLED
Start: 2020-10-01

## 2020-09-22 NOTE — PROGRESS NOTES
[facility] [place] [facility]  [address], [place]: [phone]  F: [phone]    Reason for Visit:   Lata Gutierrez is a 82 y.o. male who is seen on 9/22/2020 for follow up of anemia. History of Present Illness:   Patient is a 82 y.o. male with CAD and h/o prostate cancer who is seen for follow up of anemia     He has progressive anemia since 5/2019 with Hb having dropped from 12 g/dl to 9 g/dl by 12/2019. He does have a h/o iron deficiency with iron studies on 12/13/2019 showing a TSAT of 8% though ferritin was 53. When rechecked 1/2/2020 he was iron replete but anemia had barely improved to 9.5 g/dl. He was admitted around 11/2019 at [facility] with SOB and anemia. He was seen by Dr. Fanny Apodaca. EGD 11/2019 had shown some esophagitis. Colonoscopy was not considered necessary. He also was diagnosed with Myasthenia gravis. He had plasma exchange. He was then placed on prednisone. He was readmitted with SOB in Dec 2019 thought to be due to prednisone and was taken off this. He was seen by Neurology and was then told that he has no myasthenia. He also had a normal SPEP at that time and a normal MMA. His work up was most suggestive of iron deficiency. He received Injectafer x 2 in Jan 2020. Now seen for follow up with labs. He has normal BMs and has no diarrhea  He has no melena  He states that he has no energy though, has some FERNANDO, he has rare dizziness. He has a stable weight. He had a colonoscopy about 4 years ago and was reportedly normal. He has no fevers, chills. He has an EDG 1 week ago and we do not have the results of this.      He had a prostatectomy 2006    He has never smoked  Mother had pancreatic cancer  Sister had Lymphoma  Daughter had lung cancer    Past Medical History:   Diagnosis Date    CAD (coronary artery disease) 05/2019    S/p PCI May 2019 [facility]     NUKE 8/7/2020 =Lexiscan pharmacologic stress test shows normal perfusion , noted apical thinning with an EF of 47  %.  Diverticulosis, sigmoid 8/2011    ED (erectile dysfunction) of organic origin     Hypertension     LVH (left ventricular hypertrophy) due to hypertensive disease     PAF (paroxysmal atrial fibrillation) ([facility] [address] 11/18/2019    [facility] admit nov 2019 no OAC due to anemia    Postherpetic neuralgia     Prostate cancer ([facility] [address]     Sebaceous cyst 4/1/2014    Shingles 02/2019      Past Surgical History:   Procedure Laterality Date    ENDOSCOPY, COLON, DIAGNOSTIC  >= 8-10years ago   Harlin Reeve SURGERY  1968 [id number]    HX CARPAL TUNNEL RELEASE  4/08    right,left    HX CHOLECYSTECTOMY      HX HEART CATHETERIZATION  05/2019    HX HERNIA REPAIR      HX PROSTATECTOMY  5/06      Social History     Tobacco Use    Smoking status: Never Smoker    Smokeless tobacco: Never Used   Substance Use Topics    Alcohol use: No      Family History   Problem Relation Age of Onset    Cancer Mother         pancreatic    Diabetes Father     Stroke Father     Diabetes Sister     Hypertension Sister     Other Sister         Open heart surgery     Diabetes Brother     Hypertension Brother     Hypertension Brother     Asthma Daughter     Heart Attack Daughter     Other Daughter         hip replacement x 2     Current Outpatient Medications   Medication Sig    atorvastatin (LIPITOR) 10 mg tablet Take 1 Tab by mouth daily.  torsemide (DEMADEX) 20 mg tablet Take 1 Tab by mouth as needed (swelling, shortness of breath).  potassium chloride SR (K-TAB) 20 mEq tablet Take 2 Tabs by mouth every Monday, Wednesday, Friday.  losartan (COZAAR) 100 mg tablet Take 1 Tab by mouth daily.  gabapentin (NEURONTIN) 400 mg capsule Take 1 Cap by mouth nightly. Max Daily Amount: 400 mg.  aspirin delayed-release 81 mg tablet Take 81 mg by mouth daily.  pantoprazole (PROTONIX) 40 mg tablet Take 40 mg by mouth daily.     albuterol (PROVENTIL HFA, VENTOLIN HFA, PROAIR HFA) 90 mcg/actuation inhaler Take 1-2 Puffs by inhalation every four (4) hours as needed for Wheezing.  cyanocobalamin (VITAMIN B-12) 1,000 mcg tablet Take 1,000 mcg by mouth daily. No current facility-administered medications for this visit. No Known Allergies     Review of Systems: A complete review of systems was obtained, negative except as described above. Physical Exam:   Vitals reviewed    Constitutional: Appears well-developed and well-nourished in no apparent distress   Mental status: Alert and awake, Oriented to person/place/time, Able to follow commands  Eyes: EOM normal, Sclera normal, No visible ocular discharge  HENT: Normocephalic, atraumatic; Mouth/Throat: Moist mucous membranes, External Ears normal  Neurological: No facial asymmetry (Cranial nerve 7 motor function), No gaze palsy  Skin: No significant exanthematous lesions or discoloration noted on facial skin  Psychiatric: Normal affect, normal judgment/insight. No hallucinations       Results:     Lab Results   Component Value Date/Time    WBC 7.7 09/15/2020 11:02 AM    HGB 10.3 (L) 09/15/2020 11:02 AM    HCT 34.0 (L) 09/15/2020 11:02 AM    PLATELET 504 03/43/4978 11:02 AM    MCV 85 09/15/2020 11:02 AM    ABS. NEUTROPHILS 5.3 09/15/2020 11:02 AM     Lab Results   Component Value Date/Time    Sodium 137 12/16/2019 05:31 AM    Potassium 4.5 12/16/2019 05:31 AM    Chloride 103 12/16/2019 05:31 AM    CO2 32 12/16/2019 05:31 AM    Glucose 92 12/16/2019 05:31 AM    BUN 23 (H) 12/16/2019 05:31 AM    Creatinine 1.04 12/16/2019 05:31 AM    GFR est AA >60 12/16/2019 05:31 AM    GFR est non-AA >60 12/16/2019 05:31 AM    Calcium 8.7 12/16/2019 05:31 AM     Lab Results   Component Value Date/Time    Bilirubin, total 1.0 12/12/2019 03:59 PM    ALT (SGPT) 33 12/12/2019 03:59 PM    Alk.  phosphatase 62 12/12/2019 03:59 PM    Protein, total 6.3 (L) 12/12/2019 03:59 PM    Albumin 3.6 12/12/2019 03:59 PM    Globulin 2.7 12/12/2019 03:59 PM       Lab Results   Component Value Date/Time    Iron % saturation 7 (LL) 09/15/2020 11:02 AM    TIBC 390 09/15/2020 11:02 AM    Ferritin 21 (L) 09/15/2020 11:02 AM    Vitamin B12 238 04/27/2018 03:25 PM    Folate 14.8 04/27/2018 03:25 PM    Haptoglobin 167 05/21/2020 09:05 AM     05/21/2020 09:05 AM    Sed rate (ESR) 20 05/24/2019 12:50 PM    C-Reactive Protein, Qt 0.6 12/12/2018 05:04 PM    C-Reactive Protein, Cardiac 10.91 (H) 05/24/2019 12:50 PM    TSH 1.23 12/14/2019 04:55 AM     No results found for: INR, APTT, DDIMSQ, DDIME, [id number], Milderd Number, FDPLT, Karenann Bride, [address] [facility], [address], [id number], [id number], [id number], [id number], [id number], [id number], Zoraida Parker    Records reviewed and summarized above. Pathology report(s) reviewed above. Radiology report(s) reviewed above. Assessment:   1) Anemia-    He did have evidence of iron deficiency when checked 12/14/2019  It does appear to have had a negative SPEP , normal MMA and B12 levels and no evidence of hemolysis on work up at [address] [place] [address] x 2 in Jan 2020 with Hb having improved as of 5/21/2020 to 12.2 g/dl. Iron stores normalized    Labs reviewed from 9/2020 show recurrent anemia with severe iron deficiency    Had an EGD 2020 and I dont have results of this. I also encourage him to have a colonoscopy with Dr. Hernando Jason and he will follow up with him      We will replace IV injectafer again    2) H/O Prostate cancer  Prostatectomy in 2006    3) CAD  On plavix    4) MG  This is not a certain diagnosis  He follows with Dr. Lisa Cook:     · Injectafer x 2  · Follow with GI Dr. Hernando Jason  · RTC 12 weeks with cbc, iron profile, ferritin    All questions answered and results were reviewed    I appreciate the opportunity to participate in Mr. Nanci Hammond's care.     Signed By: Rupesh Little MD

## 2020-09-22 NOTE — PROGRESS NOTES
Remo Habermann is a 82 y.o. male  Chief Complaint   Patient presents with    Follow-up    Anemia     1. Have you been to the ER, urgent care clinic since your last visit? Hospitalized since your last visit? No  2. Have you seen or consulted any other health care providers outside of the [address] [place] [address] since your last visit? Include any pap smears or colon screening.  No

## 2020-09-25 ENCOUNTER — DOCUMENTATION ONLY (OUTPATIENT)
Dept: FAMILY MEDICINE CLINIC | Age: 85
End: 2020-09-25

## 2020-10-01 ENCOUNTER — HOSPITAL ENCOUNTER (OUTPATIENT)
Dept: INFUSION THERAPY | Age: 85
Discharge: HOME | End: 2020-10-01
Payer: MEDICARE

## 2020-10-01 VITALS
TEMPERATURE: 98.2 F | DIASTOLIC BLOOD PRESSURE: 75 MMHG | SYSTOLIC BLOOD PRESSURE: 104 MMHG | RESPIRATION RATE: 18 BRPM | OXYGEN SATURATION: 95 % | HEART RATE: 76 BPM

## 2020-10-01 DIAGNOSIS — D50.0 IRON DEFICIENCY ANEMIA DUE TO CHRONIC BLOOD LOSS: Primary | ICD-10-CM

## 2020-10-01 PROCEDURE — 74011250636 HC RX REV CODE- 250/636: Performed by: REGISTERED NURSE

## 2020-10-01 PROCEDURE — 96365 THER/PROPH/DIAG IV INF INIT: CPT

## 2020-10-01 RX ORDER — SODIUM CHLORIDE 0.9 % (FLUSH) 0.9 %
10 SYRINGE (ML) INJECTION AS NEEDED
Status: DISPENSED | OUTPATIENT
Start: 2020-10-01 | End: 2020-10-01

## 2020-10-01 RX ORDER — SODIUM CHLORIDE 9 MG/ML
10 INJECTION INTRAMUSCULAR; INTRAVENOUS; SUBCUTANEOUS AS NEEDED
Status: ACTIVE | OUTPATIENT
Start: 2020-10-01 | End: 2020-10-01

## 2020-10-01 RX ORDER — HEPARIN 100 UNIT/ML
300-500 SYRINGE INTRAVENOUS AS NEEDED
Status: ACTIVE | OUTPATIENT
Start: 2020-10-01 | End: 2020-10-01

## 2020-10-01 RX ADMIN — SODIUM CHLORIDE 750 MG: 900 INJECTION, SOLUTION INTRAVENOUS at 11:42

## 2020-10-01 RX ADMIN — Medication 10 ML: at 11:12

## 2020-10-01 RX ADMIN — SODIUM CHLORIDE 250 ML: 900 INJECTION, SOLUTION INTRAVENOUS at 11:12

## 2020-10-01 NOTE — PROGRESS NOTES
Outpatient Infusion Center Progress Note    1100 Pt admit to [facility] for dose 1 of 2 Venofer ambulatory in stable condition. Assessment completed. No new concerns voiced. Peripheral IV accessed with positive blood return in left arm. PIV flushed and NS started at [employer]. Visit Vitals  /75 (BP 1 Location: Left arm, BP Patient Position: Sitting)   Pulse 76   Temp 98.2 °F (36.8 °C)   Resp 18   SpO2 95%       Medications:  NS KVO  Venofer    1235 Pt tolerated treatment well. PIV maintained positive blood return throughout treatment. PIV flushed and deaccessed per protocol. D/c home ambulatory in no distress. Pt aware of next appointment scheduled for 10/8/20.

## 2020-10-08 ENCOUNTER — HOSPITAL ENCOUNTER (OUTPATIENT)
Dept: INFUSION THERAPY | Age: 85
Discharge: HOME | End: 2020-10-08
Payer: MEDICARE

## 2020-10-08 VITALS
SYSTOLIC BLOOD PRESSURE: 146 MMHG | DIASTOLIC BLOOD PRESSURE: 89 MMHG | HEART RATE: 60 BPM | RESPIRATION RATE: 18 BRPM | OXYGEN SATURATION: 96 % | TEMPERATURE: 98.3 F

## 2020-10-08 DIAGNOSIS — D50.0 IRON DEFICIENCY ANEMIA DUE TO CHRONIC BLOOD LOSS: Primary | ICD-10-CM

## 2020-10-08 PROCEDURE — 74011250636 HC RX REV CODE- 250/636: Performed by: REGISTERED NURSE

## 2020-10-08 PROCEDURE — 96374 THER/PROPH/DIAG INJ IV PUSH: CPT

## 2020-10-08 RX ORDER — SODIUM CHLORIDE 9 MG/ML
500 INJECTION, SOLUTION INTRAVENOUS ONCE
Status: COMPLETED | OUTPATIENT
Start: 2020-10-08 | End: 2020-10-08

## 2020-10-08 RX ORDER — DIPHENHYDRAMINE HYDROCHLORIDE 50 MG/ML
50 INJECTION, SOLUTION INTRAMUSCULAR; INTRAVENOUS AS NEEDED
Status: DISCONTINUED | OUTPATIENT
Start: 2020-10-08 | End: 2020-10-09 | Stop reason: HOSPADM

## 2020-10-08 RX ORDER — ONDANSETRON 2 MG/ML
8 INJECTION INTRAMUSCULAR; INTRAVENOUS AS NEEDED
Status: DISCONTINUED | OUTPATIENT
Start: 2020-10-08 | End: 2020-10-09 | Stop reason: HOSPADM

## 2020-10-08 RX ORDER — HEPARIN 100 UNIT/ML
300-500 SYRINGE INTRAVENOUS AS NEEDED
Status: DISCONTINUED | OUTPATIENT
Start: 2020-10-08 | End: 2020-10-09 | Stop reason: HOSPADM

## 2020-10-08 RX ORDER — ALBUTEROL SULFATE 0.83 MG/ML
2.5 SOLUTION RESPIRATORY (INHALATION) AS NEEDED
Status: DISCONTINUED | OUTPATIENT
Start: 2020-10-08 | End: 2020-10-09 | Stop reason: HOSPADM

## 2020-10-08 RX ORDER — ACETAMINOPHEN 325 MG/1
650 TABLET ORAL AS NEEDED
Status: DISCONTINUED | OUTPATIENT
Start: 2020-10-08 | End: 2020-10-09 | Stop reason: HOSPADM

## 2020-10-08 RX ORDER — DIPHENHYDRAMINE HYDROCHLORIDE 50 MG/ML
25 INJECTION, SOLUTION INTRAMUSCULAR; INTRAVENOUS AS NEEDED
Status: DISCONTINUED | OUTPATIENT
Start: 2020-10-08 | End: 2020-10-09 | Stop reason: HOSPADM

## 2020-10-08 RX ORDER — EPINEPHRINE 1 MG/ML
0.3 INJECTION, SOLUTION, CONCENTRATE INTRAVENOUS AS NEEDED
Status: DISCONTINUED | OUTPATIENT
Start: 2020-10-08 | End: 2020-10-09 | Stop reason: HOSPADM

## 2020-10-08 RX ORDER — HYDROCORTISONE SODIUM SUCCINATE 100 MG/2ML
100 INJECTION, POWDER, FOR SOLUTION INTRAMUSCULAR; INTRAVENOUS AS NEEDED
Status: DISCONTINUED | OUTPATIENT
Start: 2020-10-08 | End: 2020-10-09 | Stop reason: HOSPADM

## 2020-10-08 RX ORDER — SODIUM CHLORIDE 9 MG/ML
10 INJECTION INTRAMUSCULAR; INTRAVENOUS; SUBCUTANEOUS AS NEEDED
Status: DISCONTINUED | OUTPATIENT
Start: 2020-10-08 | End: 2020-10-09 | Stop reason: HOSPADM

## 2020-10-08 RX ORDER — SODIUM CHLORIDE 0.9 % (FLUSH) 0.9 %
10 SYRINGE (ML) INJECTION AS NEEDED
Status: DISCONTINUED | OUTPATIENT
Start: 2020-10-08 | End: 2020-10-09 | Stop reason: HOSPADM

## 2020-10-08 RX ADMIN — FERRIC CARBOXYMALTOSE INJECTION 750 MG: 50 INJECTION, SOLUTION INTRAVENOUS at 13:43

## 2020-10-08 RX ADMIN — SODIUM CHLORIDE 500 ML: 900 INJECTION, SOLUTION INTRAVENOUS at 13:37

## 2020-10-08 NOTE — PROGRESS NOTES
[address] [facility] Visit Note    2742 Patient arrives for Injectafer infusion without acute problems. Please see [facility] for complete assessment and education provided. Vital signs stable throughout and prior to discharge. Patient tolerated procedure well and was discharged without incident. Patient and Family member is aware of no further [facility] appointments and to follow up with his provider for follow-up. Medications verified by Jaison Medel RN  via [employer]:    1. Injectafer 750 mg infusion  2.    NS KVO    Vital signs:  Patient Vitals for the past 12 hrs:   Temp Pulse Resp BP SpO2   10/08/20 1437 98.3 °F (36.8 °C) 60 18 (!) 146/89 96 %   10/08/20 1352 -- -- -- -- 97 %   10/08/20 1328 98.1 °F (36.7 °C) (!) 59 18 (!) 144/80 --

## 2020-10-19 DIAGNOSIS — I10 ESSENTIAL HYPERTENSION WITH GOAL BLOOD PRESSURE LESS THAN 140/90: ICD-10-CM

## 2020-10-21 RX ORDER — LOSARTAN POTASSIUM 100 MG/1
100 TABLET ORAL DAILY
Qty: 90 TAB | Refills: 3 | Status: SHIPPED | OUTPATIENT
Start: 2020-10-21 | End: 2021-01-01

## 2020-10-21 NOTE — TELEPHONE ENCOUNTER
Request for Losartan 100mg daily. Last office visit 7-20-20, next office visit 11-16-20. Refills per verbal order from Dr. Leela Nguyen as Dr. Irene Santiago is not in the office.

## 2020-11-16 ENCOUNTER — OFFICE VISIT (OUTPATIENT)
Dept: CARDIOLOGY CLINIC | Age: 85
End: 2020-11-16
Payer: MEDICARE

## 2020-11-16 VITALS
HEIGHT: 68 IN | HEART RATE: 56 BPM | RESPIRATION RATE: 18 BRPM | OXYGEN SATURATION: 97 % | DIASTOLIC BLOOD PRESSURE: 70 MMHG | SYSTOLIC BLOOD PRESSURE: 130 MMHG | WEIGHT: 187 LBS | BODY MASS INDEX: 28.34 KG/M2

## 2020-11-16 DIAGNOSIS — I10 ESSENTIAL HYPERTENSION WITH GOAL BLOOD PRESSURE LESS THAN 140/90: ICD-10-CM

## 2020-11-16 DIAGNOSIS — I51.89 DIASTOLIC DYSFUNCTION: ICD-10-CM

## 2020-11-16 DIAGNOSIS — I48.19 PERSISTENT ATRIAL FIBRILLATION (HCC): Primary | ICD-10-CM

## 2020-11-16 DIAGNOSIS — J98.6 ELEVATED HEMIDIAPHRAGM: ICD-10-CM

## 2020-11-16 DIAGNOSIS — R60.0 PEDAL EDEMA: ICD-10-CM

## 2020-11-16 DIAGNOSIS — I25.110 CORONARY ARTERY DISEASE INVOLVING NATIVE CORONARY ARTERY OF NATIVE HEART WITH UNSTABLE ANGINA PECTORIS (HCC): ICD-10-CM

## 2020-11-16 PROCEDURE — 1101F PT FALLS ASSESS-DOCD LE1/YR: CPT | Performed by: SPECIALIST

## 2020-11-16 PROCEDURE — G8752 SYS BP LESS 140: HCPCS | Performed by: SPECIALIST

## 2020-11-16 PROCEDURE — G8417 CALC BMI ABV UP PARAM F/U: HCPCS | Performed by: SPECIALIST

## 2020-11-16 PROCEDURE — G8754 DIAS BP LESS 90: HCPCS | Performed by: SPECIALIST

## 2020-11-16 PROCEDURE — G8510 SCR DEP NEG, NO PLAN REQD: HCPCS | Performed by: SPECIALIST

## 2020-11-16 PROCEDURE — G0463 HOSPITAL OUTPT CLINIC VISIT: HCPCS | Performed by: SPECIALIST

## 2020-11-16 PROCEDURE — 99214 OFFICE O/P EST MOD 30 MIN: CPT | Performed by: SPECIALIST

## 2020-11-16 PROCEDURE — G8536 NO DOC ELDER MAL SCRN: HCPCS | Performed by: SPECIALIST

## 2020-11-16 PROCEDURE — G8427 DOCREV CUR MEDS BY ELIG CLIN: HCPCS | Performed by: SPECIALIST

## 2020-11-16 NOTE — PROGRESS NOTES
Visit Vitals  /70 (BP 1 Location: Left arm, BP Patient Position: Sitting)   Pulse (!) 56   Resp 18   Ht 5' 8\" (1.727 m)   Wt 187 lb (84.8 kg)   SpO2 97%   BMI 28.43 kg/m²

## 2020-11-16 NOTE — Clinical Note
11/16/20 Patient: John Vyas YOB: 1935 Date of Visit: 11/16/2020 Yoav Farris MD 
[facility] [address] VIA In Basket Dear Yoav Farris MD, Thank you for referring Mr. Wilfred Hammond to [facility] for evaluation. My notes for this consultation are attached. If you have questions, please do not hesitate to call me. I look forward to following your patient along with you.  
 
 
Sincerely, 
 
Steven May MD

## 2020-11-16 NOTE — PROGRESS NOTES
Kasi Salomon Stephany     1935       Gaby Breen MD, [facility]  Date of Visit-11/16/2020   PCP is Rusty Washington MD   [facility] [place]  [facility]  HPI:  Los Lubin is a 82 y.o. male   4 month f/u   · CAD- JOSEFINA-Cath done 5/30/19. OM1 with 70% blockage had JOSEFINA  · CT scan on 10/8/19 which  Showed low lung volumes, diaphragm on the left was elevated with atelectasis on the left with mucus plugging.   · hospitalized at [facility] 11/12/2019 through 11/21/2019,Atrial fibrillation with RVR possible MG, underwent plasmapheresis/ steroids. Then  became markedly edemetous and I admitted him on 12/12/19. · Echo 11-18-19 EF Definity for atrial fibrillation Moderate to severe LVH EF 60-65%MAC, mild MRMod SERGIO PASP 45  · Echo 12-13-19 EF 50-55% severe LVH, mod LAE, mod MR, TR  · HTN, XOL, anemia Hgb 8.7 11-20-19    Pt has CAD with prior stent in 2019. Also admitted in 2019 by Neurology  Dr. Daljit Gutierrez and was hospitalized at [facility] [place] [place]. He underwent plasmapheresis for possible neuromuscular disorder and then put on steroids. He became markedly edemetous and  admitted him on 12/12/19 to [facility]. He received IV diuretics and was discharged on 12/16/19. Pulmonary follow up  for DAYO. His Prednisone was decreased significantly. He stopped Mestinon.   Pt saw Dr. Sherif Mora in May in f/u of anemia. The source of anemia is still not clear and a colonoscopy was planned. 08/07/20   NUCLEAR CARDIAC STRESS TEST 08/14/2020 8/18/2020    Narrative · Gated SPECT: Left ventricular function post-stress was abnormal.   Calculated ejection fraction is 47%. The TID ratio is 1.02.  · Baseline ECG: Normal sinus rhythm, atrial fibrillation. · Myocardial perfusion imaging defect 1: There is a defect that is small   in size present in the apex location(s) that is non-reversible. The defect   appears to be an artifact. · Myocardial perfusion imaging defect 1: caused by soft tissue.      NUKE 8/7/2020 =Lexiscan pharmacologic stress test shows normal perfusion ,   noted apical thinning with an EF of 47  %. Signed by: Khanh Olivas MD     08/07/20   ECHO ADULT COMPLETE 08/14/2020 8/14/2020    Narrative · LV: Normal cavity size and systolic function (ejection fraction normal). Moderate concentric hypertrophy. Calculated left ventricular ejection   fraction is 57%. Biplane method used to measure ejection fraction. · LA: Moderately dilated left atrium. Left Atrium volume index is 48   mL/m2. · RA: Mildly dilated right atrium. · AV: Aortic valve sclerosis with no significant stenosis. Mild aortic   valve regurgitation is present. · MV: Mitral valve thickening. Mild mitral valve regurgitation is present. · TV: Mild tricuspid valve regurgitation is present. · PA: Mild pulmonary hypertension. Pulmonary arterial systolic pressure is   35 mmHg. · AO: Mild ascending aorta dilatation. Ascending aorta diameter = 3.7 cm. Signed by: Khanh Olivas MD       Overall the pt states he is doing well. He notes that he has \"good days and bad days\", but he has had days where he felt so good that he did yard work. He states he is limited by SOB and lower back pain. He takes his fluid pill 2-3x a week. Pt notes he did not take his BP medication yesterday or this morning. He checks his BP at home and states that his BP's at home are very similar to what is recorded in office today. Pt asks if he should start exercising and playing handball more. Denies chest pain, edema, syncope, has no tachycardia, palpitations or sense of arrhythmia. Assessment/Plan:     1. Persistent atrial fibrillation ([address]  Sounds regular today but I suspect he is staying in a controlled AF. The rate has varied with his other issues including his volume elevation last November. Currently he seems to be improving and is only requiring diuretic a few days a week.  He is concerned about playing sports which I think his limitations are deconditioning and anemia. 2. Coronary artery disease involving native coronary artery of native heart with unstable angina pectoris ([address]  More then 1 year out from angioplasty to the . He is off Plavix, should continue ASA. 3. Essential hypertension with goal blood pressure less than 140/90  Good control. 4. Diastolic dysfunction  Related to LVH which is at least moderate and accounts for part of his edema. There is no significant valve disease. At goal , meds and possible side effects reviewed and patient denies  Key CAD CHF Meds             losartan (COZAAR) 100 mg tablet (Taking) Take 1 Tab by mouth daily. atorvastatin (LIPITOR) 10 mg tablet (Taking) Take 1 Tab by mouth daily. torsemide (DEMADEX) 20 mg tablet (Taking) Take 1 Tab by mouth as needed (swelling, shortness of breath). aspirin delayed-release 81 mg tablet (Taking) Take 81 mg by mouth daily. BP Readings from Last 6 Encounters:   11/16/20 130/70   10/08/20 (!) 146/89   10/01/20 104/75   09/22/20 (!) 172/87   09/16/20 (!) 152/93   08/07/20 124/70        5. Elevated hemidiaphragm  Elevated left hemidiaphragm by CT scan in October 2019. 6. Pedal edema  Due to diastolic dysfunction. Patient Instructions   We will see you back in 6 months. Future Appointments   Date Time Provider Samantha Ley   12/9/2020 10:30 AM Nakia De Los Santos  N Rodo Sidhu BS AMB   5/10/2021 10:00 AM Gaby Aranda MD CAVREY BS AMB       F/u in 6 months     Impression:   1. Persistent atrial fibrillation ([address]    2. Coronary artery disease involving native coronary artery of native heart with unstable angina pectoris ([address]    3. Essential hypertension with goal blood pressure less than 140/90    4. Diastolic dysfunction    5. Elevated hemidiaphragm    6.  Pedal edema       Cardiac History:   [facility] Admit November 2019  --New onset Afib , rate controlled, w/up for MG, plasmapharesis done, no OAC due to risk with low plts and anemia when seen by Cards at [address] [facility] Hgb was 8.5  ECHO [address] [place] [address] 11-18-19 Definity EF 60-65% mod severe LVH, mild MR, MAC, LAE, mod OUMAR PASP 45    CAD   S/p PCI May 2019 [facility]  Lexiscan 8-7-2020 no reversible ischemia, EF 47%, apical thinning (LVH). ROS-except as noted above. . A complete cardiac and respiratory are reviewed and negative except as above ; Resp-denies wheezing  or productive cough,. Const- No unusual weight loss or fever; Neuro-no recent seizure or CVA ; GI- No BRBPR, abdom pain, bloating ; - no  hematuria   see supplement sheet, initialed and to be scanned by staff  Past Medical History:   Diagnosis Date    CAD (coronary artery disease) 05/2019    S/p PCI May 2019 [facility]     NU 8/7/2020 =Lexiscan pharmacologic stress test shows normal perfusion , noted apical thinning with an EF of 47  %.  Diverticulosis, sigmoid 8/2011    ED (erectile dysfunction) of organic origin     Hypertension     LVH (left ventricular hypertrophy) due to hypertensive disease     PAF (paroxysmal atrial fibrillation) ([address] 11/18/2019    [address] [place] [address] admit nov 2019 no OAC due to anemia    Postherpetic neuralgia     Prostate cancer ([facility]     Sebaceous cyst 4/1/2014    Shingles 02/2019      Social Hx= reports that he has never smoked. He has never used smokeless tobacco. He reports that he does not drink alcohol or use drugs. Exam and Labs:  /70 (BP 1 Location: Left arm, BP Patient Position: Sitting)   Pulse (!) 56   Resp 18   Ht 5' 8\" (1.727 m)   Wt 187 lb (84.8 kg)   SpO2 97%   BMI 28.43 kg/m² Constitutional:  NAD, comfortable  Head: NC,AT. Eyes: No scleral icterus. Neck:  Neck supple. No JVD present. Throat: moist mucous membranes. Chest: Effort normal & normal respiratory excursion . Neurological: alert, conversant and oriented . Skin: Skin is not cold. No obvious systemic rash noted. Not diaphoretic. No erythema. Psychiatric:  Grossly normal mood and affect. Behavior appears normal. Extremities:  no clubbing or cyanosis. Abdomen: non distended    Lungs: mild reduction in air force. No stridor. distress, wheezes or  Rales. Heart: normal rate, regular rhythm, normal S1, S2, no murmurs, rubs, clicks or gallops , PMI non displaced. Edema: Edema is none. Lab Results   Component Value Date/Time    Cholesterol, total 110 08/05/2019 12:19 PM    HDL Cholesterol 43 08/05/2019 12:19 PM    LDL, calculated 55 08/05/2019 12:19 PM    Triglyceride 60 08/05/2019 12:19 PM     Lab Results   Component Value Date/Time    Sodium 137 12/16/2019 05:31 AM    Potassium 4.5 12/16/2019 05:31 AM    Chloride 103 12/16/2019 05:31 AM    CO2 32 12/16/2019 05:31 AM    Anion gap 2 (L) 12/16/2019 05:31 AM    Glucose 92 12/16/2019 05:31 AM    BUN 23 (H) 12/16/2019 05:31 AM    Creatinine 1.04 12/16/2019 05:31 AM    BUN/Creatinine ratio 22 (H) 12/16/2019 05:31 AM    GFR est AA >60 12/16/2019 05:31 AM    GFR est non-AA >60 12/16/2019 05:31 AM    Calcium 8.7 12/16/2019 05:31 AM      Wt Readings from Last 3 Encounters:   11/16/20 187 lb (84.8 kg)   09/22/20 199 lb (90.3 kg)   09/16/20 190 lb (86.2 kg)      BP Readings from Last 3 Encounters:   11/16/20 130/70   10/08/20 (!) 146/89   10/01/20 104/75      Current Outpatient Medications   Medication Sig    losartan (COZAAR) 100 mg tablet Take 1 Tab by mouth daily.  atorvastatin (LIPITOR) 10 mg tablet Take 1 Tab by mouth daily.  torsemide (DEMADEX) 20 mg tablet Take 1 Tab by mouth as needed (swelling, shortness of breath).  potassium chloride SR (K-TAB) 20 mEq tablet Take 2 Tabs by mouth every Monday, Wednesday, Friday.  gabapentin (NEURONTIN) 400 mg capsule Take 1 Cap by mouth nightly. Max Daily Amount: 400 mg.  aspirin delayed-release 81 mg tablet Take 81 mg by mouth daily.  pantoprazole (PROTONIX) 40 mg tablet Take 40 mg by mouth daily.  albuterol (PROVENTIL HFA, VENTOLIN HFA, PROAIR HFA) 90 mcg/actuation inhaler Take 1-2 Puffs by inhalation every four (4) hours as needed for Wheezing.     cyanocobalamin (VITAMIN B-12) 1,000 mcg tablet Take 1,000 mcg by mouth daily. No current facility-administered medications for this visit. Impression see above.       Written by Rosalee Akins, as dictated by Colletta Brasil, MD.

## 2020-12-04 ENCOUNTER — HOSPITAL ENCOUNTER (OUTPATIENT)
Dept: LAB | Age: 85
Discharge: HOME | End: 2020-12-04
Payer: MEDICARE

## 2020-12-04 PROCEDURE — 83550 IRON BINDING TEST: CPT

## 2020-12-04 PROCEDURE — 82728 ASSAY OF FERRITIN: CPT

## 2020-12-04 PROCEDURE — 36415 COLL VENOUS BLD VENIPUNCTURE: CPT

## 2020-12-04 PROCEDURE — 85025 COMPLETE CBC W/AUTO DIFF WBC: CPT

## 2020-12-05 LAB
BASOPHILS # BLD AUTO: 0 X10E3/UL (ref 0–0.2)
BASOPHILS NFR BLD AUTO: 0 %
EOSINOPHIL # BLD AUTO: 0.2 X10E3/UL (ref 0–0.4)
EOSINOPHIL NFR BLD AUTO: 3 %
ERYTHROCYTE [DISTWIDTH] IN BLOOD BY AUTOMATED COUNT: 16.2 % (ref 11.6–15.4)
FERRITIN SERPL-MCNC: 284 NG/ML (ref 30–400)
HCT VFR BLD AUTO: 37.4 % (ref 37.5–51)
HGB BLD-MCNC: 11.5 G/DL (ref 13–17.7)
IMM GRANULOCYTES # BLD AUTO: 0 X10E3/UL (ref 0–0.1)
IMM GRANULOCYTES NFR BLD AUTO: 0 %
IRON SATN MFR SERPL: 19 % (ref 15–55)
IRON SERPL-MCNC: 50 UG/DL (ref 38–169)
LYMPHOCYTES # BLD AUTO: 1.1 X10E3/UL (ref 0.7–3.1)
LYMPHOCYTES NFR BLD AUTO: 16 %
MCH RBC QN AUTO: 27.8 PG (ref 26.6–33)
MCHC RBC AUTO-ENTMCNC: 30.7 G/DL (ref 31.5–35.7)
MCV RBC AUTO: 90 FL (ref 79–97)
MONOCYTES # BLD AUTO: 0.6 X10E3/UL (ref 0.1–0.9)
MONOCYTES NFR BLD AUTO: 8 %
NEUTROPHILS # BLD AUTO: 5.2 X10E3/UL (ref 1.4–7)
NEUTROPHILS NFR BLD AUTO: 73 %
PLATELET # BLD AUTO: 196 X10E3/UL (ref 150–450)
RBC # BLD AUTO: 4.14 X10E6/UL (ref 4.14–5.8)
TIBC SERPL-MCNC: 260 UG/DL (ref 250–450)
UIBC SERPL-MCNC: 210 UG/DL (ref 111–343)
WBC # BLD AUTO: 7.1 X10E3/UL (ref 3.4–10.8)

## 2020-12-09 ENCOUNTER — OFFICE VISIT (OUTPATIENT)
Dept: ONCOLOGY | Age: 85
End: 2020-12-09
Payer: MEDICARE

## 2020-12-09 VITALS
OXYGEN SATURATION: 93 % | SYSTOLIC BLOOD PRESSURE: 138 MMHG | DIASTOLIC BLOOD PRESSURE: 91 MMHG | WEIGHT: 185.6 LBS | HEART RATE: 62 BPM | TEMPERATURE: 96.5 F | BODY MASS INDEX: 28.13 KG/M2 | HEIGHT: 68 IN

## 2020-12-09 DIAGNOSIS — I25.10 CORONARY ARTERY DISEASE INVOLVING NATIVE CORONARY ARTERY OF NATIVE HEART WITHOUT ANGINA PECTORIS: ICD-10-CM

## 2020-12-09 DIAGNOSIS — D50.0 IRON DEFICIENCY ANEMIA DUE TO CHRONIC BLOOD LOSS: Primary | ICD-10-CM

## 2020-12-09 DIAGNOSIS — I48.19 PERSISTENT ATRIAL FIBRILLATION (HCC): ICD-10-CM

## 2020-12-09 PROCEDURE — 1101F PT FALLS ASSESS-DOCD LE1/YR: CPT | Performed by: INTERNAL MEDICINE

## 2020-12-09 PROCEDURE — G8536 NO DOC ELDER MAL SCRN: HCPCS | Performed by: INTERNAL MEDICINE

## 2020-12-09 PROCEDURE — 99214 OFFICE O/P EST MOD 30 MIN: CPT | Performed by: INTERNAL MEDICINE

## 2020-12-09 PROCEDURE — G8427 DOCREV CUR MEDS BY ELIG CLIN: HCPCS | Performed by: INTERNAL MEDICINE

## 2020-12-09 PROCEDURE — G8755 DIAS BP > OR = 90: HCPCS | Performed by: INTERNAL MEDICINE

## 2020-12-09 PROCEDURE — G8752 SYS BP LESS 140: HCPCS | Performed by: INTERNAL MEDICINE

## 2020-12-09 PROCEDURE — G8510 SCR DEP NEG, NO PLAN REQD: HCPCS | Performed by: INTERNAL MEDICINE

## 2020-12-09 PROCEDURE — G8417 CALC BMI ABV UP PARAM F/U: HCPCS | Performed by: INTERNAL MEDICINE

## 2020-12-09 PROCEDURE — G0463 HOSPITAL OUTPT CLINIC VISIT: HCPCS | Performed by: INTERNAL MEDICINE

## 2020-12-09 NOTE — PROGRESS NOTES
[facility] [place] at [address], [address]: [phone]  F: [phone]    Reason for Visit:   Geoffrey Sanderson is a 82 y.o. male who is seen on 12/9/2020 for follow up of anemia. History of Present Illness:   Patient is a 82 y.o. male with CAD and h/o prostate cancer who is seen for follow up of anemia     He has progressive anemia since 5/2019 with Hb having dropped from 12 g/dl to 9 g/dl by 12/2019. He does have a h/o iron deficiency with iron studies on 12/13/2019 showing a TSAT of 8% though ferritin was 53. When rechecked 1/2/2020 he was iron replete but anemia had barely improved to 9.5 g/dl. He was admitted around 11/2019 at [facility] with SOB and anemia. He was seen by Dr. Santosh Barrera. EGD 11/2019 had shown some esophagitis. Colonoscopy was not considered necessary. He also was diagnosed with Myasthenia gravis. He had plasma exchange. He was then placed on prednisone. He was readmitted with SOB in Dec 2019 thought to be due to prednisone and was taken off this. He was seen by Neurology and was then told that he has no myasthenia. He also had a normal SPEP at that time and a normal MMA. His work up was most suggestive of iron deficiency. He received Injectafer x 2 in Jan 2020 and again 10/2020. Now seen for follow up with labs. Has felt like he has more energy. He had an EGD 9/2020 which showed some gastritis   He has normal BMs and has no diarrhea  He has no melena  He has continued to take diuretics and that has helped with SOB. He has no hematuria,fveers or weight loss    He had a prostatectomy 2006    He has never smoked  Mother had pancreatic cancer  Sister had Lymphoma  Daughter had lung cancer    Past Medical History:   Diagnosis Date    CAD (coronary artery disease) 05/2019    S/p PCI May 2019 [facility]     NUKE 8/7/2020 =Lexiscan pharmacologic stress test shows normal perfusion , noted apical thinning with an EF of 47  %.      Diverticulosis, sigmoid 8/2011    ED (erectile dysfunction) of organic origin     Hypertension     LVH (left ventricular hypertrophy) due to hypertensive disease     PAF (paroxysmal atrial fibrillation) ([facility] 11/18/2019    [facility] admit nov 2019 no OAC due to anemia    Postherpetic neuralgia     Prostate cancer ([facility]     Sebaceous cyst 4/1/2014    Shingles 02/2019      Past Surgical History:   Procedure Laterality Date    ENDOSCOPY, COLON, DIAGNOSTIC  >= 8-10years ago   Irma Jahaira SURGERY  1968 [id number]    HX CARPAL TUNNEL RELEASE  4/08    right,left    HX CHOLECYSTECTOMY      HX HEART CATHETERIZATION  05/2019    HX HERNIA REPAIR      HX PROSTATECTOMY  5/06      Social History     Tobacco Use    Smoking status: Never Smoker    Smokeless tobacco: Never Used   Substance Use Topics    Alcohol use: No      Family History   Problem Relation Age of Onset    Cancer Mother         pancreatic    Diabetes Father     Stroke Father     Diabetes Sister     Hypertension Sister     Other Sister         Open heart surgery     Diabetes Brother     Hypertension Brother     Hypertension Brother     Asthma Daughter     Heart Attack Daughter     Other Daughter         hip replacement x 2     Current Outpatient Medications   Medication Sig    losartan (COZAAR) 100 mg tablet Take 1 Tab by mouth daily.  atorvastatin (LIPITOR) 10 mg tablet Take 1 Tab by mouth daily.  torsemide (DEMADEX) 20 mg tablet Take 1 Tab by mouth as needed (swelling, shortness of breath).  potassium chloride SR (K-TAB) 20 mEq tablet Take 2 Tabs by mouth every Monday, Wednesday, Friday.  gabapentin (NEURONTIN) 400 mg capsule Take 1 Cap by mouth nightly. Max Daily Amount: 400 mg.  aspirin delayed-release 81 mg tablet Take 81 mg by mouth daily.  pantoprazole (PROTONIX) 40 mg tablet Take 40 mg by mouth daily.     albuterol (PROVENTIL HFA, VENTOLIN HFA, PROAIR HFA) 90 mcg/actuation inhaler Take 1-2 Puffs by inhalation every four (4) hours as needed for Wheezing.  cyanocobalamin (VITAMIN B-12) 1,000 mcg tablet Take 1,000 mcg by mouth daily. No current facility-administered medications for this visit. No Known Allergies     Review of Systems: A complete review of systems was obtained, negative except as described above. Physical Exam:   Vitals reviewed    Constitutional: Appears well-developed and well-nourished in no apparent distress   Mental status: Alert and awake, Oriented to person/place/time, Able to follow commands  Eyes: EOM normal, Sclera normal, No visible ocular discharge  HENT: Normocephalic, atraumatic; Mouth/Throat: Moist mucous membranes, External Ears normal  Skin: No significant exanthematous lesions or discoloration noted on facial skin  Psychiatric: Normal affect, normal judgment/insight. No hallucinations       Results:     Lab Results   Component Value Date/Time    WBC 7.1 12/04/2020 08:13 AM    HGB 11.5 (L) 12/04/2020 08:13 AM    HCT 37.4 (L) 12/04/2020 08:13 AM    PLATELET 157 69/57/7562 08:13 AM    MCV 90 12/04/2020 08:13 AM    ABS. NEUTROPHILS 5.2 12/04/2020 08:13 AM     Lab Results   Component Value Date/Time    Sodium 137 12/16/2019 05:31 AM    Potassium 4.5 12/16/2019 05:31 AM    Chloride 103 12/16/2019 05:31 AM    CO2 32 12/16/2019 05:31 AM    Glucose 92 12/16/2019 05:31 AM    BUN 23 (H) 12/16/2019 05:31 AM    Creatinine 1.04 12/16/2019 05:31 AM    GFR est AA >60 12/16/2019 05:31 AM    GFR est non-AA >60 12/16/2019 05:31 AM    Calcium 8.7 12/16/2019 05:31 AM     Lab Results   Component Value Date/Time    Bilirubin, total 1.0 12/12/2019 03:59 PM    ALT (SGPT) 33 12/12/2019 03:59 PM    Alk.  phosphatase 62 12/12/2019 03:59 PM    Protein, total 6.3 (L) 12/12/2019 03:59 PM    Albumin 3.6 12/12/2019 03:59 PM    Globulin 2.7 12/12/2019 03:59 PM       Lab Results   Component Value Date/Time    Iron % saturation 19 12/04/2020 08:13 AM    TIBC 260 12/04/2020 08:13 AM    Ferritin 284 12/04/2020 08:13 AM Vitamin B12 238 04/27/2018 03:25 PM    Folate 14.8 04/27/2018 03:25 PM    Haptoglobin 167 05/21/2020 09:05 AM     05/21/2020 09:05 AM    Sed rate (ESR) 20 05/24/2019 12:50 PM    C-Reactive Protein, Qt 0.6 12/12/2018 05:04 PM    C-Reactive Protein, Cardiac 10.91 (H) 05/24/2019 12:50 PM    TSH 1.23 12/14/2019 04:55 AM     No results found for: INR, APTT, DDIMSQ, DDIME, [id number], [place], FDPLT, [place], [address] [facility], [facility], [address], [id number], [id number], [id number], [id number], [id number], [id number], Richie Xiong    Records reviewed and summarized above. Pathology report(s) reviewed above. Radiology report(s) reviewed above. Assessment:   1) Anemia-    He did have evidence of iron deficiency when checked 12/14/2019. It does appear to have had a negative SPEP , normal MMA and B12 levels and no evidence of hemolysis on work up at [facility]. Received Injecatfer x 2 in Jan 2020 with Hb having improved as of 5/21/2020 to 12.2 g/dl. Iron stores normalized but anemia recurred by 9/2020  EGD 9/2020 showed some atrophic gastritis but no clear bleeding. Colonoscopy was not recommended but may need to be considered if anemia worsens in the next few months  Labs from 12/2020 shows improvement in Hb and stores and likely will continue to improve over the next month      2) H/O Prostate cancer  Prostatectomy in 2006    3) CAD  On plavix    4) MG  This is not a certain diagnosis  He follows with Dr. Colton Voss:     · B12 - 1000 mcg daily  · FA daily  · Follow with GI   · RTC 12 weeks with cbc, iron profile, ferritin    All questions answered and results were reviewed    I appreciate the opportunity to participate in Mr. Emmy Hammond's care.     Signed By: Dilcia Olmstead MD

## 2020-12-09 NOTE — PROGRESS NOTES
Samantha John is a 82 y.o. male  Chief Complaint   Patient presents with    Follow-up    Anemia     1. Have you been to the ER, urgent care clinic since your last visit? Hospitalized since your last visit? No  2. Have you seen or consulted any other health care providers outside of the [address] [place] [address] since your last visit? Include any pap smears or colon screening.  no

## 2020-12-22 DIAGNOSIS — D50.0 IRON DEFICIENCY ANEMIA DUE TO CHRONIC BLOOD LOSS: ICD-10-CM

## 2021-01-01 ENCOUNTER — OFFICE VISIT (OUTPATIENT)
Dept: CARDIOLOGY CLINIC | Age: 86
End: 2021-01-01
Payer: MEDICARE

## 2021-01-01 ENCOUNTER — OFFICE VISIT (OUTPATIENT)
Dept: ONCOLOGY | Age: 86
End: 2021-01-01
Payer: MEDICARE

## 2021-01-01 ENCOUNTER — PATIENT MESSAGE (OUTPATIENT)
Dept: FAMILY MEDICINE CLINIC | Age: 86
End: 2021-01-01

## 2021-01-01 ENCOUNTER — TELEPHONE (OUTPATIENT)
Dept: CARDIOLOGY CLINIC | Age: 86
End: 2021-01-01

## 2021-01-01 ENCOUNTER — TELEPHONE (OUTPATIENT)
Dept: ONCOLOGY | Age: 86
End: 2021-01-01

## 2021-01-01 ENCOUNTER — TELEPHONE (OUTPATIENT)
Dept: FAMILY MEDICINE CLINIC | Age: 86
End: 2021-01-01

## 2021-01-01 ENCOUNTER — PATIENT OUTREACH (OUTPATIENT)
Dept: CASE MANAGEMENT | Age: 86
End: 2021-01-01

## 2021-01-01 ENCOUNTER — OFFICE VISIT (OUTPATIENT)
Dept: FAMILY MEDICINE CLINIC | Age: 86
End: 2021-01-01
Payer: MEDICARE

## 2021-01-01 ENCOUNTER — TRANSCRIBE ORDER (OUTPATIENT)
Dept: SCHEDULING | Age: 86
End: 2021-01-01

## 2021-01-01 ENCOUNTER — APPOINTMENT (OUTPATIENT)
Dept: GENERAL RADIOLOGY | Age: 86
End: 2021-01-01
Attending: EMERGENCY MEDICINE
Payer: MEDICARE

## 2021-01-01 ENCOUNTER — LAB ONLY (OUTPATIENT)
Dept: FAMILY MEDICINE CLINIC | Age: 86
End: 2021-01-01

## 2021-01-01 ENCOUNTER — APPOINTMENT (OUTPATIENT)
Dept: CT IMAGING | Age: 86
End: 2021-01-01
Attending: EMERGENCY MEDICINE
Payer: MEDICARE

## 2021-01-01 ENCOUNTER — APPOINTMENT (OUTPATIENT)
Dept: CT IMAGING | Age: 86
DRG: 064 | End: 2021-01-01
Attending: EMERGENCY MEDICINE
Payer: MEDICARE

## 2021-01-01 ENCOUNTER — APPOINTMENT (OUTPATIENT)
Dept: MRI IMAGING | Age: 86
DRG: 064 | End: 2021-01-01
Attending: INTERNAL MEDICINE
Payer: MEDICARE

## 2021-01-01 ENCOUNTER — HOSPITAL ENCOUNTER (OUTPATIENT)
Dept: INFUSION THERAPY | Age: 86
Discharge: HOME | End: 2021-03-23
Payer: MEDICARE

## 2021-01-01 ENCOUNTER — HOSPITAL ENCOUNTER (EMERGENCY)
Age: 86
Discharge: HOME | End: 2021-07-12
Attending: EMERGENCY MEDICINE | Admitting: EMERGENCY MEDICINE
Payer: MEDICARE

## 2021-01-01 ENCOUNTER — APPOINTMENT (OUTPATIENT)
Dept: NON INVASIVE DIAGNOSTICS | Age: 86
DRG: 064 | End: 2021-01-01
Attending: INTERNAL MEDICINE
Payer: MEDICARE

## 2021-01-01 ENCOUNTER — HOSPITAL ENCOUNTER (OUTPATIENT)
Dept: INFUSION THERAPY | Age: 86
Discharge: HOME | End: 2021-10-05
Payer: MEDICARE

## 2021-01-01 ENCOUNTER — APPOINTMENT (OUTPATIENT)
Dept: CT IMAGING | Age: 86
DRG: 064 | End: 2021-01-01
Attending: PSYCHIATRY & NEUROLOGY
Payer: MEDICARE

## 2021-01-01 ENCOUNTER — APPOINTMENT (OUTPATIENT)
Dept: CT IMAGING | Age: 86
DRG: 064 | End: 2021-01-01
Attending: FAMILY MEDICINE
Payer: MEDICARE

## 2021-01-01 ENCOUNTER — HOSPITAL ENCOUNTER (OUTPATIENT)
Dept: CT IMAGING | Age: 86
Discharge: HOME | End: 2021-07-22
Attending: INTERNAL MEDICINE
Payer: MEDICARE

## 2021-01-01 ENCOUNTER — HOSPITAL ENCOUNTER (OUTPATIENT)
Dept: INFUSION THERAPY | Age: 86
Discharge: HOME | End: 2021-07-07
Payer: MEDICARE

## 2021-01-01 ENCOUNTER — HOSPITAL ENCOUNTER (OUTPATIENT)
Dept: INFUSION THERAPY | Age: 86
Discharge: HOME | End: 2021-09-28
Payer: MEDICARE

## 2021-01-01 ENCOUNTER — HOSPITAL ENCOUNTER (OUTPATIENT)
Dept: INFUSION THERAPY | Age: 86
Discharge: HOME | End: 2021-07-14
Payer: MEDICARE

## 2021-01-01 ENCOUNTER — HOSPITAL ENCOUNTER (INPATIENT)
Age: 86
LOS: 4 days | Discharge: HOME | DRG: 064 | End: 2021-04-24
Attending: EMERGENCY MEDICINE | Admitting: STUDENT IN AN ORGANIZED HEALTH CARE EDUCATION/TRAINING PROGRAM
Payer: MEDICARE

## 2021-01-01 ENCOUNTER — APPOINTMENT (OUTPATIENT)
Dept: GENERAL RADIOLOGY | Age: 86
DRG: 064 | End: 2021-01-01
Attending: EMERGENCY MEDICINE
Payer: MEDICARE

## 2021-01-01 ENCOUNTER — DOCUMENTATION ONLY (OUTPATIENT)
Dept: FAMILY MEDICINE CLINIC | Age: 86
End: 2021-01-01

## 2021-01-01 ENCOUNTER — HOSPITAL ENCOUNTER (OUTPATIENT)
Dept: GENERAL RADIOLOGY | Age: 86
Discharge: HOME | End: 2021-07-28
Attending: INTERNAL MEDICINE
Payer: MEDICARE

## 2021-01-01 VITALS
SYSTOLIC BLOOD PRESSURE: 159 MMHG | TEMPERATURE: 97.7 F | HEART RATE: 68 BPM | RESPIRATION RATE: 16 BRPM | OXYGEN SATURATION: 97 % | DIASTOLIC BLOOD PRESSURE: 78 MMHG

## 2021-01-01 VITALS
RESPIRATION RATE: 16 BRPM | WEIGHT: 185 LBS | BODY MASS INDEX: 28.04 KG/M2 | TEMPERATURE: 97.6 F | HEIGHT: 68 IN | OXYGEN SATURATION: 96 % | HEART RATE: 51 BPM | DIASTOLIC BLOOD PRESSURE: 64 MMHG | SYSTOLIC BLOOD PRESSURE: 122 MMHG

## 2021-01-01 VITALS
DIASTOLIC BLOOD PRESSURE: 85 MMHG | BODY MASS INDEX: 29.19 KG/M2 | OXYGEN SATURATION: 93 % | TEMPERATURE: 96.5 F | WEIGHT: 192 LBS | HEART RATE: 61 BPM | SYSTOLIC BLOOD PRESSURE: 155 MMHG

## 2021-01-01 VITALS
OXYGEN SATURATION: 96 % | RESPIRATION RATE: 16 BRPM | WEIGHT: 179.6 LBS | HEART RATE: 60 BPM | OXYGEN SATURATION: 99 % | SYSTOLIC BLOOD PRESSURE: 130 MMHG | HEART RATE: 62 BPM | DIASTOLIC BLOOD PRESSURE: 90 MMHG | DIASTOLIC BLOOD PRESSURE: 60 MMHG | OXYGEN SATURATION: 97 % | BODY MASS INDEX: 27.22 KG/M2 | WEIGHT: 180 LBS | TEMPERATURE: 98.6 F | RESPIRATION RATE: 18 BRPM | RESPIRATION RATE: 16 BRPM | DIASTOLIC BLOOD PRESSURE: 70 MMHG | HEIGHT: 68 IN | SYSTOLIC BLOOD PRESSURE: 120 MMHG | HEART RATE: 48 BPM | BODY MASS INDEX: 27.28 KG/M2 | SYSTOLIC BLOOD PRESSURE: 131 MMHG | HEIGHT: 68 IN

## 2021-01-01 VITALS
HEART RATE: 59 BPM | DIASTOLIC BLOOD PRESSURE: 65 MMHG | BODY MASS INDEX: 29.04 KG/M2 | OXYGEN SATURATION: 96 % | TEMPERATURE: 98.2 F | SYSTOLIC BLOOD PRESSURE: 106 MMHG | RESPIRATION RATE: 18 BRPM | WEIGHT: 191 LBS

## 2021-01-01 VITALS
WEIGHT: 187.39 LBS | BODY MASS INDEX: 28.4 KG/M2 | HEART RATE: 62 BPM | SYSTOLIC BLOOD PRESSURE: 136 MMHG | HEIGHT: 68 IN | DIASTOLIC BLOOD PRESSURE: 95 MMHG | TEMPERATURE: 97.3 F | OXYGEN SATURATION: 96 % | RESPIRATION RATE: 17 BRPM

## 2021-01-01 VITALS
RESPIRATION RATE: 16 BRPM | OXYGEN SATURATION: 98 % | HEART RATE: 69 BPM | SYSTOLIC BLOOD PRESSURE: 130 MMHG | HEIGHT: 68 IN | WEIGHT: 187 LBS | DIASTOLIC BLOOD PRESSURE: 90 MMHG | BODY MASS INDEX: 28.34 KG/M2

## 2021-01-01 VITALS
DIASTOLIC BLOOD PRESSURE: 60 MMHG | OXYGEN SATURATION: 97 % | BODY MASS INDEX: 26.83 KG/M2 | HEART RATE: 57 BPM | HEIGHT: 68 IN | WEIGHT: 177 LBS | TEMPERATURE: 98.1 F | RESPIRATION RATE: 16 BRPM | SYSTOLIC BLOOD PRESSURE: 125 MMHG

## 2021-01-01 VITALS
BODY MASS INDEX: 27.28 KG/M2 | HEART RATE: 49 BPM | SYSTOLIC BLOOD PRESSURE: 110 MMHG | HEIGHT: 68 IN | RESPIRATION RATE: 16 BRPM | OXYGEN SATURATION: 100 % | DIASTOLIC BLOOD PRESSURE: 70 MMHG | WEIGHT: 180 LBS

## 2021-01-01 VITALS
DIASTOLIC BLOOD PRESSURE: 88 MMHG | TEMPERATURE: 97.3 F | HEART RATE: 80 BPM | OXYGEN SATURATION: 96 % | RESPIRATION RATE: 16 BRPM | BODY MASS INDEX: 28.19 KG/M2 | HEIGHT: 68 IN | WEIGHT: 186 LBS | SYSTOLIC BLOOD PRESSURE: 148 MMHG

## 2021-01-01 VITALS
BODY MASS INDEX: 27.89 KG/M2 | DIASTOLIC BLOOD PRESSURE: 60 MMHG | HEIGHT: 68 IN | WEIGHT: 184 LBS | HEART RATE: 66 BPM | RESPIRATION RATE: 16 BRPM | OXYGEN SATURATION: 96 % | SYSTOLIC BLOOD PRESSURE: 90 MMHG

## 2021-01-01 VITALS
SYSTOLIC BLOOD PRESSURE: 101 MMHG | OXYGEN SATURATION: 97 % | DIASTOLIC BLOOD PRESSURE: 70 MMHG | TEMPERATURE: 98.1 F | HEART RATE: 63 BPM | WEIGHT: 199.08 LBS | HEIGHT: 68 IN | RESPIRATION RATE: 18 BRPM | BODY MASS INDEX: 30.17 KG/M2

## 2021-01-01 VITALS
SYSTOLIC BLOOD PRESSURE: 128 MMHG | HEART RATE: 68 BPM | RESPIRATION RATE: 18 BRPM | DIASTOLIC BLOOD PRESSURE: 70 MMHG | WEIGHT: 174.4 LBS | BODY MASS INDEX: 26.43 KG/M2 | HEIGHT: 68 IN

## 2021-01-01 VITALS
HEART RATE: 61 BPM | SYSTOLIC BLOOD PRESSURE: 130 MMHG | OXYGEN SATURATION: 96 % | DIASTOLIC BLOOD PRESSURE: 60 MMHG | HEIGHT: 68 IN | RESPIRATION RATE: 16 BRPM | BODY MASS INDEX: 25.01 KG/M2 | WEIGHT: 165 LBS

## 2021-01-01 VITALS
HEIGHT: 68 IN | WEIGHT: 184 LBS | SYSTOLIC BLOOD PRESSURE: 130 MMHG | OXYGEN SATURATION: 97 % | TEMPERATURE: 98.2 F | RESPIRATION RATE: 16 BRPM | BODY MASS INDEX: 27.89 KG/M2 | DIASTOLIC BLOOD PRESSURE: 68 MMHG | HEART RATE: 76 BPM

## 2021-01-01 VITALS
SYSTOLIC BLOOD PRESSURE: 143 MMHG | OXYGEN SATURATION: 94 % | RESPIRATION RATE: 12 BRPM | BODY MASS INDEX: 26.67 KG/M2 | HEART RATE: 63 BPM | DIASTOLIC BLOOD PRESSURE: 79 MMHG | HEIGHT: 68 IN | WEIGHT: 176 LBS

## 2021-01-01 VITALS
SYSTOLIC BLOOD PRESSURE: 120 MMHG | DIASTOLIC BLOOD PRESSURE: 93 MMHG | HEART RATE: 80 BPM | OXYGEN SATURATION: 99 % | TEMPERATURE: 98.5 F | RESPIRATION RATE: 16 BRPM

## 2021-01-01 VITALS
RESPIRATION RATE: 16 BRPM | DIASTOLIC BLOOD PRESSURE: 79 MMHG | HEART RATE: 62 BPM | TEMPERATURE: 96.5 F | SYSTOLIC BLOOD PRESSURE: 154 MMHG

## 2021-01-01 VITALS
WEIGHT: 195 LBS | RESPIRATION RATE: 16 BRPM | HEART RATE: 61 BPM | SYSTOLIC BLOOD PRESSURE: 100 MMHG | OXYGEN SATURATION: 96 % | BODY MASS INDEX: 29.55 KG/M2 | HEIGHT: 68 IN | DIASTOLIC BLOOD PRESSURE: 60 MMHG

## 2021-01-01 VITALS — SYSTOLIC BLOOD PRESSURE: 138 MMHG | TEMPERATURE: 97.8 F | HEART RATE: 74 BPM | DIASTOLIC BLOOD PRESSURE: 85 MMHG

## 2021-01-01 DIAGNOSIS — I25.10 CORONARY ARTERY DISEASE INVOLVING NATIVE CORONARY ARTERY OF NATIVE HEART WITHOUT ANGINA PECTORIS: ICD-10-CM

## 2021-01-01 DIAGNOSIS — R41.0 CONFUSION AND DISORIENTATION: Primary | ICD-10-CM

## 2021-01-01 DIAGNOSIS — I10 ESSENTIAL HYPERTENSION: ICD-10-CM

## 2021-01-01 DIAGNOSIS — R53.81 MALAISE: ICD-10-CM

## 2021-01-01 DIAGNOSIS — I51.89 DIASTOLIC DYSFUNCTION: ICD-10-CM

## 2021-01-01 DIAGNOSIS — I25.110 CORONARY ARTERY DISEASE INVOLVING NATIVE CORONARY ARTERY OF NATIVE HEART WITH UNSTABLE ANGINA PECTORIS (HCC): ICD-10-CM

## 2021-01-01 DIAGNOSIS — R60.0 PEDAL EDEMA: ICD-10-CM

## 2021-01-01 DIAGNOSIS — I10 ESSENTIAL HYPERTENSION WITH GOAL BLOOD PRESSURE LESS THAN 140/90: ICD-10-CM

## 2021-01-01 DIAGNOSIS — J98.6 ELEVATED HEMIDIAPHRAGM: ICD-10-CM

## 2021-01-01 DIAGNOSIS — R60.9 EDEMA, UNSPECIFIED TYPE: ICD-10-CM

## 2021-01-01 DIAGNOSIS — I10 HYPERTENSION, ESSENTIAL: ICD-10-CM

## 2021-01-01 DIAGNOSIS — R51.9 INTRACTABLE HEADACHE, UNSPECIFIED CHRONICITY PATTERN, UNSPECIFIED HEADACHE TYPE: ICD-10-CM

## 2021-01-01 DIAGNOSIS — I48.0 PAF (PAROXYSMAL ATRIAL FIBRILLATION) (HCC): Primary | ICD-10-CM

## 2021-01-01 DIAGNOSIS — I11.0 HYPERTENSIVE LEFT VENTRICULAR HYPERTROPHY WITH HEART FAILURE (HCC): ICD-10-CM

## 2021-01-01 DIAGNOSIS — D64.9 CHRONIC ANEMIA: Primary | ICD-10-CM

## 2021-01-01 DIAGNOSIS — D50.0 IRON DEFICIENCY ANEMIA DUE TO CHRONIC BLOOD LOSS: Primary | ICD-10-CM

## 2021-01-01 DIAGNOSIS — E78.5 HYPERLIPIDEMIA, UNSPECIFIED HYPERLIPIDEMIA TYPE: ICD-10-CM

## 2021-01-01 DIAGNOSIS — R53.1 WEAKNESS GENERALIZED: ICD-10-CM

## 2021-01-01 DIAGNOSIS — L30.9 DERMATITIS: Primary | ICD-10-CM

## 2021-01-01 DIAGNOSIS — D50.0 ANEMIA DUE TO GI BLOOD LOSS: Primary | ICD-10-CM

## 2021-01-01 DIAGNOSIS — R13.10 DYSPHAGIA: Primary | ICD-10-CM

## 2021-01-01 DIAGNOSIS — I48.0 PAF (PAROXYSMAL ATRIAL FIBRILLATION) (HCC): ICD-10-CM

## 2021-01-01 DIAGNOSIS — I48.91 ATRIAL FIBRILLATION, UNSPECIFIED TYPE (HCC): Primary | ICD-10-CM

## 2021-01-01 DIAGNOSIS — I50.32 DIASTOLIC CHF, CHRONIC (HCC): ICD-10-CM

## 2021-01-01 DIAGNOSIS — I48.19 PERSISTENT ATRIAL FIBRILLATION (HCC): Primary | ICD-10-CM

## 2021-01-01 DIAGNOSIS — I48.19 PERSISTENT ATRIAL FIBRILLATION (HCC): ICD-10-CM

## 2021-01-01 DIAGNOSIS — K21.9 GASTROESOPHAGEAL REFLUX DISEASE WITHOUT ESOPHAGITIS: ICD-10-CM

## 2021-01-01 DIAGNOSIS — B02.29 POSTHERPETIC NEURALGIA: ICD-10-CM

## 2021-01-01 DIAGNOSIS — S00.83XA FACIAL CONTUSION, INITIAL ENCOUNTER: ICD-10-CM

## 2021-01-01 DIAGNOSIS — K31.82 DIEULAFOY LESION (HEMORRHAGIC) OF STOMACH AND DUODENUM: ICD-10-CM

## 2021-01-01 DIAGNOSIS — R80.9 PROTEINURIA, UNSPECIFIED TYPE: Primary | ICD-10-CM

## 2021-01-01 DIAGNOSIS — D50.9 IRON DEFICIENCY ANEMIA, UNSPECIFIED IRON DEFICIENCY ANEMIA TYPE: ICD-10-CM

## 2021-01-01 DIAGNOSIS — R42 DIZZINESS: ICD-10-CM

## 2021-01-01 DIAGNOSIS — Z09 HOSPITAL DISCHARGE FOLLOW-UP: ICD-10-CM

## 2021-01-01 DIAGNOSIS — G47.00 INSOMNIA, UNSPECIFIED TYPE: Primary | ICD-10-CM

## 2021-01-01 DIAGNOSIS — I50.9 CHRONIC CONGESTIVE HEART FAILURE, UNSPECIFIED HEART FAILURE TYPE (HCC): ICD-10-CM

## 2021-01-01 DIAGNOSIS — R80.9 PROTEINURIA, UNSPECIFIED TYPE: ICD-10-CM

## 2021-01-01 DIAGNOSIS — E61.1 LOW IRON: ICD-10-CM

## 2021-01-01 DIAGNOSIS — I50.9 ACUTE ON CHRONIC CONGESTIVE HEART FAILURE, UNSPECIFIED HEART FAILURE TYPE (HCC): Primary | ICD-10-CM

## 2021-01-01 DIAGNOSIS — R22.0 FACIAL SWELLING: ICD-10-CM

## 2021-01-01 DIAGNOSIS — Z91.81 AT HIGH RISK FOR FALLS: ICD-10-CM

## 2021-01-01 DIAGNOSIS — H53.9 CVA, OLD, DISTURBANCES OF VISION: ICD-10-CM

## 2021-01-01 DIAGNOSIS — G47.00 INSOMNIA, UNSPECIFIED TYPE: ICD-10-CM

## 2021-01-01 DIAGNOSIS — R41.0 CONFUSION AND DISORIENTATION: ICD-10-CM

## 2021-01-01 DIAGNOSIS — R53.83 FATIGUE, UNSPECIFIED TYPE: ICD-10-CM

## 2021-01-01 DIAGNOSIS — R89.9 ABNORMAL LABORATORY TEST: Primary | ICD-10-CM

## 2021-01-01 DIAGNOSIS — K92.2 GASTROINTESTINAL HEMORRHAGE, UNSPECIFIED GASTROINTESTINAL HEMORRHAGE TYPE: ICD-10-CM

## 2021-01-01 DIAGNOSIS — R42 VERTIGO: ICD-10-CM

## 2021-01-01 DIAGNOSIS — I69.398 CVA, OLD, DISTURBANCES OF VISION: ICD-10-CM

## 2021-01-01 DIAGNOSIS — Z00.00 ENCOUNTER FOR MEDICARE ANNUAL WELLNESS EXAM: Primary | ICD-10-CM

## 2021-01-01 DIAGNOSIS — R13.10 DYSPHAGIA: ICD-10-CM

## 2021-01-01 LAB
ALBUMIN SERPL-MCNC: 3.4 G/DL (ref 3.5–5)
ALBUMIN SERPL-MCNC: 3.7 G/DL (ref 3.5–5)
ALBUMIN SERPL-MCNC: 3.8 G/DL (ref 3.5–5)
ALBUMIN/GLOB SERPL: 1 {RATIO} (ref 1.1–2.2)
ALBUMIN/GLOB SERPL: 1 {RATIO} (ref 1.1–2.2)
ALBUMIN/GLOB SERPL: 1.2 {RATIO} (ref 1.1–2.2)
ALP SERPL-CCNC: 100 U/L (ref 45–117)
ALP SERPL-CCNC: 87 U/L (ref 45–117)
ALP SERPL-CCNC: 98 U/L (ref 45–117)
ALT SERPL-CCNC: 19 U/L (ref 12–78)
ALT SERPL-CCNC: 22 U/L (ref 12–78)
ALT SERPL-CCNC: 43 U/L (ref 12–78)
ANION GAP SERPL CALC-SCNC: 2 MMOL/L (ref 5–15)
ANION GAP SERPL CALC-SCNC: 3 MMOL/L (ref 5–15)
ANION GAP SERPL CALC-SCNC: 3 MMOL/L (ref 5–15)
ANION GAP SERPL CALC-SCNC: 4 MMOL/L (ref 5–15)
ANION GAP SERPL CALC-SCNC: 6 MMOL/L (ref 5–15)
ANION GAP SERPL CALC-SCNC: 7 MMOL/L (ref 5–15)
APPEARANCE UR: CLEAR
ASPIRIN TEST, ASPIRN: 431 ARU
AST SERPL-CCNC: 18 U/L (ref 15–37)
AST SERPL-CCNC: 27 U/L (ref 15–37)
AST SERPL-CCNC: 31 U/L (ref 15–37)
ATRIAL RATE: 65 BPM
ATRIAL RATE: 68 BPM
BACTERIA URNS QL MICRO: NEGATIVE /HPF
BASOPHILS # BLD AUTO: 0 X10E3/UL (ref 0–0.2)
BASOPHILS # BLD: 0 K/UL (ref 0–0.1)
BASOPHILS # BLD: 0.1 K/UL (ref 0–0.1)
BASOPHILS NFR BLD AUTO: 0 %
BASOPHILS NFR BLD: 0 % (ref 0–1)
BASOPHILS NFR BLD: 1 % (ref 0–1)
BILIRUB SERPL-MCNC: 0.4 MG/DL (ref 0.2–1)
BILIRUB SERPL-MCNC: 0.9 MG/DL (ref 0.2–1)
BILIRUB SERPL-MCNC: 1.2 MG/DL (ref 0.2–1)
BILIRUB UR QL CFM: NEGATIVE
BNP SERPL-MCNC: ABNORMAL PG/ML
BUN SERPL-MCNC: 19 MG/DL (ref 6–20)
BUN SERPL-MCNC: 21 MG/DL (ref 6–20)
BUN SERPL-MCNC: 23 MG/DL (ref 6–20)
BUN SERPL-MCNC: 24 MG/DL (ref 6–20)
BUN SERPL-MCNC: 25 MG/DL (ref 6–20)
BUN SERPL-MCNC: 27 MG/DL (ref 6–20)
BUN SERPL-MCNC: 28 MG/DL (ref 6–20)
BUN SERPL-MCNC: 33 MG/DL (ref 6–20)
BUN/CREAT SERPL: 18 (ref 12–20)
BUN/CREAT SERPL: 19 (ref 12–20)
BUN/CREAT SERPL: 21 (ref 12–20)
BUN/CREAT SERPL: 22 (ref 12–20)
BUN/CREAT SERPL: 23 (ref 12–20)
BUN/CREAT SERPL: 24 (ref 12–20)
BUN/CREAT SERPL: 27 (ref 12–20)
BUN/CREAT SERPL: 28 (ref 12–20)
CALCIUM SERPL-MCNC: 8.3 MG/DL (ref 8.5–10.1)
CALCIUM SERPL-MCNC: 8.7 MG/DL (ref 8.5–10.1)
CALCIUM SERPL-MCNC: 8.7 MG/DL (ref 8.5–10.1)
CALCIUM SERPL-MCNC: 8.8 MG/DL (ref 8.5–10.1)
CALCIUM SERPL-MCNC: 9 MG/DL (ref 8.5–10.1)
CALCIUM SERPL-MCNC: 9.1 MG/DL (ref 8.5–10.1)
CALCIUM SERPL-MCNC: 9.1 MG/DL (ref 8.5–10.1)
CALCIUM SERPL-MCNC: 9.5 MG/DL (ref 8.5–10.1)
CALCULATED R AXIS, ECG10: -59 DEGREES
CALCULATED R AXIS, ECG10: -73 DEGREES
CALCULATED T AXIS, ECG11: 100 DEGREES
CALCULATED T AXIS, ECG11: 33 DEGREES
CHLORIDE SERPL-SCNC: 101 MMOL/L (ref 97–108)
CHLORIDE SERPL-SCNC: 103 MMOL/L (ref 97–108)
CHLORIDE SERPL-SCNC: 103 MMOL/L (ref 97–108)
CHLORIDE SERPL-SCNC: 106 MMOL/L (ref 97–108)
CHLORIDE SERPL-SCNC: 108 MMOL/L (ref 97–108)
CHLORIDE SERPL-SCNC: 109 MMOL/L (ref 97–108)
CHLORIDE SERPL-SCNC: 110 MMOL/L (ref 97–108)
CHLORIDE SERPL-SCNC: 112 MMOL/L (ref 97–108)
CHOLEST SERPL-MCNC: 104 MG/DL
CHOLEST SERPL-MCNC: 96 MG/DL
CO2 SERPL-SCNC: 27 MMOL/L (ref 21–32)
CO2 SERPL-SCNC: 28 MMOL/L (ref 21–32)
CO2 SERPL-SCNC: 31 MMOL/L (ref 21–32)
CO2 SERPL-SCNC: 31 MMOL/L (ref 21–32)
CO2 SERPL-SCNC: 32 MMOL/L (ref 21–32)
CO2 SERPL-SCNC: 33 MMOL/L (ref 21–32)
CO2 SERPL-SCNC: 34 MMOL/L (ref 21–32)
CO2 SERPL-SCNC: 35 MMOL/L (ref 21–32)
COLLECT DURATION TIME UR: ABNORMAL HR
COLOR UR: ABNORMAL
COMMENT, HOLDF: NORMAL
CREAT SERPL-MCNC: 0.83 MG/DL (ref 0.7–1.3)
CREAT SERPL-MCNC: 0.96 MG/DL (ref 0.7–1.3)
CREAT SERPL-MCNC: 1.01 MG/DL (ref 0.7–1.3)
CREAT SERPL-MCNC: 1.13 MG/DL (ref 0.7–1.3)
CREAT SERPL-MCNC: 1.16 MG/DL (ref 0.7–1.3)
CREAT SERPL-MCNC: 1.18 MG/DL (ref 0.7–1.3)
CREAT SERPL-MCNC: 1.3 MG/DL (ref 0.7–1.3)
CREAT SERPL-MCNC: 1.3 MG/DL (ref 0.7–1.3)
DIAGNOSIS, 93000: NORMAL
DIAGNOSIS, 93000: NORMAL
DIFFERENTIAL METHOD BLD: ABNORMAL
ECHO LA AREA 4C: 22.47 CM2
ECHO LA VOL 4C: 67.22 ML (ref 18–58)
ECHO LA VOLUME INDEX A4C: 32.95 ML/M2 (ref 16–28)
ECHO LVOT PEAK GRADIENT: 3.56 MMHG
ECHO LVOT PEAK VELOCITY: 94.36 CM/S
ECHO RV INTERNAL DIMENSION: 4.64 CM
ECHO RV TAPSE: 1.02 CM (ref 1.5–2)
EOSINOPHIL # BLD AUTO: 0.2 X10E3/UL (ref 0–0.4)
EOSINOPHIL # BLD AUTO: 0.3 X10E3/UL (ref 0–0.4)
EOSINOPHIL # BLD AUTO: 0.3 X10E3/UL (ref 0–0.4)
EOSINOPHIL # BLD: 0.1 K/UL (ref 0–0.4)
EOSINOPHIL # BLD: 0.2 K/UL (ref 0–0.4)
EOSINOPHIL NFR BLD AUTO: 3 %
EOSINOPHIL NFR BLD AUTO: 5 %
EOSINOPHIL NFR BLD AUTO: 5 %
EOSINOPHIL NFR BLD: 1 % (ref 0–7)
EOSINOPHIL NFR BLD: 2 % (ref 0–7)
EOSINOPHIL NFR BLD: 3 % (ref 0–7)
EOSINOPHIL NFR BLD: 4 % (ref 0–7)
EPITH CASTS URNS QL MICRO: ABNORMAL /LPF
ERYTHROCYTE [DISTWIDTH] IN BLOOD BY AUTOMATED COUNT: 12.9 % (ref 11.6–15.4)
ERYTHROCYTE [DISTWIDTH] IN BLOOD BY AUTOMATED COUNT: 14.6 % (ref 11.6–15.4)
ERYTHROCYTE [DISTWIDTH] IN BLOOD BY AUTOMATED COUNT: 15.4 % (ref 11.6–15.4)
ERYTHROCYTE [DISTWIDTH] IN BLOOD BY AUTOMATED COUNT: 15.5 % (ref 11.5–14.5)
ERYTHROCYTE [DISTWIDTH] IN BLOOD BY AUTOMATED COUNT: 15.8 % (ref 11.5–14.5)
ERYTHROCYTE [DISTWIDTH] IN BLOOD BY AUTOMATED COUNT: 16.1 % (ref 11.5–14.5)
ERYTHROCYTE [DISTWIDTH] IN BLOOD BY AUTOMATED COUNT: 16.2 % (ref 11.5–14.5)
ERYTHROCYTE [DISTWIDTH] IN BLOOD BY AUTOMATED COUNT: 16.4 % (ref 11.5–14.5)
ERYTHROCYTE [DISTWIDTH] IN BLOOD BY AUTOMATED COUNT: 20 % (ref 11.5–14.5)
ERYTHROCYTE [DISTWIDTH] IN BLOOD BY AUTOMATED COUNT: 23.4 % (ref 11.5–14.5)
EST. AVERAGE GLUCOSE BLD GHB EST-MCNC: 126 MG/DL
FERRITIN SERPL-MCNC: 103 NG/ML (ref 30–400)
FERRITIN SERPL-MCNC: 25 NG/ML (ref 26–388)
FERRITIN SERPL-MCNC: 36 NG/ML (ref 30–400)
FERRITIN SERPL-MCNC: 51 NG/ML (ref 30–400)
GLOBULIN SER CALC-MCNC: 3.1 G/DL (ref 2–4)
GLOBULIN SER CALC-MCNC: 3.5 G/DL (ref 2–4)
GLOBULIN SER CALC-MCNC: 3.7 G/DL (ref 2–4)
GLUCOSE BLD STRIP.AUTO-MCNC: 99 MG/DL (ref 65–100)
GLUCOSE SERPL-MCNC: 100 MG/DL (ref 65–100)
GLUCOSE SERPL-MCNC: 104 MG/DL (ref 65–100)
GLUCOSE SERPL-MCNC: 109 MG/DL (ref 65–100)
GLUCOSE SERPL-MCNC: 134 MG/DL (ref 65–100)
GLUCOSE SERPL-MCNC: 96 MG/DL (ref 65–100)
GLUCOSE SERPL-MCNC: 97 MG/DL (ref 65–100)
GLUCOSE SERPL-MCNC: 97 MG/DL (ref 65–100)
GLUCOSE SERPL-MCNC: 99 MG/DL (ref 65–100)
GLUCOSE UR STRIP.AUTO-MCNC: NEGATIVE MG/DL
HBA1C MFR BLD: 6 % (ref 4–5.6)
HCT VFR BLD AUTO: 26.7 % (ref 37.5–51)
HCT VFR BLD AUTO: 27.6 % (ref 37.5–51)
HCT VFR BLD AUTO: 29.7 % (ref 36.6–50.3)
HCT VFR BLD AUTO: 30.1 % (ref 36.6–50.3)
HCT VFR BLD AUTO: 31.3 % (ref 36.6–50.3)
HCT VFR BLD AUTO: 36.7 % (ref 36.6–50.3)
HCT VFR BLD AUTO: 38.6 % (ref 37.5–51)
HCT VFR BLD AUTO: 41 % (ref 36.6–50.3)
HCT VFR BLD AUTO: 42 % (ref 36.6–50.3)
HCT VFR BLD AUTO: 43.1 % (ref 36.6–50.3)
HDLC SERPL-MCNC: 45 MG/DL
HDLC SERPL-MCNC: 51 MG/DL
HDLC SERPL: 2 {RATIO} (ref 0–5)
HDLC SERPL: 2.1 {RATIO} (ref 0–5)
HGB BLD-MCNC: 10.2 G/DL (ref 12.1–17)
HGB BLD-MCNC: 11.7 G/DL (ref 13–17.7)
HGB BLD-MCNC: 12.3 G/DL (ref 12.1–17)
HGB BLD-MCNC: 12.7 G/DL (ref 12.1–17)
HGB BLD-MCNC: 13 G/DL (ref 12.1–17)
HGB BLD-MCNC: 8 G/DL (ref 13–17.7)
HGB BLD-MCNC: 8.1 G/DL (ref 13–17.7)
HGB BLD-MCNC: 8.3 G/DL (ref 12.1–17)
HGB BLD-MCNC: 8.4 G/DL (ref 12.1–17)
HGB BLD-MCNC: 8.4 G/DL (ref 12.1–17)
HGB UR QL STRIP: NEGATIVE
HYALINE CASTS URNS QL MICRO: ABNORMAL /LPF (ref 0–5)
IMM GRANULOCYTES # BLD AUTO: 0 K/UL (ref 0–0.04)
IMM GRANULOCYTES # BLD AUTO: 0 X10E3/UL (ref 0–0.1)
IMM GRANULOCYTES # BLD AUTO: 0.1 K/UL (ref 0–0.04)
IMM GRANULOCYTES NFR BLD AUTO: 0 %
IMM GRANULOCYTES NFR BLD AUTO: 0 % (ref 0–0.5)
IMM GRANULOCYTES NFR BLD AUTO: 0 % (ref 0–0.5)
IMM GRANULOCYTES NFR BLD AUTO: 1 %
IMM GRANULOCYTES NFR BLD AUTO: 1 %
IMM GRANULOCYTES NFR BLD AUTO: 1 % (ref 0–0.5)
IRON SATN MFR SERPL: 15 % (ref 20–50)
IRON SATN MFR SERPL: 18 % (ref 15–55)
IRON SATN MFR SERPL: 5 % (ref 15–55)
IRON SATN MFR SERPL: 6 % (ref 15–55)
IRON SATN MFR SERPL: 7 % (ref 20–50)
IRON SERPL-MCNC: 17 UG/DL (ref 38–169)
IRON SERPL-MCNC: 22 UG/DL (ref 38–169)
IRON SERPL-MCNC: 25 UG/DL (ref 35–150)
IRON SERPL-MCNC: 44 UG/DL (ref 35–150)
IRON SERPL-MCNC: 50 UG/DL (ref 38–169)
KETONES UR QL STRIP.AUTO: ABNORMAL MG/DL
LDLC SERPL CALC-MCNC: 36 MG/DL (ref 0–100)
LDLC SERPL CALC-MCNC: 43.6 MG/DL (ref 0–100)
LEUKOCYTE ESTERASE UR QL STRIP.AUTO: NEGATIVE
LIPID PROFILE,FLP: NORMAL
LYMPHOCYTES # BLD AUTO: 0.8 X10E3/UL (ref 0.7–3.1)
LYMPHOCYTES # BLD AUTO: 1.1 X10E3/UL (ref 0.7–3.1)
LYMPHOCYTES # BLD AUTO: 1.1 X10E3/UL (ref 0.7–3.1)
LYMPHOCYTES # BLD: 0.7 K/UL (ref 0.8–3.5)
LYMPHOCYTES # BLD: 0.9 K/UL (ref 0.8–3.5)
LYMPHOCYTES # BLD: 1 K/UL (ref 0.8–3.5)
LYMPHOCYTES # BLD: 1 K/UL (ref 0.8–3.5)
LYMPHOCYTES NFR BLD AUTO: 13 %
LYMPHOCYTES NFR BLD AUTO: 16 %
LYMPHOCYTES NFR BLD AUTO: 18 %
LYMPHOCYTES NFR BLD: 12 % (ref 12–49)
LYMPHOCYTES NFR BLD: 13 % (ref 12–49)
LYMPHOCYTES NFR BLD: 13 % (ref 12–49)
LYMPHOCYTES NFR BLD: 14 % (ref 12–49)
LYMPHOCYTES NFR BLD: 14 % (ref 12–49)
LYMPHOCYTES NFR BLD: 15 % (ref 12–49)
MAGNESIUM SERPL-MCNC: 1.9 MG/DL (ref 1.6–2.4)
MAGNESIUM SERPL-MCNC: 2.1 MG/DL (ref 1.6–2.4)
MAGNESIUM SERPL-MCNC: 2.1 MG/DL (ref 1.6–2.4)
MCH RBC QN AUTO: 25.9 PG (ref 26–34)
MCH RBC QN AUTO: 26.9 PG (ref 26–34)
MCH RBC QN AUTO: 27.3 PG (ref 26.6–33)
MCH RBC QN AUTO: 27.6 PG (ref 26.6–33)
MCH RBC QN AUTO: 27.8 PG (ref 26–34)
MCH RBC QN AUTO: 28.5 PG (ref 26.6–33)
MCH RBC QN AUTO: 28.9 PG (ref 26–34)
MCH RBC QN AUTO: 29 PG (ref 26–34)
MCH RBC QN AUTO: 29 PG (ref 26–34)
MCH RBC QN AUTO: 29.6 PG (ref 26–34)
MCHC RBC AUTO-ENTMCNC: 26.8 G/DL (ref 30–36.5)
MCHC RBC AUTO-ENTMCNC: 27.8 G/DL (ref 30–36.5)
MCHC RBC AUTO-ENTMCNC: 27.9 G/DL (ref 30–36.5)
MCHC RBC AUTO-ENTMCNC: 27.9 G/DL (ref 30–36.5)
MCHC RBC AUTO-ENTMCNC: 29.3 G/DL (ref 31.5–35.7)
MCHC RBC AUTO-ENTMCNC: 30 G/DL (ref 30–36.5)
MCHC RBC AUTO-ENTMCNC: 30 G/DL (ref 31.5–35.7)
MCHC RBC AUTO-ENTMCNC: 30.2 G/DL (ref 30–36.5)
MCHC RBC AUTO-ENTMCNC: 30.2 G/DL (ref 30–36.5)
MCHC RBC AUTO-ENTMCNC: 30.3 G/DL (ref 31.5–35.7)
MCV RBC AUTO: 103.6 FL (ref 80–99)
MCV RBC AUTO: 106.1 FL (ref 80–99)
MCV RBC AUTO: 92 FL (ref 79–97)
MCV RBC AUTO: 92.9 FL (ref 80–99)
MCV RBC AUTO: 93 FL (ref 79–97)
MCV RBC AUTO: 94 FL (ref 79–97)
MCV RBC AUTO: 95.7 FL (ref 80–99)
MCV RBC AUTO: 96.2 FL (ref 80–99)
MCV RBC AUTO: 96.7 FL (ref 80–99)
MCV RBC AUTO: 96.8 FL (ref 80–99)
MONOCYTES # BLD AUTO: 0.5 X10E3/UL (ref 0.1–0.9)
MONOCYTES # BLD AUTO: 0.6 X10E3/UL (ref 0.1–0.9)
MONOCYTES # BLD AUTO: 0.6 X10E3/UL (ref 0.1–0.9)
MONOCYTES # BLD: 0.6 K/UL (ref 0–1)
MONOCYTES # BLD: 0.7 K/UL (ref 0–1)
MONOCYTES # BLD: 0.8 K/UL (ref 0–1)
MONOCYTES NFR BLD AUTO: 10 %
MONOCYTES NFR BLD AUTO: 10 %
MONOCYTES NFR BLD AUTO: 8 %
MONOCYTES NFR BLD: 10 % (ref 5–13)
MONOCYTES NFR BLD: 11 % (ref 5–13)
MONOCYTES NFR BLD: 11 % (ref 5–13)
MONOCYTES NFR BLD: 9 % (ref 5–13)
NEUTROPHILS # BLD AUTO: 3.9 X10E3/UL (ref 1.4–7)
NEUTROPHILS # BLD AUTO: 4.2 X10E3/UL (ref 1.4–7)
NEUTROPHILS # BLD AUTO: 4.6 X10E3/UL (ref 1.4–7)
NEUTROPHILS NFR BLD AUTO: 67 %
NEUTROPHILS NFR BLD AUTO: 70 %
NEUTROPHILS NFR BLD AUTO: 73 %
NEUTS SEG # BLD: 4.1 K/UL (ref 1.8–8)
NEUTS SEG # BLD: 4.3 K/UL (ref 1.8–8)
NEUTS SEG # BLD: 4.9 K/UL (ref 1.8–8)
NEUTS SEG # BLD: 5.1 K/UL (ref 1.8–8)
NEUTS SEG # BLD: 5.2 K/UL (ref 1.8–8)
NEUTS SEG # BLD: 5.4 K/UL (ref 1.8–8)
NEUTS SEG NFR BLD: 70 % (ref 32–75)
NEUTS SEG NFR BLD: 72 % (ref 32–75)
NEUTS SEG NFR BLD: 72 % (ref 32–75)
NEUTS SEG NFR BLD: 74 % (ref 32–75)
NEUTS SEG NFR BLD: 75 % (ref 32–75)
NEUTS SEG NFR BLD: 75 % (ref 32–75)
NITRITE UR QL STRIP.AUTO: NEGATIVE
NRBC # BLD: 0 K/UL (ref 0–0.01)
NRBC BLD-RTO: 0 PER 100 WBC
P2Y12 PLT RESPONSE,PPPR: 315 PRU (ref 194–418)
PH UR STRIP: 5.5 [PH] (ref 5–8)
PHOSPHATE SERPL-MCNC: 1.6 MG/DL (ref 2.6–4.7)
PHOSPHATE SERPL-MCNC: 2.7 MG/DL (ref 2.6–4.7)
PHOSPHATE SERPL-MCNC: 2.8 MG/DL (ref 2.6–4.7)
PLATELET # BLD AUTO: 147 K/UL (ref 150–400)
PLATELET # BLD AUTO: 158 K/UL (ref 150–400)
PLATELET # BLD AUTO: 159 K/UL (ref 150–400)
PLATELET # BLD AUTO: 162 K/UL (ref 150–400)
PLATELET # BLD AUTO: 174 K/UL (ref 150–400)
PLATELET # BLD AUTO: 179 X10E3/UL (ref 150–450)
PLATELET # BLD AUTO: 184 X10E3/UL (ref 150–450)
PLATELET # BLD AUTO: 187 K/UL (ref 150–400)
PLATELET # BLD AUTO: 214 K/UL (ref 150–400)
PLATELET # BLD AUTO: 252 X10E3/UL (ref 150–450)
PMV BLD AUTO: 10.2 FL (ref 8.9–12.9)
PMV BLD AUTO: 10.3 FL (ref 8.9–12.9)
PMV BLD AUTO: 10.4 FL (ref 8.9–12.9)
PMV BLD AUTO: 10.5 FL (ref 8.9–12.9)
PMV BLD AUTO: 10.6 FL (ref 8.9–12.9)
PMV BLD AUTO: 10.8 FL (ref 8.9–12.9)
PMV BLD AUTO: 11.4 FL (ref 8.9–12.9)
POTASSIUM SERPL-SCNC: 3 MMOL/L (ref 3.5–5.1)
POTASSIUM SERPL-SCNC: 3.4 MMOL/L (ref 3.5–5.1)
POTASSIUM SERPL-SCNC: 3.7 MMOL/L (ref 3.5–5.1)
POTASSIUM SERPL-SCNC: 3.7 MMOL/L (ref 3.5–5.1)
POTASSIUM SERPL-SCNC: 3.8 MMOL/L (ref 3.5–5.1)
POTASSIUM SERPL-SCNC: 4.8 MMOL/L (ref 3.5–5.1)
PROT 24H UR-MRATE: 253 MG/24HR
PROT SERPL-MCNC: 6.8 G/DL (ref 6.4–8.2)
PROT SERPL-MCNC: 6.9 G/DL (ref 6.4–8.2)
PROT SERPL-MCNC: 7.5 G/DL (ref 6.4–8.2)
PROT UR STRIP-MCNC: 100 MG/DL
Q-T INTERVAL, ECG07: 412 MS
Q-T INTERVAL, ECG07: 460 MS
QRS DURATION, ECG06: 106 MS
QRS DURATION, ECG06: 112 MS
QTC CALCULATION (BEZET), ECG08: 440 MS
QTC CALCULATION (BEZET), ECG08: 444 MS
RBC # BLD AUTO: 2.8 M/UL (ref 4.1–5.7)
RBC # BLD AUTO: 2.9 X10E6/UL (ref 4.14–5.8)
RBC # BLD AUTO: 2.97 X10E6/UL (ref 4.14–5.8)
RBC # BLD AUTO: 3.02 M/UL (ref 4.1–5.7)
RBC # BLD AUTO: 3.24 M/UL (ref 4.1–5.7)
RBC # BLD AUTO: 3.79 M/UL (ref 4.1–5.7)
RBC # BLD AUTO: 4.1 X10E6/UL (ref 4.14–5.8)
RBC # BLD AUTO: 4.24 M/UL (ref 4.1–5.7)
RBC # BLD AUTO: 4.39 M/UL (ref 4.1–5.7)
RBC # BLD AUTO: 4.48 M/UL (ref 4.1–5.7)
RBC #/AREA URNS HPF: ABNORMAL /HPF (ref 0–5)
RBC MORPH BLD: ABNORMAL
SAMPLES BEING HELD,HOLD: NORMAL
SERVICE CMNT-IMP: NORMAL
SODIUM SERPL-SCNC: 140 MMOL/L (ref 136–145)
SODIUM SERPL-SCNC: 141 MMOL/L (ref 136–145)
SODIUM SERPL-SCNC: 142 MMOL/L (ref 136–145)
SODIUM SERPL-SCNC: 143 MMOL/L (ref 136–145)
SODIUM SERPL-SCNC: 144 MMOL/L (ref 136–145)
SODIUM SERPL-SCNC: 146 MMOL/L (ref 136–145)
SP GR UR REFRACTOMETRY: 1.02 (ref 1–1.03)
SPECIMEN VOL ?TM UR: 1100 ML
T4 FREE SERPL-MCNC: 0.9 NG/DL (ref 0.8–1.5)
TIBC SERPL-MCNC: 274 UG/DL (ref 250–450)
TIBC SERPL-MCNC: 297 UG/DL (ref 250–450)
TIBC SERPL-MCNC: 311 UG/DL (ref 250–450)
TIBC SERPL-MCNC: 364 UG/DL (ref 250–450)
TIBC SERPL-MCNC: 366 UG/DL (ref 250–450)
TRIGL SERPL-MCNC: 47 MG/DL (ref ?–150)
TRIGL SERPL-MCNC: 75 MG/DL (ref ?–150)
TROPONIN I SERPL-MCNC: 0.08 NG/ML
TROPONIN I SERPL-MCNC: 0.09 NG/ML
TSH SERPL DL<=0.05 MIU/L-ACNC: 3.59 UIU/ML (ref 0.36–3.74)
UA: UC IF INDICATED,UAUC: ABNORMAL
UIBC SERPL-MCNC: 224 UG/DL (ref 111–343)
UIBC SERPL-MCNC: 294 UG/DL (ref 111–343)
UIBC SERPL-MCNC: 344 UG/DL (ref 111–343)
UROBILINOGEN UR QL STRIP.AUTO: 2 EU/DL (ref 0.2–1)
VENTRICULAR RATE, ECG03: 55 BPM
VENTRICULAR RATE, ECG03: 70 BPM
VLDLC SERPL CALC-MCNC: 15 MG/DL
VLDLC SERPL CALC-MCNC: 9.4 MG/DL
WBC # BLD AUTO: 5.8 X10E3/UL (ref 3.4–10.8)
WBC # BLD AUTO: 5.8 X10E3/UL (ref 3.4–10.8)
WBC # BLD AUTO: 5.9 K/UL (ref 4.1–11.1)
WBC # BLD AUTO: 6 K/UL (ref 4.1–11.1)
WBC # BLD AUTO: 6.6 K/UL (ref 4.1–11.1)
WBC # BLD AUTO: 6.6 X10E3/UL (ref 3.4–10.8)
WBC # BLD AUTO: 6.8 K/UL (ref 4.1–11.1)
WBC # BLD AUTO: 7 K/UL (ref 4.1–11.1)
WBC # BLD AUTO: 7.1 K/UL (ref 4.1–11.1)
WBC # BLD AUTO: 7.1 K/UL (ref 4.1–11.1)
WBC URNS QL MICRO: ABNORMAL /HPF (ref 0–4)

## 2021-01-01 PROCEDURE — 65270000032 HC RM SEMIPRIVATE

## 2021-01-01 PROCEDURE — G8754 DIAS BP LESS 90: HCPCS | Performed by: SPECIALIST

## 2021-01-01 PROCEDURE — 71275 CT ANGIOGRAPHY CHEST: CPT

## 2021-01-01 PROCEDURE — 74011250637 HC RX REV CODE- 250/637: Performed by: INTERNAL MEDICINE

## 2021-01-01 PROCEDURE — 80048 BASIC METABOLIC PNL TOTAL CA: CPT

## 2021-01-01 PROCEDURE — G8754 DIAS BP LESS 90: HCPCS | Performed by: INTERNAL MEDICINE

## 2021-01-01 PROCEDURE — 93005 ELECTROCARDIOGRAM TRACING: CPT

## 2021-01-01 PROCEDURE — 74011250637 HC RX REV CODE- 250/637: Performed by: STUDENT IN AN ORGANIZED HEALTH CARE EDUCATION/TRAINING PROGRAM

## 2021-01-01 PROCEDURE — G8427 DOCREV CUR MEDS BY ELIG CLIN: HCPCS | Performed by: FAMILY MEDICINE

## 2021-01-01 PROCEDURE — G8755 DIAS BP > OR = 90: HCPCS | Performed by: SPECIALIST

## 2021-01-01 PROCEDURE — G8753 SYS BP > OR = 140: HCPCS | Performed by: FAMILY MEDICINE

## 2021-01-01 PROCEDURE — 74011250636 HC RX REV CODE- 250/636: Performed by: STUDENT IN AN ORGANIZED HEALTH CARE EDUCATION/TRAINING PROGRAM

## 2021-01-01 PROCEDURE — G8510 SCR DEP NEG, NO PLAN REQD: HCPCS | Performed by: FAMILY MEDICINE

## 2021-01-01 PROCEDURE — G8427 DOCREV CUR MEDS BY ELIG CLIN: HCPCS | Performed by: SPECIALIST

## 2021-01-01 PROCEDURE — 97165 OT EVAL LOW COMPLEX 30 MIN: CPT

## 2021-01-01 PROCEDURE — 93010 ELECTROCARDIOGRAM REPORT: CPT | Performed by: SPECIALIST

## 2021-01-01 PROCEDURE — 1101F PT FALLS ASSESS-DOCD LE1/YR: CPT | Performed by: INTERNAL MEDICINE

## 2021-01-01 PROCEDURE — 99214 OFFICE O/P EST MOD 30 MIN: CPT | Performed by: INTERNAL MEDICINE

## 2021-01-01 PROCEDURE — G8510 SCR DEP NEG, NO PLAN REQD: HCPCS | Performed by: INTERNAL MEDICINE

## 2021-01-01 PROCEDURE — G8417 CALC BMI ABV UP PARAM F/U: HCPCS | Performed by: SPECIALIST

## 2021-01-01 PROCEDURE — 82962 GLUCOSE BLOOD TEST: CPT

## 2021-01-01 PROCEDURE — 74011250636 HC RX REV CODE- 250/636: Performed by: EMERGENCY MEDICINE

## 2021-01-01 PROCEDURE — G8536 NO DOC ELDER MAL SCRN: HCPCS | Performed by: FAMILY MEDICINE

## 2021-01-01 PROCEDURE — G8752 SYS BP LESS 140: HCPCS | Performed by: SPECIALIST

## 2021-01-01 PROCEDURE — 97162 PT EVAL MOD COMPLEX 30 MIN: CPT

## 2021-01-01 PROCEDURE — G8752 SYS BP LESS 140: HCPCS | Performed by: INTERNAL MEDICINE

## 2021-01-01 PROCEDURE — G8432 DEP SCR NOT DOC, RNG: HCPCS | Performed by: INTERNAL MEDICINE

## 2021-01-01 PROCEDURE — G8536 NO DOC ELDER MAL SCRN: HCPCS | Performed by: INTERNAL MEDICINE

## 2021-01-01 PROCEDURE — G8510 SCR DEP NEG, NO PLAN REQD: HCPCS | Performed by: SPECIALIST

## 2021-01-01 PROCEDURE — 99214 OFFICE O/P EST MOD 30 MIN: CPT | Performed by: SPECIALIST

## 2021-01-01 PROCEDURE — 80053 COMPREHEN METABOLIC PANEL: CPT

## 2021-01-01 PROCEDURE — 74011250636 HC RX REV CODE- 250/636: Performed by: REGISTERED NURSE

## 2021-01-01 PROCEDURE — 94760 N-INVAS EAR/PLS OXIMETRY 1: CPT

## 2021-01-01 PROCEDURE — G8536 NO DOC ELDER MAL SCRN: HCPCS | Performed by: SPECIALIST

## 2021-01-01 PROCEDURE — 36415 COLL VENOUS BLD VENIPUNCTURE: CPT

## 2021-01-01 PROCEDURE — G8419 CALC BMI OUT NRM PARAM NOF/U: HCPCS | Performed by: SPECIALIST

## 2021-01-01 PROCEDURE — 84484 ASSAY OF TROPONIN QUANT: CPT

## 2021-01-01 PROCEDURE — 99223 1ST HOSP IP/OBS HIGH 75: CPT | Performed by: PSYCHIATRY & NEUROLOGY

## 2021-01-01 PROCEDURE — 74011000636 HC RX REV CODE- 636: Performed by: INTERNAL MEDICINE

## 2021-01-01 PROCEDURE — 99232 SBSQ HOSP IP/OBS MODERATE 35: CPT | Performed by: NURSE PRACTITIONER

## 2021-01-01 PROCEDURE — 1101F PT FALLS ASSESS-DOCD LE1/YR: CPT | Performed by: SPECIALIST

## 2021-01-01 PROCEDURE — 83880 ASSAY OF NATRIURETIC PEPTIDE: CPT

## 2021-01-01 PROCEDURE — 99214 OFFICE O/P EST MOD 30 MIN: CPT | Performed by: FAMILY MEDICINE

## 2021-01-01 PROCEDURE — 1101F PT FALLS ASSESS-DOCD LE1/YR: CPT | Performed by: FAMILY MEDICINE

## 2021-01-01 PROCEDURE — G8427 DOCREV CUR MEDS BY ELIG CLIN: HCPCS | Performed by: INTERNAL MEDICINE

## 2021-01-01 PROCEDURE — G8754 DIAS BP LESS 90: HCPCS | Performed by: FAMILY MEDICINE

## 2021-01-01 PROCEDURE — G0463 HOSPITAL OUTPT CLINIC VISIT: HCPCS | Performed by: FAMILY MEDICINE

## 2021-01-01 PROCEDURE — 74011250637 HC RX REV CODE- 250/637: Performed by: NURSE PRACTITIONER

## 2021-01-01 PROCEDURE — 71045 X-RAY EXAM CHEST 1 VIEW: CPT

## 2021-01-01 PROCEDURE — G0463 HOSPITAL OUTPT CLINIC VISIT: HCPCS | Performed by: INTERNAL MEDICINE

## 2021-01-01 PROCEDURE — 70450 CT HEAD/BRAIN W/O DYE: CPT

## 2021-01-01 PROCEDURE — 74011250637 HC RX REV CODE- 250/637: Performed by: FAMILY MEDICINE

## 2021-01-01 PROCEDURE — 85025 COMPLETE CBC W/AUTO DIFF WBC: CPT

## 2021-01-01 PROCEDURE — 74011250637 HC RX REV CODE- 250/637: Performed by: PHYSICIAN ASSISTANT

## 2021-01-01 PROCEDURE — 97530 THERAPEUTIC ACTIVITIES: CPT

## 2021-01-01 PROCEDURE — 97535 SELF CARE MNGMENT TRAINING: CPT

## 2021-01-01 PROCEDURE — G0463 HOSPITAL OUTPT CLINIC VISIT: HCPCS | Performed by: SPECIALIST

## 2021-01-01 PROCEDURE — G8752 SYS BP LESS 140: HCPCS | Performed by: FAMILY MEDICINE

## 2021-01-01 PROCEDURE — 83735 ASSAY OF MAGNESIUM: CPT

## 2021-01-01 PROCEDURE — 96365 THER/PROPH/DIAG IV INF INIT: CPT

## 2021-01-01 PROCEDURE — 84100 ASSAY OF PHOSPHORUS: CPT

## 2021-01-01 PROCEDURE — 93306 TTE W/DOPPLER COMPLETE: CPT

## 2021-01-01 PROCEDURE — 70498 CT ANGIOGRAPHY NECK: CPT

## 2021-01-01 PROCEDURE — 97116 GAIT TRAINING THERAPY: CPT

## 2021-01-01 PROCEDURE — 93005 ELECTROCARDIOGRAM TRACING: CPT | Performed by: SPECIALIST

## 2021-01-01 PROCEDURE — G8753 SYS BP > OR = 140: HCPCS | Performed by: INTERNAL MEDICINE

## 2021-01-01 PROCEDURE — G8419 CALC BMI OUT NRM PARAM NOF/U: HCPCS | Performed by: INTERNAL MEDICINE

## 2021-01-01 PROCEDURE — 65660000000 HC RM CCU STEPDOWN

## 2021-01-01 PROCEDURE — 85576 BLOOD PLATELET AGGREGATION: CPT

## 2021-01-01 PROCEDURE — 97112 NEUROMUSCULAR REEDUCATION: CPT

## 2021-01-01 PROCEDURE — G8417 CALC BMI ABV UP PARAM F/U: HCPCS | Performed by: INTERNAL MEDICINE

## 2021-01-01 PROCEDURE — 70496 CT ANGIOGRAPHY HEAD: CPT

## 2021-01-01 PROCEDURE — 74230 X-RAY XM SWLNG FUNCJ C+: CPT

## 2021-01-01 PROCEDURE — 1111F DSCHRG MED/CURRENT MED MERGE: CPT | Performed by: SPECIALIST

## 2021-01-01 PROCEDURE — G8417 CALC BMI ABV UP PARAM F/U: HCPCS | Performed by: FAMILY MEDICINE

## 2021-01-01 PROCEDURE — 70487 CT MAXILLOFACIAL W/DYE: CPT

## 2021-01-01 PROCEDURE — 74011250637 HC RX REV CODE- 250/637: Performed by: EMERGENCY MEDICINE

## 2021-01-01 PROCEDURE — 99232 SBSQ HOSP IP/OBS MODERATE 35: CPT | Performed by: INTERNAL MEDICINE

## 2021-01-01 PROCEDURE — 96374 THER/PROPH/DIAG INJ IV PUSH: CPT

## 2021-01-01 PROCEDURE — 99285 EMERGENCY DEPT VISIT HI MDM: CPT

## 2021-01-01 PROCEDURE — 70551 MRI BRAIN STEM W/O DYE: CPT

## 2021-01-01 PROCEDURE — 71046 X-RAY EXAM CHEST 2 VIEWS: CPT

## 2021-01-01 PROCEDURE — 74011250636 HC RX REV CODE- 250/636

## 2021-01-01 PROCEDURE — 1111F DSCHRG MED/CURRENT MED MERGE: CPT | Performed by: FAMILY MEDICINE

## 2021-01-01 PROCEDURE — 74011000250 HC RX REV CODE- 250: Performed by: NURSE PRACTITIONER

## 2021-01-01 PROCEDURE — G0439 PPPS, SUBSEQ VISIT: HCPCS | Performed by: FAMILY MEDICINE

## 2021-01-01 PROCEDURE — 74011250636 HC RX REV CODE- 250/636: Performed by: INTERNAL MEDICINE

## 2021-01-01 PROCEDURE — 97161 PT EVAL LOW COMPLEX 20 MIN: CPT

## 2021-01-01 PROCEDURE — 83036 HEMOGLOBIN GLYCOSYLATED A1C: CPT

## 2021-01-01 PROCEDURE — 99222 1ST HOSP IP/OBS MODERATE 55: CPT | Performed by: INTERNAL MEDICINE

## 2021-01-01 PROCEDURE — 74011000636 HC RX REV CODE- 636: Performed by: RADIOLOGY

## 2021-01-01 PROCEDURE — 74011000250 HC RX REV CODE- 250: Performed by: INTERNAL MEDICINE

## 2021-01-01 PROCEDURE — 80061 LIPID PANEL: CPT

## 2021-01-01 PROCEDURE — 99284 EMERGENCY DEPT VISIT MOD MDM: CPT

## 2021-01-01 PROCEDURE — G8432 DEP SCR NOT DOC, RNG: HCPCS | Performed by: SPECIALIST

## 2021-01-01 PROCEDURE — 92611 MOTION FLUOROSCOPY/SWALLOW: CPT

## 2021-01-01 PROCEDURE — G8755 DIAS BP > OR = 90: HCPCS | Performed by: INTERNAL MEDICINE

## 2021-01-01 RX ORDER — PANTOPRAZOLE SODIUM 40 MG/1
40 TABLET, DELAYED RELEASE ORAL DAILY
Status: DISCONTINUED | OUTPATIENT
Start: 2021-01-01 | End: 2021-01-01 | Stop reason: HOSPADM

## 2021-01-01 RX ORDER — THERA TABS 400 MCG
1 TAB ORAL DAILY
COMMUNITY

## 2021-01-01 RX ORDER — SODIUM CHLORIDE 9 MG/ML
10 INJECTION INTRAMUSCULAR; INTRAVENOUS; SUBCUTANEOUS AS NEEDED
Status: CANCELLED | OUTPATIENT
Start: 2021-01-01

## 2021-01-01 RX ORDER — HYDROCORTISONE SODIUM SUCCINATE 100 MG/2ML
100 INJECTION, POWDER, FOR SOLUTION INTRAMUSCULAR; INTRAVENOUS AS NEEDED
Status: ACTIVE | OUTPATIENT
Start: 2021-01-01 | End: 2021-01-01

## 2021-01-01 RX ORDER — ASPIRIN 81 MG/1
81 TABLET ORAL DAILY
Qty: 30 TAB | Refills: 0 | Status: SHIPPED | OUTPATIENT
Start: 2021-01-01 | End: 2021-01-01

## 2021-01-01 RX ORDER — SODIUM CHLORIDE 0.9 % (FLUSH) 0.9 %
10 SYRINGE (ML) INJECTION AS NEEDED
Status: DISPENSED | OUTPATIENT
Start: 2021-01-01 | End: 2021-01-01

## 2021-01-01 RX ORDER — DIPHENHYDRAMINE HYDROCHLORIDE 50 MG/ML
25 INJECTION, SOLUTION INTRAMUSCULAR; INTRAVENOUS AS NEEDED
Status: CANCELLED
Start: 2021-01-01

## 2021-01-01 RX ORDER — DICLOFENAC SODIUM 10 MG/G
GEL TOPICAL
COMMUNITY
Start: 2021-01-01

## 2021-01-01 RX ORDER — ACETAMINOPHEN 325 MG/1
650 TABLET ORAL AS NEEDED
Status: CANCELLED
Start: 2021-01-01

## 2021-01-01 RX ORDER — KETOROLAC TROMETHAMINE 5 MG/ML
2 SOLUTION OPHTHALMIC 3 TIMES DAILY
Status: DISCONTINUED | OUTPATIENT
Start: 2021-01-01 | End: 2021-01-01 | Stop reason: HOSPADM

## 2021-01-01 RX ORDER — SODIUM CHLORIDE 0.9 % (FLUSH) 0.9 %
10 SYRINGE (ML) INJECTION AS NEEDED
Status: CANCELLED | OUTPATIENT
Start: 2021-01-01

## 2021-01-01 RX ORDER — MOXIFLOXACIN 5 MG/ML
1 SOLUTION/ DROPS OPHTHALMIC 3 TIMES DAILY
COMMUNITY
End: 2021-01-01

## 2021-01-01 RX ORDER — ALBUTEROL SULFATE 0.83 MG/ML
2.5 SOLUTION RESPIRATORY (INHALATION) AS NEEDED
Status: ACTIVE | OUTPATIENT
Start: 2021-01-01 | End: 2021-01-01

## 2021-01-01 RX ORDER — HEPARIN 100 UNIT/ML
300-500 SYRINGE INTRAVENOUS AS NEEDED
Status: CANCELLED
Start: 2021-01-01

## 2021-01-01 RX ORDER — ALBUTEROL SULFATE 0.83 MG/ML
2.5 SOLUTION RESPIRATORY (INHALATION) AS NEEDED
Status: CANCELLED
Start: 2021-01-01

## 2021-01-01 RX ORDER — HEPARIN 100 UNIT/ML
300-500 SYRINGE INTRAVENOUS AS NEEDED
Status: ACTIVE | OUTPATIENT
Start: 2021-01-01 | End: 2021-01-01

## 2021-01-01 RX ORDER — EPINEPHRINE 1 MG/ML
0.3 INJECTION, SOLUTION, CONCENTRATE INTRAVENOUS AS NEEDED
Status: CANCELLED | OUTPATIENT
Start: 2021-01-01

## 2021-01-01 RX ORDER — LOSARTAN POTASSIUM 50 MG/1
100 TABLET ORAL DAILY
Status: DISCONTINUED | OUTPATIENT
Start: 2021-01-01 | End: 2021-01-01 | Stop reason: HOSPADM

## 2021-01-01 RX ORDER — PANTOPRAZOLE SODIUM 40 MG/1
40 TABLET, DELAYED RELEASE ORAL DAILY
Qty: 90 TAB | Refills: 0 | Status: SHIPPED | OUTPATIENT
Start: 2021-01-01 | End: 2021-01-01

## 2021-01-01 RX ORDER — ACETAMINOPHEN 325 MG/1
650 TABLET ORAL
Status: DISCONTINUED | OUTPATIENT
Start: 2021-01-01 | End: 2021-01-01 | Stop reason: HOSPADM

## 2021-01-01 RX ORDER — CLOPIDOGREL BISULFATE 75 MG/1
75 TABLET ORAL DAILY
Status: DISCONTINUED | OUTPATIENT
Start: 2021-01-01 | End: 2021-01-01

## 2021-01-01 RX ORDER — HYDROCORTISONE SODIUM SUCCINATE 100 MG/2ML
100 INJECTION, POWDER, FOR SOLUTION INTRAMUSCULAR; INTRAVENOUS AS NEEDED
Status: CANCELLED | OUTPATIENT
Start: 2021-01-01

## 2021-01-01 RX ORDER — TRAZODONE HYDROCHLORIDE 50 MG/1
50 TABLET ORAL
Qty: 30 TABLET | Refills: 5 | Status: SHIPPED | OUTPATIENT
Start: 2021-01-01 | End: 2021-01-01 | Stop reason: ALTCHOICE

## 2021-01-01 RX ORDER — SODIUM CHLORIDE 0.9 % (FLUSH) 0.9 %
5-40 SYRINGE (ML) INJECTION EVERY 8 HOURS
Status: DISCONTINUED | OUTPATIENT
Start: 2021-01-01 | End: 2021-01-01 | Stop reason: HOSPADM

## 2021-01-01 RX ORDER — ONDANSETRON 2 MG/ML
8 INJECTION INTRAMUSCULAR; INTRAVENOUS AS NEEDED
Status: ACTIVE | OUTPATIENT
Start: 2021-01-01 | End: 2021-01-01

## 2021-01-01 RX ORDER — ONDANSETRON 2 MG/ML
8 INJECTION INTRAMUSCULAR; INTRAVENOUS AS NEEDED
Status: CANCELLED | OUTPATIENT
Start: 2021-01-01

## 2021-01-01 RX ORDER — PANTOPRAZOLE SODIUM 40 MG/1
TABLET, DELAYED RELEASE ORAL
Qty: 90 TABLET | Refills: 0 | Status: SHIPPED | OUTPATIENT
Start: 2021-01-01 | End: 2021-01-01

## 2021-01-01 RX ORDER — SODIUM CHLORIDE 9 MG/ML
25 INJECTION, SOLUTION INTRAVENOUS CONTINUOUS
Status: CANCELLED | OUTPATIENT
Start: 2021-01-01

## 2021-01-01 RX ORDER — SODIUM CHLORIDE 9 MG/ML
25 INJECTION, SOLUTION INTRAVENOUS AS NEEDED
Status: DISCONTINUED | OUTPATIENT
Start: 2021-01-01 | End: 2021-01-01 | Stop reason: HOSPADM

## 2021-01-01 RX ORDER — DIPHENHYDRAMINE HYDROCHLORIDE 50 MG/ML
25 INJECTION, SOLUTION INTRAMUSCULAR; INTRAVENOUS AS NEEDED
Status: ACTIVE | OUTPATIENT
Start: 2021-01-01 | End: 2021-01-01

## 2021-01-01 RX ORDER — GUAIFENESIN 100 MG/5ML
162 LIQUID (ML) ORAL
Status: COMPLETED | OUTPATIENT
Start: 2021-01-01 | End: 2021-01-01

## 2021-01-01 RX ORDER — ACETAMINOPHEN 650 MG/1
650 SUPPOSITORY RECTAL
Status: DISCONTINUED | OUTPATIENT
Start: 2021-01-01 | End: 2021-01-01 | Stop reason: HOSPADM

## 2021-01-01 RX ORDER — SODIUM CHLORIDE 9 MG/ML
25 INJECTION, SOLUTION INTRAVENOUS CONTINUOUS
Status: DISPENSED | OUTPATIENT
Start: 2021-01-01 | End: 2021-01-01

## 2021-01-01 RX ORDER — ACETAMINOPHEN 325 MG/1
650 TABLET ORAL AS NEEDED
Status: ACTIVE | OUTPATIENT
Start: 2021-01-01 | End: 2021-01-01

## 2021-01-01 RX ORDER — SODIUM CHLORIDE 9 MG/ML
10 INJECTION INTRAMUSCULAR; INTRAVENOUS; SUBCUTANEOUS AS NEEDED
Status: ACTIVE | OUTPATIENT
Start: 2021-01-01 | End: 2021-01-01

## 2021-01-01 RX ORDER — FUROSEMIDE 10 MG/ML
40 INJECTION INTRAMUSCULAR; INTRAVENOUS 2 TIMES DAILY
Status: DISCONTINUED | OUTPATIENT
Start: 2021-01-01 | End: 2021-01-01

## 2021-01-01 RX ORDER — ATORVASTATIN CALCIUM 10 MG/1
10 TABLET, FILM COATED ORAL DAILY
Status: DISCONTINUED | OUTPATIENT
Start: 2021-01-01 | End: 2021-01-01

## 2021-01-01 RX ORDER — TORSEMIDE 10 MG/1
10 TABLET ORAL DAILY
Qty: 90 TAB | Refills: 0 | Status: SHIPPED | OUTPATIENT
Start: 2021-01-01 | End: 2021-01-01

## 2021-01-01 RX ORDER — SODIUM CHLORIDE 0.9 % (FLUSH) 0.9 %
10 SYRINGE (ML) INJECTION AS NEEDED
Status: DISCONTINUED | OUTPATIENT
Start: 2021-01-01 | End: 2021-01-01 | Stop reason: HOSPADM

## 2021-01-01 RX ORDER — TORSEMIDE 10 MG/1
10 TABLET ORAL DAILY
Qty: 30 TAB | Refills: 0 | Status: SHIPPED | OUTPATIENT
Start: 2021-01-01 | End: 2021-01-01 | Stop reason: SDUPTHER

## 2021-01-01 RX ORDER — FUROSEMIDE 10 MG/ML
40 INJECTION INTRAMUSCULAR; INTRAVENOUS ONCE
Status: COMPLETED | OUTPATIENT
Start: 2021-01-01 | End: 2021-01-01

## 2021-01-01 RX ORDER — CIPROFLOXACIN HYDROCHLORIDE 3.5 MG/ML
1 SOLUTION/ DROPS TOPICAL 3 TIMES DAILY
Status: DISCONTINUED | OUTPATIENT
Start: 2021-01-01 | End: 2021-01-01 | Stop reason: HOSPADM

## 2021-01-01 RX ORDER — GABAPENTIN 100 MG/1
100 CAPSULE ORAL
Qty: 30 CAPSULE | Refills: 5 | Status: SHIPPED | OUTPATIENT
Start: 2021-01-01

## 2021-01-01 RX ORDER — GABAPENTIN 400 MG/1
400 CAPSULE ORAL
Qty: 90 CAP | Refills: 1 | Status: SHIPPED | OUTPATIENT
Start: 2021-01-01 | End: 2021-01-01

## 2021-01-01 RX ORDER — POTASSIUM CHLORIDE 750 MG/1
20 TABLET, FILM COATED, EXTENDED RELEASE ORAL 2 TIMES DAILY
Status: COMPLETED | OUTPATIENT
Start: 2021-01-01 | End: 2021-01-01

## 2021-01-01 RX ORDER — ATORVASTATIN CALCIUM 10 MG/1
10 TABLET, FILM COATED ORAL DAILY
Status: DISCONTINUED | OUTPATIENT
Start: 2021-01-01 | End: 2021-01-01 | Stop reason: HOSPADM

## 2021-01-01 RX ORDER — GABAPENTIN 400 MG/1
400 CAPSULE ORAL
Status: DISCONTINUED | OUTPATIENT
Start: 2021-01-01 | End: 2021-01-01 | Stop reason: HOSPADM

## 2021-01-01 RX ORDER — PREDNISOLONE ACETATE 10 MG/ML
2 SUSPENSION/ DROPS OPHTHALMIC 3 TIMES DAILY
COMMUNITY
End: 2021-01-01

## 2021-01-01 RX ORDER — DIPHENHYDRAMINE HYDROCHLORIDE 50 MG/ML
50 INJECTION, SOLUTION INTRAMUSCULAR; INTRAVENOUS AS NEEDED
Status: CANCELLED
Start: 2021-01-01

## 2021-01-01 RX ORDER — EPINEPHRINE 1 MG/ML
0.3 INJECTION, SOLUTION, CONCENTRATE INTRAVENOUS AS NEEDED
Status: ACTIVE | OUTPATIENT
Start: 2021-01-01 | End: 2021-01-01

## 2021-01-01 RX ORDER — TORSEMIDE 10 MG/1
10 TABLET ORAL AS NEEDED
Qty: 90 TABLET | Refills: 0
Start: 2021-01-01 | End: 2021-01-01

## 2021-01-01 RX ORDER — PREDNISOLONE ACETATE 10 MG/ML
2 SUSPENSION/ DROPS OPHTHALMIC 3 TIMES DAILY
Status: DISCONTINUED | OUTPATIENT
Start: 2021-01-01 | End: 2021-01-01 | Stop reason: HOSPADM

## 2021-01-01 RX ORDER — TORSEMIDE 20 MG/1
10 TABLET ORAL DAILY
Status: DISCONTINUED | OUTPATIENT
Start: 2021-01-01 | End: 2021-01-01 | Stop reason: HOSPADM

## 2021-01-01 RX ORDER — ASPIRIN 81 MG/1
81 TABLET ORAL DAILY
Qty: 90 TABLET | Refills: 2
Start: 2021-01-01

## 2021-01-01 RX ORDER — ATORVASTATIN CALCIUM 40 MG/1
10 TABLET, FILM COATED ORAL DAILY
Qty: 15 TAB | Refills: 0 | Status: SHIPPED | OUTPATIENT
Start: 2021-01-01 | End: 2021-01-01 | Stop reason: SDUPTHER

## 2021-01-01 RX ORDER — LOSARTAN POTASSIUM 50 MG/1
50 TABLET ORAL DAILY
Qty: 90 TABLET | Refills: 2 | Status: SHIPPED | OUTPATIENT
Start: 2021-01-01 | End: 2021-01-01

## 2021-01-01 RX ORDER — KETOROLAC TROMETHAMINE 5 MG/ML
2 SOLUTION OPHTHALMIC 3 TIMES DAILY
COMMUNITY
End: 2021-01-01

## 2021-01-01 RX ORDER — CLOPIDOGREL BISULFATE 75 MG/1
300 TABLET ORAL ONCE
Status: DISCONTINUED | OUTPATIENT
Start: 2021-01-01 | End: 2021-01-01

## 2021-01-01 RX ORDER — PROMETHAZINE HYDROCHLORIDE 25 MG/1
12.5 TABLET ORAL
Status: DISCONTINUED | OUTPATIENT
Start: 2021-01-01 | End: 2021-01-01 | Stop reason: HOSPADM

## 2021-01-01 RX ORDER — ATORVASTATIN CALCIUM 40 MG/1
TABLET, FILM COATED ORAL
Qty: 45 TABLET | Refills: 0 | Status: SHIPPED | OUTPATIENT
Start: 2021-01-01

## 2021-01-01 RX ORDER — ATORVASTATIN CALCIUM 40 MG/1
40 TABLET, FILM COATED ORAL DAILY
Status: DISCONTINUED | OUTPATIENT
Start: 2021-01-01 | End: 2021-01-01

## 2021-01-01 RX ORDER — DIPHENHYDRAMINE HYDROCHLORIDE 50 MG/ML
50 INJECTION, SOLUTION INTRAMUSCULAR; INTRAVENOUS AS NEEDED
Status: ACTIVE | OUTPATIENT
Start: 2021-01-01 | End: 2021-01-01

## 2021-01-01 RX ORDER — SODIUM CHLORIDE 9 MG/ML
25 INJECTION, SOLUTION INTRAVENOUS CONTINUOUS
Status: DISCONTINUED | OUTPATIENT
Start: 2021-01-01 | End: 2021-01-01 | Stop reason: HOSPADM

## 2021-01-01 RX ORDER — POLYETHYLENE GLYCOL 3350 17 G/17G
17 POWDER, FOR SOLUTION ORAL DAILY PRN
Status: DISCONTINUED | OUTPATIENT
Start: 2021-01-01 | End: 2021-01-01 | Stop reason: HOSPADM

## 2021-01-01 RX ORDER — LOSARTAN POTASSIUM 25 MG/1
25 TABLET ORAL DAILY
Qty: 90 TABLET | Refills: 1 | Status: SHIPPED | OUTPATIENT
Start: 2021-01-01 | End: 2022-01-01 | Stop reason: SDUPTHER

## 2021-01-01 RX ORDER — TORSEMIDE 10 MG/1
TABLET ORAL
Qty: 90 TABLET | Refills: 0 | Status: SHIPPED | OUTPATIENT
Start: 2021-01-01 | End: 2022-01-01 | Stop reason: SDUPTHER

## 2021-01-01 RX ORDER — ONDANSETRON 2 MG/ML
4 INJECTION INTRAMUSCULAR; INTRAVENOUS
Status: DISCONTINUED | OUTPATIENT
Start: 2021-01-01 | End: 2021-01-01 | Stop reason: HOSPADM

## 2021-01-01 RX ORDER — ASPIRIN 81 MG/1
81 TABLET ORAL DAILY
Qty: 90 TABLET | Refills: 3
Start: 2021-01-01 | End: 2021-01-01

## 2021-01-01 RX ORDER — OFLOXACIN 3 MG/ML
SOLUTION/ DROPS OPHTHALMIC
COMMUNITY
Start: 2021-01-01

## 2021-01-01 RX ORDER — ASPIRIN 81 MG/1
81 TABLET ORAL DAILY
Status: DISCONTINUED | OUTPATIENT
Start: 2021-01-01 | End: 2021-01-01 | Stop reason: HOSPADM

## 2021-01-01 RX ORDER — ATORVASTATIN CALCIUM 40 MG/1
20 TABLET, FILM COATED ORAL DAILY
Qty: 45 TAB | Refills: 0 | Status: SHIPPED | OUTPATIENT
Start: 2021-01-01 | End: 2021-01-01

## 2021-01-01 RX ORDER — SODIUM CHLORIDE 0.9 % (FLUSH) 0.9 %
5-40 SYRINGE (ML) INJECTION AS NEEDED
Status: DISCONTINUED | OUTPATIENT
Start: 2021-01-01 | End: 2021-01-01 | Stop reason: HOSPADM

## 2021-01-01 RX ORDER — TORSEMIDE 20 MG/1
20 TABLET ORAL DAILY
Status: DISCONTINUED | OUTPATIENT
Start: 2021-01-01 | End: 2021-01-01

## 2021-01-01 RX ORDER — PANTOPRAZOLE SODIUM 40 MG/1
40 TABLET, DELAYED RELEASE ORAL 2 TIMES DAILY
Qty: 90 TABLET | Refills: 0
Start: 2021-01-01

## 2021-01-01 RX ADMIN — KETOROLAC TROMETHAMINE 2 DROP: 5 SOLUTION OPHTHALMIC at 10:01

## 2021-01-01 RX ADMIN — CIPROFLOXACIN 1 DROP: 3 SOLUTION OPHTHALMIC at 10:01

## 2021-01-01 RX ADMIN — GABAPENTIN 400 MG: 400 CAPSULE ORAL at 22:55

## 2021-01-01 RX ADMIN — GABAPENTIN 400 MG: 400 CAPSULE ORAL at 21:56

## 2021-01-01 RX ADMIN — FUROSEMIDE 40 MG: 10 INJECTION, SOLUTION INTRAMUSCULAR; INTRAVENOUS at 17:23

## 2021-01-01 RX ADMIN — Medication 10 ML: at 13:53

## 2021-01-01 RX ADMIN — PREDNISOLONE ACETATE 2 DROP: 10 SUSPENSION/ DROPS OPHTHALMIC at 22:12

## 2021-01-01 RX ADMIN — FERRIC CARBOXYMALTOSE INJECTION 750 MG: 50 INJECTION, SOLUTION INTRAVENOUS at 14:38

## 2021-01-01 RX ADMIN — ASPIRIN 81 MG: 81 TABLET, COATED ORAL at 08:22

## 2021-01-01 RX ADMIN — IOPAMIDOL 70 ML: 755 INJECTION, SOLUTION INTRAVENOUS at 15:07

## 2021-01-01 RX ADMIN — Medication 10 ML: at 08:22

## 2021-01-01 RX ADMIN — GABAPENTIN 400 MG: 400 CAPSULE ORAL at 22:12

## 2021-01-01 RX ADMIN — APIXABAN 2.5 MG: 2.5 TABLET, FILM COATED ORAL at 10:35

## 2021-01-01 RX ADMIN — LOSARTAN POTASSIUM 100 MG: 50 TABLET, FILM COATED ORAL at 08:54

## 2021-01-01 RX ADMIN — FUROSEMIDE 40 MG: 10 INJECTION, SOLUTION INTRAMUSCULAR; INTRAVENOUS at 18:04

## 2021-01-01 RX ADMIN — PANTOPRAZOLE SODIUM 40 MG: 40 TABLET, DELAYED RELEASE ORAL at 08:22

## 2021-01-01 RX ADMIN — SODIUM CHLORIDE 25 ML/HR: 900 INJECTION, SOLUTION INTRAVENOUS at 13:00

## 2021-01-01 RX ADMIN — APIXABAN 2.5 MG: 2.5 TABLET, FILM COATED ORAL at 09:49

## 2021-01-01 RX ADMIN — TORSEMIDE 10 MG: 20 TABLET ORAL at 09:49

## 2021-01-01 RX ADMIN — Medication 10 ML: at 21:59

## 2021-01-01 RX ADMIN — FERRIC CARBOXYMALTOSE INJECTION 750 MG: 50 INJECTION, SOLUTION INTRAVENOUS at 12:00

## 2021-01-01 RX ADMIN — KETOROLAC TROMETHAMINE 2 DROP: 5 SOLUTION OPHTHALMIC at 08:39

## 2021-01-01 RX ADMIN — KETOROLAC TROMETHAMINE 2 DROP: 5 SOLUTION OPHTHALMIC at 15:20

## 2021-01-01 RX ADMIN — Medication 10 ML: at 06:40

## 2021-01-01 RX ADMIN — TORSEMIDE 20 MG: 20 TABLET ORAL at 08:40

## 2021-01-01 RX ADMIN — ATORVASTATIN CALCIUM 10 MG: 20 TABLET, FILM COATED ORAL at 08:22

## 2021-01-01 RX ADMIN — Medication 10 ML: at 06:26

## 2021-01-01 RX ADMIN — SODIUM CHLORIDE 25 ML/HR: 900 INJECTION, SOLUTION INTRAVENOUS at 09:12

## 2021-01-01 RX ADMIN — Medication 10 ML: at 17:26

## 2021-01-01 RX ADMIN — FUROSEMIDE 40 MG: 10 INJECTION, SOLUTION INTRAMUSCULAR; INTRAVENOUS at 09:08

## 2021-01-01 RX ADMIN — LOSARTAN POTASSIUM 100 MG: 50 TABLET, FILM COATED ORAL at 08:40

## 2021-01-01 RX ADMIN — CIPROFLOXACIN 1 DROP: 3 SOLUTION OPHTHALMIC at 22:16

## 2021-01-01 RX ADMIN — Medication 10 ML: at 06:53

## 2021-01-01 RX ADMIN — CIPROFLOXACIN 1 DROP: 3 SOLUTION OPHTHALMIC at 23:21

## 2021-01-01 RX ADMIN — LOSARTAN POTASSIUM 100 MG: 50 TABLET, FILM COATED ORAL at 09:49

## 2021-01-01 RX ADMIN — IOPAMIDOL 100 ML: 612 INJECTION, SOLUTION INTRAVENOUS at 16:30

## 2021-01-01 RX ADMIN — ATORVASTATIN CALCIUM 10 MG: 20 TABLET, FILM COATED ORAL at 08:40

## 2021-01-01 RX ADMIN — ASPIRIN 81 MG: 81 TABLET, COATED ORAL at 09:57

## 2021-01-01 RX ADMIN — ATORVASTATIN CALCIUM 10 MG: 10 TABLET, FILM COATED ORAL at 09:49

## 2021-01-01 RX ADMIN — Medication 10 ML: at 21:17

## 2021-01-01 RX ADMIN — CIPROFLOXACIN 1 DROP: 3 SOLUTION OPHTHALMIC at 15:20

## 2021-01-01 RX ADMIN — Medication 10 ML: at 15:21

## 2021-01-01 RX ADMIN — IOPAMIDOL 100 ML: 755 INJECTION, SOLUTION INTRAVENOUS at 17:00

## 2021-01-01 RX ADMIN — PANTOPRAZOLE SODIUM 40 MG: 40 TABLET, DELAYED RELEASE ORAL at 08:40

## 2021-01-01 RX ADMIN — FERRIC CARBOXYMALTOSE INJECTION 750 MG: 50 INJECTION, SOLUTION INTRAVENOUS at 09:56

## 2021-01-01 RX ADMIN — FERRIC CARBOXYMALTOSE INJECTION 750 MG: 50 INJECTION, SOLUTION INTRAVENOUS at 12:41

## 2021-01-01 RX ADMIN — Medication 10 ML: at 22:15

## 2021-01-01 RX ADMIN — KETOROLAC TROMETHAMINE 2 DROP: 5 SOLUTION OPHTHALMIC at 21:57

## 2021-01-01 RX ADMIN — KETOROLAC TROMETHAMINE 2 DROP: 5 SOLUTION OPHTHALMIC at 22:15

## 2021-01-01 RX ADMIN — APIXABAN 2.5 MG: 2.5 TABLET, FILM COATED ORAL at 21:56

## 2021-01-01 RX ADMIN — LOSARTAN POTASSIUM 100 MG: 50 TABLET, FILM COATED ORAL at 09:08

## 2021-01-01 RX ADMIN — ATORVASTATIN CALCIUM 40 MG: 40 TABLET, FILM COATED ORAL at 09:57

## 2021-01-01 RX ADMIN — POTASSIUM CHLORIDE 20 MEQ: 750 TABLET, FILM COATED, EXTENDED RELEASE ORAL at 17:23

## 2021-01-01 RX ADMIN — PANTOPRAZOLE SODIUM 40 MG: 40 TABLET, DELAYED RELEASE ORAL at 09:49

## 2021-01-01 RX ADMIN — Medication 10 ML: at 06:38

## 2021-01-01 RX ADMIN — ASPIRIN 162 MG: 81 TABLET, CHEWABLE ORAL at 02:21

## 2021-01-01 RX ADMIN — POTASSIUM CHLORIDE 20 MEQ: 750 TABLET, FILM COATED, EXTENDED RELEASE ORAL at 11:13

## 2021-01-01 RX ADMIN — PANTOPRAZOLE SODIUM 40 MG: 40 TABLET, DELAYED RELEASE ORAL at 09:53

## 2021-01-01 RX ADMIN — POTASSIUM CHLORIDE 20 MEQ: 750 TABLET, FILM COATED, EXTENDED RELEASE ORAL at 08:40

## 2021-01-01 RX ADMIN — ATORVASTATIN CALCIUM 10 MG: 20 TABLET, FILM COATED ORAL at 09:08

## 2021-01-01 RX ADMIN — PREDNISOLONE ACETATE 2 DROP: 10 SUSPENSION/ DROPS OPHTHALMIC at 10:01

## 2021-01-01 RX ADMIN — FUROSEMIDE 40 MG: 10 INJECTION, SOLUTION INTRAMUSCULAR; INTRAVENOUS at 13:52

## 2021-01-01 RX ADMIN — ASPIRIN 81 MG: 81 TABLET, COATED ORAL at 09:08

## 2021-01-01 RX ADMIN — GABAPENTIN 400 MG: 400 CAPSULE ORAL at 21:17

## 2021-01-01 RX ADMIN — Medication 10 ML: at 22:55

## 2021-01-01 RX ADMIN — KETOROLAC TROMETHAMINE 2 DROP: 5 SOLUTION OPHTHALMIC at 15:22

## 2021-01-01 RX ADMIN — PANTOPRAZOLE SODIUM 40 MG: 40 TABLET, DELAYED RELEASE ORAL at 09:08

## 2021-01-01 RX ADMIN — SODIUM CHLORIDE 25 ML/HR: 900 INJECTION, SOLUTION INTRAVENOUS at 11:03

## 2021-01-01 RX ADMIN — GABAPENTIN 400 MG: 400 CAPSULE ORAL at 08:22

## 2021-01-01 RX ADMIN — PREDNISOLONE ACETATE 2 DROP: 10 SUSPENSION/ DROPS OPHTHALMIC at 23:21

## 2021-01-01 RX ADMIN — ASPIRIN 81 MG: 81 TABLET, COATED ORAL at 09:49

## 2021-01-01 RX ADMIN — LOSARTAN POTASSIUM 100 MG: 50 TABLET, FILM COATED ORAL at 09:57

## 2021-01-01 RX ADMIN — FERRIC CARBOXYMALTOSE INJECTION 750 MG: 50 INJECTION, SOLUTION INTRAVENOUS at 11:30

## 2021-01-01 RX ADMIN — FUROSEMIDE 40 MG: 10 INJECTION, SOLUTION INTRAMUSCULAR; INTRAVENOUS at 02:20

## 2021-01-01 RX ADMIN — Medication 10 ML: at 11:00

## 2021-01-01 RX ADMIN — SODIUM CHLORIDE 10 ML: 9 INJECTION INTRAMUSCULAR; INTRAVENOUS; SUBCUTANEOUS at 09:11

## 2021-01-01 RX ADMIN — ASPIRIN 81 MG: 81 TABLET, COATED ORAL at 08:40

## 2021-01-01 RX ADMIN — TORSEMIDE 10 MG: 20 TABLET ORAL at 09:58

## 2021-01-01 RX ADMIN — SODIUM CHLORIDE 25 ML/HR: 900 INJECTION, SOLUTION INTRAVENOUS at 11:25

## 2021-01-01 RX ADMIN — PREDNISOLONE ACETATE 2 DROP: 10 SUSPENSION/ DROPS OPHTHALMIC at 15:20

## 2021-01-06 DIAGNOSIS — R60.9 EDEMA, UNSPECIFIED TYPE: ICD-10-CM

## 2021-01-06 RX ORDER — TORSEMIDE 20 MG/1
20 TABLET ORAL AS NEEDED
Qty: 30 TAB | Refills: 0 | Status: SHIPPED | OUTPATIENT
Start: 2021-01-06 | End: 2021-01-08

## 2021-01-06 NOTE — TELEPHONE ENCOUNTER
Last Visit: VV 4/8/20  MD Shravan Chavarria 2/26/20 MD Dietrich  Next Appointment: Not scheduled  Previous Refill Encounter(s): 7/20/20 90    Requested Prescriptions     Pending Prescriptions Disp Refills    torsemide (DEMADEX) 20 mg tablet 90 Tab 0     Sig: Take 1 Tab by mouth as needed (swelling, shortness of breath).

## 2021-01-06 NOTE — LETTER
1/7/2021 8:10 AM 
 
Mr. Mark Yanes [address] [facility] [address] Dear Mr. Patrica Bronson missed you! Please call our office at [phone] and schedule a follow up appointment for your continued care. Sincerely, Izabel Rangel MD

## 2021-01-08 DIAGNOSIS — R60.9 EDEMA, UNSPECIFIED TYPE: ICD-10-CM

## 2021-01-14 DIAGNOSIS — R60.9 EDEMA, UNSPECIFIED TYPE: ICD-10-CM

## 2021-01-14 RX ORDER — TORSEMIDE 20 MG/1
TABLET ORAL
Qty: 30 TAB | Refills: 0 | Status: SHIPPED | OUTPATIENT
Start: 2021-01-14 | End: 2021-01-17

## 2021-01-17 RX ORDER — TORSEMIDE 20 MG/1
TABLET ORAL
Qty: 90 TAB | Refills: 0 | Status: ON HOLD | OUTPATIENT
Start: 2021-01-17 | End: 2021-01-01 | Stop reason: SDUPTHER

## 2021-01-26 ENCOUNTER — OFFICE VISIT (OUTPATIENT)
Dept: FAMILY MEDICINE CLINIC | Age: 86
End: 2021-01-26
Payer: MEDICARE

## 2021-01-26 VITALS
RESPIRATION RATE: 16 BRPM | WEIGHT: 183 LBS | DIASTOLIC BLOOD PRESSURE: 70 MMHG | OXYGEN SATURATION: 96 % | BODY MASS INDEX: 27.74 KG/M2 | SYSTOLIC BLOOD PRESSURE: 136 MMHG | HEART RATE: 63 BPM | HEIGHT: 68 IN | TEMPERATURE: 97.3 F

## 2021-01-26 DIAGNOSIS — I25.10 CORONARY ARTERY DISEASE INVOLVING NATIVE CORONARY ARTERY OF NATIVE HEART WITHOUT ANGINA PECTORIS: ICD-10-CM

## 2021-01-26 DIAGNOSIS — I10 ESSENTIAL HYPERTENSION WITH GOAL BLOOD PRESSURE LESS THAN 140/90: ICD-10-CM

## 2021-01-26 DIAGNOSIS — B02.29 POSTHERPETIC NEURALGIA: Primary | ICD-10-CM

## 2021-01-26 DIAGNOSIS — D50.9 IRON DEFICIENCY ANEMIA, UNSPECIFIED IRON DEFICIENCY ANEMIA TYPE: ICD-10-CM

## 2021-01-26 DIAGNOSIS — E78.5 HYPERLIPIDEMIA, UNSPECIFIED HYPERLIPIDEMIA TYPE: ICD-10-CM

## 2021-01-26 DIAGNOSIS — I48.19 PERSISTENT ATRIAL FIBRILLATION (HCC): ICD-10-CM

## 2021-01-26 PROBLEM — G70.00 MYASTHENIA GRAVIS (HCC): Status: RESOLVED | Noted: 2019-12-29 | Resolved: 2021-01-26

## 2021-01-26 PROCEDURE — G8754 DIAS BP LESS 90: HCPCS | Performed by: FAMILY MEDICINE

## 2021-01-26 PROCEDURE — G8417 CALC BMI ABV UP PARAM F/U: HCPCS | Performed by: FAMILY MEDICINE

## 2021-01-26 PROCEDURE — G8427 DOCREV CUR MEDS BY ELIG CLIN: HCPCS | Performed by: FAMILY MEDICINE

## 2021-01-26 PROCEDURE — G8510 SCR DEP NEG, NO PLAN REQD: HCPCS | Performed by: FAMILY MEDICINE

## 2021-01-26 PROCEDURE — 99214 OFFICE O/P EST MOD 30 MIN: CPT | Performed by: FAMILY MEDICINE

## 2021-01-26 PROCEDURE — G8536 NO DOC ELDER MAL SCRN: HCPCS | Performed by: FAMILY MEDICINE

## 2021-01-26 PROCEDURE — G0463 HOSPITAL OUTPT CLINIC VISIT: HCPCS | Performed by: FAMILY MEDICINE

## 2021-01-26 PROCEDURE — G8752 SYS BP LESS 140: HCPCS | Performed by: FAMILY MEDICINE

## 2021-01-26 PROCEDURE — 1101F PT FALLS ASSESS-DOCD LE1/YR: CPT | Performed by: FAMILY MEDICINE

## 2021-01-26 RX ORDER — PYRIDOSTIGMINE BROMIDE 60 MG/1
TABLET ORAL
Status: ON HOLD | COMMUNITY
Start: 2020-12-07 | End: 2021-01-01

## 2021-01-26 NOTE — PROGRESS NOTES
[facility] 82 y.o. male  presents to the office today for hypertension. Blood pressure 136/70, pulse 63, temperature 97.3 °F (36.3 °C), temperature source Temporal, resp. rate 16, height 5' 8\" (1.727 m), weight 183 lb (83 kg), SpO2 96 %. Body mass index is 27.83 kg/m². Chief Complaint   Patient presents with    Hypertension        Hypertension: BP at office today 156/89 and 136/70 on manual recheck. Pt continues with Demadex 20 mg/day Monday, Wednesday, and Friday and Cozaar 100 mg/day. Pt states he has been experiencing SOB with exertion. He continues to take his Demadex 20 mg/day; he notes that on days he takes the medication, he can lose as much as 5 lbs overnight. However, the fluid builds up again within a few days. Persistent atrial fibrillation: Stable. Pt is under the care of cardiology, Dr. Barbara Bishop, for this condition. Hyperlipidemia: Lipid panel on 8/5/2019 notable for total cholesterol 110, HDL 43, LDL 55, and triglycerides 60. Pt continues with Lipitor 10 mg/day. Hypokalemia: Stable. Pt continues with K-Tab 20 mEq/BID on Monday, Wednesday, and Friday. Postherpetic neuralgia: Pt continues with gabapentin 400 mg/day. He informs me that his left hand continues to feel sensitive to touch and weak. Upon examination, pt has trouble gripping objects. Pt is under the care of Dr. Stephen Frey for this condition at this time. Chronic SOB: Pt has not seen Dr. Oly De León, pulmonology, in over 6 months. I advised pt to see him at least twice a year. Pt reports that he had a pulmonary function test during his last appointment. Pt informs me that he believes the tests went well as he was told the results were \"okay, but they didn't seem excited\". Pt was not provided with an AVS. Notes from a pulmonary function test on 6/5/2018 showed mild restrictive ventilatory defect based on spirometry and normal diffusion capacity.     Iron deficiency anemia: Pt has been receiving blood iron transfusions under the care of Dr. Echo Mendoza. Health maintenance: Medicare questionnaire discussed and responses reviewed today in office. Pt is due for updated labwork, which will be performed during today's OV. Pt reports that he has lost weight over hte past few months. He states that his appetite has decreased; he will eat in the morning and then not be hungry again until dinner. I advised pt to drink an Ensure drink with his meals to help increase his caloric intake. Pt has been experiencing a productive cough and he feels as if he has phlegm present in his throat. Current Outpatient Medications   Medication Sig Dispense Refill    pyridostigmine (MESTINON) 60 mg tablet       torsemide (DEMADEX) 20 mg tablet TAKE 1 TABLET BY MOUTH AS NEEDED FOR SWELLING AND SHORTNESS OF BREATH 90 Tab 0    losartan (COZAAR) 100 mg tablet Take 1 Tab by mouth daily. 90 Tab 3    atorvastatin (LIPITOR) 10 mg tablet Take 1 Tab by mouth daily. 90 Tab 3    potassium chloride SR (K-TAB) 20 mEq tablet Take 2 Tabs by mouth every Monday, Wednesday, Friday. 30 Tab 5    gabapentin (NEURONTIN) 400 mg capsule Take 1 Cap by mouth nightly. Max Daily Amount: 400 mg. 90 Cap 1    aspirin delayed-release 81 mg tablet Take 81 mg by mouth daily.  pantoprazole (PROTONIX) 40 mg tablet Take 40 mg by mouth daily.  albuterol (PROVENTIL HFA, VENTOLIN HFA, PROAIR HFA) 90 mcg/actuation inhaler Take 1-2 Puffs by inhalation every four (4) hours as needed for Wheezing. 1 Inhaler 5    cyanocobalamin (VITAMIN B-12) 1,000 mcg tablet Take 1,000 mcg by mouth daily. No Known Allergies  Past Medical History:   Diagnosis Date    CAD (coronary artery disease) 05/2019    S/p PCI May 2019 [facility]     NUKE 8/7/2020 =Lexiscan pharmacologic stress test shows normal perfusion , noted apical thinning with an EF of 47  %.      Diverticulosis, sigmoid 8/2011    ED (erectile dysfunction) of organic origin     Hypertension     LVH (left ventricular hypertrophy) due to hypertensive disease     PAF (paroxysmal atrial fibrillation) ([facility] [address] 11/18/2019    [address] admit nov 2019 no OAC due to anemia    Postherpetic neuralgia     Prostate cancer ([facility] [address]     Sebaceous cyst 4/1/2014    Shingles 02/2019     Past Surgical History:   Procedure Laterality Date    ENDOSCOPY, COLON, DIAGNOSTIC  >= 8-10years ago   [place] SURGERY  1968 [id number]    HX CARPAL TUNNEL RELEASE  4/08    right,left    HX CHOLECYSTECTOMY      HX HEART CATHETERIZATION  05/2019    HX HERNIA REPAIR      HX PROSTATECTOMY  5/06     Family History   Problem Relation Age of Onset    Cancer Mother         pancreatic    Diabetes Father     Stroke Father     Diabetes Sister     Hypertension Sister     Other Sister         Open heart surgery     Diabetes Brother     Hypertension Brother     Hypertension Brother     Asthma Daughter     Heart Attack Daughter     Other Daughter         hip replacement x 2     Social History     Tobacco Use    Smoking status: Never Smoker    Smokeless tobacco: Never Used   Substance Use Topics    Alcohol use: No        Review of Systems   Constitutional: Negative for chills and fever. HENT: Negative for hearing loss and tinnitus. Eyes: Negative for blurred vision and double vision. Respiratory: Negative for cough and shortness of breath. Cardiovascular: Negative for chest pain and palpitations. Gastrointestinal: Negative for nausea and vomiting. Genitourinary: Negative for dysuria and frequency. Musculoskeletal: Negative for back pain and falls. Skin: Negative for itching and rash. Neurological: Negative for dizziness, loss of consciousness and headaches. Endo/Heme/Allergies: Negative. Psychiatric/Behavioral: Negative for depression. The patient is not nervous/anxious. Physical Exam  Vitals signs reviewed. Constitutional:       Appearance: Normal appearance. HENT:      Head: Normocephalic and atraumatic.       Right Ear: Tympanic membrane, ear canal and external ear normal.      Left Ear: Tympanic membrane, ear canal and external ear normal.      Nose: Nose normal.      Mouth/Throat:      Mouth: Mucous membranes are moist.      Pharynx: Oropharynx is clear. Eyes:      Extraocular Movements: Extraocular movements intact. Conjunctiva/sclera: Conjunctivae normal.      Pupils: Pupils are equal, round, and reactive to light. Neck:      Musculoskeletal: Normal range of motion and neck supple. Cardiovascular:      Rate and Rhythm: Normal rate and regular rhythm. Pulses: Normal pulses. Heart sounds: Normal heart sounds. Pulmonary:      Effort: Pulmonary effort is normal.      Breath sounds: Normal breath sounds. Abdominal:      General: Abdomen is flat. Bowel sounds are normal.      Palpations: Abdomen is soft. Musculoskeletal: Normal range of motion. Skin:     General: Skin is warm and dry. Neurological:      General: No focal deficit present. Mental Status: He is alert and oriented to person, place, and time. Psychiatric:         Mood and Affect: Mood normal.         Behavior: Behavior normal.         Judgment: Judgment normal.           ASSESSMENT and PLAN  Diagnoses and all orders for this visit:    1. Postherpetic neuralgia  Pt continues with gabapentin 400 mg/day. He informs me that his left hand continues to feel sensitive to touch and weak. Upon examination, pt has trouble gripping objects. Pt is under the care of Dr. Stacy Harden for this condition at this time. 2. Persistent atrial fibrillation (HCC)  Stable. Pt is under the care of cardiology, Dr. Raj Carrion, for this condition. 3. Essential hypertension with goal blood pressure less than 140/90  BP at office today 156/89 and 136/70 on manual recheck. Pt continues with Demadex 20 mg/day Monday, Wednesday, and Friday and Cozaar 100 mg/day. Pt states he has been experiencing SOB with exertion.  He continues to take his Demadex 20 mg/day; he notes that on days he takes the medication, he can lose as much as 5 lbs overnight. However, the fluid builds up again within a few days.   -     METABOLIC PANEL, COMPREHENSIVE; Future  -     CBC WITH AUTOMATED DIFF; Future      4. Coronary artery disease involving native coronary artery of native heart without angina pectoris  Refer to #2.   -     METABOLIC PANEL, COMPREHENSIVE; Future      5. Hyperlipidemia, unspecified hyperlipidemia type  Lipid panel on 8/5/2019 notable for total cholesterol 110, HDL 43, LDL 55, and triglycerides 60. Pt continues with Lipitor 10 mg/day. -     METABOLIC PANEL, COMPREHENSIVE; Future  -     LIPID PANEL; Future       6. Iron deficiency anemia, unspecified iron deficiency anemia type  Pt has been receiving blood iron transfusions under the care of Dr. Jody Peña; Future              Follow-up and Dispositions    · Return in about 4 months (around 5/26/2021). Medication risks/benefits/costs/interactions/alternatives discussed with patient. Advised patient to call back or return to office if symptoms worsen/change/persist.  If patient cannot reach us or should anything more severe/urgent arise he/she should proceed directly to the nearest emergency department. Discussed expected course/resolution/complications of diagnosis in detail with patient. Patient given a written after visit summary which includes diagnoses, current medications and vitals. Patient expressed understanding with the diagnosis and plan. Written by kellee Edmondson, as dictated by Poly Draper M.D.    2:45 PM - 3:10 PM    Total time spent with the patient 25 minutes, greater than 50% of time spent counseling patient. Consent Type: Consent 1 (Standard)

## 2021-01-26 NOTE — PROGRESS NOTES
This is the Subsequent Medicare Annual Wellness Exam, performed 12 months or more after the Initial AWV or the last Subsequent AWV    I have reviewed the patient's medical history in detail and updated the computerized patient record. Depression Risk Factor Screening:     3 most recent PHQ Screens 1/26/2021   PHQ Not Done -   Little interest or pleasure in doing things Not at all   Feeling down, depressed, irritable, or hopeless Not at all   Total Score PHQ 2 0       Alcohol Risk Screen    Do you average more than 1 drink per night or more than 7 drinks a week: No    In the past three months have you have had more than 4 drinks containing alcohol on one occasion: No        Functional Ability and Level of Safety:    Hearing: Hearing is good. Activities of Daily Living: The home contains: no safety equipment. Patient does total self care      Ambulation: with no difficulty     Fall Risk:  Fall Risk Assessment, last 12 mths 1/26/2021   Able to walk? Yes   Fall in past 12 months? 0   Do you feel unsteady? 0   Are you worried about falling 0      Abuse Screen:  Patient is not abused       Cognitive Screening    Has your family/caregiver stated any concerns about your memory: no         Assessment/Plan   Education and counseling provided:  Are appropriate based on today's review and evaluation    Diagnoses and all orders for this visit:    1.  Persistent atrial fibrillation [facility])        Health Maintenance Due     Health Maintenance Due   Topic Date Due    COVID-19 Vaccine (1 of 2) 12/12/1951    GLAUCOMA SCREENING Q2Y  09/15/2017    Shingrix Vaccine Age 50> (2 of 2) 08/20/2018    DTaP/Tdap/Td series (2 - Tdap) 02/14/2019    Medicare Yearly Exam  05/01/2020    Lipid Screen  08/05/2020       Patient Care Team   Patient Care Team:  Candelaria Coats MD as PCP - General (Family Medicine)  Candelaria Coats MD as PCP - [facility] Empaneled Provider  Poppy Lyon MD (Cardiology)  Kacie Edge MD (Neurology)    History     Patient Active Problem List   Diagnosis Code    Hypertension I10    ED (erectile dysfunction) of organic origin N52.9    Diverticulosis, sigmoid K57.30    History of prostate cancer Z85.46    B12 deficiency E53.8    Sebaceous cyst L72.3    Advanced care planning/counseling discussion Z71.89    Gastroesophageal reflux disease without esophagitis K21.9    Shingles B02.9    Postherpetic neuralgia B02.29    Persistent atrial fibrillation (HCC) I48.19    Coronary artery disease involving native coronary artery of native heart without angina pectoris I25.10    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      Diverticulosis, sigmoid 8/2011    ED (erectile dysfunction) of organic origin     Hypertension     LVH (left ventricular hypertrophy) due to hypertensive disease     PAF (paroxysmal atrial fibrillation) ([facility] [address] 11/18/2019    [address] [facility] nov 2019 no OAC due to anemia    Postherpetic neuralgia     Prostate cancer ([facility] [address]     Sebaceous cyst 4/1/2014    Shingles 02/2019      Past Surgical History:   Procedure Laterality Date    ENDOSCOPY, COLON, DIAGNOSTIC  >= 8-10years ago   [facility] SURGERY  1968 [id number]    HX CARPAL TUNNEL RELEASE  4/08    right,left    HX CHOLECYSTECTOMY      HX HEART CATHETERIZATION  05/2019    HX HERNIA REPAIR      HX PROSTATECTOMY  5/06     Current Outpatient Medications   Medication Sig Dispense Refill    pyridostigmine (MESTINON) 60 mg tablet       torsemide (DEMADEX) 20 mg tablet TAKE 1 TABLET BY MOUTH AS NEEDED FOR SWELLING AND SHORTNESS OF BREATH 90 Tab 0    losartan (COZAAR) 100 mg tablet Take 1 Tab by mouth daily. 90 Tab 3    atorvastatin (LIPITOR) 10 mg tablet Take 1 Tab by mouth daily. 90 Tab 3    potassium chloride SR (K-TAB) 20 mEq tablet Take 2 Tabs by mouth every Monday, Wednesday, Friday. 30 Tab 5    gabapentin (NEURONTIN) 400 mg capsule Take 1 Cap by mouth nightly. Max Daily Amount: 400 mg. 90 Cap 1    aspirin delayed-release 81 mg tablet Take 81 mg by mouth daily.  pantoprazole (PROTONIX) 40 mg tablet Take 40 mg by mouth daily.  albuterol (PROVENTIL HFA, VENTOLIN HFA, PROAIR HFA) 90 mcg/actuation inhaler Take 1-2 Puffs by inhalation every four (4) hours as needed for Wheezing. 1 Inhaler 5    cyanocobalamin (VITAMIN B-12) 1,000 mcg tablet Take 1,000 mcg by mouth daily. No Known Allergies    Family History   Problem Relation Age of Onset   Dossie Grecia Cancer Mother         pancreatic    Diabetes Father     Stroke Father     Diabetes Sister     Hypertension Sister     Other Sister         Open heart surgery     Diabetes Brother     Hypertension Brother     Hypertension Brother     Asthma Daughter     Heart Attack Daughter     Other Daughter         hip replacement x 2     Social History     Tobacco Use    Smoking status: Never Smoker    Smokeless tobacco: Never Used   Substance Use Topics    Alcohol use:  No

## 2021-01-26 NOTE — PROGRESS NOTES
Moy Alcala is a 82 y.o. male    Chief Complaint   Patient presents with    Hypertension     1. Have you been to the ER, urgent care clinic since your last visit? Hospitalized since your last visit? No      2. Have you seen or consulted any other health care providers outside of the [address] [place] [address] since your last visit? Include any pap smears or colon screening.   No

## 2021-01-27 LAB
ALBUMIN SERPL-MCNC: 3.8 G/DL (ref 3.5–5)
ALBUMIN/GLOB SERPL: 1.1 {RATIO} (ref 1.1–2.2)
ALP SERPL-CCNC: 109 U/L (ref 45–117)
ALT SERPL-CCNC: 28 U/L (ref 12–78)
ANION GAP SERPL CALC-SCNC: 4 MMOL/L (ref 5–15)
AST SERPL-CCNC: 22 U/L (ref 15–37)
BASOPHILS # BLD: 0 K/UL (ref 0–0.1)
BASOPHILS NFR BLD: 0 % (ref 0–1)
BILIRUB SERPL-MCNC: 0.6 MG/DL (ref 0.2–1)
BUN SERPL-MCNC: 28 MG/DL (ref 6–20)
BUN/CREAT SERPL: 24 (ref 12–20)
CALCIUM SERPL-MCNC: 9.6 MG/DL (ref 8.5–10.1)
CHLORIDE SERPL-SCNC: 104 MMOL/L (ref 97–108)
CHOLEST SERPL-MCNC: 113 MG/DL
CO2 SERPL-SCNC: 34 MMOL/L (ref 21–32)
CREAT SERPL-MCNC: 1.19 MG/DL (ref 0.7–1.3)
DIFFERENTIAL METHOD BLD: ABNORMAL
EOSINOPHIL # BLD: 0.1 K/UL (ref 0–0.4)
EOSINOPHIL NFR BLD: 2 % (ref 0–7)
ERYTHROCYTE [DISTWIDTH] IN BLOOD BY AUTOMATED COUNT: 13.5 % (ref 11.5–14.5)
GLOBULIN SER CALC-MCNC: 3.4 G/DL (ref 2–4)
GLUCOSE SERPL-MCNC: 108 MG/DL (ref 65–100)
HCT VFR BLD AUTO: 39.3 % (ref 36.6–50.3)
HDLC SERPL-MCNC: 48 MG/DL
HDLC SERPL: 2.4 {RATIO} (ref 0–5)
HGB BLD-MCNC: 11.9 G/DL (ref 12.1–17)
IMM GRANULOCYTES # BLD AUTO: 0 K/UL (ref 0–0.04)
IMM GRANULOCYTES NFR BLD AUTO: 0 % (ref 0–0.5)
IRON SATN MFR SERPL: 12 % (ref 20–50)
IRON SERPL-MCNC: 36 UG/DL (ref 35–150)
LDLC SERPL CALC-MCNC: 54.4 MG/DL (ref 0–100)
LIPID PROFILE,FLP: NORMAL
LYMPHOCYTES # BLD: 1.2 K/UL (ref 0.8–3.5)
LYMPHOCYTES NFR BLD: 18 % (ref 12–49)
MCH RBC QN AUTO: 28.8 PG (ref 26–34)
MCHC RBC AUTO-ENTMCNC: 30.3 G/DL (ref 30–36.5)
MCV RBC AUTO: 95.2 FL (ref 80–99)
MONOCYTES # BLD: 0.8 K/UL (ref 0–1)
MONOCYTES NFR BLD: 11 % (ref 5–13)
NEUTS SEG # BLD: 4.8 K/UL (ref 1.8–8)
NEUTS SEG NFR BLD: 69 % (ref 32–75)
NRBC # BLD: 0 K/UL (ref 0–0.01)
NRBC BLD-RTO: 0 PER 100 WBC
PLATELET # BLD AUTO: 184 K/UL (ref 150–400)
PMV BLD AUTO: 11 FL (ref 8.9–12.9)
POTASSIUM SERPL-SCNC: 4.6 MMOL/L (ref 3.5–5.1)
PROT SERPL-MCNC: 7.2 G/DL (ref 6.4–8.2)
RBC # BLD AUTO: 4.13 M/UL (ref 4.1–5.7)
SODIUM SERPL-SCNC: 142 MMOL/L (ref 136–145)
TIBC SERPL-MCNC: 299 UG/DL (ref 250–450)
TRIGL SERPL-MCNC: 53 MG/DL (ref ?–150)
VLDLC SERPL CALC-MCNC: 10.6 MG/DL
WBC # BLD AUTO: 6.9 K/UL (ref 4.1–11.1)

## 2021-02-01 NOTE — PROGRESS NOTES
Sean Wallace,     I need to Ron Dru Mckeon to make a virtual visit to discuss his labs. His iron levels are low and I wanted to go into more detail with about it.

## 2021-03-03 NOTE — TELEPHONE ENCOUNTER
Weak dizzy fell twice yesterday fatigue no fever  Pulse ox 98   93% exertion     No appointments available in clinic. Advised to go to ER or Urgent Care. Daughter is Nurse denies stroke like sx.    Advised may try OTC Dramamine for dizziness

## 2021-03-03 NOTE — TELEPHONE ENCOUNTER
----- Message from April ROSEMARY Lozada sent at 3/2/2021  3:40 PM EST -----  Regarding: Dr. Ayaka Garcia  General Message/Vendor Calls    Caller's first and last name: Tulio Garcia       Reason for call: Requested a call back       Callback required yes/no and why: Yes       Best contact number(s): [phone]      Details to clarify the request: Patients wife stated [facility] and they stated he didn't brake anything but since the fall he has fallen twice again and she would like for Dr. Pepe Arreola to call and speak with him regarding this matter.        April [address]

## 2021-03-03 NOTE — TELEPHONE ENCOUNTER
Daughter Linette Mackenzie states there was a death in the family, and they must travel. The patient does not feel well, receive 2nd covid-19 vac, the past few days patient has been weak and dizzy, he fell a couple of times (not hurt).  Would like advice as to whether he should attend .      YZ#[phone]

## 2021-03-12 NOTE — PROGRESS NOTES
[facility] [place] [facility] [place] [address] [address], [address]  W: [phone]  F: [phone]    Reason for Visit:   Yong Saeed is a 82 y.o. male who is seen on 3/12/2021 for follow up of anemia. History of Present Illness:   Patient is a 82 y.o. male with CAD and h/o prostate cancer who is seen for follow up of anemia     He has progressive anemia since 5/2019 with Hb having dropped from 12 g/dl to 9 g/dl by 12/2019. He does have a h/o iron deficiency with iron studies on 12/13/2019 showing a TSAT of 8% though ferritin was 53. When rechecked 1/2/2020 he was iron replete but anemia had barely improved to 9.5 g/dl. He was admitted around 11/2019 at [facility] with SOB and anemia. He was seen by Dr. Patel Nation Dr. Barbi Cary. EGD 11/2019 had shown some esophagitis. Colonoscopy was not considered necessary. He also was diagnosed with Myasthenia gravis. He had plasma exchange. He was then placed on prednisone. He was readmitted with SOB in Dec 2019 thought to be due to prednisone and was taken off this. He was seen by Neurology and was then told that he has no myasthenia. He also had a normal SPEP at that time and a normal MMA. His work up was most suggestive of iron deficiency. He received Injectafer x 2 in Jan 2020 and again 10/2020. Now seen for follow up with labs. Has felt like he has more energy. He had an EGD 9/2020 which showed some gastritis. He got  in Dec 2020. He had a fall in Feb 2020 when he fell in the snow. No injuries.    He has normal BMs and has no diarrhea  He has no melena, he still has fatiguiged  Not craving ice, he has no SOB      He had a prostatectomy 2006    He has never smoked  Mother had pancreatic cancer  Sister had Lymphoma  Daughter had lung cancer    Past Medical History:   Diagnosis Date    CAD (coronary artery disease) 05/2019    S/p PCI May 2019 [facility]     NUKE 8/7/2020 =Lexiscan pharmacologic stress test shows normal perfusion , noted apical thinning with an EF of 47  %.  Diverticulosis, sigmoid 8/2011    ED (erectile dysfunction) of organic origin     Hypertension     LVH (left ventricular hypertrophy) due to hypertensive disease     PAF (paroxysmal atrial fibrillation) ([facility] 11/18/2019    [address] admit nov 2019 no OAC due to anemia    Postherpetic neuralgia     Prostate cancer ([facility]     Sebaceous cyst 4/1/2014    Shingles 02/2019      Past Surgical History:   Procedure Laterality Date    ENDOSCOPY, COLON, DIAGNOSTIC  >= 8-10years ago   Dean Wart[place] SURGERY  1968 [id number]    HX CARPAL TUNNEL RELEASE  4/08    right,left    HX CHOLECYSTECTOMY      HX HEART CATHETERIZATION  05/2019    HX HERNIA REPAIR      HX PROSTATECTOMY  5/06      Social History     Tobacco Use    Smoking status: Never Smoker    Smokeless tobacco: Never Used   Substance Use Topics    Alcohol use: No      Family History   Problem Relation Age of Onset    Cancer Mother         pancreatic    Diabetes Father     Stroke Father     Diabetes Sister     Hypertension Sister     Other Sister         Open heart surgery     Diabetes Brother     Hypertension Brother     Hypertension Brother     Asthma Daughter     Heart Attack Daughter     Other Daughter         hip replacement x 2     Current Outpatient Medications   Medication Sig    pyridostigmine (MESTINON) 60 mg tablet     torsemide (DEMADEX) 20 mg tablet TAKE 1 TABLET BY MOUTH AS NEEDED FOR SWELLING AND SHORTNESS OF BREATH    losartan (COZAAR) 100 mg tablet Take 1 Tab by mouth daily.  atorvastatin (LIPITOR) 10 mg tablet Take 1 Tab by mouth daily.  potassium chloride SR (K-TAB) 20 mEq tablet Take 2 Tabs by mouth every Monday, Wednesday, Friday.  gabapentin (NEURONTIN) 400 mg capsule Take 1 Cap by mouth nightly. Max Daily Amount: 400 mg.  aspirin delayed-release 81 mg tablet Take 81 mg by mouth daily.  pantoprazole (PROTONIX) 40 mg tablet Take 40 mg by mouth daily.     albuterol (PROVENTIL HFA, VENTOLIN HFA, PROAIR HFA) 90 mcg/actuation inhaler Take 1-2 Puffs by inhalation every four (4) hours as needed for Wheezing.  cyanocobalamin (VITAMIN B-12) 1,000 mcg tablet Take 1,000 mcg by mouth daily. No current facility-administered medications for this visit. No Known Allergies     Review of Systems: A complete review of systems was obtained, negative except as described above. Physical Exam:   Vitals reviewed    Constitutional: Appears well-developed and well-nourished in no apparent distress   Mental status: Alert and awake, Oriented to person/place/time, Able to follow commands  Eyes: EOM normal, Sclera normal, No visible ocular discharge  HENT: Normocephalic, atraumatic; Mouth/Throat: Moist mucous membranes, External Ears normal  Skin: No significant exanthematous lesions or discoloration noted on facial skin  Psychiatric: Normal affect, normal judgment/insight. No hallucinations       Results:     Lab Results   Component Value Date/Time    WBC 6.6 03/09/2021 09:19 AM    HGB 11.7 (L) 03/09/2021 09:19 AM    HCT 38.6 03/09/2021 09:19 AM    PLATELET 800 64/10/7965 09:19 AM    MCV 94 03/09/2021 09:19 AM    ABS. NEUTROPHILS 4.6 03/09/2021 09:19 AM     Lab Results   Component Value Date/Time    Sodium 142 01/26/2021 04:45 PM    Potassium 4.6 01/26/2021 04:45 PM    Chloride 104 01/26/2021 04:45 PM    CO2 34 (H) 01/26/2021 04:45 PM    Glucose 108 (H) 01/26/2021 04:45 PM    BUN 28 (H) 01/26/2021 04:45 PM    Creatinine 1.19 01/26/2021 04:45 PM    GFR est AA >60 01/26/2021 04:45 PM    GFR est non-AA 58 (L) 01/26/2021 04:45 PM    Calcium 9.6 01/26/2021 04:45 PM     Lab Results   Component Value Date/Time    Bilirubin, total 0.6 01/26/2021 04:45 PM    ALT (SGPT) 28 01/26/2021 04:45 PM    Alk.  phosphatase 109 01/26/2021 04:45 PM    Protein, total 7.2 01/26/2021 04:45 PM    Albumin 3.8 01/26/2021 04:45 PM    Globulin 3.4 01/26/2021 04:45 PM       Lab Results   Component Value Date/Time    Iron % saturation 18 03/09/2021 09:19 AM    TIBC 274 03/09/2021 09:19 AM    Ferritin 103 03/09/2021 09:19 AM    Vitamin B12 238 04/27/2018 03:25 PM    Folate 14.8 04/27/2018 03:25 PM    Haptoglobin 167 05/21/2020 09:05 AM     05/21/2020 09:05 AM    Sed rate (ESR) 20 05/24/2019 12:50 PM    C-Reactive Protein, Qt 0.6 12/12/2018 05:04 PM    C-Reactive Protein, Cardiac 10.91 (H) 05/24/2019 12:50 PM    TSH 1.23 12/14/2019 04:55 AM     No results found for: INR, APTT, DDIMSQ, DDIME, [id number], Faustino Beards, FDPLT, Jamse Eleazar, [address] [facility], [facility], [address], [id number], [id number], [id number], [id number], [id number], [id number], Carlton [place]    Records reviewed and summarized above. Pathology report(s) reviewed above. Radiology report(s) reviewed above. Assessment:   1) Anemia-    He did have evidence of iron deficiency when checked 12/14/2019. It does appear to have had a negative SPEP , normal MMA and B12 levels and no evidence of hemolysis on work up at [facility]. Received Injecatfer x 2 in Jan 2020 with Hb having improved as of 5/21/2020 to 12.2 g/dl. Iron stores normalized but anemia recurred by 9/2020  EGD 9/2020 showed some atrophic gastritis but no clear bleeding. Colonoscopy was not recommended but may need to be considered if anemia worsens in the next few months  Labs from 3/2021 shows stable anemia and borderline stores   He still has some fatigue and will replace with additional iron    2) H/O Prostate cancer  Prostatectomy in 2006    3) CAD  On plavix    4) MG  This is not a certain diagnosis  He follows with Dr. Vish Perez:     · B12 - 1000 mcg daily  · FA daily  · Follow with GI   · Injectafer x 1  · RTC 12 weeks with cbc, iron profile, ferritin    All questions answered and results were reviewed    I appreciate the opportunity to participate in Mr. Jia Hammond's care.     Signed By: Sandra Cueva MD

## 2021-03-12 NOTE — PROGRESS NOTES
Cheryl Her is a 82 y.o. male     Chief Complaint   Patient presents with    Follow-up      anemia     1. Have you been to the ER, urgent care clinic since your last visit? Hospitalized since your last visit? No    2. Have you seen or consulted any other health care providers outside of the [address] [place] [address] since your last visit? Include any pap smears or colon screening. Yes, patient went to [facility] in Feb for a fall he had outside.     Patient states he has had both of his covid vaccines

## 2021-03-19 NOTE — TELEPHONE ENCOUNTER
MD Dietrich,    Request for refill of gabapentin. Check . Thanks, Keisha Farrell    Last Visit: 1/26/21 MD Dietrich  Next Appointment: 5/24/21 MD Dietrich  Previous Refill Encounter(s): 2/11/20 90 + 1    Requested Prescriptions     Pending Prescriptions Disp Refills    gabapentin (NEURONTIN) 400 mg capsule 90 Cap 1     Sig: Take 1 Cap by mouth nightly. Max Daily Amount: 400 mg.

## 2021-03-23 NOTE — PROGRESS NOTES
[address] @ [facility] VISIT NOTE     [address] Patient arrives for [facility] Infusion without acute problems. Please see connect care for complete assessment and education provided. Vital signs stable throughout and prior to discharge, Pt. Tolerated treatment well and discharged without incident. Patient/spouse is aware of no further [facility] appointments @ this time & to follow up with healthcare provider for next appointment. Patient did wait the 30 minute observation period post infusion with no adverse reactions noted @ this time. The patient/parent denies any symptoms of COVID (SOB, coughing, fever, or generally not feeling well), denies any known exposure to COVID-19 recently, and denies any known recent contact with family/friend that has a pending COVID test.      Medications Verified by Kaitlin Serrano RN via [email or web address]:  1. Injectafer 750 mg IV  2.  NS IV Flush  [address]  Patient Vitals for the past 12 hrs:   Temp Pulse Resp BP SpO2   03/23/21 1229 97.7 °F (36.5 °C) 68 16 (!) 159/78    03/23/21 1158 98 °F (36.7 °C) 61 16 (!) 148/81    03/23/21 1054 98 °F (36.7 °C) 71 18 (!) 147/85 97 %

## 2021-04-06 NOTE — TELEPHONE ENCOUNTER
Lata Rolle (pt's wife) called and stated that his melatonin medication is not working. He is not feeling well, because he is not sleeping at nights. She is requesting a callback from the nurse.       BCB# [phone]    Thanks,  Sammi

## 2021-04-06 NOTE — TELEPHONE ENCOUNTER
Patient having a lot of dizziness   Advised to go get meclizine from Pharmacy [facility]   It will help with sleep as well. Advised to start low and slow 1/2 tab twice daily then increase to 1 full tab twice daily   If tolerated. Advised of possible sedation side effects. Patient to call me back in a couple of days with a update.

## 2021-04-20 PROBLEM — I50.9 DECOMPENSATED HEART FAILURE (HCC): Status: ACTIVE | Noted: 2021-01-01

## 2021-04-20 NOTE — CONSULTS
Cardiology Consult Note      Patient Name: Montrell Dominguez  : 1935 MRN: [id number]  Date: 2021  Time: 9:20 AM    Admit Diagnosis: Decompensated heart failure ([facility] [I50.9]    Primary Cardiologist: Olya Garcia MD    Consulting Cardiologist: Davis Parish MD    Reason for Consult: HF    Requesting MD: Isai Hansen MD    HPI:  Montrell Dominguez is a 82 y.o. male admitted on 2021  for Decompensated heart failure ([address] [I50.9]. has a past medical history of CAD (coronary artery disease) (2019), Diverticulosis, sigmoid (2011), ED (erectile dysfunction) of organic origin, Hypertension, LVH (left ventricular hypertrophy) due to hypertensive disease, PAF (paroxysmal atrial fibrillation) ([facility] [address] [facility] (2019), Postherpetic neuralgia, [facility] ([facility] Sebaceous cyst (2014), and Shingles (2019). He also has no past medical history of Abuse or Hypercholesterolemia. Presents for progressive increased SOB. Occurring over the last month; much more significant over the past 24 hours. He states no change in his weight or edema of legs over baseline. Does note early satiety over the past week or 2. He had been increasing his diuretic, with some good effect. He admits to having [place] food last night, chips and salsa. Subjective:  FERNANDO, but no CP and no edema of legs. Puffy stomach and gets full fast when eating. Assessment and Plan     1. DHF   - acute on chronic   - EF 57% on echo    - No h/o systolic HF   - IV Lasix 40 mg BID   - follow [facility]ytes   - echo cancelled, will not change treatment plan   - Decompensation related to dietary indiscretions  2. Hypokalemia   - K 3.0   - replacing  3. Persistent Afib   - rate controlled   - no OAC with anemia  4. CAD   - JOSEFINA-Cath done 19. OM1 with 70% blockage had JOSEFINA   - ASA 81, Lipitor  5.  HTN   - Cozaar, Lasix    Decompensated DHF related to dietary indiscretions. Continue IV Lasix for volume removal.  K replacing. BP elevated 2/2 volume and non absorption of HTN meds with edematous intestines from fluid. Echo cancelled, results would not change treatment plan. Pt with acute on chronic diastolic CHF and fluid overload likely due to dietary indiscretion and elevated BP. Agree with plans for aggressive IV diruesis. Afib is controlled. Titrate BP meds prn. 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    Pedal edema R60.0    Iron deficiency anemia due to chronic blood loss X37.3    Diastolic dysfunction N37.19    LVH (left ventricular hypertrophy) due to hypertensive disease I11.9    PAF (paroxysmal atrial fibrillation) (HCC) I48.0    Decompensated heart failure ([facility]) I50.9     [facility] Admit November 2019  --New onset Afib , rate controlled, w/up for MG, plasmapharesis done, no OAC due to risk with low plts and anemia when seen by Cards at [facility] Hgb was 8.5  ECHO [facility] 11-18-19 Definity EF 60-65% mod severe LVH, mild MR, MAC, LAE, mod OUMAR PASP 45    CAD   S/p PCI May 2019 [facility]  Lexiscan 8-7-2020 no reversible ischemia, EF 47%, apical thinning (LVH). Review of Systems:    [] Patient unable to provide secondary to condition    [x] All systems negative, except as checked below.   Constitutional:    []Weight Change  []Fever   []Chills   []Night Sweats  []Fatigue  []Malaise  []____  ENT/Mouth: []Hearing Changes  []Ear Pain  []Nasal Congestion   []Sinus Pain  []Hoarseness   []Sore throat  []Rhinorrhea  []Swallowing Difficulty  []____  Eyes:    []Eye Pain  []Swelling  []Redness  []Foreign Body  []Discharge  []Vision Changes  []____  Cardiovascular:    []Chest Pain  []SOB  []PND  []FERNANDO  []Orthopnea  []Claudication  []Edema   []Palpitations  []____  Respiratory:    []Cough  []Sputum  []Wheezing,  [x]SOB  []Hemoptysis  []____  Gastrointestinal:    []Nausea  []Vomiting  []Diarrhea  []Constipation  []Pain  []Heartburn  []Anorexia  []Dysphagia  []Hematochezia  []Melena,  []Jaundice  []____  Genitourinary:    []Dysuria  []Urinary Frequency  []Hematuria  []Urinary Incontinence  []Urgency  []Flank Pain  []Hesitancy  []____  Musculoskeletal:    []Arthralgias  []Myalgias  []Joint Swelling  []Joint Stiffness  []Back Pain  []Neck Pain  []____  Skin:    []Skin Lesions  []Pruritis  []Hair Changes  []Skin rashes  []____  Neuro:    []Weakness  []Numbness  []Paresthesias  []Loss of Consciousness  []Syncope   []Dizziness  []Headache  []Coordination Changes  []Recent Falls  []____  Psych:    []Anxiety/Depression  []Insomnia  []Memory Changes  []Violence/Abuse Hx.  []____  Heme/Lymph:    []Bruising  []Bleeding  []Lymphadenopathy  []____  Endocrine:    []Polyuria  []Polydipsia  []Temperature Intolerance  []____         Previous treatment/evaluation includes   PCI with stents, Echocardiogram - TTE/KORI, Stress echocardiogram  Cardiac risk factors:   dyslipidemia, obesity, sedentary life style, male gender, hypertension. Past Medical History:   Diagnosis Date    CAD (coronary artery disease) 05/2019    S/p PCI May 2019 [facility]     NUKE 8/7/2020 =Lexiscan pharmacologic stress test shows normal perfusion , noted apical thinning with an EF of 47  %.      Diverticulosis, sigmoid 8/2011    ED (erectile dysfunction) of organic origin     Hypertension     LVH (left ventricular hypertrophy) due to hypertensive disease     PAF (paroxysmal atrial fibrillation) ([facility] 11/18/2019    [facility] admit nov 2019 no OAC due to anemia    Postherpetic neuralgia     Prostate cancer ([facility]     Sebaceous cyst 4/1/2014    Shingles 02/2019     Past Surgical History:   Procedure Laterality Date    ENDOSCOPY, COLON, DIAGNOSTIC  >= 8-10years ago    HX BACK SURGERY  1968 [id number]    HX CARPAL TUNNEL RELEASE  4/08    right,left    HX CHOLECYSTECTOMY      HX HEART CATHETERIZATION  05/2019    HX HERNIA REPAIR      HX PROSTATECTOMY  5/06     Current Facility-Administered Medications   Medication Dose Route Frequency    aspirin delayed-release tablet 81 mg  81 mg Oral DAILY    atorvastatin (LIPITOR) tablet 10 mg  10 mg Oral DAILY    gabapentin (NEURONTIN) capsule 400 mg  400 mg Oral QHS    losartan (COZAAR) tablet 100 mg  100 mg Oral DAILY    pantoprazole (PROTONIX) tablet 40 mg  40 mg Oral DAILY    sodium chloride (NS) flush 5-40 mL  5-40 mL IntraVENous Q8H    sodium chloride (NS) flush 5-40 mL  5-40 mL IntraVENous PRN    acetaminophen (TYLENOL) tablet 650 mg  650 mg Oral Q6H PRN    Or    acetaminophen (TYLENOL) suppository 650 mg  650 mg Rectal Q6H PRN    polyethylene glycol (MIRALAX) packet 17 g  17 g Oral DAILY PRN    promethazine (PHENERGAN) tablet 12.5 mg  12.5 mg Oral Q6H PRN    Or    ondansetron (ZOFRAN) injection 4 mg  4 mg IntraVENous Q6H PRN    furosemide (LASIX) injection 40 mg  40 mg IntraVENous BID     Current Outpatient Medications   Medication Sig    gabapentin (NEURONTIN) 400 mg capsule Take 1 Cap by mouth nightly. Max Daily Amount: 400 mg.  pyridostigmine (MESTINON) 60 mg tablet     torsemide (DEMADEX) 20 mg tablet TAKE 1 TABLET BY MOUTH AS NEEDED FOR SWELLING AND SHORTNESS OF BREATH    losartan (COZAAR) 100 mg tablet Take 1 Tab by mouth daily.  atorvastatin (LIPITOR) 10 mg tablet Take 1 Tab by mouth daily.  potassium chloride SR (K-TAB) 20 mEq tablet Take 2 Tabs by mouth every Monday, Wednesday, Friday.     aspirin delayed-release 81 mg tablet Take 81 mg by mouth daily.  pantoprazole (PROTONIX) 40 mg tablet Take 40 mg by mouth daily.  albuterol (PROVENTIL HFA, VENTOLIN HFA, PROAIR HFA) 90 mcg/actuation inhaler Take 1-2 Puffs by inhalation every four (4) hours as needed for Wheezing.  cyanocobalamin (VITAMIN B-12) 1,000 mcg tablet Take 1,000 mcg by mouth daily. No Known Allergies   Family History   Problem Relation Age of Onset   Fay Abreu Cancer Mother         pancreatic   Fay Abreu Diabetes Father     Stroke Father     Diabetes Sister     Hypertension Sister     Other Sister         Open heart surgery     Diabetes Brother     Hypertension Brother     Hypertension Brother     Asthma Daughter     Heart Attack Daughter     Other Daughter         hip replacement x 2      Social History     Socioeconomic History    Marital status:      Spouse name: Not on file    Number of children: Not on file    Years of education: Not on file    Highest education level: Not on file   Tobacco Use    Smoking status: Never Smoker    Smokeless tobacco: Never Used   Substance and Sexual Activity    Alcohol use: No    Drug use: No    Sexual activity: Not Currently     Partners: Female       Objective:    Physical Exam    Vitals:   Vitals:    04/19/21 2154 04/20/21 0220 04/20/21 0642   BP: (!) 189/95 (!) 141/96 (!) 152/94   Pulse: 72 68 70   Resp: 18  18   Temp: 98 °F (36.7 °C)     SpO2: 95%  90%       General:    Alert, cooperative, no distress, appears stated age. Neck:   Supple, no carotid bruit and no JVD. Back:     Symmetric, normal curvature. Lungs:     B/L crackles to mid to auscultation bilaterally. Heart[de-identified]    Regular rate and rhythm, S1, S2 normal, no murmur, click, rub or gallop. Abdomen:     Soft, non-tender. Bowel sounds normal.    Extremities:   Extremities normal, atraumatic, no cyanosis or edema.    Vascular:   Pulses - 2+ radials   Skin:   Skin color normal. No rashes or lesions   Neurologic:   CN II-XII grossly intact. Telemetry: AFIB    ECG:   EKG Results     Procedure 720 Value Units Date/Time    EKG, 12 LEAD, INITIAL [[id number]] Collected: 04/19/21 2223    Order Status: Completed Updated: 04/19/21 2226     Ventricular Rate 70 BPM      Atrial Rate 65 BPM      QRS Duration 106 ms      Q-T Interval 412 ms      QTC Calculation (Bezet) 444 ms      Calculated R Axis -59 degrees      Calculated T Axis 33 degrees      Diagnosis --     Atrial fibrillation  Left axis deviation  When compared with ECG of 13-DEC-2019 08:11,  Criteria for Septal infarct are no longer present  Nonspecific T wave abnormality, improved in Inferior leads              Data Review:     Radiology:   XR Results (most recent):  Results from [facility] encounter on 04/19/21   XR CHEST PA LAT    Narrative Clinical history: sob  INDICATION:   sob  COMPARISON: 12/12/2019    FINDINGS:   PA and lateral views of the chest are obtained. The cardiopericardial silhouette is dominant. Mild interstitial opacities are  demonstrated. There is no pleural effusion, pneumothorax or focal consolidation  present. Impression Cardiomegaly and minimal interstitial pulmonary edema. Recent Labs     04/20/21  0749 04/19/21  2234   TROIQ 0.09* 0.08*     Recent Labs     04/20/21  0749 04/19/21  2234   * 142   K 3.0* 3.8   * 108   CO2 27 28   BUN 19 23*   CREA 0.83 0.96    134*   PHOS 1.6*  --    CA 8.3* 9.5     Recent Labs     04/19/21  2234   WBC 7.1   HGB 12.3   HCT 41.0   *     Recent Labs     04/19/21  2234   AP 98     No results for input(s): CHOL, LDLC in the last 72 hours. No lab exists for component: TGL, HDLC,  HBA1C  No results for input(s): CRP, TSH, TSHEXT in the last 72 hours. No lab exists for component: ESR    Zhane Slight.  Bridgett Blanton MD         [facility] [address] [place] [address],[address]     [facility]     [phone]    Elizabeth Gibbs Jessica Ko MD

## 2021-04-20 NOTE — H&P
History & Physical    Primary Care Provider: Park Montalvo MD  Source of Information: Patient and chart review    History of Presenting Illness:   Lata Gutierrez is a 82 y.o. male: History of postherpetic neuralgia, dyslipidemia, hypertension, CAD, history of prostate cancer, paroxysmal atrial fibrillation not anticoagulated due to chronic anemia, chronic dyspnea, BPPV who presents to ER with chief complaint of shortness of breath and dizziness. Wife was present at bedside states patient has been mildly confused over the last 2 days. Patient is seen and examined at bedside is alert and oriented x3 and does not appear confused. He is well aware of all his current medications, specialist follow-up and full medical history. He endorses a chronic history of dyspnea from which she has seen pulmonology and cardiology in the past.  He states he has never been diagnosed with overt heart failure but has had pulmonary function tests which have in the past showed mild restrictive pattern. He does state that over the last month he has had intermittent episodes of shortness of breath when he exerts himself. States he currently has no shortness of breath though he did at time of EMS activation. Currently, he has no chest pain, palpitations, nausea or vomiting. He has had no recent travel or sick contacts. The patient denies any fever, chills, chest pain, cough, congestion, recent illness, palpitations, or dysuria. On ER presentation, vitals remarkable for BP of 189/95. Labs remarkable for BNP of 17,963,  Head CT shows no acute changes. Chest x-ray showed mild interstitial pulmonary edema. Review of Systems:  A comprehensive review of systems was negative except for that written in the History of Present Illness.      Past Medical History:   Diagnosis Date    CAD (coronary artery disease) 05/2019    S/p PCI May 2019 [facility]     NUKE 8/7/2020 =Lexiscan pharmacologic stress test shows normal perfusion , noted apical thinning with an EF of 47  %.  Diverticulosis, sigmoid 8/2011    ED (erectile dysfunction) of organic origin     Hypertension     LVH (left ventricular hypertrophy) due to hypertensive disease     PAF (paroxysmal atrial fibrillation) ([facility] [address] 11/18/2019    [facility] admit nov 2019 no OAC due to anemia    Postherpetic neuralgia     Prostate cancer ([facility] [address]     Sebaceous cyst 4/1/2014    Shingles 02/2019      Past Surgical History:   Procedure Laterality Date    ENDOSCOPY, COLON, DIAGNOSTIC  >= 8-10years ago   [facility] SURGERY  1968 [id number]    HX CARPAL TUNNEL RELEASE  4/08    right,left    HX CHOLECYSTECTOMY      HX HEART CATHETERIZATION  05/2019    HX HERNIA REPAIR      HX PROSTATECTOMY  5/06     Prior to Admission medications    Medication Sig Start Date End Date Taking? Authorizing Provider   gabapentin (NEURONTIN) 400 mg capsule Take 1 Cap by mouth nightly. Max Daily Amount: 400 mg. 3/19/21   Cari Gramajo MD   pyridostigmine (MESTINON) 60 mg tablet  12/7/20   Provider, Historical   torsemide (DEMADEX) 20 mg tablet TAKE 1 TABLET BY MOUTH AS NEEDED FOR SWELLING AND SHORTNESS OF BREATH 1/17/21   Richie Dietrich MD   losartan (COZAAR) 100 mg tablet Take 1 Tab by mouth daily. 10/21/20   Gaby Aranda MD   atorvastatin (LIPITOR) 10 mg tablet Take 1 Tab by mouth daily. 8/13/20   Italo Rasheed MD   potassium chloride SR (K-TAB) 20 mEq tablet Take 2 Tabs by mouth every Monday, Wednesday, Friday. 4/8/20   Richie Dietrich MD   aspirin delayed-release 81 mg tablet Take 81 mg by mouth daily. Provider, Historical   pantoprazole (PROTONIX) 40 mg tablet Take 40 mg by mouth daily. 11/21/19   Provider, Historical   albuterol (PROVENTIL HFA, VENTOLIN HFA, PROAIR HFA) 90 mcg/actuation inhaler Take 1-2 Puffs by inhalation every four (4) hours as needed for Wheezing.  10/30/19   Richie Dietrich MD   cyanocobalamin (VITAMIN B-12) 1,000 mcg tablet Take 1,000 mcg by mouth daily.    Provider, Historical     No Known Allergies   Family History   Problem Relation Age of Onset    Cancer Mother         pancreatic    Diabetes Father     Stroke Father     Diabetes Sister     Hypertension Sister     Other Sister         Open heart surgery     Diabetes Brother     Hypertension Brother     Hypertension Brother     Asthma Daughter     Heart Attack Daughter     Other Daughter         hip replacement x 2        SOCIAL HISTORY:  Patient resides:  Independently x   Assisted Living    SNF    With family care       Smoking history:   None x   Former    Chronic      Alcohol history:   None x   Social    Chronic      Ambulates:   Independently x   w/cane    w/walker    w/wc    CODE STATUS:  DNR    Full x   Other      Objective:     Physical Exam:     Visit Vitals  BP (!) 141/96   Pulse 68   Temp 98 °F (36.7 °C)   Resp 18   SpO2 95%           General:  Alert, cooperative, no distress, appears stated age. Head:  Normocephalic, without obvious abnormality, atraumatic. Eyes:  Conjunctivae/corneas clear. PERRL, EOMs intact. Nose: Nares normal. Septum midline. Mucosa normal.        Neck: Supple, symmetrical, trachea midline, no carotid bruit and no JVD. Lungs:   Clear to auscultation bilaterally. Chest wall:  No tenderness or deformity. Heart:  Regular rate and rhythm, S1, S2 normal, no murmur, click, rub or gallop. Abdomen:   Soft, non-tender. Bowel sounds normal. No masses,  No organomegaly. Extremities: Extremities normal, atraumatic, no cyanosis or edema. Pulses: 2+ and symmetric all extremities. Skin: Skin color, texture, turgor normal. No rashes or lesions   Neurologic: CNII-XII intact. EKG: Atrial fibrillation. Rate controlled.   Data Review:     Recent Days:  Recent Labs     04/19/21  2234   WBC 7.1   HGB 12.3   HCT 41.0   *     Recent Labs     04/19/21  2234      K 3.8      CO2 28   *   BUN 23*   CREA 0.96   CA 9.5   ALB 3.8 ALT 22     No results for input(s): PH, PCO2, PO2, HCO3, FIO2 in the last 72 hours. 24 Hour Results:  Recent Results (from the past 24 hour(s))   EKG, 12 LEAD, INITIAL    Collection Time: 04/19/21 10:23 PM   Result Value Ref Range    Ventricular Rate 70 BPM    Atrial Rate 65 BPM    QRS Duration 106 ms    Q-T Interval 412 ms    QTC Calculation (Bezet) 444 ms    Calculated R Axis -59 degrees    Calculated T Axis 33 degrees    Diagnosis       Atrial fibrillation  Left axis deviation  When compared with ECG of 13-DEC-2019 08:11,  Criteria for Septal infarct are no longer present  Nonspecific T wave abnormality, improved in Inferior leads     METABOLIC PANEL, COMPREHENSIVE    Collection Time: 04/19/21 10:34 PM   Result Value Ref Range    Sodium 142 136 - 145 mmol/L    Potassium 3.8 3.5 - 5.1 mmol/L    Chloride 108 97 - 108 mmol/L    CO2 28 21 - 32 mmol/L    Anion gap 6 5 - 15 mmol/L    Glucose 134 (H) 65 - 100 mg/dL    BUN 23 (H) 6 - 20 MG/DL    Creatinine 0.96 0.70 - 1.30 MG/DL    BUN/Creatinine ratio 24 (H) 12 - 20      GFR est AA >60 >60 ml/min/1.73m2    GFR est non-AA >60 >60 ml/min/1.73m2    Calcium 9.5 8.5 - 10.1 MG/DL    Bilirubin, total 1.2 (H) 0.2 - 1.0 MG/DL    ALT (SGPT) 22 12 - 78 U/L    AST (SGOT) 27 15 - 37 U/L    Alk.  phosphatase 98 45 - 117 U/L    Protein, total 7.5 6.4 - 8.2 g/dL    Albumin 3.8 3.5 - 5.0 g/dL    Globulin 3.7 2.0 - 4.0 g/dL    A-G Ratio 1.0 (L) 1.1 - 2.2     CBC WITH AUTOMATED DIFF    Collection Time: 04/19/21 10:34 PM   Result Value Ref Range    WBC 7.1 4.1 - 11.1 K/uL    RBC 4.24 4.10 - 5.70 M/uL    HGB 12.3 12.1 - 17.0 g/dL    HCT 41.0 36.6 - 50.3 %    MCV 96.7 80.0 - 99.0 FL    MCH 29.0 26.0 - 34.0 PG    MCHC 30.0 30.0 - 36.5 g/dL    RDW 16.4 (H) 11.5 - 14.5 %    PLATELET 182 (L) 391 - 400 K/uL    MPV 11.4 8.9 - 12.9 FL    NRBC 0.0 0  WBC    ABSOLUTE NRBC 0.00 0.00 - 0.01 K/uL    NEUTROPHILS 75 32 - 75 %    LYMPHOCYTES 13 12 - 49 %    MONOCYTES 9 5 - 13 %    EOSINOPHILS 2 0 - 7 %    BASOPHILS 0 0 - 1 %    IMMATURE GRANULOCYTES 0 0.0 - 0.5 %    ABS. NEUTROPHILS 5.4 1.8 - 8.0 K/UL    ABS. LYMPHOCYTES 1.0 0.8 - 3.5 K/UL    ABS. MONOCYTES 0.7 0.0 - 1.0 K/UL    ABS. EOSINOPHILS 0.1 0.0 - 0.4 K/UL    ABS. BASOPHILS 0.0 0.0 - 0.1 K/UL    ABS. IMM. GRANS. 0.0 0.00 - 0.04 K/UL    DF AUTOMATED     TROPONIN I    Collection Time: 04/19/21 10:34 PM   Result Value Ref Range    Troponin-I, Qt. 0.08 (H) <0.05 ng/mL   NT-PRO BNP    Collection Time: 04/19/21 10:34 PM   Result Value Ref Range    NT pro-BNP 17,963 (H) <450 PG/ML         Imaging:     Assessment:     Alvaro Escalona is a 82 y.o. male: History of postherpetic neuralgia, dyslipidemia, hypertension, CAD, history of prostate cancer, paroxysmal atrial fibrillation not anticoagulated due to chronic anemia, chronic dyspnea, BPPV who is admitted for decompensated heart failure. Plan:       Decompensated heart failure  -Patient is without previous documentation of CHF  -Pulmonary edema, dyspnea and interstitial pulmonary edema consistent with CHF  -Admit to and monitor on telemetry  -Diuresis with Lasix 40 mg IV twice daily  -Strict I's and O's with daily weights  -Monitor and replete electrolytes  -Repeat echocardiogram in a.m.  -Cardiology on consult. Appreciate recs    Elevated Trop / NSTEMI  -Trop leak to 0.08  -Likely demand ischemia in setting of CHF exacerbation  -Management of CHF as outlined above  -Serial troponins  -Echo in a.m. Dizziness  -Likely BPPV.   CT head unremarkable  -Continue as needed meclizine    Hypertension  -Continue home losartan    CAD S/P PCI  -Continue home statin and aspirin    Paroxysmal atrial fibrillation  -Asymptomatic and rate controlled  -Not anticoagulated due to anemia history            FEN/GI -  PO hydration  Activity - As tolerated  DVT prophylaxis - SCDs  GI prophylaxis -  NI  Disposition - Home    CODE STATUS:  Full code       Signed By: Alisa Johnson MD     April 20, 2021

## 2021-04-20 NOTE — ED PROVIDER NOTES
HPI     28-year-old male with a history of coronary artery disease, congestive heart failure, hypertension, paroxysmal A. fib, shingles, diverticulosis, history of prostate cancer, presents the emergency department with several complaints. Patient's wife states he has been off all day seemingly more confused and falling. He has a long history of vertigo but since last night he has not been able to walk and has had dizziness. They tried their normal meclizine without relief. He denies any change in vision or slurred speech. There is no focal weakness or numbness. He has had chills all day. He has had chest pain and shortness of breath off and on for a month with a cough for several months. He has been vaccinated for Covid having his second vaccine 3 weeks ago. He did not have COVID-19 during this pandemic. Patient states he feels like the room is spinning and like he is going to pass out. He did hit his head earlier this morning when he fell but has not lost consciousness. He is not currently on any blood thinning medications. He denies any abdominal pain nausea vomiting or diarrhea. He denies any urinary frequency or burning. He has not had any increased pain or swelling in his legs. He currently denies chest pain    Past Medical History:   Diagnosis Date    CAD (coronary artery disease) 05/2019    S/p PCI May 2019 [facility]     NUKE 8/7/2020 =Lexiscan pharmacologic stress test shows normal perfusion , noted apical thinning with an EF of 47  %.      Diverticulosis, sigmoid 8/2011    ED (erectile dysfunction) of organic origin     Hypertension     LVH (left ventricular hypertrophy) due to hypertensive disease     PAF (paroxysmal atrial fibrillation) ([address] 11/18/2019    [facility] admit nov 2019 no OAC due to anemia    Postherpetic neuralgia     Prostate cancer ([address]     Sebaceous cyst 4/1/2014    Shingles 02/2019       Past Surgical History:   Procedure Laterality Date    ENDOSCOPY, COLON, DIAGNOSTIC  >= 8-10years ago   [facility] SURGERY  [id number]    HX CARPAL TUNNEL RELEASE  4/08    right,left    HX CHOLECYSTECTOMY      HX HEART CATHETERIZATION  05/2019    HX HERNIA REPAIR      HX PROSTATECTOMY  5/06         Family History:   Problem Relation Age of Onset    Cancer Mother         pancreatic   Ryan Garcia Diabetes Father     Stroke Father     Diabetes Sister     Hypertension Sister     Other Sister         Open heart surgery     Diabetes Brother     Hypertension Brother     Hypertension Brother     Asthma Daughter     Heart Attack Daughter     Other Daughter         hip replacement x 2       Social History     Socioeconomic History    Marital status:      Spouse name: Not on file    Number of children: Not on file    Years of education: Not on file    Highest education level: Not on file   Occupational History    Not on file   Social Needs    Financial resource strain: Not on file    Food insecurity     Worry: Not on file     Inability: Not on file    Transportation needs     Medical: Not on file     Non-medical: Not on file   Tobacco Use    Smoking status: Never Smoker    Smokeless tobacco: Never Used   Substance and Sexual Activity    Alcohol use: No    Drug use: No    Sexual activity: Not Currently     Partners: Female   Lifestyle    Physical activity     Days per week: Not on file     Minutes per session: Not on file    Stress: Not on file   Relationships    Social connections     Talks on phone: Not on file     Gets together: Not on file     Attends Yarsani service: Not on file     Active member of club or organization: Not on file     Attends meetings of clubs or organizations: Not on file     Relationship status: Not on file    Intimate partner violence     Fear of current or ex partner: Not on file     Emotionally abused: Not on file     Physically abused: Not on file     Forced sexual activity: Not on file   Other Topics Concern    Not on file   Social History Narrative  Not on file         ALLERGIES: Patient has no known allergies. Review of Systems   Constitutional: Positive for chills. Negative for fever. HENT: Negative for ear pain. Eyes: Negative for visual disturbance. Respiratory: Positive for cough, chest tightness and shortness of breath. Cardiovascular: Positive for chest pain. Negative for palpitations and leg swelling. Gastrointestinal: Negative for abdominal pain, blood in stool, nausea and vomiting. Endocrine: Negative for polyuria. Genitourinary: Negative for dysuria. Musculoskeletal: Positive for gait problem. Neurological: Positive for dizziness and headaches. Negative for syncope, facial asymmetry and speech difficulty. Psychiatric/Behavioral: Negative for dysphoric mood. Vitals:    04/19/21 2154   BP: (!) 189/95   Pulse: 72   Resp: 18   Temp: 98 °F (36.7 °C)   SpO2: 95%            Physical Exam  Constitutional:       General: He is not in acute distress. Appearance: He is well-developed. HENT:      Head: Normocephalic and atraumatic. Mouth/Throat:      Pharynx: No oropharyngeal exudate. Eyes:      General: No scleral icterus. Right eye: No discharge. Left eye: No discharge. Pupils: Pupils are equal, round, and reactive to light. Neck:      Musculoskeletal: Normal range of motion and neck supple. Vascular: No JVD. Cardiovascular:      Rate and Rhythm: Normal rate. Rhythm irregular. Heart sounds: Normal heart sounds. No murmur. Pulmonary:      Effort: Pulmonary effort is normal. No respiratory distress. Breath sounds: Normal breath sounds. No stridor. No wheezing or rales. Chest:      Chest wall: No tenderness. Abdominal:      General: Bowel sounds are normal. There is no distension. Palpations: Abdomen is soft. There is no mass. Tenderness: There is no abdominal tenderness. There is no guarding or rebound. Musculoskeletal: Normal range of motion.    Skin: General: Skin is warm and dry. Capillary Refill: Capillary refill takes less than 2 seconds. Findings: No rash. Neurological:      Mental Status: He is oriented to person, place, and time. Psychiatric:         Behavior: Behavior normal.         Thought Content: Thought content normal.         Judgment: Judgment normal.          MDM  Number of Diagnoses or Management Options  35 minutes of critical care time       Procedures    ED EKG interpretation:  Rhythm: atrial fib; and irregular. Rate (approx.): 70; Axis: left axis deviation; P wave: ; QRS interval: prolonged; ST/T wave: non-specific changes; This EKG was interpreted by Ban Dent MD,ED Provider. Head CT is negative. Chest x-ray is concerning for congestive heart failure. Patient is weak and dizzy and cannot ambulate with frequent falls. Troponin is elevated at 0.08 - he is not currently having chest pain and no stemi on EKG>   Will diuresis and admit to the hospital for further work-up. Perfect Serve Consult for Admission  1:16 AM    ED Room Number: ER16/16  Patient Name and age:  Gene García 82 y.o.  male  Working Diagnosis:   1. Acute on chronic congestive heart failure, unspecified heart failure type ([address]    2. Vertigo        COVID-19 Suspicion:  no  Sepsis present:  no  Reassessment needed: no  Code Status:  Full Code  Readmission: no  Isolation Requirements:  no  Recommended Level of Care:  telemetry  Department:[facility] Adult ED - 21   Other: 82-year-old male with coronary artery disease, congestive heart failure, hypertension, paroxysmal A. fib, presents emergency department with chest pain shortness of breath which is progressively gotten worse with frequent falls and inability to walk currently. Head CT is negative. Neuro exam is nonfocal.  He does have a history of vertigo. Chest x-ray is consistent with failure. I have ordered IV Lasix.   His troponin is mildly elevated he is not currently having any chest pain.

## 2021-04-20 NOTE — PROGRESS NOTES
TRANSFER - IN REPORT:    Verbal report received from Sandrita(name) on [facility]  being received from ED(unit) for routine progression of care      Report consisted of patients Situation, Background, Assessment and   Recommendations(SBAR). Information from the following report(s) SBAR, Kardex, Intake/Output, MAR and Recent Results was reviewed with the receiving nurse. Opportunity for questions and clarification was provided. Assessment completed upon patients arrival to unit and care assumed.

## 2021-04-20 NOTE — PROGRESS NOTES
[address] [facility] Adult  Hospitalist Group                                                                                          Hospitalist Progress Note  Gaurav Granger MD  Answering service: [phone] OR [phone] from in house phone              Progress Note    Patient: Fernando Fontana MRN: [id number]  SSN: [id number]    YOB: 1935  Age: 82 y.o. Sex: male      Admit Date: 4/19/2021    LOS: 0 days     Subjective:     Patient presents with diastolic congestive heart failure. Starting on diuresis. Being followed by cardiology. Patient is not requiring supplemental oxygen. Objective:     Vitals:    04/20/21 1145 04/20/21 1200 04/20/21 1230 04/20/21 1407   BP: (!) 150/96 (!) 151/92 (!) 150/95    Pulse: (!) 58 (!) 59 (!) 57    Resp: 15 14 14    Temp:       SpO2: 93%      Weight:    81.1 kg (178 lb 12.7 oz)        Intake and Output:  Current Shift: No intake/output data recorded. Last three shifts: 04/18 1901 - 04/20 0700  In: -   Out: 800 [Urine:800]    Physical Exam:   GENERAL: alert, cooperative, no distress, appears stated age  THROAT & NECK: normal and no erythema or exudates noted. LUNG: clear to auscultation bilaterally  HEART: regular rate and rhythm, S1, S2 normal, no murmur, click, rub or gallop  ABDOMEN: soft, non-tender. Bowel sounds normal. No masses,  no organomegaly  EXTREMITIES:  extremities normal, atraumatic, no cyanosis or edema  SKIN: no rash or abnormalities  NEUROLOGIC: AOx3. PSYCHIATRIC: non focal    Lab/Data Review: All lab results for the last 24 hours reviewed.      Recent Results (from the past 24 hour(s))   EKG, 12 LEAD, INITIAL    Collection Time: 04/19/21 10:23 PM   Result Value Ref Range    Ventricular Rate 70 BPM    Atrial Rate 65 BPM    QRS Duration 106 ms    Q-T Interval 412 ms    QTC Calculation (Bezet) 444 ms    Calculated R Axis -59 degrees    Calculated T Axis 33 degrees    Diagnosis       Atrial fibrillation  Left axis deviation  When compared with ECG of 13-DEC-2019 08:11,  Criteria for Septal infarct are no longer present  Nonspecific T wave abnormality, improved in Inferior leads     METABOLIC PANEL, COMPREHENSIVE    Collection Time: 04/19/21 10:34 PM   Result Value Ref Range    Sodium 142 136 - 145 mmol/L    Potassium 3.8 3.5 - 5.1 mmol/L    Chloride 108 97 - 108 mmol/L    CO2 28 21 - 32 mmol/L    Anion gap 6 5 - 15 mmol/L    Glucose 134 (H) 65 - 100 mg/dL    BUN 23 (H) 6 - 20 MG/DL    Creatinine 0.96 0.70 - 1.30 MG/DL    BUN/Creatinine ratio 24 (H) 12 - 20      GFR est AA >60 >60 ml/min/1.73m2    GFR est non-AA >60 >60 ml/min/1.73m2    Calcium 9.5 8.5 - 10.1 MG/DL    Bilirubin, total 1.2 (H) 0.2 - 1.0 MG/DL    ALT (SGPT) 22 12 - 78 U/L    AST (SGOT) 27 15 - 37 U/L    Alk. phosphatase 98 45 - 117 U/L    Protein, total 7.5 6.4 - 8.2 g/dL    Albumin 3.8 3.5 - 5.0 g/dL    Globulin 3.7 2.0 - 4.0 g/dL    A-G Ratio 1.0 (L) 1.1 - 2.2     CBC WITH AUTOMATED DIFF    Collection Time: 04/19/21 10:34 PM   Result Value Ref Range    WBC 7.1 4.1 - 11.1 K/uL    RBC 4.24 4.10 - 5.70 M/uL    HGB 12.3 12.1 - 17.0 g/dL    HCT 41.0 36.6 - 50.3 %    MCV 96.7 80.0 - 99.0 FL    MCH 29.0 26.0 - 34.0 PG    MCHC 30.0 30.0 - 36.5 g/dL    RDW 16.4 (H) 11.5 - 14.5 %    PLATELET 924 (L) 621 - 400 K/uL    MPV 11.4 8.9 - 12.9 FL    NRBC 0.0 0  WBC    ABSOLUTE NRBC 0.00 0.00 - 0.01 K/uL    NEUTROPHILS 75 32 - 75 %    LYMPHOCYTES 13 12 - 49 %    MONOCYTES 9 5 - 13 %    EOSINOPHILS 2 0 - 7 %    BASOPHILS 0 0 - 1 %    IMMATURE GRANULOCYTES 0 0.0 - 0.5 %    ABS. NEUTROPHILS 5.4 1.8 - 8.0 K/UL    ABS. LYMPHOCYTES 1.0 0.8 - 3.5 K/UL    ABS. MONOCYTES 0.7 0.0 - 1.0 K/UL    ABS. EOSINOPHILS 0.1 0.0 - 0.4 K/UL    ABS. BASOPHILS 0.0 0.0 - 0.1 K/UL    ABS. IMM.  GRANS. 0.0 0.00 - 0.04 K/UL    DF AUTOMATED     TROPONIN I    Collection Time: 04/19/21 10:34 PM   Result Value Ref Range    Troponin-I, Qt. 0.08 (H) <0.05 ng/mL   NT-PRO BNP    Collection Time: 04/19/21 10:34 PM   Result Value Ref Range    NT pro-BNP 17,963 (H) <597 PG/ML   METABOLIC PANEL, BASIC    Collection Time: 04/20/21  7:49 AM   Result Value Ref Range    Sodium 146 (H) 136 - 145 mmol/L    Potassium 3.0 (L) 3.5 - 5.1 mmol/L    Chloride 112 (H) 97 - 108 mmol/L    CO2 27 21 - 32 mmol/L    Anion gap 7 5 - 15 mmol/L    Glucose 100 65 - 100 mg/dL    BUN 19 6 - 20 MG/DL    Creatinine 0.83 0.70 - 1.30 MG/DL    BUN/Creatinine ratio 23 (H) 12 - 20      GFR est AA >60 >60 ml/min/1.73m2    GFR est non-AA >60 >60 ml/min/1.73m2    Calcium 8.3 (L) 8.5 - 10.1 MG/DL   TROPONIN I    Collection Time: 04/20/21  7:49 AM   Result Value Ref Range    Troponin-I, Qt. 0.09 (H) <0.05 ng/mL   MAGNESIUM    Collection Time: 04/20/21  7:49 AM   Result Value Ref Range    Magnesium 1.9 1.6 - 2.4 mg/dL   PHOSPHORUS    Collection Time: 04/20/21  7:49 AM   Result Value Ref Range    Phosphorus 1.6 (L) 2.6 - 4.7 MG/DL        Imaging:    Xr Chest Pa Lat    Result Date: 4/19/2021  Clinical history: sob INDICATION:   sob COMPARISON: 12/12/2019 FINDINGS: PA and lateral views of the chest are obtained. The cardiopericardial silhouette is dominant. Mild interstitial opacities are demonstrated. There is no pleural effusion, pneumothorax or focal consolidation present. Cardiomegaly and minimal interstitial pulmonary edema. Ct Head Wo Cont    Result Date: 4/20/2021  Indication:  Left arm drift Comparison: CT earlier today Findings: 5 mm axial images were obtained from the skull base through the vertex. CT dose reduction was achieved through the use of a standardized protocol tailored for this examination and automatic exposure control for dose modulation. The ventricles and cortical sulci are prominent, compatible with age related volume loss. There is no evidence of intracranial hemorrhage, mass, mass effect, or acute infarct. There is periventricular white matter disease. No extra-axial fluid collections are seen.  There are chronic bilateral medial occipital lobe infarctions. The visualized paranasal sinuses and mastoid air cells are clear. The orbital structures are unremarkable. No osseous abnormalities are seen. 1. No evidence of acute infarct or intracranial hemorrhage. 2. Periventricular white matter disease is likely secondary to chronic small vessel ischemic changes. 3. Chronic bilateral occipital lobe infarctions. Ct Head Wo Cont    Result Date: 4/20/2021  CLINICAL HISTORY: fall/dizziness, headache INDICATION: fall/dizziness, headache COMPARISON: 12/13/2019. CT dose reduction was achieved through use of a standardized protocol tailored for this examination and automatic exposure control for dose modulation. TECHNIQUE: Serial axial images with a collimation of 5 mm were obtained from the skull base through the vertex  FINDINGS: There is sulcal and ventricular prominence. Confluent periventricular and scattered foci of hypodensity in the cerebral white matter. There is no evidence of an acute infarction, hemorrhage, or mass-effect. There is no evidence of midline shift or hydrocephalus. Posterior fossa structures are unremarkable. No extra-axial collections are seen. Mastoid air cells are well pneumatized and clear. Remote right occipital infarct. No acute intracranial process. Imaging findings consistent with mild chronic microvascular ischemic change. There is a moderate degree of cerebral atrophy.          Assessment and Plan:     Congestive heart failure  -Acute on chronic diastolic congestive heart failure likely induced by uncontrolled hypertension and dietary indiscretion  -Last echo 8/20 with EF of 57%, cardiology does not recommend repeating echo  -Diuresing with Lasix 40 mg IV twice daily  -Cardiology following    Abnormal troponin  -Likely demand due to CHF  -No clinical signs or symptoms of acute coronary syndrome    Dizziness  -Initial head CT negative on admission  -Nursing reporting left-sided weakness of unknown duration  -Repeated head CT, will follow    Atrial fibrillation  -Persistent atrial fibrillation, rate controlled  -Not on anticoagulation due to chronic anemia    Hypertension  -Uncontrolled hypertension  -Resuming Cozaar patient takes at home along with Lasix  -Further medication adjustment if blood pressure remains uncontrolled    Coronary artery disease  -S/p PCI 5/2019  -Continue aspirin and statin    Discharge disposition: Likely in the next 24 to 48 hours pending progress.     Signed By: Juan Antonio Pires MD     April 20, 2021

## 2021-04-20 NOTE — ROUTINE PROCESS
Orders received, chart reviewed and patient evaluated by occupational therapy. Pending progression with skilled acute occupational therapy, recommend: To be determined: JIMENEZ vs. Chris Barrientos Recommend with nursing ADLs with supervision/setup, OOB to chair 3x/day and toileting via beside commode with 1 assist and gait belt. Thank you for completing as able in order to maintain patient strength, endurance and independence. Full evaluation to follow.

## 2021-04-20 NOTE — ED TRIAGE NOTES
Triage: Pt arrives from home via EMS with CC of shortness of breath. He reports his breathing now feels \"relaxed\" but when he called ems he had SOB. He denies CP.  Denies N/V. Spiral Flap Text: The defect edges were debeveled with a #15 scalpel blade.  Given the location of the defect, shape of the defect and the proximity to free margins a spiral flap was deemed most appropriate.  Using a sterile surgical marker, an appropriate rotation flap was drawn incorporating the defect and placing the expected incisions within the relaxed skin tension lines where possible. The area thus outlined was incised deep to adipose tissue with a #15 scalpel blade.  The skin margins were undermined to an appropriate distance in all directions utilizing iris scissors.

## 2021-04-20 NOTE — PROGRESS NOTES
Problem: Mobility Impaired (Adult and Pediatric)  Goal: *Acute Goals and Plan of Care (Insert Text)  Description: FUNCTIONAL STATUS PRIOR TO ADMISSION: Patient was independent and active without use of DME though with intermittent dizziness and falls. Functional mobility started to decline about a week ago, started using a rollator walker, several falls this week. HOME SUPPORT PRIOR TO ADMISSION: The patient lived with his wife but did not require assist prior to a week ago. Physical Therapy Goals  Initiated 4/20/2021  1. Patient will move from supine to sit and sit to supine , scoot up and down, and roll side to side in bed with modified independence within 7 day(s). 2.  Patient will transfer from bed to chair and chair to bed with modified independence using the least restrictive device within 7 day(s). 3.  Patient will perform sit to stand with modified independence within 7 day(s). 4.  Patient will ambulate with modified independence for 150 feet with the least restrictive device within 7 day(s). 5.  Patient will ascend/descend 2 stairs with modified independence within 7 day(s). Outcome: Not Met   PHYSICAL THERAPY EVALUATION  Patient: Jayson Milligan (76 y.o. male)  Date: 4/20/2021  Primary Diagnosis: [facility] [I50.9]        Precautions:  Fall    ASSESSMENT  Based on the objective data described below, the patient presents with impaired functional mobility compared to baseline s/p admission with heart failure. Prior to last week, patient was independent without a device and went to lunch with family. He has a h/o dizziness (takes meclizine though has not helped this week) and h/o falls with an increase in falls this week. Today, functional mobility is limited by impaired balance, decreased coordination (LUE finger to nose impaired, mild LUE drift), generalized weakness (though RLE=LLE,  =), decreased safety awareness, inattention.   Pt denied dizziness during this therapy session. Head CT shows \"remote right occipital infarct, no acute intracranial process, mild chronic microvascular ischemic change, moderate degree of cerebral atrophy\". Current Level of Function Impacting Discharge (mobility/balance): Supine<->sit min assist, sit<->stand CGA, ambulates with RW with min assist, unsteady, impaired gait     Functional Outcome Measure: The patient scored 9/28 on the Tinetti outcome measure which is indicative of high fall risk. Other factors to consider for discharge: PLOF indep, fall history, lives w/ wife     Patient will benefit from skilled therapy intervention to address the above noted impairments. PLAN :  Recommendations and Planned Interventions: bed mobility training, transfer training, gait training, therapeutic exercises, neuromuscular re-education, patient and family training/education, and therapeutic activities      Frequency/Duration: Patient will be followed by physical therapy:  5 times a week to address goals. Recommendation for discharge: (in order for the patient to meet his/her long term goals)  To be determined pending progress. Anticipate inpatient rehab vs home with HHPT    This discharge recommendation:  Has not yet been discussed the attending provider and/or case management    IF patient discharges home will need the following DME: to be determined (TBD)         SUBJECTIVE:   Patient stated My feet got caught up in the sheets.     OBJECTIVE DATA SUMMARY:   HISTORY:    Past Medical History:   Diagnosis Date    CAD (coronary artery disease) 05/2019    S/p PCI May 2019 [facility]     NUKE 8/7/2020 =Lexiscan pharmacologic stress test shows normal perfusion , noted apical thinning with an EF of 47  %.      Diverticulosis, sigmoid 8/2011    ED (erectile dysfunction) of organic origin     Hypertension     LVH (left ventricular hypertrophy) due to hypertensive disease     PAF (paroxysmal atrial fibrillation) ([facility] 11/18/2019    [facility] admit nov 2019 no OAC due to anemia    Postherpetic neuralgia     Prostate cancer ([facility]     Sebaceous cyst 4/1/2014    Shingles 02/2019     Past Surgical History:   Procedure Laterality Date    ENDOSCOPY, COLON, DIAGNOSTIC  >= 8-10years ago    [address] [id number]    HX CARPAL TUNNEL RELEASE  4/08    right,left    HX CHOLECYSTECTOMY      HX HEART CATHETERIZATION  05/2019    HX HERNIA REPAIR      HX PROSTATECTOMY  5/06       Personal factors and/or comorbidities impacting plan of care: PMH    Home Situation  Home Environment: Private residence  # Steps to Enter: 2  One/Two Story Residence: One story  Living Alone: No  Support Systems: Spouse/Significant Other/Partner  Current DME Used/Available at Home: [employer], rollator, [employer] [place] chair, Grab bars, Cane, straight  Tub or Shower Type: Tub/Shower combination(also has walk in)    EXAMINATION/PRESENTATION/DECISION MAKING:   Critical Behavior:  Neurologic State: Alert  Orientation Level: Oriented X4        Hearing: Auditory  Auditory Impairment: None  Skin:  areas exposed intact  Edema: none noted  Range Of Motion:  AROM: Generally decreased, functional                       Strength:    Strength: Generally decreased, functional   =  RLE=LLE                    Tone & Sensation:   Tone: Normal              Sensation: Intact(light touch BLE)  Denies numbness and tingling               Coordination:  Coordination: Generally decreased, functional(LUE finger to nose impaired)  LUE drift (mild)  Vision:   Tracking: Able to track stimulus in all quadrants w/o difficulty(left beating nystagmus)  Diplopia: No  Corrective Lenses: Glasses  Functional Mobility:  Bed Mobility:  Supine to Sit: Minimum assistance  Sit to Supine: Minimum assistance  Scooting: Contact guard assistance; Other (comment)(w/ cues for technique)  Transfers:  Sit to Stand: Contact guard assistance  Stand to Sit: Contact guard assistance    Balance:   Sitting: Intact; Without support  Standing: Impaired; With support  Standing - Static: Fair;Constant support  Standing - Dynamic : Poor;Constant support  Ambulation/Gait Training:  Distance (ft): 50 Feet (ft)  Assistive Device: Walker, rolling;Gait belt  Ambulation - Level of Assistance: Minimal assistance;Assist x1;Additional time; Adaptive equipment  Gait Description (WDL): Exceptions to WDL  Gait Abnormalities: Decreased step clearance; Path deviations; Other;Scissoring  Base of Support: Narrowed  Speed/Hannah: Slow;Shuffled  Step Length: Right shortened;Left shortened  Swing Pattern: Left asymmetrical  LE's flexed, ambulates outside walker frame at times, had difficulty managing walker on turns, unsteady    Therapeutic Activity:   Received patient with urine soaked clothing. Assisted with clothing change followed by patient urinating on the floor while sitting EOB seemingly unaware he was urinating. Again, assisted with clothing change having patient stalin/ doff gown and socks with cues provided to improve safety and CGA. Pt had difficulty managing upper body clothing requiring min assist d/t LUE incoordination and donning left sock. Functional Measure:  Tinetti test:    Sitting Balance: 1  Arises: 1  Attempts to Rise: 1  Immediate Standing Balance: 0  Standing Balance: 1  Nudged: 0  Eyes Closed: 0  Turn 360 Degrees - Continuous/Discontinuous: 0  Turn 360 Degrees - Steady/Unsteady: 0  Sitting Down: 1  Balance Score: 5 Balance total score  Indication of Gait: 0  R Step Length/Height: 1  L Step Length/Height: 0  R Foot Clearance: 1  L Foot Clearance: 1  Step Symmetry: 0  Step Continuity: 0  Path: 0  Trunk: 0  Walking Time: 1  Gait Score: 4 Gait total score  Total Score: 9/28 Overall total score         Tinetti Tool Score Risk of Falls  <19 = High Fall Risk  19-24 = Moderate Fall Risk  25-28 = Low Fall Risk  Tinetti ME. Performance-Oriented Assessment of Mobility Problems in Elderly Patients. [address] 66; [id number].  (Scoring Description: PT Bulletin Feb. 10, 1993)    Older adults: Ran Goodman et al, 2009; n = 1000 [facility] elderly evaluated with ABC, RENE, ADL, and IADL)  · Mean RENE score for males aged 69-68 years = 26.21(3.40)  · Mean RENE score for females age 69-68 years = 25.16(4.30)  · Mean RENE score for males over 80 years = 23.29(6.02)  · Mean RENE score for females over 80 years = 17.20(8.32)            Physical Therapy Evaluation Charge Determination   History Examination Presentation Decision-Making   MEDIUM  Complexity : 1-2 comorbidities / personal factors will impact the outcome/ POC  MEDIUM Complexity : 3 Standardized tests and measures addressing body structure, function, activity limitation and / or participation in recreation  MEDIUM Complexity : Evolving with changing characteristics  Other outcome measures Tinetti 9/28, high fall risk      Based on the above components, the patient evaluation is determined to be of the following complexity level: MEDIUM    Pain Rating:  None voiced    Activity Tolerance:   Fair    After treatment patient left in no apparent distress:   Supine in bed, Call bell within reach, Caregiver / family present, and Side rails x 3    COMMUNICATION/EDUCATION:   The patients plan of care was discussed with: Registered nurse.  (informed RN of neuro findings)    Fall prevention education was provided and the patient/caregiver indicated understanding., Patient/family have participated as able in goal setting and plan of care. , and Patient/family agree to work toward stated goals and plan of care.     Thank you for this referral.  Cleatis Schaumann, PT   Time Calculation: 49 mins

## 2021-04-20 NOTE — ED NOTES
Assumed care of patient. Patient pulled back in bed. Patient with external catheter hooked to suction. Out put 650.

## 2021-04-20 NOTE — NURSE NAVIGATOR
Chart reviewed by Heart Failure Nurse Navigator. Heart Failure database completed. EF:  pending     ACEi/ARB/ARNi: Cozaar    BB: echo pending    Aldosterone Antagonist:echo pending    Obstructive Sleep Apnea Screening: N/1   STOP-BANG score:   Referred to Sleep Medicine:     CRT not indicated    NYHA Functional Class requested via provider message on [employer]      Heart Failure Teach Back in Patient Education. Heart Failure Avoiding Triggers on Discharge Instructions. Cardiologist:       Post discharge follow up phone call to be made within 48-72 hours of discharge.

## 2021-04-20 NOTE — ED NOTES
Bedside shift change report given to BARBARA BENDER (oncoming nurse) by Janis Head RN (offgoing nurse). Report included the following information SBAR, ED Summary and MAR.

## 2021-04-20 NOTE — PROGRESS NOTES
Problem: Self Care Deficits Care Plan (Adult)  Goal: *Acute Goals and Plan of Care (Insert Text)  Description:   FUNCTIONAL STATUS PRIOR TO ADMISSION: Patient was independent and active without use of DME.     HOME SUPPORT: The patient lived with wife but did not require assist.    Occupational Therapy Goals  Initiated 4/20/2021  1. Patient will perform grooming standing for 5 minutes with contact guard assist within 7 day(s). 2.  Patient will perform bathing with contact guard assist within 7 day(s). 3.  Patient will perform lower body dressing with contact guard assist within 7 day(s). 4.  Patient will perform toilet transfers with contact guard assist within 7 day(s). 5.  Patient will perform all aspects of toileting with contact guard assist within 7 day(s). 6.  Patient will participate in upper extremity therapeutic exercise/activities with supervision/set-up for 5 minutes within 7 day(s). 7.  Patient will utilize energy conservation techniques during functional activities with verbal cues within 7 day(s). Outcome: Not Met    OCCUPATIONAL THERAPY EVALUATION  Patient: Tay Najera (89 y.o. male)  Date: 4/20/2021  Primary Diagnosis: Decompensated heart failure ([facility] [address] [I50.9]        Precautions:  Fall    ASSESSMENT  Based on the objective data described below, the patient presents with limited ADL performance s/p admission for decompensated heart failure. Patient ADLs limited by impaired balance, generalized weakness, decreased functional activity tolerance, and impaired coordination in LUE. Noted imaging shows \"remote right occipital infarct, no acute intracranial process, mild chronic microvascular ischemic change, moderate degree of cerebral atrophy\". Patient reports being IND with ADLs PTA. Today, session completed in bed. Patient noted to be nodding off during session/questions about PLOF. Noted patient able to adjust in bed in sitting and able to tailor sit to reach to feet.  Patient setup for self feeding (to bring drink to mouth). Noted during FM coordination testing, patient with impaired coordination in LUE with FTN. Patient left semisupine in bed with call bell in reach, RN aware, all needs met. Will continue to follow - may need brief rehab stay once medically cleared for D/C - will continue to assess. Current Level of Function Impacting Discharge (ADLs/self-care): up to min A for ADLs    Functional Outcome Measure: The patient scored Total: 35/100 on the Barthel Index outcome measure which is indicative of 65% impaired ability to care for basic self needs/dependency on others; inferred 100% dependency on others for instrumental ADLs. Other factors to consider for discharge: was IND at baseline, lives with elderly wife     Patient will benefit from skilled therapy intervention to address the above noted impairments. PLAN :  Recommendations and Planned Interventions: self care training, functional mobility training, therapeutic exercise, balance training, therapeutic activities, endurance activities, patient education, home safety training, and family training/education    Frequency/Duration: Patient will be followed by occupational therapy 5 times a week to address goals. Recommendation for discharge: (in order for the patient to meet his/her long term goals)  To be determined: IPR vs. HHOT    This discharge recommendation:  Has not yet been discussed the attending provider and/or case management    IF patient discharges home will need the following DME: bedside commode       SUBJECTIVE:   Patient stated I think I've lost a day since being in here.     OBJECTIVE DATA SUMMARY:   HISTORY:   Past Medical History:   Diagnosis Date    CAD (coronary artery disease) 05/2019    S/p PCI May 2019 [facility]     NUKE 8/7/2020 =Lexiscan pharmacologic stress test shows normal perfusion , noted apical thinning with an EF of 47  %.      Diverticulosis, sigmoid 8/2011    ED (erectile dysfunction) of organic origin     Hypertension     LVH (left ventricular hypertrophy) due to hypertensive disease     PAF (paroxysmal atrial fibrillation) ([facility] [address] 11/18/2019    [facility] admit nov 2019 no OAC due to anemia    Postherpetic neuralgia     Prostate cancer ([facility] [address]     Sebaceous cyst 4/1/2014    Shingles 02/2019     Past Surgical History:   Procedure Laterality Date    ENDOSCOPY, COLON, DIAGNOSTIC  >= 8-10years ago    [address] [id number]    HX CARPAL TUNNEL RELEASE  4/08    right,left    HX CHOLECYSTECTOMY      HX HEART CATHETERIZATION  05/2019    HX HERNIA REPAIR      HX PROSTATECTOMY  5/06       Expanded or extensive additional review of patient history:     Home Situation  Home Environment: Private residence  # Steps to Enter: 2  One/Two Story Residence: One story  Living Alone: No  Support Systems: Spouse/Significant Other/Partner  Current DME Used/Available at Home: Bi [place], rollator, Shower chair, Grab bars, Cane, straight  Tub or Shower Type: Tub/Shower combination    Hand dominance: Right    EXAMINATION OF PERFORMANCE DEFICITS:  Cognitive/Behavioral Status:  Neurologic State: Alert  Orientation Level: Oriented X4  Cognition: Appropriate decision making; Follows commands  Perception: Appears intact  Perseveration: No perseveration noted  Safety/Judgement: Awareness of environment; Fall prevention    Skin: appears grossly intact    Edema: none noted in BUEs    Hearing: Auditory  Auditory Impairment: None    Vision/Perceptual:    Tracking: Able to track stimulus in all quadrants w/o difficulty                 Diplopia: No    Acuity: Impaired near vision; Impaired far vision(reports needing cataract sx)    Corrective Lenses: Glasses    Range of Motion:  In BUEs  AROM: Generally decreased, functional    Strength: In BUEs  Strength: Generally decreased, functional    Coordination:  Coordination: Generally decreased, functional(LUE impaired FTN)  Fine Motor Skills-Upper: Right Intact; Left Impaired Gross Motor Skills-Upper: Left Intact; Right Intact    Tone & Sensation:  In BUEs  Tone: Normal  Sensation: Intact    Balance:  Sitting: Intact; Without support  Standing: Impaired; With support  Standing - Static: Fair;Constant support  Standing - Dynamic : Poor;Constant support    Functional Mobility and Transfers for ADLs:  Bed Mobility:  Supine to Sit: Minimum assistance  Sit to Supine: Minimum assistance  Scooting: Contact guard assistance; Other (comment)(w/ cues for technique)    Transfers:  Sit to Stand: Contact guard assistance  Stand to Sit: Contact guard assistance    ADL Assessment:  Feeding: Minimum assistance    Oral Facial Hygiene/Grooming: Minimum assistance    Bathing: Moderate assistance    Upper Body Dressing: Minimum assistance    Lower Body Dressing: Moderate assistance    Toileting: Maximum assistance    ADL Intervention and task modifications:    Lower Body Dressing Assistance  Socks: Stand-by assistance  Leg Crossed Method Used: Yes  Position Performed: Long sitting on bed    Toileting  Bladder Hygiene: Total assistance (dependent)    Cognitive Retraining  Safety/Judgement: Awareness of environment; Fall prevention     Functional Measure:  Barthel Index:    Bathin  Bladder: 5  Bowels: 5  Groomin  Dressin  Feedin  Mobility: 0  Stairs: 0  Toilet Use: 5  Transfer (Bed to Chair and Back): 10  Total: 35/100        The Barthel ADL Index: Guidelines  1. The index should be used as a record of what a patient does, not as a record of what a patient could do. 2. The main aim is to establish degree of independence from any help, physical or verbal, however minor and for whatever reason. 3. The need for supervision renders the patient not independent. 4. A patient's performance should be established using the best available evidence. Asking the patient, friends/relatives and nurses are the usual sources, but direct observation and common sense are also important.  However direct testing is not needed. 5. Usually the patient's performance over the preceding 24-48 hours is important, but occasionally longer periods will be relevant. 6. Middle categories imply that the patient supplies over 50 per cent of the effort. 7. Use of aids to be independent is allowed. Monica Martinez, Barthel, D.W. (9502). Functional evaluation: the Barthel Index. [facility] (14)2. ALON Woody, Leo Stantonh., Jaycee Vigildict.[facility], [address] [place] [address] (1999). Measuring the change indisability after inpatient rehabilitation; comparison of the responsiveness of the Barthel Index and Functional Cecil Measure. Journal of Neurology, Neurosurgery, and Psychiatry, 66(4), 036-127. Rebecca Kirkpatrick, N.J.A, SRUTHI Diaz, & Roxie Welch M.A. (2004.) Assessment of post-stroke quality of life in cost-effectiveness studies: The usefulness of the Barthel Index and the EuroQoL-5D. Quality of Life Research, 15, 597-25       Occupational Therapy Evaluation Charge Determination   History Examination Decision-Making   LOW Complexity : Brief history review  MEDIUM Complexity : 3-5 performance deficits relating to physical, cognitive , or psychosocial skils that result in activity limitations and / or participation restrictions MEDIUM Complexity : Patient may present with comorbidities that affect occupational performnce. Miniml to moderate modification of tasks or assistance (eg, physical or verbal ) with assesment(s) is necessary to enable patient to complete evaluation       Based on the above components, the patient evaluation is determined to be of the following complexity level: MEDIUM  Pain Rating:  Reporting no pain    Activity Tolerance:   Fair    After treatment patient left in no apparent distress:    Supine in bed, Call bell within reach, Side rails x 3, and RN aware    COMMUNICATION/EDUCATION:   The patients plan of care was discussed with: Physical therapist and Registered nurse.      Home safety education was provided and the patient/caregiver indicated understanding. and Patient/family have participated as able in goal setting and plan of care. This patients plan of care is appropriate for delegation to SHERRY.     Thank you for this referral.  Ramone Brito, OT  Time Calculation: 8 mins

## 2021-04-21 NOTE — PROGRESS NOTES
Bedside and Verbal shift change report given to BARBARA Colon (oncoming nurse) by Jose Doran (offgoing nurse). Report included the following information SBAR, Kardex, Intake/Output, MAR, Recent Results and Med Rec Status.

## 2021-04-21 NOTE — PROGRESS NOTES
[address] [facility] Adult  Hospitalist Group                                                                                          Hospitalist Progress Note  Suzanne Simon MD  Answering service: [phone] OR [phone] from in house phone              Progress Note    Patient: Jono Mireles MRN: [id number]  SSN: [id number]    YOB: 1935  Age: 82 y.o. Sex: male      Admit Date: 4/19/2021    LOS: 1 day     Subjective:     Patient presents with diastolic congestive heart failure. Starting on diuresis. Being followed by cardiology. Patient is not requiring supplemental oxygen. Objective:     Vitals:    04/21/21 1015 04/21/21 1020 04/21/21 1025 04/21/21 1240   BP:    126/73   Pulse: (!) 48 60 74 64   Resp:    20   Temp:    99 °F (37.2 °C)   SpO2:    94%   Weight:            Intake and Output:  Current Shift: 04/21 0701 - 04/21 1900  In: 240 [P.O.:240]  Out: 200 [Urine:200]  Last three shifts: 04/19 1901 - 04/21 0700  In: -   Out: 3575 [Urine:1450]    Physical Exam:   GENERAL: alert, cooperative, no distress, appears stated age  THROAT & NECK: normal and no erythema or exudates noted. LUNG: clear to auscultation bilaterally  HEART: regular rate and rhythm, S1, S2 normal, no murmur, click, rub or gallop  ABDOMEN: soft, non-tender. Bowel sounds normal. No masses,  no organomegaly  EXTREMITIES:  extremities normal, atraumatic, no cyanosis or edema  SKIN: no rash or abnormalities  NEUROLOGIC: AOx3. PSYCHIATRIC: non focal    Lab/Data Review: All lab results for the last 24 hours reviewed.      Recent Results (from the past 24 hour(s))   METABOLIC PANEL, BASIC    Collection Time: 04/21/21  2:11 AM   Result Value Ref Range    Sodium 140 136 - 145 mmol/L    Potassium 3.4 (L) 3.5 - 5.1 mmol/L    Chloride 106 97 - 108 mmol/L    CO2 31 21 - 32 mmol/L    Anion gap 3 (L) 5 - 15 mmol/L    Glucose 109 (H) 65 - 100 mg/dL    BUN 21 (H) 6 - 20 MG/DL    Creatinine 1.13 0.70 - 1.30 MG/DL BUN/Creatinine ratio 19 12 - 20      GFR est AA >60 >60 ml/min/1.73m2    GFR est non-AA >60 >60 ml/min/1.73m2    Calcium 9.0 8.5 - 10.1 MG/DL   GLUCOSE, POC    Collection Time: 04/21/21 11:24 AM   Result Value Ref Range    Glucose (POC) 99 65 - 100 mg/dL    Performed by Ameena Her         Imaging:    No results found. Assessment and Plan:     Congestive heart failure  -Acute on chronic diastolic congestive heart failure likely induced by uncontrolled hypertension and dietary indiscretion  -Last echo 8/20 with EF of 57%, cardiology does not recommend repeating echo  -Diuresing with Lasix 40 mg IV twice daily  -Cardiology following    Abnormal troponin  -Likely demand due to CHF  -No clinical signs or symptoms of acute coronary syndrome    Dizziness  -Initial head CT negative on admission  -Nursing reporting left-sided weakness of unknown duration 4/20 and CT head was repeated but did not show any acute pathology    Atrial fibrillation  -Persistent atrial fibrillation, rate controlled  -Not on anticoagulation due to chronic anemia    Hypertension  -Uncontrolled hypertension  -Continue Cozaar patient takes at home along with Lasix  -Further medication adjustment if blood pressure remains uncontrolled    Coronary artery disease  -S/p PCI 5/2019  -Continue aspirin and statin    Discharge disposition: Likely in the next 24 to 48 hours pending progress.     Signed By: Tigre Yoon MD     April 21, 2021

## 2021-04-21 NOTE — PROGRESS NOTES
Problem: Mobility Impaired (Adult and Pediatric)  Goal: *Acute Goals and Plan of Care (Insert Text)  Description: FUNCTIONAL STATUS PRIOR TO ADMISSION: Patient was independent and active without use of DME though with intermittent dizziness and falls. Functional mobility started to decline about a week ago, started using a rollator walker, several falls this week. HOME SUPPORT PRIOR TO ADMISSION: The patient lived with his wife but did not require assist prior to a week ago. Physical Therapy Goals  Initiated 4/20/2021  1. Patient will move from supine to sit and sit to supine , scoot up and down, and roll side to side in bed with modified independence within 7 day(s). 2.  Patient will transfer from bed to chair and chair to bed with modified independence using the least restrictive device within 7 day(s). 3.  Patient will perform sit to stand with modified independence within 7 day(s). 4.  Patient will ambulate with modified independence for 150 feet with the least restrictive device within 7 day(s). 5.  Patient will ascend/descend 2 stairs with modified independence within 7 day(s). Outcome: Progressing Towards Goal  PHYSICAL THERAPY TREATMENT  Patient: Jayson Milligan (45 y.o. male)  Date: 4/21/2021  Diagnosis: Decompensated [facility] [I50.9] <principal problem not specified>       Precautions: Fall  Chart, physical therapy assessment, plan of care and goals were reviewed. ASSESSMENT  Patient continues with skilled PT services and is progressing towards goals. Pt greeted ambulating from the restroom with RN, agreeable to PT to intervene as pt noted to be reaching for walls/doorways and objects for steadying. Assisted pt back to seated at EOB with Min A and introduced RW for balance support. Pt required Min A for steadying assist upon standing to prevent Lward LOB and then required CGA to min A overall for balance support during gait, particularly with turns.  Pt with BLE scissoring when performing 180 degree turns. Noted pt maintained body on L side of walker, had Lward pathway deviations and required cues to avoid objects on the L. Overall gait stability improved with cueing to attend to foot placement during turns and pt demonstrated good carryover throughout session. Feel that pt will benefit from continued therapy in the acute setting to address significant balance deficits. Rec intensive therapy in the IPR setting once medically stable to improve function, safety and tolerance. He remains a high falls risk. Current Level of Function Impacting Discharge (mobility/balance): Min A for transfers and gait with RW    Other factors to consider for discharge:          PLAN :  Patient continues to benefit from skilled intervention to address the above impairments. Continue treatment per established plan of care. to address goals. Recommendation for discharge: (in order for the patient to meet his/her long term goals)  Therapy 3 hours per day 5-7 days per week    This discharge recommendation:  Has been made in collaboration with the attending provider and/or case management    IF patient discharges home will need the following DME: to be determined (TBD)       SUBJECTIVE:   Patient stated I fall because of vertigo .     OBJECTIVE DATA SUMMARY:   Critical Behavior:  Neurologic State: Alert  Orientation Level: Oriented X4  Cognition: Appropriate safety awareness, Follows commands  Safety/Judgement: Awareness of environment, Fall prevention  Functional Mobility Training:  Bed Mobility:     Supine to Sit: Stand-by assistance  Sit to Supine: Stand-by assistance           Transfers:  Sit to Stand: Minimum assistance  Stand to Sit: Minimum assistance                             Balance:  Sitting: Intact; Without support  Standing: Impaired; With support  Ambulation/Gait Training:  Distance (ft): 125 Feet (ft)  Assistive Device: Walker, rolling;Gait belt  Ambulation - Level of Assistance: Minimal assistance                 Base of Support: Shift to left;Narrowed     Speed/Hannah: Slow;Shuffled;Fluctuations                 Pain Rating:  Denied pain    Activity Tolerance:   Good and SpO2 stable on RA    After treatment patient left in no apparent distress:   Supine in bed, Bed / chair alarm activated, and Caregiver / family present    COMMUNICATION/COLLABORATION:   The patients plan of care was discussed with: Occupational therapist and Registered nurse.       Marques Mario PT, DPT   Time Calculation: 38 mins

## 2021-04-21 NOTE — PROGRESS NOTES
NUTRITION education brief     MD consult for diet education received. Pt admitted with acute on chronic diastolic heart failure. Lasix rx with possible d/c tomorrow noted. Concerns for diet contributing to heart failure per cardiac notes. Pt and wife visited today. They eat 2 meals per day with a large number eaten out or with frozen meals for convenience sake. Handouts provided and talked through current diet practices and areas for improvement. Reviewed alterantive frozen meals that would have less sodium and guidelines for label reading discussed. Encouraged pt to review resturant menus as well since often eats at the same few restaurants with same meal items chosen. Good understanding of information reviewed. No additional questions at this time.      Scarlett Veliz RD

## 2021-04-21 NOTE — PROGRESS NOTES
Problem: Self Care Deficits Care Plan (Adult)  Goal: *Acute Goals and Plan of Care (Insert Text)  Description:   FUNCTIONAL STATUS PRIOR TO ADMISSION: Patient was independent and active without use of DME. Driving and running into objects on left hand side. Hand ball has not played in years due to vision. HOME SUPPORT: The patient lived with wife but did not require assist.    Occupational Therapy Goals  Initiated 4/20/2021  1. Patient will perform grooming standing for 5 minutes with contact guard assist within 7 day(s). 2.  Patient will perform bathing with contact guard assist within 7 day(s). 3.  Patient will perform lower body dressing with contact guard assist within 7 day(s). 4.  Patient will perform toilet transfers with contact guard assist within 7 day(s). 5.  Patient will perform all aspects of toileting with contact guard assist within 7 day(s). 6.  Patient will participate in upper extremity therapeutic exercise/activities with supervision/set-up for 5 minutes within 7 day(s). 7.  Patient will utilize energy conservation techniques during functional activities with verbal cues within 7 day(s). Outcome: Progressing Towards Goal  OCCUPATIONAL THERAPY TREATMENT  Patient: Ann Kinney (69 y.o. male)  Date: 4/21/2021  Diagnosis: Decompensated [facility] failure ([facility] [I50.9] <principal problem not specified>       Precautions: Fall  Chart, occupational therapy assessment, plan of care, and goals were reviewed. ASSESSMENT  Patient continues with skilled OT services and is progressing towards goals. ADLs limited by standing tolerance, dynamic standing balance, visual deficits (noted B occipital infarcts; mainly left visual inattention; cataract sx last Wednesday, reading glasses), coordination, cognitive/neuro deficits (anosognosia, executive functioning; therefore safety awareness and application during ADLs and functional mobility). Baseline arthritis.       Current Level of Function Impacting Discharge (ADLs): moderate assistance overall ADLs, RW education, bed<>bathroom min A RW management    Other factors to consider for discharge: wife at home         PLAN :  Patient continues to benefit from skilled intervention to address the above impairments. Continue treatment per established plan of care to address goals. Recommend with staff: continued OOB to chair, to and from bathroom and completing ADLs with nursing supervision 2* fall risk    Recommend next OT session: visual testing, scanning techniques; discussion over no driving    Recommendation for discharge: (in order for the patient to meet his/her long term goals)  Therapy 3 hours per day 5-7 days per week 2* patient requires medical management, can benefit from therapy intervention to increase independence prior to discharge home 2* high fall risk; patient can tolerate 3 hours of therapy and has support at home when discharged. If discharges home will need HHOT then outpatient, neuro opthomologist, wife assistance with all basic ADLs, total assistance instrumental ADLs. This discharge recommendation:  Has been made in collaboration with the attending provider and/or case management    IF patient discharges home will need the following DME: shower chair and grab bar       SUBJECTIVE:   Patient stated I am fine, no problems here.     OBJECTIVE DATA SUMMARY:   Cognitive/Behavioral Status:  Neurologic State: Alert  Orientation Level: Oriented X4  Cognition: Appropriate safety awareness; Follows commands             Functional Mobility and Transfers for ADLs:  Bed Mobility:  Supine to Sit: Stand-by assistance  Sit to Supine: Stand-by assistance    Transfers:  Sit to Stand: Minimum assistance  Functional Transfers  Bathroom Mobility: Minimum assistance(RW)       Balance:  Sitting: Intact; Without support  Standing: Impaired; With support    ADL Intervention:  Feeding  Feeding Assistance:  Total assistance (dependent)(wife immediately cut all food and opened containers)    Grooming  Washing Hands: Moderate assistance  Brushing Teeth: Moderate assistance increased time to navigate objects on sink, toothbrush under water; verbal cue and then tactile that paste was not on the brush, water on left hand side for rinsing \"I always have a cup at home (patient utilized hands to then get water to mouth for rinse\". .. oh I saw that there\". Lower Body Dressing Assistance  Shoes with Cloth Laces: Minimum assistance(increased time, L hand decreased attention visually)  Leg Crossed Method Used: Yes  Position Performed: (sitting)         Cognitive Retraining  Executive Functions: Executing cognitive plans  Organizing/Sequencing: Three step sequence    Therapeutic Exercises:       Pain:      Activity Tolerance:   Good    After treatment patient left in no apparent distress:   Sitting in chair, Call bell within reach, Bed / chair alarm activated, and Caregiver / family present    COMMUNICATION/COLLABORATION:   The patients plan of care was discussed with: Physical therapist, Registered nurse, and Case management.      Selina Ortiz  Time Calculation: 36 mins

## 2021-04-21 NOTE — PROGRESS NOTES
BASSAM:  1. RUR-11%  2. Admitted from home, plans to go to IPR. 3. IPR choice pending. Care Management Interventions  PCP Verified by CM: Yes  Palliative Care Criteria Met (RRAT>21 & CHF Dx)?: No  Mode of Transport at Discharge: Other (see comment)(BLS vs family)  MyChart Signup: Yes  Discharge Durable Medical Equipment: No  Health Maintenance Reviewed: Yes  Physical Therapy Consult: Yes  Occupational Therapy Consult: Yes  Speech Therapy Consult: No  Current Support Network: Family Lives Nearby  Confirm Follow Up Transport: Family   Resource Information Provided?: No  Discharge Location  Discharge Placement: Rehab hospital/unit acute      Reason for Admission:  Decompensated HF                     RUR Score:          11%           Plan for utilizing home health:      Patient will go to IPR    PCP: First and Last name:  Rachel Covington MD     Name of Practice: [facility]   Are you a current patient: Yes/No:  Yes    Approximate date of last visit: 3/19/20    Can you participate in a virtual visit with your PCP: No                     Current Advanced Directive/Advance Care Plan: Full Code      Healthcare Decision Maker:   Click here to complete [address] including selection of the [address] Relationship (ie \"Primary\")             Secondary Decision Maker (Active): Jin Potter Daughter - [phone]    Secondary Decision Maker: Dessie Mcardle - Daughter - [phone]                  Transition of Care Plan:                    Reviewed chart for transitions of care, and discussed in rounds. CM met with patient at bedside to explain role and offer support. Patient is alert and oriented x4,and confirmed demographics. Patient lives alone, no DME use prior to admission. He has a supportive daughter at bedside. He used [facility] OP PT/OT in the past. CM provided him with IPR list and will follow up with choice.       [facility]

## 2021-04-21 NOTE — PROGRESS NOTES
Bedside shift change report given to Balbir Alegria (oncoming nurse) by Benjy Lee RN (offgoing nurse). Report included the following information SBAR, Kardex and MAR.

## 2021-04-21 NOTE — PROGRESS NOTES
Admission Medication Reconciliation:      Spoke with patient by telephone @ [phone], unable to speak with patient face to face at this time due to general isolation precautions related to COVID-19 pandemic. Patient seemed like a generally reliable historian. RX query is available at this time. Interview included questions regarding use of:  PTA medications including prescription/OTC, vitamins, supplements, inhaled, topical, injectable, otic and ophthalmic medications    Notes:  Injectafer: last dose 3/23/21, treated for anemia  COVID Vaccine [employer]): rec'd second dose \"about three weeks ago\" (per wife, who is in hospital room with patient)    Medication changes (since last review):  Deleted:  ASA: \"I stopped it for no good reason\"  Pyridostigmine: \"they found out I don't have Myasthenia gravis\"    Thank you for allowing me to participate in the care of your patient. Diamond Kohli PharmD, RN # [phone]       [facility] pharmacy benefit data reflects medications filled and processed through the patient's insurance, however   this data does NOT capture whether the medication was picked up or is currently being taken by the patient. Allergies:  Patient has no known allergies. Significant PMH/Disease States:   Past Medical History:   Diagnosis Date    CAD (coronary artery disease) 05/2019    S/p PCI May 2019 [facility]     NUKE 8/7/2020 =Lexiscan pharmacologic stress test shows normal perfusion , noted apical thinning with an EF of 47  %.      Diverticulosis, sigmoid 8/2011    ED (erectile dysfunction) of organic origin     Hypertension     LVH (left ventricular hypertrophy) due to hypertensive disease     PAF (paroxysmal atrial fibrillation) ([facility] 11/18/2019    [facility] admit nov 2019 no OAC due to anemia    Postherpetic neuralgia     Prostate cancer [facility])     Sebaceous cyst 4/1/2014    Shingles 02/2019     Chief Complaint for this Admission:    Chief Complaint   Patient presents with    Chest Pain     Prior to Admission Medications:   Prior to Admission Medications   Prescriptions Last Dose Informant Taking? albuterol (PROVENTIL HFA, VENTOLIN HFA, PROAIR HFA) 90 mcg/actuation inhaler 2021 at Unknown time  Yes   Sig: Take 1-2 Puffs by inhalation every four (4) hours as needed for Wheezing. atorvastatin (LIPITOR) 10 mg tablet 2021 at Unknown time  Yes   Sig: Take 1 Tab by mouth daily. cyanocobalamin (VITAMIN B-12) 1,000 mcg tablet 2021 at Unknown time  Yes   Sig: Take 1,000 mcg by mouth daily. ferric carboxymaltose (INJECTAFER IV) 3/23/2021  Yes   Si mg by IntraVENous route.   gabapentin (NEURONTIN) 400 mg capsule 2021 at Unknown time  Yes   Sig: Take 1 Cap by mouth nightly. Max Daily Amount: 400 mg.   ketorolac (ACULAR) 0.5 % ophthalmic solution 2021 at Unknown time  Yes   Sig: Administer 2 Drops to both eyes three (3) times daily. losartan (COZAAR) 100 mg tablet 2021 at Unknown time  Yes   Sig: Take 1 Tab by mouth daily. moxifloxacin (VIGAMOX) 0.5 % ophthalmic solution 2021 at Unknown time  Yes   Sig: Administer 1 Drop to left eye three (3) times daily. pantoprazole (PROTONIX) 40 mg tablet 2021 at Unknown time  Yes   Sig: Take 40 mg by mouth daily. prednisoLONE acetate (PRED FORTE) 1 % ophthalmic suspension 2021 at Unknown time  Yes   Sig: Administer 2 Drops to both eyes three (3) times daily. therapeutic multivitamin (THERAGRAN) tablet 2021 at Unknown time  Yes   Sig: Take 1 Tab by mouth daily. torsemide (DEMADEX) 20 mg tablet 2021 at Unknown time  Yes   Sig: TAKE 1 TABLET BY MOUTH AS NEEDED FOR SWELLING AND SHORTNESS OF BREATH      Facility-Administered Medications: None     Please contact the main inpatient pharmacy with any questions or concerns at [phone] and we will direct you to the clinical pharmacist covering this patient's care while in-house.    KONRAD Jeff

## 2021-04-21 NOTE — PROGRESS NOTES
Bedside shift change report given to Balbir Alegria (oncoming nurse) by Delroy JIMENEZ (offgoing nurse). Report included the following information SBAR, Kardex and MAR.

## 2021-04-22 NOTE — PROGRESS NOTES
Physician Progress Note      Marcell Morrison  [facility] #:                  [id number]  :                       1935  ADMIT DATE:       2021 11:49 PM  [address] [place] DATE:  RESPONDING  PROVIDER #:        Jose Alberto Diane MD          QUERY TEXT:    Pt admitted with A/C Diastolic CHF, also with c/o dizziness, unsteady gait, and mild confusion. Pt noted to have New Thalamus and Occipital lobe Infarcts on Brain MRI. If possible, please document in progress notes and discharge summary the present on admission status of Thalamus/ Occipital Infarcts : The medical record reflects the following:  Risk Factors: hx of Paroxysmal A-Fib, HTN  Clinical Indicators: admitted with c/o mild confusion, dizziness w/ difficulty ambulating, and report of left sided weakness prior to arrival to ER. MRI Brain showing new infarcts in Right Thalamus and Occipital lobes. Treatment: ASA, Plavix/ Eliquis, PT/OT, Neuro consult was made.     Thank you, if you have any questions please e-mail me at  [email or web address]  [phone]  Options provided:  -- Yes, Thalamus/ Occipital Infarcts were present at the time of the order to admit to the hospital  -- No, Thalamus/ Occipital Infarcts were not present on admission and developed during the inpatient stay  -- Other - I will add my own diagnosis  -- Disagree - Not applicable / Not valid  -- Disagree - Clinically unable to determine / Unknown  -- Refer to Clinical Documentation Reviewer    PROVIDER RESPONSE TEXT:    Yes, Thalamus/ Occipital Infarcts were present at the time of the order to admit to the hospital.    Query created by: Vijaya Kang on 2021 5:27 PM      Electronically signed by:  Jose Alberto Diane MD 2021 6:21 PM

## 2021-04-22 NOTE — CONSULTS
Consult dictated. 82year-old admitted for congestive heart failure. Has paroxysmal atrial fibrillation, hypertension, not on anticoagulation due to chronic anemia. Developed weakness in the left side first noted this past Monday. MRI of the brain shows lacunar infarct in the right thalamus and also cortical infarction in the medial right occipital lobe. There is evidence of old bilateral occipital infarctions. Thalamic infarction is typically due to atherosclerotic vascular disease but multiple chronic and acute occipital infarctions raise a concern for ongoing cardio embolic phenomena.   Agree with starting Plavix, loaded with 300 mg and check Plavix test and aspirin test tomorrow morning  Check CTA to evaluate posterior circulation  He is not a candidate for long-term oral anticoagulation with may need to consider alternative treatment options such as watchman device  Hyacinth Madrid MD

## 2021-04-22 NOTE — PROGRESS NOTES
BASSAM:  1. RUR-11%  2. Admitted from home. 3. Patient would like OP therapy. CM met with patient and family at bedside, they provided choice of [facility]. CM spoke with MD who advised that he can go home. CM requested for her to speak with him about going home. 1200- Patient is waiting for MRI, possible discharge this afternoon. He requested for OP pt/ot. CM will follow up to see if he wants home health.     Shelli Bullock, [facility]

## 2021-04-22 NOTE — PROGRESS NOTES
Problem: Self Care Deficits Care Plan (Adult)  Goal: *Acute Goals and Plan of Care (Insert Text)  Description:   FUNCTIONAL STATUS PRIOR TO ADMISSION: Patient was independent and active without use of DME.     HOME SUPPORT: The patient lived with wife but did not require assist.    Occupational Therapy Goals  Initiated 4/20/2021  1. Patient will perform grooming standing for 5 minutes with contact guard assist within 7 day(s). 2.  Patient will perform bathing with contact guard assist within 7 day(s). 3.  Patient will perform lower body dressing with contact guard assist within 7 day(s). 4.  Patient will perform toilet transfers with contact guard assist within 7 day(s). 5.  Patient will perform all aspects of toileting with contact guard assist within 7 day(s). 6.  Patient will participate in upper extremity therapeutic exercise/activities with supervision/set-up for 5 minutes within 7 day(s). 7.  Patient will utilize energy conservation techniques during functional activities with verbal cues within 7 day(s).  4/22/2021 1313 by Rajesh Hsieh OT  Outcome: Progressing Towards Goal  OCCUPATIONAL THERAPY TREATMENT  Patient: Car Ravi (22 y.o. male)  Date: 4/22/2021  Diagnosis: Decompensated heart failure ([facility] [I50.9] <principal problem not specified>       Precautions: Fall  Chart, occupational therapy assessment, plan of care, and goals were reviewed. ASSESSMENT  Patient continues with skilled OT services and is progressing towards goals. Patient received in bedside chair, agreeable to session. This session focused on coordination, dynamic standing balance, and safety. Patient bumping into towel bar on door on bathroom entry and exit, seemingly unaware of hitting obstacle and later with no recollection of incidents. Patient tolerating brief standing at sink for grooming with overall CGA - patient with postural sway during bimanual aspect of task, then using sink for stability.  Patient agreeable to various exercises to challenge coordination, dynamic balance, and protective responses. Also receptive to discussion and education regarding home safety recommendations and HEP for fine motor coordination. Patient continues to be a high fall risk with impaired balance and decreased insight and safety awareness. Per family, plan is to discharge home despite recommendations for rehab. If home, patient will require follow-up OT and would likely benefit from visual assessment and OP OT once medically stable. Current Level of Function Impacting Discharge (ADLs): up to min A to correct standing LOBs    Other factors to consider for discharge: high fall risk; declining rehab         PLAN :  Patient continues to benefit from skilled intervention to address the above impairments. Continue treatment per established plan of care to address goals. Recommend with staff: OOB to chair and toileting / self-care in bathroom with up to min assist    Recommend next OT session: fine motor; hand-eye coordination    Recommendation for discharge: (in order for the patient to meet his/her long term goals)  IPR; If declines,  OT if has 24/7 increased supervision/assistance; would benefit from OP OT and visual assessment of specialist to determine best interventions    This discharge recommendation:  Has been made in collaboration with the attending provider and/or case management    IF patient discharges home will need the following DME: shower chair and grab bar       SUBJECTIVE:   Patient stated No, I don't feel like I've been running into things\" - after running into towel bar in bathroom x2.     OBJECTIVE DATA SUMMARY:   Cognitive/Behavioral Status:  Neurologic State: Alert  Orientation Level: Oriented to person;Oriented to place;Oriented to situation  Cognition: Follows commands;Decreased attention/concentration;Poor safety awareness  Perception: Cues to attend left visual field;Cues to attend right visual field;Verbal;Visual  Perseveration: No perseveration noted  Safety/Judgement: Decreased awareness of environment;Decreased awareness of need for safety; Fall prevention    Functional Mobility and Transfers for ADLs:  Bed Mobility:  Scooting: Stand-by assistance    Transfers:  Sit to Stand: Contact guard assistance  Functional Transfers  Bathroom Mobility: Contact guard assistance  Cues: Verbal cues provided;Visual cues provided     Balance:  Sitting: Intact  Standing: Impaired  Standing - Static: Good;Fair  Standing - Dynamic : Fair;Poor    ADL Intervention:  Feeding  Feeding Assistance: Set-up  Drink to Mouth: Supervision    Grooming  Grooming Assistance: Contact guard assistance  Position Performed: Standing  Brushing Teeth: Contact guard assistance  Cues: Verbal cues provided;Visual cues provided    Lower Body Dressing Assistance  Dressing Assistance: Stand-by assistance  Socks: Stand-by assistance    Cognitive Retraining  Orientation Retraining: Reorienting; Awareness of environment  Problem Solving: Awareness of environment  Safety/Judgement: Decreased awareness of environment;Decreased awareness of need for safety; Fall prevention    Therapeutic Exercises:   -Dynamic standing balance challenge: fair recovery to midline from lateral lean, however quickly loses balance to posterior when challenged; difficulty maintaining narrow base of support  -Coordination exercises: targeted reaching and functional reaching exercise, with increased difficulty observed when using LUE . -Overall, note poor environmental awareness, impaired fine motor and coordination, and impaired standing balance. Pain:  No reports. Activity Tolerance:   Good, Fair and requires rest breaks    After treatment patient left in no apparent distress:   Sitting in chair, Call bell within reach and Caregiver / family present    COMMUNICATION/COLLABORATION:   The patients plan of care was discussed with: Physical therapist and Registered nurse. Rebecca Nance, OT  Time Calculation: 34 mins

## 2021-04-22 NOTE — PROGRESS NOTES
Cardiology Progress Note            Admit Date: 4/19/2021  Admit Diagnosis: Decompensated heart failure ([facility] [address] [I50.9]  Date: 4/22/2021     Time: 1:48 PM    Subjective:  Back to baseline. Feels much better. Wife and daughter at bedside     Assessment and Plan     1. DHF              - acute on chronic              - EF 57% on echo 8/20              - No h/o systolic HF              - Torsemide 20 mg today. May discharge on 10 mg              - follow Lytes              - Decompensation related to dietary indiscretions  2. Hypokalemia              - K 3.7              - resolved  3. Persistent Afib              - rate controlled              - no OAC with anemia  4. CAD              - JOSEFINA-Cath done 5/30/19. OM1 with 70% blockage had JOSEFINA              - ASA 81, Lipitor  5. HTN              - Cozaar, Lasix      Back to baseline. IV Lasix changed to Torsemide 20 mg today. Recommend discharging on 10 mg daily. He is stable and compensated from a Cardiology standpoint. OK FOR DISCHARGE TO REHAB. Continue on all other meds. Has follow up with Dr. Rafaela Mcghee on 5/10, following IPR. Will see again as needed. Saw and evaluated pt and agree with above assessment and plan. Diuresed well and feeling better. Transition to po and ok for discharge from cardiac standpoint.  Outpt follow up with Dr. Joey Cali MD  Future Appointments   Date Time Provider Samantha Ley   4/28/2021  8:30 AM [address] [place] [address] Sw [facility]R OP [facility]   5/10/2021 10:00 AM Gaby Aranda MD CAVREY BS AMB   5/24/2021  9:45 AM Meme Ramirez MD PAFP BS AMB   6/23/2021 11:30 AM Orman Favre,  [address] BS AMB           No results found for: MILAGRO   Past Medical History:   Diagnosis Date    CAD (coronary artery disease) 05/2019    S/p PCI May 2019 [facility]     NUKE 8/7/2020 =Lexiscan pharmacologic stress test shows normal perfusion , noted apical thinning with an EF of 47  %.     Diverticulosis, sigmoid 8/2011    ED (erectile dysfunction) of organic origin     Hypertension     LVH (left ventricular hypertrophy) due to hypertensive disease     PAF (paroxysmal atrial fibrillation) ([facility] [address] 11/18/2019    [facility] admit nov 2019 no OAC due to anemia    Postherpetic neuralgia     [facility]     Sebaceous cyst 4/1/2014    Shingles 02/2019      Social History     Tobacco Use    Smoking status: Never Smoker    Smokeless tobacco: Never Used   Substance Use Topics    Alcohol use: No    Drug use: No           Review of Systems:    [] Patient unable to provide secondary to condition    [x] All systems negative, except as checked below.   Constitutional:    []Weight Change  []Fever   []Chills   []Night Sweats  []Fatigue  []Malaise  []____  ENT/Mouth:     []Hearing Changes  []Ear Pain  []Nasal Congestion   []Sinus Pain  []Hoarseness   []Sore throat  []Rhinorrhea  []Swallowing Difficulty  []____  Eyes:    []Eye Pain  []Swelling  []Redness  []Foreign Body  []Discharge  []Vision Changes  []____  Cardiovascular:    []Chest Pain  []SOB  []PND  []FERNANDO  []Orthopnea  []Claudication  []Edema   []Palpitations  []____  Respiratory:    []Cough  []Sputum  []Wheezing,  []SOB  []Hemoptysis  []____  Gastrointestinal:    []Nausea  []Vomiting  []Diarrhea  []Constipation  []Pain  []Heartburn  []Anorexia  []Dysphagia  []Hematochezia  []Melena,  []Jaundice  []____  Genitourinary:    []Dysuria  []Urinary Frequency  []Hematuria  []Urinary Incontinence  []Urgency  []Flank Pain  []Hesitancy  []____  Musculoskeletal:    []Arthralgias  []Myalgias  []Joint Swelling  []Joint Stiffness  []Back Pain  []Neck Pain  []____  Skin:    []Skin Lesions  []Pruritis  []Hair Changes  []Skin rashes  []____  Neuro:    []Weakness  []Numbness  []Paresthesias  []Loss of Consciousness  []Syncope   []Dizziness  []Headache  []Coordination Changes  []Recent Falls  []____  Psych:    []Anxiety/Depression  []Insomnia  []Memory Changes  []Violence/Abuse Hx.  []____  Heme/Lymph:    []Bruising  []Bleeding  []Lymphadenopathy  []____  Endocrine:    []Polyuria  []Polydipsia  []Temperature Intolerance  []____         Objective:     Physical Exam:                Visit Vitals  BP (!) 141/80 (BP 1 Location: Left upper arm, BP Patient Position: Sitting)   Pulse 61   Temp 98.4 °F (36.9 °C)   Resp 18   Ht 5' 8\" (1.727 m)   Wt 175 lb 0.7 oz (79.4 kg)   SpO2 93%   BMI 26.62 kg/m²        General Appearance:   Well developed, well nourished,alert and oriented x 3, and   individual in no acute distress. Ears/Nose/Mouth/Throat:    Hearing grossly normal.       Neck:  Supple. Chest:    Lungs coarse in bases to auscultation bilaterally. Cardiovascular:   Regular rate and rhythm, S1, S2 normal, no murmur. Abdomen:    Soft, non-tender, bowel sounds are active. Extremities:  No edema bilaterally. Skin:  Warm and dry.      Telemetry: normal sinus rhythm     Data Review:    Labs:    Recent Results (from the past 24 hour(s))   GLUCOSE, POC    Collection Time: 04/21/21 11:24 AM   Result Value Ref Range    Glucose (POC) 99 65 - 100 mg/dL    Performed by Kenny 7, BASIC    Collection Time: 04/22/21 12:56 AM   Result Value Ref Range    Sodium 141 136 - 145 mmol/L    Potassium 3.7 3.5 - 5.1 mmol/L    Chloride 103 97 - 108 mmol/L    CO2 34 (H) 21 - 32 mmol/L    Anion gap 4 (L) 5 - 15 mmol/L    Glucose 99 65 - 100 mg/dL    BUN 24 (H) 6 - 20 MG/DL    Creatinine 1.30 0.70 - 1.30 MG/DL    BUN/Creatinine ratio 18 12 - 20      GFR est AA >60 >60 ml/min/1.73m2    GFR est non-AA 53 (L) >60 ml/min/1.73m2    Calcium 8.8 8.5 - 10.1 MG/DL          Radiology:        Current Facility-Administered Medications   Medication Dose Route Frequency    ketorolac (ACULAR) 0.5 % ophthalmic solution 2 Drop  2 Drop Both Eyes TID    torsemide (DEMADEX) tablet 20 mg  20 mg Oral DAILY    aspirin delayed-release tablet 81 mg  81 mg Oral DAILY    atorvastatin (LIPITOR) tablet 10 mg  10 mg Oral DAILY    gabapentin (NEURONTIN) capsule 400 mg  400 mg Oral QHS    losartan (COZAAR) tablet 100 mg  100 mg Oral DAILY    pantoprazole (PROTONIX) tablet 40 mg  40 mg Oral DAILY    sodium chloride (NS) flush 5-40 mL  5-40 mL IntraVENous Q8H    sodium chloride (NS) flush 5-40 mL  5-40 mL IntraVENous PRN    acetaminophen (TYLENOL) tablet 650 mg  650 mg Oral Q6H PRN    Or    acetaminophen (TYLENOL) suppository 650 mg  650 mg Rectal Q6H PRN    polyethylene glycol (MIRALAX) packet 17 g  17 g Oral DAILY PRN    promethazine (PHENERGAN) tablet 12.5 mg  12.5 mg Oral Q6H PRN    Or    ondansetron (ZOFRAN) injection 4 mg  4 mg IntraVENous Q6H PRN       Marcell Chol.  Christie Casey MD     [facility] [address] [place] [address] [facility] [place] [facility] [address] [address]   Jeffry Munoz   [phone]

## 2021-04-22 NOTE — PROGRESS NOTES
Bedside shift change report given to Catie Shaffer RN (oncoming nurse) by Darylene Munch, RN (offgoing nurse). Report included the following information SBAR, Kardex, MAR and Recent Results.

## 2021-04-22 NOTE — PROGRESS NOTES
Physical Therapy  4/22/2021    Chart reviewed and cleared by RN. Two attempts to see patient today. Pt initially working with OT and upon return this afternoon off the floor for MRI. Will continue to follow.     Thank Nader Scruggs, PT, DPT

## 2021-04-22 NOTE — CONSULTS
[address]    Name:  Maria De Jesus Connors  MR#:  [id number]  :  1935  ACCOUNT #:  [de-identified]  DATE OF SERVICE:  2021      REQUESTING PHYSICIAN:  Colette Franco MD    REASON FOR EVALUATION:  Stroke. HISTORY OF PRESENT ILLNESS:  The patient is an 29-year-old male with past medical history of postherpetic neuralgia, dyslipidemia, hypertension, coronary artery disease, prostate cancer, and paroxysmal atrial fibrillation, not on anticoagulation due to chronic anemia, who was admitted on  with symptoms of shortness of breath and dizziness. He was found to be in decompensated congestive heart failure and was started on treatment for the same. At some point this past Monday, the patient noticed that his left side felt weaker than the right and he was unsteady on his feet. This prompted MRI scan of the brain which was completed earlier today, and it showed acute infarction in the right thalamus and right medial occipital lobe. Neurology was asked to evaluate in this regard. The patient takes aspirin at home and at one point was on anticoagulation which was stopped. Plavix was started by the hospitalist earlier today. Denies any headache, changes in vision, speech difficulty, or swallowing impairment. No sensory deficits. He does complain of lethargy, mild shortness of breath, and fatigue most of the time. PAST MEDICAL HISTORY:  As mentioned above. HOME MEDICATIONS:  1.  Gabapentin. 2.  Pyridostigmine. 3.  Torsemide. 4.  Cozaar. 5.  Lipitor. 6.  Aspirin. SOCIAL HISTORY:  He lives independently. No history of smoking or alcohol use. FAMILY HISTORY:  Noncontributory. PHYSICAL EXAMINATION:  GENERAL:  The patient is alert, fully oriented, answers all questions, and follows all commands. VITAL SIGNS:  Blood pressure 130/75, temperature 97.6, and pulse is 48. Speech is clear. Comprehension is normal.  Pupils are equal, round, and reactive. Extraocular movements are full. Face is symmetric. Tongue is midline. Hearing is baseline. Muscle tone and bulk normal.  Strength exam reveals trace weakness of the  strength on the left when compared to the right. He is able to raise both legs against gravity, but left leg starts to drift down right around 5 seconds. Sensation is intact. No neglect. DTRs 2/2 and symmetric. Toes downgoing. Gait exam is deferred. HEART:  Regular rate and rhythm. CHEST:  Clear. ABDOMEN:  Soft, nontender. Positive bowel sounds. EXTREMITIES:  No edema. LABORATORY DATA:  CBC with WBCs 7.1, hemoglobin 12.3, hematocrit is 41, and platelets 776. Chemistry:  Sodium 141, potassium 3.7, BUN 24, and creatinine 1.30. CT head was negative. MRI scan of the brain was independently reviewed and findings as discussed above. Carotid duplex done earlier this month did not show any hemodynamically significant stenosis. Echocardiogram shows ejection fraction of 57% from 08/2020. There is mildly dilated right atrium. Left atrium is moderately dilated. ASSESSMENT AND PLAN:  An 33-year-old male with history of paroxysmal atrial fibrillation and hypertension, not on anticoagulation due to chronic anemia. He is currently admitted for decompensated congestive heart failure. He developed weakness on the left side that was first noted this past Monday. MRI scan of the brain was completed earlier today that shows an acute lacunar infarct in the right thalamus and also cortical infarction in the medial right occipital lobe. 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 Thalamic infarction is typically due to atherosclerotic vascular disease, but multiple chronic and acute occipital infarctions raise the concern for ongoing cardioembolic phenomenon. We will check CTA of the head and neck to further evaluate the posterior circulation. Agree with starting Plavix, load with 300 mg and maintain 75 mg daily.   We will check aspirin test and Plavix test tomorrow morning to ensure they are therapeutic. He is likely not a candidate for long-term oral anticoagulation, but we may need to consider alternative treatment options such as a Watchman device. We will defer that to Cardiology. Thank you for this consultation.       Xavier Vanegas MD      [id number]  D:  04/22/2021 16:11  T:  04/22/2021 18:23  JOB #:  [id number]

## 2021-04-22 NOTE — PROGRESS NOTES
[facility]  Hospitalist Group                                                                                          Hospitalist Progress Note  Colten Aguirre MD  Answering service: [phone] or [phone] from in house phone        Date of Service:  2021  NAME:  Jael Scruggs  :  1935  MRN:  [id number]      Admission Summary:   Jael Scruggs is a 82 y.o. male: History of postherpetic neuralgia, dyslipidemia, hypertension, CAD, history of prostate cancer, paroxysmal atrial fibrillation not anticoagulated due to chronic anemia, chronic dyspnea, BPPV who presents to ER with chief complaint of shortness of breath and dizziness. Interval history / Subjective:     Pt denies any signficiant SOB, has been getting up with minimal assistance to the bathroom. Reviewed old CT scan, and given ongoing neuro changes and drift per PT, ordered MRI brain. Returned with new acute infarctions   Pt was to be discharged however now canceled. Assessment & Plan:     New acute ischemic infarcts - occipital lobe, thalamic lobe  Dizziness  L sided weakness  - Neurology consult  - Add plavix to ASA  - Increase statin   - ?truly not a OAC candidate. Anemia is not severe. Would certainly benefit from a stroke prevention prespective.   - PT/oT  - Echo ordered  - Likely also needs CTA of head and neck    Acute diastolic CHF exacerbation   - NYHA class III on admit,   - Cardiology following  - Diuresis with 40mg IV lasix, now to PO    Abnormal troponin - type 2 NSTEMI from demand  - No further workup  - Cardiology following    Chronic paroxysmal Afib  - rate controlled  - Not on AC due to anemia, though not severe. - OK for [facility] per Cardiology.  Need to discuss with neurology     CAD - ASA, statin   HTN - home meds    Code status:FULL  DVT prophylaxis: SCDs     Care Plan discussed with: Patient/Family  Anticipated Disposition:  PT, OT, RN  Anticipated Discharge: Less than 24 hours     Hospital Problems  Date Reviewed: 3/12/2021          Codes Class Noted POA    Decompensated heart failure ([facility] [address] ICD-10-CM: I50.9  ICD-9-CM: 428.0  4/20/2021 Unknown                Review of Systems:   A comprehensive review of systems was negative except for that written in the HPI. Vital Signs:    Last 24hrs VS reviewed since prior progress note. Most recent are:  Visit Vitals  /75 (BP 1 Location: Left upper arm, BP Patient Position: At rest)   Pulse (!) 48   Temp 97.6 °F (36.4 °C)   Resp 18   Ht 5' 8\" (1.727 m)   Wt 90.3 kg (199 lb 1.2 oz)   SpO2 95%   BMI 30.27 kg/m²         Intake/Output Summary (Last 24 hours) at 4/22/2021 1604  Last data filed at 4/21/2021 2241  Gross per 24 hour   Intake    Output 250 ml   Net -250 ml        Physical Examination:     I had a face to face encounter with this patient and independently examined them on 4/22/2021 as outlined below:          Constitutional:  No acute distress, cooperative, pleasant    ENT:  Oral mucosa moist, oropharynx benign. Resp:  CTA bilaterally. No wheezing/rhonchi/rales. No accessory muscle use   CV:  Regular rhythm, normal rate, no murmurs, gallops, rubs    GI:  Soft, non distended, non tender. normoactive bowel sounds, no hepatosplenomegaly     Musculoskeletal:  No edema, warm, 2+ pulses throughout    Neurologic:  Moves all extremities.   AAOx3, CN II-XII reviewed     Skin:  Good turgor, no rashes or ulcers       Data Review:    Review and/or order of clinical lab test  Review and/or order of tests in the radiology section of CPT  Review and/or order of tests in the medicine section of CPT      Labs:     Recent Labs     04/19/21  2234   WBC 7.1   HGB 12.3   HCT 41.0   *     Recent Labs     04/22/21  0056 04/21/21  0211 04/20/21  0749    140 146*   K 3.7 3.4* 3.0*    106 112*   CO2 34* 31 27   BUN 24* 21* 19   CREA 1.30 1.13 0.83   GLU 99 109* 100   CA 8.8 9.0 8.3*   MG  --   --  1.9   PHOS  --   --  1.6* Recent Labs     04/19/21 2234   ALT 22   AP 98   TBILI 1.2*   TP 7.5   ALB 3.8   GLOB 3.7     No results for input(s): INR, PTP, APTT, INREXT in the last 72 hours. No results for input(s): FE, TIBC, PSAT, FERR in the last 72 hours. Lab Results   Component Value Date/Time    Folate 14.8 04/27/2018 03:25 PM      No results for input(s): PH, PCO2, PO2 in the last 72 hours.   Recent Labs     04/20/21  0749 04/19/21 2234   TROIQ 0.09* 0.08*     Lab Results   Component Value Date/Time    Cholesterol, total 113 01/26/2021 04:45 PM    HDL Cholesterol 48 01/26/2021 04:45 PM    LDL, calculated 54.4 01/26/2021 04:45 PM    Triglyceride 53 01/26/2021 04:45 PM    CHOL/HDL Ratio 2.4 01/26/2021 04:45 PM     Lab Results   Component Value Date/Time    Glucose (POC) 99 04/21/2021 11:24 AM     Lab Results   Component Value Date/Time    Color Yellow 04/02/2018 09:54 AM    Appearance Clear 04/02/2018 09:54 AM    pH (UA) 6.0 04/02/2018 09:54 AM    Ketone Negative 04/02/2018 09:54 AM    Bilirubin Negative 04/02/2018 09:54 AM    Nitrites Negative 04/02/2018 09:54 AM    Leukocyte Esterase Negative 04/02/2018 09:54 AM         Medications Reviewed:     Current Facility-Administered Medications   Medication Dose Route Frequency    ketorolac (ACULAR) 0.5 % ophthalmic solution 2 Drop  2 Drop Both Eyes TID    [START ON 4/23/2021] torsemide (DEMADEX) tablet 10 mg  10 mg Oral DAILY    [START ON 4/23/2021] atorvastatin (LIPITOR) tablet 40 mg  40 mg Oral DAILY    clopidogreL (PLAVIX) tablet 300 mg  300 mg Oral ONCE    prednisoLONE acetate (PRED FORTE) 1 % ophthalmic suspension 2 Drop  2 Drop Left Eye TID    ciprofloxacin HCl (CILOXAN) 0.3 % ophthalmic solution 1 Drop  1 Drop Left Eye TID    [START ON 4/23/2021] clopidogreL (PLAVIX) tablet 75 mg  75 mg Oral DAILY    aspirin delayed-release tablet 81 mg  81 mg Oral DAILY    gabapentin (NEURONTIN) capsule 400 mg  400 mg Oral QHS    losartan (COZAAR) tablet 100 mg  100 mg Oral DAILY    pantoprazole (PROTONIX) tablet 40 mg  40 mg Oral DAILY    sodium chloride (NS) flush 5-40 mL  5-40 mL IntraVENous Q8H    sodium chloride (NS) flush 5-40 mL  5-40 mL IntraVENous PRN    acetaminophen (TYLENOL) tablet 650 mg  650 mg Oral Q6H PRN    Or    acetaminophen (TYLENOL) suppository 650 mg  650 mg Rectal Q6H PRN    polyethylene glycol (MIRALAX) packet 17 g  17 g Oral DAILY PRN    promethazine (PHENERGAN) tablet 12.5 mg  12.5 mg Oral Q6H PRN    Or    ondansetron (ZOFRAN) injection 4 mg  4 mg IntraVENous Q6H PRN     ______________________________________________________________________  EXPECTED LENGTH OF STAY: - - -  ACTUAL LENGTH OF STAY:          2                 Julia Godfrey MD

## 2021-04-22 NOTE — PROGRESS NOTES
TRANSFER - OUT REPORT:    Verbal report given to Vickie Overton RN(name) on John Hammond  being transferred to 6S(unit) for routine progression of care       Report consisted of patients Situation, Background, Assessment and   Recommendations(SBAR). Information from the following report(s) SBAR, Kardex, Intake/Output, MAR, Recent Results and Med Rec Status was reviewed with the receiving nurse. Lines:   Peripheral IV 04/21/21 Anterior;Right; Outer Forearm (Active)   Site Assessment Clean, dry, & intact 04/22/21 0839   Phlebitis Assessment 0 04/22/21 0839   Infiltration Assessment 0 04/22/21 0839   Dressing Status Clean, dry, & intact 04/22/21 0839   Dressing Type Transparent;Tape 04/22/21 0839   Hub Color/Line Status Flushed;End cap changed; Patent;Capped 04/22/21 0839   Action Taken Open ports on tubing capped 04/22/21 0839   Alcohol Cap Used Yes 04/22/21 0839        Opportunity for questions and clarification was provided. Patient transported with:   Tech     Patient to go to CT and then transfer to unit.

## 2021-04-23 NOTE — PROGRESS NOTES
[address] [facility]  Hospitalist Group                                                                                          Hospitalist Progress Note  Sylvia De Los Santos MD  Answering service: [phone] from in house phone        Date of Service:  2021  NAME:  Tay Najera  :  1935  MRN:  [id number]      Admission Summary:   Tay Najera is a 82 y.o. male: History of postherpetic neuralgia, dyslipidemia, hypertension, CAD, history of prostate cancer, paroxysmal atrial fibrillation not anticoagulated due to chronic anemia, chronic dyspnea, BPPV who presents to ER with chief complaint of shortness of breath and dizziness. Interval history / Subjective:   Doing well today, had some difficulty sleeping overnight with frequent interruptions. Echocardiogram still pending this morning awaiting PT and OT recommendations     Assessment & Plan:     New acute ischemic infarcts - occipital lobe, thalamic lobe  Dizziness  L sided weakness  - Neurology consulted  -Continue aspirin, Eliquis 2.5 mg twice daily added  - Increase statin, per cholesterol guidelines for cardiovascular protection.  - PT/oT  - Echo with normal EF.  -CTA done  without any significant stenoses or defects. Acute diastolic CHF exacerbation   - NYHA class III on admit,   - Cardiology following  - Diuresis with 40mg IV lasix, now to PO    Abnormal troponin - type 2 NSTEMI from demand  - No further workup  - Cardiology following    Chronic paroxysmal Afib  - rate controlled  -Now on Eliquis 2.5 mg twice daily    CAD - ASA, statin   HTN - home meds    Code status:FULL  DVT prophylaxis: Gildardo Newton discussed with: Patient/Family  Anticipated Disposition: SAH/Rehab, referral sent. Anticipated Discharge: Less than 24 hours, medically ready.      Hospital Problems  Date Reviewed: 3/12/2021          Codes Class Noted POA    Decompensated heart failure ([facility] [address] ICD-10-CM: I50.9  ICD-9-CM: 428.0  4/20/2021 Unknown                Review of Systems:   A comprehensive review of systems was negative except for that written in the HPI. Vital Signs:    Last 24hrs VS reviewed since prior progress note. Most recent are:  Visit Vitals  /63   Pulse (!) 57   Temp 97.6 °F (36.4 °C)   Resp 15   Ht 5' 8\" (1.727 m)   Wt 90.3 kg (199 lb 1.2 oz)   SpO2 93%   BMI 30.27 kg/m²       No intake or output data in the 24 hours ending 04/23/21 1650     Physical Examination:     I had a face to face encounter with this patient and independently examined them on 4/23/2021 as outlined below:          Constitutional:  No acute distress, cooperative, pleasant    ENT:  Oral mucosa moist, oropharynx benign. Resp:  CTA bilaterally. No wheezing/rhonchi/rales. No accessory muscle use   CV:  Regular rhythm, normal rate, no murmurs, gallops, rubs    GI:  Soft, non distended, non tender. normoactive bowel sounds, no hepatosplenomegaly     Musculoskeletal:  No edema, warm, 2+ pulses throughout    Neurologic:  Moves all extremities. AAOx3, CN II-XII reviewed     Skin:  Good turgor, no rashes or ulcers       Data Review:    Review and/or order of clinical lab test  Review and/or order of tests in the radiology section of CPT  Review and/or order of tests in the medicine section of CPT      Labs:     Recent Labs     04/23/21  0520   WBC 6.8   HGB 13.0   HCT 43.1        Recent Labs     04/23/21  0520 04/22/21  0056 04/21/21  0211    141 140   K 3.8 3.7 3.4*    103 106   CO2 35* 34* 31   BUN 25* 24* 21*   CREA 1.18 1.30 1.13   GLU 97 99 109*   CA 9.1 8.8 9.0   MG 2.1  --   --    PHOS 2.8  --   --      No results for input(s): ALT, AP, TBIL, TBILI, TP, ALB, GLOB, GGT, AML, LPSE in the last 72 hours. No lab exists for component: SGOT, GPT, AMYP, HLPSE  No results for input(s): INR, PTP, APTT, INREXT, INREXT in the last 72 hours. No results for input(s): FE, TIBC, PSAT, FERR in the last 72 hours.    Lab Results   Component Value Date/Time    Folate 14.8 04/27/2018 03:25 PM      No results for input(s): PH, PCO2, PO2 in the last 72 hours. No results for input(s): CPK, CKNDX, TROIQ in the last 72 hours.     No lab exists for component: CPKMB  Lab Results   Component Value Date/Time    Cholesterol, total 96 04/23/2021 05:20 AM    HDL Cholesterol 45 04/23/2021 05:20 AM    LDL, calculated 36 04/23/2021 05:20 AM    Triglyceride 75 04/23/2021 05:20 AM    CHOL/HDL Ratio 2.1 04/23/2021 05:20 AM     Lab Results   Component Value Date/Time    Glucose (POC) 99 04/21/2021 11:24 AM     Lab Results   Component Value Date/Time    Color Yellow 04/02/2018 09:54 AM    Appearance Clear 04/02/2018 09:54 AM    pH (UA) 6.0 04/02/2018 09:54 AM    Ketone Negative 04/02/2018 09:54 AM    Bilirubin Negative 04/02/2018 09:54 AM    Nitrites Negative 04/02/2018 09:54 AM    Leukocyte Esterase Negative 04/02/2018 09:54 AM         Medications Reviewed:     Current Facility-Administered Medications   Medication Dose Route Frequency    [START ON 4/24/2021] atorvastatin (LIPITOR) tablet 10 mg  10 mg Oral DAILY    ketorolac (ACULAR) 0.5 % ophthalmic solution 2 Drop  2 Drop Both Eyes TID    torsemide (DEMADEX) tablet 10 mg  10 mg Oral DAILY    prednisoLONE acetate (PRED FORTE) 1 % ophthalmic suspension 2 Drop  2 Drop Left Eye TID    ciprofloxacin HCl (CILOXAN) 0.3 % ophthalmic solution 1 Drop  1 Drop Left Eye TID    apixaban (ELIQUIS) tablet 2.5 mg  2.5 mg Oral Q12H    aspirin delayed-release tablet 81 mg  81 mg Oral DAILY    gabapentin (NEURONTIN) capsule 400 mg  400 mg Oral QHS    losartan (COZAAR) tablet 100 mg  100 mg Oral DAILY    pantoprazole (PROTONIX) tablet 40 mg  40 mg Oral DAILY    sodium chloride (NS) flush 5-40 mL  5-40 mL IntraVENous Q8H    sodium chloride (NS) flush 5-40 mL  5-40 mL IntraVENous PRN    acetaminophen (TYLENOL) tablet 650 mg  650 mg Oral Q6H PRN    Or    acetaminophen (TYLENOL) suppository 650 mg  650 mg Rectal Q6H PRN    polyethylene glycol (MIRALAX) packet 17 g  17 g Oral DAILY PRN    promethazine (PHENERGAN) tablet 12.5 mg  12.5 mg Oral Q6H PRN    Or    ondansetron (ZOFRAN) injection 4 mg  4 mg IntraVENous Q6H PRN     ______________________________________________________________________  EXPECTED LENGTH OF STAY: - - -  ACTUAL LENGTH OF STAY:          3                 Kemar Schaefer MD

## 2021-04-23 NOTE — PROGRESS NOTES
Problem: Falls - Risk of  Goal: *Absence of Falls  Description: Document Loretta Masterson Fall Risk and appropriate interventions in the flowsheet. Outcome: Progressing Towards Goal  Note: Fall Risk Interventions:  Mobility Interventions: Bed/chair exit alarm, OT consult for ADLs, Patient to call before getting OOB    Mentation Interventions: Bed/chair exit alarm, Door open when patient unattended    Medication Interventions: Bed/chair exit alarm, Patient to call before getting OOB    Elimination Interventions: Call light in reach, Bed/chair exit alarm, Patient to call for help with toileting needs    History of Falls Interventions: Bed/chair exit alarm, Door open when patient unattended         Problem: Patient Education: Go to Patient Education Activity  Goal: Patient/Family Education  Outcome: Progressing Towards Goal     Problem: Patient Education: Go to Patient Education Activity  Goal: Patient/Family Education  Outcome: Progressing Towards Goal     Problem: Patient Education: Go to Patient Education Activity  Goal: Patient/Family Education  Outcome: Progressing Towards Goal     Problem: Pressure Injury - Risk of  Goal: *Prevention of pressure injury  Description: Document Catracho Scale and appropriate interventions in the flowsheet.   Outcome: Progressing Towards Goal  Note: Pressure Injury Interventions:  Sensory Interventions: Assess changes in LOC    Moisture Interventions: Check for incontinence Q2 hours and as needed    Activity Interventions: Increase time out of bed    Mobility Interventions: HOB 30 degrees or less, PT/OT evaluation, Pressure redistribution bed/mattress (bed type)    Nutrition Interventions: Document food/fluid/supplement intake    Friction and Shear Interventions: HOB 30 degrees or less                Problem: Patient Education: Go to Patient Education Activity  Goal: Patient/Family Education  Outcome: Progressing Towards Goal     Problem: Discharge Planning  Goal: *Discharge to safe environment  Outcome: Progressing Towards Goal

## 2021-04-23 NOTE — PROGRESS NOTES
Occupational Therapy    Nursing cleared for therapy. Attempted to see for OT, ECHO present starting testing. Will defer for OT and continue to follow as appropriate.        Thank you,    Maria Isabel Hartman, OT

## 2021-04-23 NOTE — PROGRESS NOTES
Transition of Care Plan   RUR- 13% Low Risk   DISPOSITION: The disposition plan is to transition to IPR - pending review submitted via [employer] 4/23/21    Transport: DOMITILA SOTO met with patient at bedside to check in regarding his aftercare plans. Patient has been recommended for IPR. CM provided patient with choice for IPR. The Plan for Transition of Care is related to the following treatment goals: IPR    The Patient was provided with a choice of provider and agrees   with the discharge plan. [x] Yes [] No    Freedom of choice list was provided with basic dialogue that supports the patient's individualized plan of care/goals, treatment preferences and shares the quality data associated with the providers. [x] Yes [] No    Places submitted for review:  [address] will continue to follow, provide support and assist with BASSAM needs as they arise.     Becky Carlson

## 2021-04-23 NOTE — PROGRESS NOTES
Neurology Progress Note  Mihir Dawkins NP      Date of admission: 4/19/2021    Patient: Jennifer Goldsmith MRN: [id number]  SSN: [id number]    YOB: 1935  Age: 82 y.o. Sex: male        Subjective:     HPI: Jennifer Goldsmith is a 82 y.o. male with past medical history of postherpetic neuralgia, dyslipidemia, hypertension, coronary artery disease, prostate cancer, and paroxysmal atrial fibrillation, not on anticoagulation due to chronic anemia, who was admitted on 04/19 with symptoms of shortness of breath and dizziness. He was found to be in decompensated congestive heart failure and was started on treatment for the same. At some point on 4/20, the patient noticed that his left side felt weaker than the right and he was unsteady on his feet. This prompted MRI scan of the brain which showed acute infarction in the right thalamus and right medial occipital lobe. The patient takes aspirin at home but had stopped taking for \"no good reason\". At one point was on anticoagulation which was stopped. Interval 04/23/21:     Mild deficits appreciated yesterday have resolved. Started on apixaban 2.5 mg bid and aspirin 81 mg daily for stroke prevention. Review of systems  Review of systems negative except as detailed in the HPI, interval, PMH and A&P    Past Medical History:   Diagnosis Date    CAD (coronary artery disease) 05/2019    S/p PCI May 2019 [facility]     NUKE 8/7/2020 =Lexiscan pharmacologic stress test shows normal perfusion , noted apical thinning with an EF of 47  %.      Diverticulosis, sigmoid 8/2011    ED (erectile dysfunction) of organic origin     Hypertension     LVH (left ventricular hypertrophy) due to hypertensive disease     PAF (paroxysmal atrial fibrillation) ([facility] [address] 11/18/2019    [facility] admit nov 2019 no OAC due to anemia    Postherpetic neuralgia     Prostate cancer ([facility] [address]     Sebaceous cyst 4/1/2014    Shingles 02/2019     Past Surgical History:   Procedure Laterality Date    ENDOSCOPY, COLON, DIAGNOSTIC  >= 8-10years ago   [facility] SURGERY  [id number]    HX CARPAL TUNNEL RELEASE  4/08    right,left    HX CHOLECYSTECTOMY      HX HEART CATHETERIZATION  05/2019    HX HERNIA REPAIR      HX PROSTATECTOMY  5/06      Family History   Problem Relation Age of Onset    Cancer Mother         pancreatic    Diabetes Father     Stroke Father     Diabetes Sister     Hypertension Sister     Other Sister         Open heart surgery     Diabetes Brother     Hypertension Brother     Hypertension Brother     Asthma Daughter     Heart Attack Daughter     Other Daughter         hip replacement x 2     Social History     Tobacco Use    Smoking status: Never Smoker    Smokeless tobacco: Never Used   Substance Use Topics    Alcohol use: No      Prior to Admission medications    Medication Sig Start Date End Date Taking? Authorizing Provider   moxifloxacin (VIGAMOX) 0.5 % ophthalmic solution Administer 1 Drop to left eye three (3) times daily. Yes Provider, Historical   prednisoLONE acetate (PRED FORTE) 1 % ophthalmic suspension Administer 2 Drops to both eyes three (3) times daily. Yes Provider, Historical   therapeutic multivitamin (THERAGRAN) tablet Take 1 Tab by mouth daily. Yes Provider, Historical   ferric carboxymaltose (INJECTAFER IV) 750 mg by IntraVENous route. Yes Provider, Historical   gabapentin (NEURONTIN) 400 mg capsule Take 1 Cap by mouth nightly. Max Daily Amount: 400 mg. 3/19/21  Yes Uzma Ramirez MD   torsemide (DEMADEX) 20 mg tablet TAKE 1 TABLET BY MOUTH AS NEEDED FOR SWELLING AND SHORTNESS OF BREATH 1/17/21  Yes Uzma Ramirez MD   losartan (COZAAR) 100 mg tablet Take 1 Tab by mouth daily. 10/21/20  Yes Ru Dorsey MD   atorvastatin (LIPITOR) 10 mg tablet Take 1 Tab by mouth daily. 8/13/20  Yes Ru Dorsey MD   pantoprazole (PROTONIX) 40 mg tablet Take 40 mg by mouth daily.  11/21/19  Yes Provider, Historical   albuterol (PROVENTIL HFA, VENTOLIN HFA, PROAIR HFA) 90 mcg/actuation inhaler Take 1-2 Puffs by inhalation every four (4) hours as needed for Wheezing. 10/30/19  Yes Eben Hill MD   cyanocobalamin (VITAMIN B-12) 1,000 mcg tablet Take 1,000 mcg by mouth daily. Yes Provider, Historical   ketorolac (ACULAR) 0.5 % ophthalmic solution Administer 2 Drops to both eyes three (3) times daily.     Provider, Historical     Current Facility-Administered Medications   Medication Dose Route Frequency Provider Last Rate Last Admin    ketorolac (ACULAR) 0.5 % ophthalmic solution 2 Drop  2 Drop Both Eyes TID Ashley Arceo NP   2 Drop at 04/23/21 1001    torsemide (DEMADEX) tablet 10 mg  10 mg Oral DAILY Phyllis Carrasco PA-C   10 mg at 04/23/21 5766    atorvastatin (LIPITOR) tablet 40 mg  40 mg Oral DAILY Martha BAH MD   40 mg at 04/23/21 0957    prednisoLONE acetate (PRED FORTE) 1 % ophthalmic suspension 2 Drop  2 Drop Left Eye TID Martha BAH MD   2 Drop at 04/23/21 1001    ciprofloxacin HCl (CILOXAN) 0.3 % ophthalmic solution 1 Drop  1 Drop Left Eye TID Martha BAH MD   1 Drop at 04/23/21 1001    apixaban (ELIQUIS) tablet 2.5 mg  2.5 mg Oral Q12H Martha BAH MD   2.5 mg at 04/23/21 1035    aspirin delayed-release tablet 81 mg  81 mg Oral DAILY Alex Hayward MD   81 mg at 04/23/21 0957    gabapentin (NEURONTIN) capsule 400 mg  400 mg Oral QHS Alex Hayward MD   400 mg at 04/22/21 2212    losartan (COZAAR) tablet 100 mg  100 mg Oral DAILY Alex Hayward MD   100 mg at 04/23/21 0957    pantoprazole (PROTONIX) tablet 40 mg  40 mg Oral DAILY Alex Hayward MD   40 mg at 04/23/21 0953    sodium chloride (NS) flush 5-40 mL  5-40 mL IntraVENous Q8H Alex Hayward MD   10 mL at 04/23/21 0638    sodium chloride (NS) flush 5-40 mL  5-40 mL IntraVENous PRN Alex Hayward MD        acetaminophen (TYLENOL) tablet 650 mg  650 mg Oral Q6H PRN Jeannette Silva MD Or    acetaminophen (TYLENOL) suppository 650 mg  650 mg Rectal Q6H PRN Alex Hayward MD        polyethylene glycol (MIRALAX) packet 17 g  17 g Oral DAILY PRN Alex Hayward MD        promethazine (PHENERGAN) tablet 12.5 mg  12.5 mg Oral Q6H PRN Alex Hayward MD        Or    ondansetron (ZOFRAN) injection 4 mg  4 mg IntraVENous Q6H PRN Alex Hayward MD            No Known Allergies    Objective:     Vitals:    21 2200 21 0215 21 0600 21 0955   BP: 128/79 131/86 (!) 141/76 122/67   Pulse: (!) 58 (!) 56 63 69   Resp: 19  16 14   Temp: 97.6 °F (36.4 °C) 97.9 °F (36.6 °C) 97.9 °F (36.6 °C) 97.7 °F (36.5 °C)   SpO2: 96%   92%        Temp (24hrs), Av.6 °F (36.4 °C), Min:97 °F (36.1 °C), Max:97.9 °F (36.6 °C)        O2 Device: None (Room air)       No intake or output data in the 24 hours ending 21 1141    General: In NAD. Cardiac: Irregularly irregular  Lungs: Unlabored breathing. Abdomen: Soft/NT/non-distended. Extremities. No edema. Neurologic Exam:  Mental Status:  Alert and oriented x 4. Language:    Grossly intact fluency and comprehension. No dysarthria. Cranial Nerves:   Pupils equal, round and reactive to light. Extraocular movements intact w/o nystagmus      Facial sensation intact to LT     Facial activation symmetric. Hearing grossly intact. Motor:    Bulk and tone normal.      5/5 strength in all extremities. No pronator drift. No involuntary movements. Sensation:    Sensation intact throughout to light touch. Coordination & Gait: No ataxia with finger to nose.  Gait deferred    LABS:  Recent Labs     21  0520   WBC 6.8   HGB 13.0   HCT 43.1        Recent Labs     21  0520 21  0056 21  0211    141 140   K 3.8 3.7 3.4*    103 106   CO2 35* 34* 31   BUN 25* 24* 21*   CREA 1.18 1.30 1.13   GLU 97 99 109*   CA 9.1 8.8 9.0   MG 2.1  --   --    PHOS 2.8  --   -- No results for input(s): ALT, AP, TBIL, TBILI, TP, ALB, GLOB, GGT, AML, LPSE in the last 72 hours. No lab exists for component: SGOT, GPT, AMYP, HLPSE  No results for input(s): INR, PTP, APTT, INREXT in the last 72 hours. No results for input(s): PHI, PCO2I, PO2I, HCO3I in the last 72 hours. No results for input(s): CPK, CKNDX, TROIQ in the last 72 hours. No lab exists for component: CPKMB  Lab Results   Component Value Date/Time    Cholesterol, total 96 04/23/2021 05:20 AM    HDL Cholesterol 45 04/23/2021 05:20 AM    LDL, calculated 36 04/23/2021 05:20 AM    Triglyceride 75 04/23/2021 05:20 AM    CHOL/HDL Ratio 2.1 04/23/2021 05:20 AM     Lab Results   Component Value Date/Time    Glucose (POC) 99 04/21/2021 11:24 AM     Lab Results   Component Value Date/Time    Color Yellow 04/02/2018 09:54 AM    Appearance Clear 04/02/2018 09:54 AM    pH (UA) 6.0 04/02/2018 09:54 AM    Ketone Negative 04/02/2018 09:54 AM    Bilirubin Negative 04/02/2018 09:54 AM    Nitrites Negative 04/02/2018 09:54 AM    Leukocyte Esterase Negative 04/02/2018 09:54 AM       No results for input(s): FE, TIBC, PSAT, FERR in the last 72 hours. Lab Results   Component Value Date/Time    Folate 14.8 04/27/2018 03:25 PM      No results for input(s): PH, PCO2, PO2 in the last 72 hours. No results for input(s): CPK, CKNDX, TROIQ in the last 72 hours. No lab exists for component: CPKMB    Imaging:  Mri Brain Wo Cont    Result Date: 4/22/2021  1. Small acute right thalamic infarction. 2. Small acute cortical infarction anterior medial right occipital lobe. 3. Chronic bilateral occipital infarctions. Cta Head    Result Date: 4/23/2021  There is no major vessel occlusion. Mild cerebral atrophy and chronic microvascular ischemic change. Atherosclerotic cerebral vasculopathy. There is no evidence of aneurysm, dissection or hemodynamically significant stenosis. . There is no intracranial mass, hemorrhage or evidence of acute infarction. Cta Neck    Result Date: 4/23/2021  There is no major vessel occlusion. Mild cerebral atrophy and chronic microvascular ischemic change. Atherosclerotic cerebral vasculopathy. There is no evidence of aneurysm, dissection or hemodynamically significant stenosis. . There is no intracranial mass, hemorrhage or evidence of acute infarction. CT Results:  Results from Hospital Encounter encounter on 04/19/21   CTA HEAD    Narrative *PRELIMINARY REPORT*    No vascular occlusion or flow-limiting stenosis. No pathologic intracranial  enhancement. No CT evidence of acute infarct. Preliminary report was provided by Dr. Ritu Hobbs, the on-call radiologist, at [address] [place] [address]. Final report to follow. *END PRELIMINARY REPORT*    CLINICAL HISTORY: CVA  INDICATION: CVA    COMPARISON: 4/22/2021. CONTRAST: 100 ml Isovue-370    TECHNIQUE:  Unenhanced  images were obtained to localize the volume for  acquisition. Multislice helical axial CT angiography was performed from the  aortic arch to the top of the head during uneventful rapid bolus intravenous  contrast administration. Coronal and sagittal reformations and 3D post  processing was performed. CT dose reduction was achieved through use of a  standardized protocol tailored for this examination and automatic exposure  control for dose modulation. FINDINGS:    There is a degree of motion artifact which slightly limits evaluation. There is  sulcal and ventricular prominence. Periventricular white matter hypodensity. Moderate cerebral atrophy and mild chronic microvascular change. .   CTA NECK  The bilateral subclavian, common carotid, and internal carotid arteries are  patent with no flow-limiting stenosis. % of right carotid artery stenosis: 0  % of left carotid artery stenosis: 0  Measurements utilizing NASCET criteria. NASCET method was utilized for calculating stenosis. The vertebral arteries are codominant and patent.  Small thyroid hypodensities do  not warrant further imaging follow-up. Multilevel foraminal stenoses of the  cervical spine. . There are degenerative changes of the cervical spine. The  common carotid artery on the right. CTA HEAD  The dural venous sinuses are grossly patent. . Petrous and cavernous carotid  arteries are patent. .Mild multifocal areas of stenoses in the left M2 vessels  more so in the anterior division. Slightly diminished arborization left M2 in  comparison to the right. The basilar artery is patent. . The M1 segments are  patent bilaterally. .The posterior cerebral arteries on the right and on the left  are patent. .  There is no aneurysm. There are A1 segments bilaterally. .  No evidence of acute intracranial hemorrhage or midline shift is demonstrated. Impression There is no major vessel occlusion. Mild cerebral atrophy and chronic microvascular ischemic change. Atherosclerotic cerebral vasculopathy. There is no evidence of aneurysm, dissection or hemodynamically significant  stenosis. .  There is no intracranial mass, hemorrhage or evidence of acute infarction. CTA NECK    Narrative *PRELIMINARY REPORT*    No vascular occlusion or flow-limiting stenosis. No pathologic intracranial  enhancement. No CT evidence of acute infarct. Preliminary report was provided by Dr. Terence Connolly, the on-call radiologist, at [address] [place] [address]. Final report to follow. *END PRELIMINARY REPORT*    CLINICAL HISTORY: CVA  INDICATION: CVA    COMPARISON: 4/22/2021. CONTRAST: 100 ml Isovue-370    TECHNIQUE:  Unenhanced  images were obtained to localize the volume for  acquisition. Multislice helical axial CT angiography was performed from the  aortic arch to the top of the head during uneventful rapid bolus intravenous  contrast administration. Coronal and sagittal reformations and 3D post  processing was performed.   CT dose reduction was achieved through use of a  standardized protocol tailored for this examination and automatic exposure  control for dose modulation. FINDINGS:    There is a degree of motion artifact which slightly limits evaluation. There is  sulcal and ventricular prominence. Periventricular white matter hypodensity. Moderate cerebral atrophy and mild chronic microvascular change. .   CTA NECK  The bilateral subclavian, common carotid, and internal carotid arteries are  patent with no flow-limiting stenosis. % of right carotid artery stenosis: 0  % of left carotid artery stenosis: 0  Measurements utilizing NASCET criteria. NASCET method was utilized for calculating stenosis. The vertebral arteries are codominant and patent. Small thyroid hypodensities do  not warrant further imaging follow-up. Multilevel foraminal stenoses of the  cervical spine. . There are degenerative changes of the cervical spine. The  common carotid artery on the right. CTA HEAD  The dural venous sinuses are grossly patent. . Petrous and cavernous carotid  arteries are patent. .Mild multifocal areas of stenoses in the left M2 vessels  more so in the anterior division. Slightly diminished arborization left M2 in  comparison to the right. The basilar artery is patent. . The M1 segments are  patent bilaterally. .The posterior cerebral arteries on the right and on the left  are patent. .  There is no aneurysm. There are A1 segments bilaterally. .  No evidence of acute intracranial hemorrhage or midline shift is demonstrated. Impression There is no major vessel occlusion. Mild cerebral atrophy and chronic microvascular ischemic change. Atherosclerotic cerebral vasculopathy. There is no evidence of aneurysm, dissection or hemodynamically significant  stenosis. .  There is no intracranial mass, hemorrhage or evidence of acute infarction.                   CT HEAD WO CONT    Narrative Indication:  Left arm drift     Comparison: CT earlier today    Findings: 5 mm axial images were obtained from the skull base through the  vertex. CT dose reduction was achieved through the use of a standardized protocol  tailored for this examination and automatic exposure control for dose  modulation. The ventricles and cortical sulci are prominent, compatible with age related  volume loss. There is no evidence of intracranial hemorrhage, mass, mass effect,  or acute infarct. There is periventricular white matter disease. No extra-axial  fluid collections are seen. There are chronic bilateral medial occipital lobe  infarctions. The visualized paranasal sinuses and mastoid air cells are clear. The orbital structures are unremarkable. No osseous abnormalities are seen. Impression 1. No evidence of acute infarct or intracranial hemorrhage. 2. Periventricular white matter disease is likely secondary to chronic small  vessel ischemic changes. 3. Chronic bilateral occipital lobe infarctions. MRI Results:  Results from [place] encounter on 04/19/21   MRI BRAIN WO CONT    Narrative INDICATION:   rule out CVA    EXAMINATION:  MRI BRAIN WO CONTRAST    COMPARISON:  CT April 20, 2021    TECHNIQUE:  Multiplanar multisequence acquisition without contrast of the brain. FINDINGS:      There is mild to moderate generalized atrophy and associated prominence of the  ventricles, sulci, and cisterns. No hydrocephalus or midline shift. Small acute  to subacute infarction right thalamus. Small areas of chronic bilateral  occipital infarction, with a new small area of acute cortical infarction in the  anterior medial right occipital lobe. Major intracranial vascular flow-voids are  patent. No abnormal hemosiderin deposition to suggest hemorrhage. Impression 1. Small acute right thalamic infarction. 2. Small acute cortical infarction anterior medial right occipital lobe. 3. Chronic bilateral occipital infarctions.        Results from [place] encounter on 12/12/19   MRI BRAIN W WO CONT    Narrative EXAM: MRI BRAIN W WO CONT    TECHNIQUE: Brain images including sagittal and axial T1-weighted, axial FLAIR,  T2-weighted, diffusion weighted, gradient echo,  precontrast. Multiplanar  postcontrast T1-weighted images. IV CONTRAST:  18 cc Dotarem    INDICATION:  dyspnea, dysphagia    COMPARISON:  None. FINDINGS:  BRAIN PARENCHYMA:  No acute infarct. No masses or abnormal enhancement. No  significant white matter disease. INTRACRANIAL HEMORRHAGE: None. CSF SPACES:  Moderate ventriculomegaly. Mild prominence of the cortical sulci. Findings most consistent with cerebral volume loss. BASAL CISTERNS:  Patent. MIDLINE SHIFT: None. VASCULAR SYSTEM:  Normal flow voids. PARANASAL SINUSES AND MASTOID AIR CELLS:  No significant opacification. VISUALIZED ORBITS:  No significant abnormalities. VISUALIZED UPPER CERVICAL SPINE:  No significant abnormalities. SELLA:  No enlargement or  focal abnormality. SKULL BASE:  No significant abnormalities. Cerebellar tonsils in normal  position. CALVARIUM:  Intact. Impression IMPRESSION:    1. No acute findings. No significant focal parenchymal lesions. 2. Moderate ventriculomegaly most consistent with cerebral volume loss. Results from [place] encounter on 10/18/19   MRI CERV SPINE WO CONT    Narrative EXAM: MRI CERV SPINE WO CONT    INDICATION: Disorders of diaphragm. COMPARISON: None    TECHNIQUE: MR imaging of the cervical spine was performed using the following  sequences: sagittal T1, T2, STIR;  axial T2, T1.     CONTRAST:  None. FINDINGS:    There is mild reversal of curvature of the cervical spine at C6-7. No  significant listhesis. Vertebral body heights are maintained. Marrow signal is  normal.    The craniocervical junction is intact. The course, caliber, and signal intensity  of the spinal cord are normal.      The paraspinal soft tissues are within normal limits. The canal is congenitally narrow. C2-C3: Mild disc space narrowing.  Mild central disc osteophyte complex with  thickening of ligamentum flavum causing mild spinal stenosis. There is mild  bilateral neural foraminal narrowing. C3-C4: Mild disc space narrowing. Mild based based disc osteophyte complex with  thickening of ligamentum flavum causing mild to moderate spinal stenosis. There  is moderate bilateral neural foraminal narrowing. C4-C5: Mild disc space narrowing. Mild broad-based disc osteophyte complex with  thickening of ligamentum flavum. There is mild to moderate spinal stenosis. There is moderate bilateral neural foraminal narrowing. C5-C6: Mild disc space narrowing. Mild broad-based disc osteophyte complex with  thickening of ligamentum flavum causing mild spinal stenosis. There is moderate  bilateral neural foraminal narrowing. C6-C7: Severe disc space narrowing. Mild broad-based disc osteophyte complex  with mild spinal stenosis. There is moderate to severe left and moderate right  neural foraminal narrowing. C7-T1: No herniation or stenosis. Impression IMPRESSION:  Mild reversal of curvature of the lower cervical spine with multilevel  spondylosis as above. XR Results   Results from [place] encounter on 04/19/21   XR CHEST PA LAT    Impression Cardiomegaly and minimal interstitial pulmonary edema. Results from [place] encounter on 12/12/19   XR CHEST PA LAT    Impression IMPRESSION:  Small bilateral pleural effusions and vague right perihilar airspace disease. Follow-up in 4-6 weeks recommended. Results from [place] encounter on 10/08/19   XR SNIFF TEST    Impression IMPRESSION: No evidence of diaphragmatic paralysis but diaphragmatic motion  appears to be somewhat limited bilaterally, left greater than right. VAS/US Results (maximum last 3):   Results from [place] encounter on 04/11/18   DUPLEX LOWER EXT VENOUS LEFT    Narrative EXAM:  DUPLEX LOWER EXT VENOUS LEFT    INDICATION: Left calf pain.    COMPARISON: None    TECHNIQUE: Grayscale, Doppler color flow, and Doppler spectral analysis  sonographic imaging of the left lower extremity. Right lower extremity  ultrasound is performed for comparison. FINDINGS: There is no evidence of filling defect within the lower extremity  veins, which demonstrate  normal compressibility and flow. There is a small  amount of fluid in the popliteal soft tissues medially suggesting a ruptured  Baker's cyst.      Impression IMPRESSION:  1. No deep venous thrombosis. 2. A small amount of fluid in the left popliteal soft tissues medially is  suggestive of a ruptured Bakers cyst.         TTE  08/07/20   ECHO ADULT COMPLETE 08/14/2020 8/14/2020    Narrative · LV: Normal cavity size and systolic function (ejection fraction normal). Moderate concentric hypertrophy. Calculated left ventricular ejection   fraction is 57%. Biplane method used to measure ejection fraction. · LA: Moderately dilated left atrium. Left Atrium volume index is 48   mL/m2. · RA: Mildly dilated right atrium. · AV: Aortic valve sclerosis with no significant stenosis. Mild aortic   valve regurgitation is present. · MV: Mitral valve thickening. Mild mitral valve regurgitation is present. · TV: Mild tricuspid valve regurgitation is present. · PA: Mild pulmonary hypertension. Pulmonary arterial systolic pressure is   35 mmHg. · AO: Mild ascending aorta dilatation. Ascending aorta diameter = 3.7 cm.         Signed by: Italo Rasheed MD         EKG  Results for orders placed or performed during the hospital encounter of 04/19/21   EKG, 12 LEAD, INITIAL   Result Value Ref Range    Ventricular Rate 70 BPM    Atrial Rate 65 BPM    QRS Duration 106 ms    Q-T Interval 412 ms    QTC Calculation (Bezet) 444 ms    Calculated R Axis -59 degrees    Calculated T Axis 33 degrees    Diagnosis       Atrial fibrillation  Left axis deviation  When compared with ECG of 13-DEC-2019 08:11,  Criteria for Septal infarct are no longer present  Nonspecific T wave abnormality, improved in Inferior leads  Confirmed by Juju Michele M.D., Daniel Peacock ([id number]) on 4/20/2021 5:39:35 PM     Results for orders placed or performed in visit on 03/23/11   AMB POC EKG ROUTINE W/ 12 LEADS, INTER & REP    Impression    Normal, no change since December 2009. Hospital Problems  Date Reviewed: 3/12/2021          Codes Class Noted POA    Decompensated heart failure ([facility] [address] ICD-10-CM: I50.9  ICD-9-CM: 428.0  4/20/2021 Unknown              Assessment/Plan:     PTA antiplatelet: No (non-compliant)  PTA AC: No  PTA statin: Atorvastatin 10 mg  LDL: 36  ECHO: LVEF is 55 - 60%. No shunt  EKG: Afib     A1C: pending      Yvette Carrero is a 82 y.o. male with history of paroxysmal atrial fibrillation and hypertension, not on anticoagulation due to chronic anemia. He is currently admitted for decompensated congestive heart failure. He developed weakness on the left side that was first noted this past Monday. MRI scan of the brain was completed that shows an acute lacunar infarct in the right thalamus and also cortical infarction in the medial right occipital lobe. 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 CTA of the head and neck to further evaluate the posterior circulation showed no major vessel occlusion or hemodynamically significant stenosis. Chronic microvascular ischemic changes noted. Thalamic infarction is typically due to atherosclerotic vascular disease, but multiple chronic and acute occipital infarctions raise the concern for ongoing cardioembolic phenomenon. From a stroke standpoint he has no appreciable deficits. Agree with apixaban 2.5 mg bid and aspirin 81 mg daily for stroke prevention  Continue home dose of atorvastatin. LDL within goal  Normoglycemic but will check A1C  Current and long term BP goal <140/<80  From a neurologic standpoint he can be discharged.     We are available as needed    Thank you for this consult.     Signed By: Louise Camacho NP     April 23, 2021 11:41 AM

## 2021-04-23 NOTE — PROGRESS NOTES
Bedside shift change report given to boom JIMENEZ (oncoming nurse) by Maria Teresa Bunch RN and Maida Hernandez RN (offgoing nurse). Report included the following information SBAR, Kardex, ED Summary, Intake/Output, Med Rec Status, Cardiac Rhythm A FIB and Alarm Parameters .

## 2021-04-23 NOTE — PROGRESS NOTES
Problem: Mobility Impaired (Adult and Pediatric)  Goal: *Acute Goals and Plan of Care (Insert Text)  Description: FUNCTIONAL STATUS PRIOR TO ADMISSION: Patient was independent and active without use of DME though with intermittent dizziness and falls. Functional mobility started to decline about a week ago, started using a rollator walker, several falls this week. HOME SUPPORT PRIOR TO ADMISSION: The patient lived with his wife but did not require assist prior to a week ago. Physical Therapy Goals  Initiated 4/20/2021  1. Patient will move from supine to sit and sit to supine , scoot up and down, and roll side to side in bed with modified independence within 7 day(s). 2.  Patient will transfer from bed to chair and chair to bed with modified independence using the least restrictive device within 7 day(s). 3.  Patient will perform sit to stand with modified independence within 7 day(s). 4.  Patient will ambulate with modified independence for 150 feet with the least restrictive device within 7 day(s). 5.  Patient will ascend/descend 2 stairs with modified independence within 7 day(s). Outcome: Progressing Towards Goal  PHYSICAL THERAPY TREATMENT  Patient: Amber Magana (81 y.o. male)  Date: 4/23/2021  Diagnosis: Decompensated heart failure ([facility] [address] [I50.9] <principal problem not specified>       Precautions: Fall  Chart, physical therapy assessment, plan of care and goals were reviewed. ASSESSMENT  Patient continues with skilled PT services and is progressing towards goals. Pt pleasant and agreeable to work with therapy, highly motivated during session. He transferred supine>sit with SBA and sit<>stand with CGA. Session focused on progressing ambulation. Pt ambulated with Tobin and use of RW - demonstrated L lean and shuffled gait with frequent LOB. Pt's LUE and LLE fatigued quickly with activity, note L hand sliding off walker.   Pt ended session seated in a chair with all needs met and nursing updated. Recommending IPR upon discharge. Current Level of Function Impacting Discharge (mobility/balance): Tobin for ambulation     Other factors to consider for discharge: MRI positive for several acute R infarcts, independent baseline, fall risk, highly motivated          PLAN :  Patient continues to benefit from skilled intervention to address the above impairments. Continue treatment per established plan of care. to address goals. 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     OBJECTIVE DATA SUMMARY:   Critical Behavior:  Neurologic State: Alert, Drowsy, Confused  Orientation Level: Oriented to person, Disoriented to place, Disoriented to situation, Disoriented to time  Cognition: Follows commands  Safety/Judgement: Decreased awareness of environment, Decreased awareness of need for safety, Fall prevention  Functional Mobility Training:  Bed Mobility:     Supine to Sit: Stand-by assistance  Sit to Supine: (up in chair)  Scooting: Stand-by assistance        Transfers:  Sit to Stand: Contact guard assistance  Stand to Sit: Contact guard assistance                             Balance:  Sitting: Intact  Standing: Impaired; With support  Standing - Static: Good;Constant support  Standing - Dynamic : Fair;Constant support  Ambulation/Gait Training:  Distance (ft): 80 Feet (ft)(x3 reps)  Assistive Device: Walker, rolling;Gait belt  Ambulation - Level of Assistance: Minimal assistance        Gait Abnormalities: Decreased step clearance; Path deviations; Shuffling gait;Trunk sway increased(L toeing out)        Base of Support: Center of gravity altered;Shift to left;Narrowed     Speed/Hannah: Slow;Shuffled  Step Length: Right shortened;Left shortened  Swing Pattern: Left asymmetrical;Right asymmetrical    Activity Tolerance:   Fair    After treatment patient left in no apparent distress:   Sitting in chair, Call bell within reach, and Bed / chair alarm activated    COMMUNICATION/COLLABORATION:   The patients plan of care was discussed with: Occupational therapist and Registered nurse.      Vera Parrish PT, DPT   Time Calculation: 18 mins

## 2021-04-23 NOTE — PROGRESS NOTES
Physical Therapy: Defer     Chart reviewed and attempted to see pt for PT treatment. RNs currently bedside working with pt. Will defer and check back as able and medically appropriate.     Shirley Coppola, PT, DPT

## 2021-04-24 NOTE — PROGRESS NOTES
Pharmacist Discharge Medication Reconciliation    Pharmacist has reviewed and reconciled cardiac medications from prior to admission, inpatient stay and discharge summary. Changes from this admission have been appropriately reflected in the after visit summary. Discharge Medications:   Current Discharge Medication List        START taking these medications    Details   OTHER,NON-FORMULARY, Outpatient Physical Therapy Evaluate and Treatment  Qty: 1 Each, Refills: 0      apixaban (ELIQUIS) 2.5 mg tablet Take 1 Tab by mouth every twelve (12) hours for 30 days. Qty: 60 Tab, Refills: 0           CONTINUE these medications which have CHANGED    Details   atorvastatin (LIPITOR) 40 mg tablet Take 0.5 Tabs by mouth daily for 30 days. Qty: 15 Tab, Refills: 0    Associated Diagnoses: Coronary artery disease involving native coronary artery of native heart without angina pectoris      aspirin delayed-release 81 mg tablet Take 1 Tab by mouth daily for 30 days. Qty: 30 Tab, Refills: 0      torsemide (DEMADEX) 10 mg tablet Take 1 Tab by mouth daily for 30 days. Qty: 30 Tab, Refills: 0    Comments: **Patient requests 90 days supply**  Associated Diagnoses: Edema, unspecified type           CONTINUE these medications which have NOT CHANGED    Details   moxifloxacin (VIGAMOX) 0.5 % ophthalmic solution Administer 1 Drop to left eye three (3) times daily. prednisoLONE acetate (PRED FORTE) 1 % ophthalmic suspension Administer 2 Drops to both eyes three (3) times daily. therapeutic multivitamin (THERAGRAN) tablet Take 1 Tab by mouth daily. ferric carboxymaltose (INJECTAFER IV) 750 mg by IntraVENous route.      gabapentin (NEURONTIN) 400 mg capsule Take 1 Cap by mouth nightly. Max Daily Amount: 400 mg. Qty: 90 Cap, Refills: 1    Associated Diagnoses: Postherpetic neuralgia      losartan (COZAAR) 100 mg tablet Take 1 Tab by mouth daily.   Qty: 90 Tab, Refills: 3    Associated Diagnoses: Essential hypertension with goal blood pressure less than 140/90      pantoprazole (PROTONIX) 40 mg tablet Take 40 mg by mouth daily. albuterol (PROVENTIL HFA, VENTOLIN HFA, PROAIR HFA) 90 mcg/actuation inhaler Take 1-2 Puffs by inhalation every four (4) hours as needed for Wheezing. Qty: 1 Inhaler, Refills: 5    Associated Diagnoses: Pneumonia of right lower lobe due to infectious organism      cyanocobalamin (VITAMIN B-12) 1,000 mcg tablet Take 1,000 mcg by mouth daily. ketorolac (ACULAR) 0.5 % ophthalmic solution Administer 2 Drops to both eyes three (3) times daily.

## 2021-04-24 NOTE — PROGRESS NOTES
Pt's BP is A bit low. Pt is sitting up in chair. His son is at his bedsdioe. Pt denies any dizziness, nausea, blurred vision. He wants to stay sitting up in chair. He has been instructed to not get Out of chair without assistance. I will recheck his BP in 1 hr.

## 2021-04-24 NOTE — PROGRESS NOTES
DC note. Pt has a DC order. Pt is to be DC 'd home and follow up with out pt rehab. He hs been provided a RX for outpt rehab and 4 medications. DC instructions were reviewed with the his son. BP was rechecked and was stable.  Pt DC'd to front entrance via wheelchair to meet his son

## 2021-04-24 NOTE — DISCHARGE INSTRUCTIONS
Dear Mary Tobar,    Thank you for choosing [address] [place] [address] for your healthcare needs. We strive to provide EXCELLENT care to you and your family. In an effort to explain clearly why you were here in the hospital, I've also written a very brief summary below. Other details including formal diagnosis, medication changes, and follow up appointment recommendations can also be found in this packet. You were admitted for Dizziness due to acute stroke, and CHF exacerbation for which you were started with IV diuretics, and started on eliquis. You also received care from specialist physicians in the following specialties:  [address]    Here are the updates to your medication list:  **Increase your statin to 40mg daily  **Add eilquis for stroke prevention  **Continue your aspirin. **Add daily torsemide, 10mg. Instead of taking it as needed. Remember that it is important for you to take your medications exactly as they are prescribed. It is helpful to keep a list of your medication with the names, dosages, and times to be taken in your wallet. Additionally,   - Please make sure to follow up with your primary care physician within 1-2 weeks of discharge for hospital follow up, as well as your cardiologist. Celestekemal Simental should also follow up with a neurologist, in a few months. - Please make sure to continue to monitor for: worsening chest pain, shortness of breath, sudden weakness, numbness or tingling and Return to the Emergency Department with any of these symptoms:   - Please get up slowly from a seated or laying position, avoid falls. - Avoid tobacco, alcohol and other illicit drug use. - Please note that you should use the following DME: Peggy Tapia.   - Please measure your weight daily, if you note a sharp increase (2-3lb) in a short period of time, please call your cardiologist. Celeste Simental should take a fluid pill at that time depending on their instruction. - Keep to a low salt diet. This will help reduce the amount of excess fluid accumulation.   - Our therapist recommended inpatient rehab, however you and your family would prefer to go home with outpatient therapy. You understand the risk of falling, and not improving as fast, if you go home. Make sure to also see your primary care doctor for follow-up. Bring these papers with you and be sure to review your medication list with your doctor. I cannot stress the importance of follow up enough. I've included the information for your follow-up appointments below: Follow-up Information     Follow up With Specialties Details Why Contact Info    [facility]   [phone], outpatient therapy [address]    Kate Tello MD Cardiology On 5/10/2021 10AM [address]  [address] [facility], [facility] [address] [facility]      Lance Jackson MD Neurology In 1 month Stroke follow up  [address] [place] [address]  [phone]            At this time, the following test results are still pending: None  Again, please follow-up these results with your primary care provider. Should you have any fever over 101 degrees for 24 hours, chest pain, shortness of breath, fever, chills, nausea, vomiting, diarrhea, change in mentation, falling, weakness, bleeding, or worsening pain, please seek medical attention immediately. Finally, as your discharging physician, you may be receiving a survey regarding my care. I would greatly value and appreciate your input in the survey as we strive for excellence. If you have any questions, I can be reached at [phone].   Thank you so much again for allowing me to care for you at [address] [place] [address].    Respectfully yours,  Alfie Antonio MD

## 2021-04-24 NOTE — DISCHARGE SUMMARY
Discharge Summary       PATIENT ID: Yvette Carrero  MRN: [id number]   YOB: 1935    DATE OF ADMISSION: 4/19/2021 11:49 PM    DATE OF DISCHARGE: 04/24/2021   PRIMARY CARE PROVIDER: Victor M Abarca MD     DISCHARGING PROVIDER: Jose Medel MD    To contact this individual call [phone] and ask the  to page. If unavailable ask to be transferred the Adult Hospitalist Department. CONSULTATIONS: IP CONSULT TO CARDIOLOGY  IP CONSULT TO NEUROLOGY    PROCEDURES/SURGERIES: * No surgery found *    ADMITTING DIAGNOSES & HOSPITAL COURSE:   New acute ischemic infarcts   L sided weakness  Acute diastolic CHF exacerbation  Chronic paroxysmal Afib     Per H and P   Wilfred Hammond is a 82 y. o. male: History of postherpetic neuralgia, dyslipidemia, hypertension, CAD, history of prostate cancer, paroxysmal atrial fibrillation not anticoagulated due to chronic anemia, chronic dyspnea, BPPV who presents to ER with chief complaint of shortness of breath and dizziness. Originally diagnosed with CHF exacerbation and diuresed with improvement. PT/OT consulted and noted some balance and L sided deficits. CT scan was negative, showed old occipital infarcts. Due to symptoms, he got MRI of the brain which showed acute infarcts. Neuro evaluated, and added eliquis. CVA's thought to be embolic, as well as secondary to atherosclerosis. DISCHARGE DIAGNOSES / PLAN:      New acute ischemic infarcts - occipital lobe, thalamic lobe  Dizziness  L sided weakness  - Neurology consulted  -Continue aspirin, Eliquis 2.5 mg twice daily added  - Increase statin, per cholesterol guidelines for cardiovascular protection.  - PT/oT  - Echo with normal EF.  -CTA done 4/22 without any significant stenoses or defects.  - PT/oT rec IPR, but patient and family prefer to go home.  They understand risk and benefit.      Acute diastolic CHF exacerbation   - NYHA class III on admit,   - Cardiology following  - Diuresis with 40mg IV lasix, now to PO     Abnormal troponin - type 2 NSTEMI from demand  - No further workup  - Cardiology following     Chronic paroxysmal Afib  - rate controlled  -Now on Eliquis 2.5 mg twice daily     CAD - ASA, statin   HTN - home meds     ADDITIONAL CARE RECOMMENDATIONS:   - Please take all medications as prescribed. Note changes as below. **Increase your statin to 40mg daily  **Add eilquis for stroke prevention  **Continue your aspirin. **Add daily torsemide, 10mg. Instead of taking it as needed. - Please make sure to follow up with your primary care physician within 1-2 weeks of discharge for hospital follow up, as well as your cardiologist. Mabel tyesha should also follow up with a neurologist, in a few months. - Please make sure to continue to monitor for: worsening chest pain, shortness of breath, sudden weakness, numbness or tingling and Return to the Emergency Department with any of these symptoms:   - Please get up slowly from a seated or laying position, avoid falls. - Avoid tobacco, alcohol and other illicit drug use. - Please note that you should use the following DME: [employer].   - Please measure your weight daily, if you note a sharp increase (2-3lb) in a short period of time, please call your cardiologist. Mabel Pena should take a fluid pill at that time depending on their instruction.   - Keep to a low salt diet. This will help reduce the amount of excess fluid accumulation.   - Our therapist recommended inpatient rehab, however you and your family would prefer to go home with outpatient therapy. You understand the risk of falling, and not improving as fast, if you go home.          PENDING TEST RESULTS:   At the time of discharge the following test results are still pending: None    FOLLOW UP APPOINTMENTS:    Follow-up Information     Follow up With Specialties Details Why Contact Info    Nancy Ryan   [phone], outpatient therapy [address] [facility] [address]   [address] Johnny Giles MD Cardiology On 5/10/2021 [address] [facility]      Lupie [place], [address] [facility]      Robert Garcias MD Neurology In 1 month Stroke follow up  [address] [facility]  [phone]               DIET: Resume previous diet    ACTIVITY: Activity as tolerated and Outpatient PT/OT    WOUND CARE: None    EQUIPMENT needed: None      DISCHARGE MEDICATIONS:  Current Discharge Medication List      START taking these medications    Details   OTHER,NON-FORMULARY, Outpatient Physical Therapy Evaluate and Treatment  Qty: 1 Each, Refills: 0      apixaban (ELIQUIS) 2.5 mg tablet Take 1 Tab by mouth every twelve (12) hours for 30 days. Qty: 60 Tab, Refills: 0         CONTINUE these medications which have CHANGED    Details   atorvastatin (LIPITOR) 40 mg tablet Take 0.5 Tabs by mouth daily for 30 days. Qty: 15 Tab, Refills: 0    Associated Diagnoses: Coronary artery disease involving native coronary artery of native heart without angina pectoris      aspirin delayed-release 81 mg tablet Take 1 Tab by mouth daily for 30 days. Qty: 30 Tab, Refills: 0      torsemide (DEMADEX) 10 mg tablet Take 1 Tab by mouth daily for 30 days. Qty: 30 Tab, Refills: 0    Comments: **Patient requests 90 days supply**  Associated Diagnoses: Edema, unspecified type         CONTINUE these medications which have NOT CHANGED    Details   moxifloxacin (VIGAMOX) 0.5 % ophthalmic solution Administer 1 Drop to left eye three (3) times daily. prednisoLONE acetate (PRED FORTE) 1 % ophthalmic suspension Administer 2 Drops to both eyes three (3) times daily. therapeutic multivitamin (THERAGRAN) tablet Take 1 Tab by mouth daily. ferric carboxymaltose (INJECTAFER IV) 750 mg by IntraVENous route.      gabapentin (NEURONTIN) 400 mg capsule Take 1 Cap by mouth nightly.  Max Daily Amount: 400 mg.  Qty: 90 Cap, Refills: 1    Associated Diagnoses: Postherpetic neuralgia      losartan (COZAAR) 100 mg tablet Take 1 Tab by mouth daily. Qty: 90 Tab, Refills: 3    Associated Diagnoses: Essential hypertension with goal blood pressure less than 140/90      pantoprazole (PROTONIX) 40 mg tablet Take 40 mg by mouth daily. albuterol (PROVENTIL HFA, VENTOLIN HFA, PROAIR HFA) 90 mcg/actuation inhaler Take 1-2 Puffs by inhalation every four (4) hours as needed for Wheezing. Qty: 1 Inhaler, Refills: 5    Associated Diagnoses: Pneumonia of right lower lobe due to infectious organism      cyanocobalamin (VITAMIN B-12) 1,000 mcg tablet Take 1,000 mcg by mouth daily. ketorolac (ACULAR) 0.5 % ophthalmic solution Administer 2 Drops to both eyes three (3) times daily. NOTIFY YOUR PHYSICIAN FOR ANY OF THE FOLLOWING:   Fever over 101 degrees for 24 hours. Chest pain, shortness of breath, fever, chills, nausea, vomiting, diarrhea, change in mentation, falling, weakness, bleeding. Severe pain or pain not relieved by medications. Or, any other signs or symptoms that you may have questions about.     DISPOSITION:   X Home With:   OT  PT  HH  RN       Long term SNF/Inpatient Rehab    Independent/assisted living    Hospice    Other:       PATIENT CONDITION AT DISCHARGE:     Functional status    Poor     Deconditioned    X Independent      Cognition   X  Lucid     Forgetful     Dementia      Catheters/lines (plus indication)    Lopez     PICC     PEG    X None      Code status   X  Full code     DNR      PHYSICAL EXAMINATION AT DISCHARGE:  Visit Vitals  /64 (BP 1 Location: Left upper arm, BP Patient Position: At rest)   Pulse (!) 57   Temp 98.6 °F (37 °C)   Resp 19   Ht 5' 8\" (1.727 m)   Wt 90.3 kg (199 lb 1.2 oz)   SpO2 93%   BMI 30.27 kg/m²     Gen: NAD, awake in bed  HEENT: NC/AT, sclera anicteric, PERRL, EOMI  CV: Irregularly irregular, normal rate, normal S1 and S2, no pedal edema   Resp: CTA b/l no increased work of breathing, no wheezing or rhonchi, speaking in full sentences   Abd: NT/ND, normal bowel sounds, no rebound or guarding  Ext: 2+ pulses, no edema  Skin: No rashes or lesions      CHRONIC MEDICAL DIAGNOSES:  Problem List as of 4/24/2021 Date Reviewed: 3/12/2021          Codes Class Noted - Resolved    Decompensated heart failure ([facility] [address] ICD-10-CM: I50.9  ICD-9-CM: 428.0  4/20/2021 - Present        LVH (left ventricular hypertrophy) due to hypertensive disease ICD-10-CM: I11.9  ICD-9-CM: 402.90  Unknown - Present        Diastolic dysfunction GXT-77-ZG: I51.89  ICD-9-CM: 429.9  3/27/2020 - Present        Iron deficiency anemia due to chronic blood loss ICD-10-CM: D50.0  ICD-9-CM: 280.0  1/17/2020 - Present        Persistent atrial fibrillation (HCC) ICD-10-CM: I48.19  ICD-9-CM: 427.31  12/29/2019 - Present        Coronary artery disease involving native coronary artery of native heart without angina pectoris ICD-10-CM: I25.10  ICD-9-CM: 414.01  12/29/2019 - Present    Overview Signed 8/14/2020  2:51 PM by MD PHILLIP Danielson 8/7/2020 =Lexiscan pharmacologic stress test shows normal perfusion , noted apical thinning with an EF of 47  %.    S/p PCI May 2019 [facility]              Elevated hemidiaphragm ICD-10-CM: J98.6  ICD-9-CM: 519.4  12/29/2019 - Present        Pedal edema ICD-10-CM: R60.0  ICD-9-CM: 782.3  12/29/2019 - Present        Postherpetic neuralgia ICD-10-CM: B02.29  ICD-9-CM: 053.19  12/19/2019 - Present        PAF (paroxysmal atrial fibrillation) ([facility] ICD-10-CM: I48.0  ICD-9-CM: 427.31  11/18/2019 - Present    Overview Signed 8/14/2020  2:53 PM by Danny Cedeño MD     [facility] admit nov 2019 no OAC due to anemia             Shingles ICD-10-CM: B02.9  ICD-9-CM: 053.9  Unknown - Present        Gastroesophageal reflux disease without esophagitis ICD-10-CM: K21.9  ICD-9-CM: 530.81  11/2/2016 - Present        Advanced care planning/counseling discussion ICD-10-CM: Z71.89  ICD-9-CM: V65.49 5/9/2016 - Present    Overview Signed 5/9/2016  3:41 PM by Willy Holloway MD     Patient given [facility] Directive form and booklet. Patient advised to follow up with me or contact nurse navigator to submit these form to medical chart. I advised Patient of the benefits of Advance Care Plan with regard to end of life care and benefits of filling forms out in case Patient cannot speak for themselves.                Sebaceous cyst ICD-10-CM: L72.3  ICD-9-CM: 706.2  4/1/2014 - Present        B12 deficiency ICD-10-CM: E53.8  ICD-9-CM: 266.2  12/13/2013 - Present        History of prostate cancer ICD-10-CM: Z85.46  ICD-9-CM: V10.46  Unknown - Present        Diverticulosis, sigmoid ICD-10-CM: K57.30  ICD-9-CM: 562.10  Unknown - Present        ED (erectile dysfunction) of organic origin ICD-10-CM: N52.9  ICD-9-CM: 607.84  Unknown - Present        Hypertension ICD-10-CM: I10  ICD-9-CM: 401.9  Unknown - Present        RESOLVED: Myasthenia gravis ([facility] [address] ICD-10-CM: G70.00  ICD-9-CM: 358.00  12/29/2019 - 1/26/2021              Greater than 30 minutes were spent with the patient on counseling and coordination of care    Signed:   Sonny Rain MD  4/24/2021  9:43 AM

## 2021-04-26 NOTE — PROGRESS NOTES
Care Transitions Initial Follow Up Call    Call within 2 business days of discharge: Yes     Patient: Celeste Councilman Patient : 1935 MRN: [id number]    [facility]charge [facility]       Complaint Diagnosis Description Type Department Provider    21 Chest Pain Acute on chronic congestive heart failure, unspecified heart failure type ([facility] [address] . .. ED to Hosp-Admission (Discharged) (ADMIT) [id number] Sahron Martinez MD; Mar... Was this an external facility discharge? No     Challenges to be reviewed by the provider   Additional needs identified to be addressed with provider yes  advance care planning- ACP not on file  [facility] - CHF exac/NSTEMI/ischemic infarcts       Method of communication with provider : chart routing    Discussed COVID-19 related testing which was not done at this time. Test results were not done. Advance Care Planning:   Does patient have an Advance Directive: not on file; education provided     Inpatient Readmission Risk score: Unplanned Readmit Risk Score: 15    Was this a readmission? no   Patient stated reason for the admission: sob and dizzy    Patients top risk factors for readmission: medical condition-new acute ischemic infarcts/CHF exac/Type 2 NSTEMI heart failure  Interventions to address risk factors: Scheduled appointment with PCP-wife to call and schedule BASSAM with pcp. Agrees to call if unable to get appt within a week., Scheduled appointment with Specialist-reminded patient to have wife call for f/u with neuro, . and Obtained and reviewed discharge summary and/or continuity of care documents    Care Transition Nurse (CTN) contacted the patient by telephone to perform post hospital discharge assessment. Verified name and  with patient as identifiers. Provided introduction to self, and explanation of the CTN role. Reports he is better, moving slowly. Using walker. Denies any sob or dizziness since coming home.  Scheduled for swallowing test Wed by . Was scheduled several months ago. May interfere with his outpatient PT. Advised to check with GI about possibility of postponing. CTN reviewed discharge instructions, medical action plan and red flags with patient who verbalized understanding. Were discharge instructions available to patient? yes. Reviewed appropriate site of care based on symptoms and resources available to patient including: PCP, Specialist, Urgent Care Clinics, When to call 911 and [address]. Patient given an opportunity to ask questions and does not have any further questions or concerns at this time. The patient agrees to contact the PCP office for questions related to their healthcare. Medication reconciliation was performed with patient, who verbalizes understanding of administration of home medications. Advised obtaining a 90-day supply of all daily and as-needed medications. Referral to Pharm D needed: no     Home Health/Outpatient orders at discharge: PT-outpatient PT at [facility]: kelsey  Date of initial visit: starts 4/27 11am    Durable Medical Equipment ordered at discharge: None  [address] [facility] [address]: na  Durable Medical Equipment received: na    Covid Risk Education    Patient has following risk factors of: heart failure. Education provided regarding infection prevention, and signs and symptoms of COVID-19 and when to seek medical attention with patient who verbalized understanding. Discussed exposure protocols and quarantine From CDC: Are you at higher risk for severe illness?  and given an opportunity for questions and concerns. The patient agrees to contact the COVID-19 hotline [phone] or PCP office for questions related to COVID-19. For more information on steps you can take to protect yourself, see CDC's How to Protect Yourself     Was patient discharged with a pulse oximeter?  no Discussed and confirmed pulse oximeter discharge instructions and when to notify provider or seek emergency care. Discussed follow-up appointments. If no appointment was previously scheduled, appointment scheduling offered: yes Is follow up appointment scheduled within 7 days of discharge? pending, wife to call    [facility]ABILITATION [facility] follow up appointment(s):   Future Appointments   Date Time Provider Samantha Ley   4/28/2021  8:30 AM [facility] XR OP 2 ERICA KRAMER'S H   5/10/2021 10:00 AM Gaby Aranda MD [facility] BS AMB   5/24/2021  9:45 AM Hyder, Gayl Nyhan, MD [facility] BS AMB   6/23/2021 11:30 AM Nilda Gasca  N [facility] BS AMB     Non[facility] follow up appointment(s): ,neuro- wife to call for f/u appt in one month. Plan for follow-up call in 7-10 days based on severity of symptoms and risk factors. Plan for next call: symptom management-assess current symptoms, self management-following discharge instructions, follow up appointment-attended BASSAM appt and medication management-taking meds as ordered. CTN provided contact information for future needs. Goals Addressed                 This Visit's Progress     Reduce risk of CHF exacerbations and complications. 4/26/21 [facility] 4/19-4/24 CHF exac/new acute ischemic infarcts/NSTEMI   Reviewed discharge instructions with patient   Reviewed meds with patient and his sister-education provided on new med.  Reviewed red flags : sob,chest pain,swelling in extremities, weight gain of > 3 lbs in 2 days or > 5lb in one week, dizziness,falls,fever,nausea,vomiting,diarrhea.  Reminded to follow low sodium diet.  Reminded to check weight q am at same time and record results.  BASSAM with pcp= wife to call and schedule   F/u with neuro=wife to call.  Given info on [employer] as resource.  Given CTN contact info if questions or concerns.    Advised CTN to check back in about a week, sooner [email or web address]

## 2021-05-07 NOTE — PROGRESS NOTES
Venessa Hammans Tomaszjisaturnino     1935       Gaby Kent MD, [facility]  Date of Visit-5/7/2021   PCP is Nazia Galeana MD   [facility] [place]  [facility]  HPI:  Ann Kinney is a 82 y.o. male   CC + lower extremity edema  + dyspnea on exertion +dizziness   6 month f/u of   · CAD- JOSEFINA-Cath done 5/30/19. OM1 with 70% blockage had JOSEFINA  · CT scan on 10/8/19 which  Showed low lung volumes, diaphragm on the left was elevated with atelectasis on the left with mucus plugging.   · hospitalized at [facility] 11/12/2019 through 11/21/2019,Atrial fibrillation with RVR possible MG, underwent plasmapheresis/ steroids. Then  became markedly edemetous and I admitted him on 12/12/19. · Echo 11-18-19 EF Definity for atrial fibrillation Moderate to severe LVH EF 60-65%MAC, mild MRMod SERGIO PASP 45  · Echo 12-13-19 EF 50-55% severe LVH, mod LAE, mod MR, TR  · HTN, XOL, anemia Hgb 8.7 11-20-19    Pt has CAD with prior stent in 2019. Also admitted in 2019 by Neurology  Dr. Darian Cedillo and was hospitalized at [facility]. He underwent plasmapheresis for possible neuromuscular disorder and then put on steroids. He became markedly edemetous and  admitted him on 12/12/19 to [facility]. He received IV diuretics and was discharged on 12/16/19. Pulmonary follow up  for DAYO. His Prednisone was decreased significantly. He stopped Mestinon.   Pt saw Dr. Roberto Goins in May 2020 in f/u of anemia. The source of anemia is still not clear and a colonoscopy was planned. Pt was hospitalized 4/19-4/24 with SOB and dizziness. 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 Eliquis was added to his ASA. He had acute diastolic CHF on admit and received diuresis. His peak troponin was 0.09. Pt is accompanied by his daughter. Overall the pt states he is doing well since leaving the hospital. He notes he is still has dizzy spells when standing up too quickly, but that is not unusual for him.  The dizziness with standing is different from the dizziness that sent him to the hospital. He felt SOB when he went to the hospital. He states that as soon as he was given the fluid pill his SOB improved. His daughter notes that he was not taking his fluid pill as he was supposed to because of frequent urination. He reports that in the last day or two he has had black, dark stools, though he notes that his diet the last few days has been different. His daughter notes that he gets full very quickly and the hospital told them it was due to him retaining fluid in his stomach. He has been going to physical therapy. He is scheduled to go back to Dr. Luis Gómez soon for an EGD. He's had a workup with Dr. Luis Gómez but they have not found a source of any bleeding. He has an appointment with Dr. Elías Jones in the next week or two. Pt received both COVID shots. Denies chest pain, edema, syncope or shortness of breath at rest, has no tachycardia, palpitations or sense of arrhythmia. EKG: Atrial fibrillation   -Left axis -anterior fascicular block. Assessment/Plan:    Patient Instructions   Have labs obtained in approximately 10 days. May have done at PCP office with other labs. Follow up with Dr. Bharathi Coreas in 6-8 weeks. Have repeat labs done 2-3 days prior to follow up. Future Appointments   Date Time Provider Samantha Ley   6/23/2021 11:30 AM Shara Van  N Rodo Sidhu BS AMB   7/2/2021  8:40 AM Gaby Aranda MD CAVREY BS AMB   9/27/2021 10:00 AM Lyubov Dietrich MD PAFP BS AMB        1. Persistent atrial fibrillation (HCC)  Recent stroke. Now on Eliquis at 2.5 mg. His rate is controlled. He did have some decompensation of his pulmonary edema which has resolved and seems to be doing well on Torsemide. We discussed risk of bleeding and clotting. I think the dose of Eliquis at 2.5 mg is a good choice. He has had one black stool and we discussed possible Watchman.  I would do that if he started to drop his blood count again while he is on Eliquis. - AMB POC EKG ROUTINE W/ 12 LEADS, INTER & REP  21    ECHO ADULT COMPLETE 2021    Interpretation Summary  · Saline contrast was given to evaluate for intracardiac shunt. No shunt seen. · LV: Estimated LVEF is 55 - 60%. Normal cavity size, wall thickness and systolic function (ejection fraction normal). Wall motion: normal.    Signed by: Bess García MD on 2021  4:28 PM      2. Coronary artery disease involving native coronary artery of native heart with unstable angina pectoris ([address]  JOSEFINA to OM 2019. Now on ASA. He is off Plavix. Has no angina. 3. Essential hypertension with goal blood pressure less than 140/90  Stable control. At goal , meds and possible side effects reviewed and patient denies  Key CAD CHF Meds             aspirin delayed-release 81 mg tablet (Taking/) Take 1 Tab by mouth daily for 30 days. atorvastatin (LIPITOR) 40 mg tablet (Taking/Discontinued) Take 0.5 Tabs by mouth daily for 30 days. apixaban (ELIQUIS) 2.5 mg tablet (Taking/Discontinued) Take 1 Tab by mouth every twelve (12) hours for 30 days. torsemide (DEMADEX) 10 mg tablet (Taking/Discontinued) Take 1 Tab by mouth daily for 30 days. losartan (COZAAR) 100 mg tablet (Taking) Take 1 Tab by mouth daily. BP Readings from Last 6 Encounters:   21 122/64   21 110/70   21 101/70   21 (!) 159/78   21 (!) 155/85   21 136/70      4. Diastolic dysfunction  LVH moderate. Went into acute pulmonary edema. Now on Torsemide daily, he had been skipping some doses. 5. Elevated hemidiaphragm  Elevated left hemidiaphragm by CT scan in 2019. 6. Pedal edema  Resolved. Plan is for blood work and f/u in 6 weeks     Impression:   1. Persistent atrial fibrillation ([address]    2. Coronary artery disease involving native coronary artery of native heart with unstable angina pectoris ([address]    3.  Essential hypertension with goal blood pressure less than 140/90    4. Diastolic dysfunction    5. Elevated hemidiaphragm    6. Pedal edema       Cardiac History:   [facility] Admit November 2019  --New onset Afib , rate controlled, w/up for MG, plasmapharesis done, no OAC due to risk with low plts and anemia when seen by Cards at [facility] Hgb was 8.5  ECHO [facility] 11-18-19 Definity EF 60-65% mod severe LVH, mild MR, MAC, LAE, mod OUMAR PASP 45    CAD   S/p PCI May 2019 [facility]  Lexiscan 8-7-2020 no reversible ischemia, EF 47%, apical thinning (LVH). Future Appointments   Date Time Provider Samantha Jil   5/24/2021  9:45 AM Dali Dietrich MD PAFP BS AMB   6/23/2021 11:30 AM Eve De Los Santos  [address] BS AMB        ROS-except as noted above. . A complete cardiac and respiratory are reviewed and negative except as above ; Resp-denies wheezing  or productive cough,. Const- No unusual weight loss or fever; Neuro-no recent seizure or CVA ; GI- No BRBPR, abdom pain, bloating ; - no  hematuria   see supplement sheet, initialed and to be scanned by staff  Past Medical History:   Diagnosis Date    CAD (coronary artery disease) 05/2019    S/p PCI May 2019 [facility]     NUKE 8/7/2020 =Lexiscan pharmacologic stress test shows normal perfusion , noted apical thinning with an EF of 47  %.  Diverticulosis, sigmoid 8/2011    ED (erectile dysfunction) of organic origin     Hypertension     LVH (left ventricular hypertrophy) due to hypertensive disease     PAF (paroxysmal atrial fibrillation) ([address] 11/18/2019    [facility] admit nov 2019 no OAC due to anemia    Postherpetic neuralgia     Prostate cancer ([address]     Sebaceous cyst 4/1/2014    Shingles 02/2019      Social Hx= reports that he has never smoked. He has never used smokeless tobacco. He reports that he does not drink alcohol or use drugs.      Exam and Labs:  /70 (BP 1 Location: Left upper arm, BP Patient Position: Sitting, BP Cuff Size: Adult)   Pulse (!) 49   Resp 16   Ht 5' 8\" (1.727 m)   Wt 180 lb (81.6 kg)   SpO2 100%   BMI 27.37 kg/m² Constitutional:  NAD, comfortable  Head: NC,AT. Eyes: No scleral icterus. Neck:  Neck supple. No JVD present. Throat: moist mucous membranes. Chest: Effort normal & normal respiratory excursion . Neurological: alert, conversant and oriented . Skin: Skin is not cold. No obvious systemic rash noted. Not diaphoretic. No erythema. Psychiatric:  Grossly normal mood and affect. Behavior appears normal. Extremities:  no clubbing or cyanosis. Abdomen: non distended    Lungs:breath sounds normal. No stridor. distress, wheezes or  Rales. Heart:IRIR normal S1, S2, no murmurs, rubs, clicks or gallops , PMI non displaced. Edema: Edema is none. Lab Results   Component Value Date/Time    Cholesterol, total 96 04/23/2021 05:20 AM    HDL Cholesterol 45 04/23/2021 05:20 AM    LDL, calculated 36 04/23/2021 05:20 AM    Triglyceride 75 04/23/2021 05:20 AM    CHOL/HDL Ratio 2.1 04/23/2021 05:20 AM     Lab Results   Component Value Date/Time    Sodium 140 04/24/2021 03:27 AM    Potassium 3.7 04/24/2021 03:27 AM    Chloride 103 04/24/2021 03:27 AM    CO2 33 (H) 04/24/2021 03:27 AM    Anion gap 4 (L) 04/24/2021 03:27 AM    Glucose 97 04/24/2021 03:27 AM    BUN 27 (H) 04/24/2021 03:27 AM    Creatinine 1.01 04/24/2021 03:27 AM    BUN/Creatinine ratio 27 (H) 04/24/2021 03:27 AM    GFR est AA >60 04/24/2021 03:27 AM    GFR est non-AA >60 04/24/2021 03:27 AM    Calcium 8.7 04/24/2021 03:27 AM      Wt Readings from Last 3 Encounters:   05/07/21 180 lb (81.6 kg)   04/23/21 199 lb 1.2 oz (90.3 kg)   04/22/21 175 lb (79.4 kg)      BP Readings from Last 3 Encounters:   05/07/21 110/70   04/24/21 101/70 03/23/21 (!) 159/78      Current Outpatient Medications   Medication Sig   Danny Gupta, Outpatient Physical Therapy Evaluate and Treatment    atorvastatin (LIPITOR) 40 mg tablet Take 0.5 Tabs by mouth daily for 30 days.     aspirin delayed-release 81 mg tablet Take 1 Tab by mouth daily for 30 days.    apixaban (ELIQUIS) 2.5 mg tablet Take 1 Tab by mouth every twelve (12) hours for 30 days.  torsemide (DEMADEX) 10 mg tablet Take 1 Tab by mouth daily for 30 days.  therapeutic multivitamin (THERAGRAN) tablet Take 1 Tab by mouth daily.  gabapentin (NEURONTIN) 400 mg capsule Take 1 Cap by mouth nightly. Max Daily Amount: 400 mg.    losartan (COZAAR) 100 mg tablet Take 1 Tab by mouth daily.  pantoprazole (PROTONIX) 40 mg tablet Take 40 mg by mouth daily.  cyanocobalamin (VITAMIN B-12) 1,000 mcg tablet Take 1,000 mcg by mouth daily.  albuterol (PROVENTIL HFA, VENTOLIN HFA, PROAIR HFA) 90 mcg/actuation inhaler Take 1-2 Puffs by inhalation every four (4) hours as needed for Wheezing. No current facility-administered medications for this visit. Impression see above.       Written by Yesi Nelson, as dictated by Ron Greene MD.

## 2021-05-07 NOTE — Clinical Note
5/7/2021 Patient: Lata Gutierrez YOB: 1935 Date of Visit: 5/7/2021 Frederik Denver, MD 
[facility] [address] [address] [address] [place] Dear Frederik Denver, MD, Thank you for referring Mr. Wilfred Hammond to [facility] for evaluation. My notes for this consultation are attached. If you have questions, please do not hesitate to call me. I look forward to following your patient along with you.  
 
 
Sincerely, 
 
Tammie Dolan MD

## 2021-05-17 NOTE — TELEPHONE ENCOUNTER
MD Dietrich,    Call from family member with request for refills below. Has appt. next week. Labs done 4/23/21. Katie Payne    Last Visit: 1/26/21 MD Dietrich  Next Appointment: 5/24/21 MD Dietrich  Previous Refill Encounter(s):   Atorvastatin 4/24/21 15  Pantoprazole 11/21/19 historical provider  Torsemide 4/24/21 30    Requested Prescriptions     Pending Prescriptions Disp Refills    atorvastatin (LIPITOR) 40 mg tablet 45 Tab 0     Sig: Take 0.5 Tabs by mouth daily.  pantoprazole (PROTONIX) 40 mg tablet 90 Tab 0     Sig: Take 1 Tab by mouth daily.  torsemide (DEMADEX) 10 mg tablet 90 Tab 0     Sig: Take 1 Tab by mouth daily.

## 2021-05-21 NOTE — TELEPHONE ENCOUNTER
Patient calling to speak to the nurse as he as a dental procedure and would like to know how long to stop taking his blood thinner, patient will be out of town on 5/24/21    [phone]

## 2021-05-21 NOTE — TELEPHONE ENCOUNTER
Patient is set to have dental work in 05/28 and would like to know how many days prior to appointment he can stop his blood thinners. States he will be availanble after 1:30-2pm due to an appointment.      Phone: [phone]

## 2021-05-24 PROBLEM — Z91.81 AT HIGH RISK FOR FALLS: Status: ACTIVE | Noted: 2021-01-01

## 2021-05-24 NOTE — PROGRESS NOTES
This is the Subsequent Medicare Annual Wellness Exam, performed 12 months or more after the Initial AWV or the last Subsequent AWV    I have reviewed the patient's medical history in detail and updated the computerized patient record. Assessment/Plan   Education and counseling provided:  Are appropriate based on today's review and evaluation    1. Encounter for Medicare annual wellness exam  2. Persistent atrial fibrillation (HCC)  -     apixaban (ELIQUIS) 2.5 mg tablet; Take 1 Tablet by mouth every twelve (12) hours for 30 days. , Normal, Disp-180 Tablet, R-1  3. Coronary artery disease involving native coronary artery of native heart without angina pectoris  4. Essential hypertension with goal blood pressure less than 140/90  5. Hyperlipidemia, unspecified hyperlipidemia type  6. Edema, unspecified type  7. Malaise       Depression Risk Factor Screening     3 most recent PHQ Screens 5/24/2021   PHQ Not Done -   Little interest or pleasure in doing things Not at all   Feeling down, depressed, irritable, or hopeless Not at all   Total Score PHQ 2 0       Alcohol Risk Screen    Do you average more than 1 drink per night or more than 7 drinks a week: No    In the past three months have you have had more than 4 drinks containing alcohol on one occasion: No        Functional Ability and Level of Safety    Hearing: Hearing is good. Activities of Daily Living: The home contains: handrails and grab bars  Patient does total self care      Ambulation: with difficulty, uses a walker     Fall Risk:  Fall Risk Assessment, last 12 mths 5/24/2021   Able to walk? Yes   Fall in past 12 months? 1   Do you feel unsteady? 1   Are you worried about falling 1   Is TUG test greater than 12 seconds? -   Is the gait abnormal? 1   Number of falls in past 12 months 2   Fall with injury?  1      Abuse Screen:  Patient is not abused       Cognitive Screening    Has your family/caregiver stated any concerns about your memory: no Health Maintenance Due     Health Maintenance Due   Topic Date Due    COVID-19 Vaccine (1) Never done    Shingrix Vaccine Age 50> (2 of 2) 08/20/2018    DTaP/Tdap/Td series (2 - Tdap) 02/14/2019    Medicare Yearly Exam  05/01/2020       Patient Care Team   Patient Care Team:  Victor M Ponce MD as PCP - General (Family Medicine)  Victor M Ponce MD as PCP - [facility]  Libra Moscoso MD (Cardiology)  Giacomo Dobbs MD (Neurology)  Camron Shipman RN as Care Transitions Nurse (Family Medicine)    History     Patient Active Problem List   Diagnosis Code    Hypertension I10    ED (erectile dysfunction) of organic origin N52.9    Diverticulosis, sigmoid K57.30    History of prostate cancer Z85.46    B12 deficiency E53.8    Sebaceous cyst L72.3    Advanced care planning/counseling discussion Z71.89    Gastroesophageal reflux disease without esophagitis K21.9    Shingles B02.9    Postherpetic neuralgia B02.29    Persistent atrial fibrillation ([address] I48.19    Coronary artery disease involving native coronary artery of native heart without angina pectoris I25.10    Elevated hemidiaphragm J98.6    Pedal edema R60.0    Iron deficiency anemia due to chronic blood loss F71.7    Diastolic dysfunction Y60.47    LVH (left ventricular hypertrophy) due to hypertensive disease I11.9    PAF (paroxysmal atrial fibrillation) ([facility]) I48.0    Decompensated heart failure ([address] I50.9    At high risk for falls Z91.81     Past Medical History:   Diagnosis Date    CAD (coronary artery disease) 05/2019    S/p PCI May 2019 [facility]     NUKE 8/7/2020 =Lexiscan pharmacologic stress test shows normal perfusion , noted apical thinning with an EF of 47  %.      Diverticulosis, sigmoid 8/2011    ED (erectile dysfunction) of organic origin     Hypertension     LVH (left ventricular hypertrophy) due to hypertensive disease     PAF (paroxysmal atrial fibrillation) ([address] 11/18/2019    [facility] admit nov 2019 no [address] due to anemia    Postherpetic neuralgia     [facility]     Sebaceous cyst 4/1/2014    Shingles 02/2019      Past Surgical History:   Procedure Laterality Date    ENDOSCOPY, COLON, DIAGNOSTIC  >= 8-10years ago   [facility] SURGERY  1968 [id number]    HX CARPAL TUNNEL RELEASE  4/08    right,left    HX CHOLECYSTECTOMY      HX HEART CATHETERIZATION  05/2019    HX HERNIA REPAIR      HX PROSTATECTOMY  5/06     Current Outpatient Medications   Medication Sig Dispense Refill    apixaban (ELIQUIS) 2.5 mg tablet Take 1 Tablet by mouth every twelve (12) hours for 30 days. 180 Tablet 1    atorvastatin (LIPITOR) 40 mg tablet Take 0.5 Tabs by mouth daily. 45 Tab 0    pantoprazole (PROTONIX) 40 mg tablet Take 1 Tab by mouth daily. 90 Tab 0    torsemide (DEMADEX) 10 mg tablet Take 1 Tab by mouth daily. 90 Tab 0    OTHER,NON-FORMULARY, Outpatient Physical Therapy Evaluate and Treatment 1 Each 0    aspirin delayed-release 81 mg tablet Take 1 Tab by mouth daily for 30 days. 30 Tab 0    therapeutic multivitamin (THERAGRAN) tablet Take 1 Tab by mouth daily.  gabapentin (NEURONTIN) 400 mg capsule Take 1 Cap by mouth nightly. Max Daily Amount: 400 mg. 90 Cap 1    losartan (COZAAR) 100 mg tablet Take 1 Tab by mouth daily. 90 Tab 3    albuterol (PROVENTIL HFA, VENTOLIN HFA, PROAIR HFA) 90 mcg/actuation inhaler Take 1-2 Puffs by inhalation every four (4) hours as needed for Wheezing. 1 Inhaler 5    cyanocobalamin (VITAMIN B-12) 1,000 mcg tablet Take 1,000 mcg by mouth daily.        No Known Allergies    Family History   Problem Relation Age of Onset   [facility] Cancer Mother         pancreatic    Diabetes Father     Stroke Father     Diabetes Sister     Hypertension Sister     Other Sister         Open heart surgery     Diabetes Brother     Hypertension Brother     Hypertension Brother     Asthma Daughter     Heart Attack Daughter     Other Daughter         hip replacement x 2     Social History Tobacco Use    Smoking status: Never Smoker    Smokeless tobacco: Never Used   Substance Use Topics    Alcohol use: No         Sara Prado MD

## 2021-05-24 NOTE — PROGRESS NOTES
Chief Complaint   Patient presents with   Earl Leigh Annual Wellness Visit     1. Have you been to the ER, urgent care clinic since your last visit? Hospitalized since your last visit? Yes [facility]     2. Have you seen or consulted any other health care providers outside of the [address] [place] [address] since your last visit? Include any pap smears or colon screening.    Cardiology   Neurology

## 2021-05-24 NOTE — PROGRESS NOTES
CORI Tobar 82 y.o. male  presents to the office today for his annual wellness visit. Blood pressure 122/64, pulse (!) 51, temperature 97.6 °F (36.4 °C), temperature source Temporal, resp. rate 16, height 5' 8\" (1.727 m), weight 185 lb (83.9 kg), SpO2 96 %. Body mass index is 28.13 kg/m². Chief Complaint   Patient presents with    Annual Wellness Visit        Hyperlipidemia: Lipid panel on 4/23/2021 notable for total cholesterol 96, HDL 45, LDL 36, and triglycerides 75. Pt continues with Lipitor 40 mg/day. GERD: Pt continues with Protonix 40 mg/day. Hypertension: BP at office today 153/72 and 122/64 on manual recheck. Pt continues with torsemide 10 mg/day and Cozaar 100 mg/day. Postherpetic neuralgia: Pt continues with Neurontin 400 mg/day. He notes that he is unable to feel anything in his hands when he lifts items. Additionally he has decreased function of his hands. Vitamin B12 deficiency: Pt's most recent B12 value was 238 on 4/27/2018. He continues with cyanocobalamin 1,000 mcg/day. Health maintenance: Medicare questionnaire discussed and responses reviewed today in office. Pt and I discussed his ACD during today's appointment. Pt will have updated lab work performed during today's OV. Pt was admitted to the ED at [facility] on 4/19/2021 due to a new acute ischemic infarcts of the occipital and thalamic lobes, acute diastolic CHF exacerbation, and chronic paroxysmal Afib,. This has since resulted in pt suffering from left sided weakness. Pt was prescribed Eliquis 2.5 mg/BID and Aspirin delayed-release 81 mg/day. Additionally, pt has been in physical therapy twice a week to help with his ambulation. Pt is under the care of Dr. Praveen Nichole, cardiology, and Dr. Zoey Ayon, neurology, for this condition. Upon examination, pt has swelling and \"puffiness\" present around his eyes, bilaterally. Additionally, pt has trace edema present in his lower limbs, bilaterally.   Jonatan Kent is aware of pt's condition. I have placed orders for pt to have labwork performed for further analysis. Current Outpatient Medications   Medication Sig Dispense Refill    apixaban (ELIQUIS) 2.5 mg tablet Take 1 Tablet by mouth every twelve (12) hours for 30 days. 180 Tablet 1    atorvastatin (LIPITOR) 40 mg tablet Take 0.5 Tabs by mouth daily. 45 Tab 0    pantoprazole (PROTONIX) 40 mg tablet Take 1 Tab by mouth daily. 90 Tab 0    torsemide (DEMADEX) 10 mg tablet Take 1 Tab by mouth daily. 90 Tab 0    OTHER,NON-FORMULARY, Outpatient Physical Therapy Evaluate and Treatment 1 Each 0    aspirin delayed-release 81 mg tablet Take 1 Tab by mouth daily for 30 days. 30 Tab 0    therapeutic multivitamin (THERAGRAN) tablet Take 1 Tab by mouth daily.  gabapentin (NEURONTIN) 400 mg capsule Take 1 Cap by mouth nightly. Max Daily Amount: 400 mg. 90 Cap 1    losartan (COZAAR) 100 mg tablet Take 1 Tab by mouth daily. 90 Tab 3    albuterol (PROVENTIL HFA, VENTOLIN HFA, PROAIR HFA) 90 mcg/actuation inhaler Take 1-2 Puffs by inhalation every four (4) hours as needed for Wheezing. 1 Inhaler 5    cyanocobalamin (VITAMIN B-12) 1,000 mcg tablet Take 1,000 mcg by mouth daily. No Known Allergies  Past Medical History:   Diagnosis Date    CAD (coronary artery disease) 05/2019    S/p PCI May 2019 [facility]     NUKE 8/7/2020 =Lexiscan pharmacologic stress test shows normal perfusion , noted apical thinning with an EF of 47  %.      Diverticulosis, sigmoid 8/2011    ED (erectile dysfunction) of organic origin     Hypertension     LVH (left ventricular hypertrophy) due to hypertensive disease     PAF (paroxysmal atrial fibrillation) ([facility] [address] 11/18/2019    [facility] admit nov 2019 no OAC due to anemia    Postherpetic neuralgia     Prostate cancer ([facility] [address]     Sebaceous cyst 4/1/2014    Shingles 02/2019     Past Surgical History:   Procedure Laterality Date    ENDOSCOPY, COLON, DIAGNOSTIC  >= 8-10years ago    HX BACK SURGERY  [id number]    HX CARPAL TUNNEL RELEASE  4/08    right,left    HX CHOLECYSTECTOMY      HX HEART CATHETERIZATION  05/2019    HX HERNIA REPAIR      HX PROSTATECTOMY  5/06     Family History   Problem Relation Age of Onset    Cancer Mother         pancreatic    Diabetes Father     Stroke Father     Diabetes Sister     Hypertension Sister     Other Sister         Open heart surgery     Diabetes Brother     Hypertension Brother     Hypertension Brother     Asthma Daughter     Heart Attack Daughter     Other Daughter         hip replacement x 2     Social History     Tobacco Use    Smoking status: Never Smoker    Smokeless tobacco: Never Used   Substance Use Topics    Alcohol use: No        Review of Systems   Constitutional: Negative for chills and fever. HENT: Negative for hearing loss and tinnitus. Eyes: Negative for blurred vision and double vision. Respiratory: Negative for cough and shortness of breath. Cardiovascular: Negative for chest pain and palpitations. Gastrointestinal: Negative for nausea and vomiting. Genitourinary: Negative for dysuria and frequency. Musculoskeletal: Negative for back pain and falls. Skin: Negative for itching and rash. Neurological: Negative for dizziness, loss of consciousness and headaches. Endo/Heme/Allergies: Negative. Psychiatric/Behavioral: Negative for depression. The patient is not nervous/anxious. Physical Exam  Vitals reviewed. Constitutional:       Appearance: Normal appearance. HENT:      Head: Normocephalic and atraumatic. Right Ear: Tympanic membrane, ear canal and external ear normal.      Left Ear: Tympanic membrane, ear canal and external ear normal.      Nose: Nose normal.      Mouth/Throat:      Mouth: Mucous membranes are moist.      Pharynx: Oropharynx is clear. Eyes:      Extraocular Movements: Extraocular movements intact.       Conjunctiva/sclera: Conjunctivae normal.      Pupils: Pupils are equal, round, and reactive to light. Cardiovascular:      Rate and Rhythm: Normal rate. Rhythm regularly irregular. Pulses: Normal pulses. Heart sounds: Normal heart sounds. Pulmonary:      Effort: Pulmonary effort is normal.      Breath sounds: Normal breath sounds. Abdominal:      General: Abdomen is flat. Bowel sounds are normal.      Palpations: Abdomen is soft. Musculoskeletal:         General: Normal range of motion. Cervical back: Normal range of motion and neck supple. Skin:     General: Skin is warm and dry. Neurological:      General: No focal deficit present. Mental Status: He is alert and oriented to person, place, and time. Psychiatric:         Mood and Affect: Mood normal.         Behavior: Behavior normal.         Judgment: Judgment normal.           ASSESSMENT and PLAN  Diagnoses and all orders for this visit:    1. Encounter for Medicare annual wellness exam  Medicare questionnaire discussed and responses reviewed today in office. 2. Persistent atrial fibrillation (HCC)  Pt was admitted to the ED at [facility] on 4/19/2021 due to a new acute ischemic infarcts of the occipital and thalamic lobes, acute diastolic CHF exacerbation, and chronic paroxysmal Afib,. This has since resulted in pt suffering from left sided weakness. Pt was prescribed Eliquis 2.5 mg/BID and Aspirin delayed-release 81 mg/day. Additionally, pt has been in physical therapy twice a week to help with his ambulation. Pt is under the care of Dr. Rudy Peres, cardiology, and Dr. Maia Vazquez, neurology, for this condition. -     apixaban (ELIQUIS) 2.5 mg tablet; Take 1 Tablet by mouth every twelve (12) hours for 30 days. 3. Coronary artery disease involving native coronary artery of native heart without angina pectoris  Refer to #2. 4. Essential hypertension with goal blood pressure less than 140/90  BP at office today 153/72 and 122/64 on manual recheck.  Pt continues with torsemide 10 mg/day and Cozaar 100 mg/day. -     METABOLIC PANEL, COMPREHENSIVE; Future    5. Hyperlipidemia, unspecified hyperlipidemia type  Lipid panel on 4/23/2021 notable for total cholesterol 96, HDL 45, LDL 36, and triglycerides 75. Pt continues with Lipitor 40 mg/day. Pt will have updated lab work performed during today's OV.   -     LIPID PANEL; Future    6. Edema, unspecified type  Upon examination, pt has swelling and \"puffiness\" present around his eyes, bilaterally. Additionally, pt has trace edema present in his lower limbs, bilaterally. Dr. Wilder Sandra is aware of pt's condition. I have placed orders for pt to have labwork performed for further analysis. 7. Malaise  Pt notes that he has been experiencing increased feelings of fatigue and tiredness throughout the day. I have placed orders for pt to undergo labwork for further analysis. -     TSH 3RD GENERATION; Future  -     T4, FREE; Future  -     CBC WITH AUTOMATED DIFF; Future        Follow-up and Dispositions    · Return in about 4 months (around 9/24/2021) for follow up. Medication risks/benefits/costs/interactions/alternatives discussed with patient. Advised patient to call back or return to office if symptoms worsen/change/persist.  If patient cannot reach us or should anything more severe/urgent arise he/she should proceed directly to the nearest emergency department. Discussed expected course/resolution/complications of diagnosis in detail with patient. Patient given a written after visit summary which includes diagnoses, current medications and vitals. Patient expressed understanding with the diagnosis and plan. Written by kellee Pitts, as dictated by Hugh Gibbs M.D.    10:23 AM - 10:52 AM    Total time spent with the patient 29 minutes, greater than 50% of time spent counseling patient.

## 2021-05-25 NOTE — PROGRESS NOTES
Patient resolved from Transition of Care episode on 5/25/21. ACM/CTN was unsuccessful at contacting this patient today. Patient/family was provided the following resources and education related to COVID-19 during the initial call:                         Signs, symptoms and red flags related to COVID-19            CDC exposure and quarantine guidelines            [facility] exposure contact - [phone]            Contact for their local Department of Health                 Patient has not had any additional ED or hospital visits. No further outreach scheduled with this CTN/ACM. Episode of Care resolved. Patient has this CTN/ACM contact information if future needs arise.

## 2021-05-27 NOTE — TELEPHONE ENCOUNTER
Verified patient with two types of identifiers. Patient is having two front teeth extracted. Gave him Dr. Lyn Lott recommendations and confirmed follow up. Patient verbalized understanding and appreciation.

## 2021-05-27 NOTE — TELEPHONE ENCOUNTER
Would not stop for routine dental cleaning or filling  If it is tooth extraction, can hold for 48 hours before and start back next day

## 2021-05-31 NOTE — PROGRESS NOTES
Ealrine-we need to get an iron profile on him I have placed the orders please help and coordinate as told getting the labs I would advise that he goes to [facility] lab work          . Regino Singh,  I am concerned with your labs your hemoglobin and hematocrit levels are low. We need to check an iron profile this week. I have been had in place the orders. Please make sure you call the office and make a lab appointment for you can go to [facility] for lab work. The rest of your labs are stable.   If you have any questions please let me know

## 2021-06-02 NOTE — TELEPHONE ENCOUNTER
Verified patient with two types of identifiers. Spoke to patient and wife who both report patient has been falling more frequently. We discussed that he went to [facility] recently for knee pain after a fall and they told him he needed to follow up with PCP and cardiology related to low Hgb and being in afib. They report his Hgb was 8.3 and his VS were normal there and have been normal at home as well. Let them know that Dr. Abbey Garcia is aware he is in afib, that it is persistent and he is being treated for this. Advised they have additional blood work completed by Dr. Kay Montoya or PCP for his low Hgb and inquire with [facility] to see if they can increase the frequency of this PT to three times or more a week as he is falling more frequently. Will get records for Dr. Abbey Garcia to review.

## 2021-06-02 NOTE — TELEPHONE ENCOUNTER
6/02/21 1135am - Return phone call to patient, ID verified x2. This Rn informed patient to go ahead and get his labs drawn that Dr. Arun Hensley has ordered for him for his next appointment with this office June 23rd. Patient informed this RN that he will go get his labs drawn in the next house. This RN faxed over lab orders to [address] [place] [address]. This RN also informed patient that depending on the results of his blood work we can order an iron infusion prior to his schedule appointment. This RN also informed patient that the NP also suggests him to make an appointment with his GI doctor to make sure his hemoglobin levels aren't decreasing due to bleeding in the gut. Patient understood all this information and had no other questions or concerns.

## 2021-06-02 NOTE — TELEPHONE ENCOUNTER
Pt wife renetta called and stated pt has been falling often and is dizzy. [facility] and they informed him his hemoglobin is an 8. They told pt he needs to see his hematologist asap. Navya Henriquez told me they did some blood work at the [facility] but she isn't sure which ones.       # [phone]

## 2021-06-03 NOTE — TELEPHONE ENCOUNTER
Patient's wife calling to speak to the nurse as she spoke to the nurse about the patient being in afib on 6/2/21 and she wanted to update the doctor, the patient has been admitted to [facility]        [phone]

## 2021-06-08 NOTE — TELEPHONE ENCOUNTER
Verified patient with two types of identifiers. Spoke to patient's wife who reports patient was discharged after receiving a blood transfusion for Hgb around 7. Scheduled OV hospital follow up. Patient's wife verbalized understanding and will call with any other questions.

## 2021-06-10 NOTE — TELEPHONE ENCOUNTER
Patient having two teeth extracted on 06/22/2021, inquiring how long should he be off his Eliquis.           [facility]:[phone]

## 2021-06-14 NOTE — PATIENT INSTRUCTIONS
Please reduce your Losartan to 50mg daily. You may cut your 100mg tablets in half and take a half tablet once a day until you get your new prescription. We will see you back in 3 weeks.

## 2021-06-14 NOTE — PROGRESS NOTES
CORI Goldsmith 82 y.o. male  presents to the office today for f/u of hospital for anemia. Pt is accompanied by his wife today during [address] Blood pressure 130/68, pulse 76, temperature 98.2 °F (36.8 °C), temperature source Temporal, resp. rate 16, height 5' 8\" (1.727 m), weight 184 lb (83.5 kg), SpO2 97 %. Body mass index is 27.98 kg/m². Chief Complaint   Patient presents with   [facility] Follow Up        Pt reports that he is feeling fine today during visit. Pt was admitted to [facility] for [facility], Afib and low hemo on 6/4/2021 according to pt's wife. Pt's wife reported that pt's Hgb was 7 when he was at the hospital. I had left a message on 6/2/21 to advise pt to get labwork done as soon as possible to assess HCT and Hgb levels because current levels were low, with HCT levels of 29.7 and Hgb levels were 8.3. Persistent atrial fibrillation: Pt is under care of Dr. Jerson La. Refer to note on hospital f/u above. Pt reports he continues with Eliquis 2.5 mg every 12 hours. CAD: Pt reports he is well Pt is under care of Dr. Jerson La.     Hypertension: BP at office today 130/68. Pt continues with Cozaar 100 mg/d      Pt's wife reports that pt is having difficulty sleeping. Pt reports that he has tried benadryl and gummy medication, but they provide no relief. Pt reports that he may be experiencing some forgetting of events or dates. Advised pt and his wife to continue to monitor condition. Will follow up with pt in one month before performing memory testing and adding new medications to prescription to allow pt rest and recovery from recent hospital visit. I asked if pt and wife would like to follow with this plan, and pt and wife agreed. Pt's wife reports that pt would like to start physical therapy again, but they require a new prescription for pt in order for him to restart physical therapy.       Current Outpatient Medications   Medication Sig Dispense Refill    traZODone (DESYREL) 50 mg tablet Take 1 Tablet by mouth nightly. 30 Tablet 5    apixaban (ELIQUIS) 2.5 mg tablet Take 1 Tablet by mouth every twelve (12) hours for 30 days. 180 Tablet 1    atorvastatin (LIPITOR) 40 mg tablet Take 0.5 Tabs by mouth daily. 45 Tab 0    pantoprazole (PROTONIX) 40 mg tablet Take 1 Tab by mouth daily. 90 Tab 0    torsemide (DEMADEX) 10 mg tablet Take 1 Tab by mouth daily. 90 Tab 0    OTHER,NON-FORMULARY, Outpatient Physical Therapy Evaluate and Treatment 1 Each 0    therapeutic multivitamin (THERAGRAN) tablet Take 1 Tab by mouth daily.  gabapentin (NEURONTIN) 400 mg capsule Take 1 Cap by mouth nightly. Max Daily Amount: 400 mg. 90 Cap 1    losartan (COZAAR) 100 mg tablet Take 1 Tab by mouth daily. 90 Tab 3    albuterol (PROVENTIL HFA, VENTOLIN HFA, PROAIR HFA) 90 mcg/actuation inhaler Take 1-2 Puffs by inhalation every four (4) hours as needed for Wheezing. 1 Inhaler 5    cyanocobalamin (VITAMIN B-12) 1,000 mcg tablet Take 1,000 mcg by mouth daily. No Known Allergies  Past Medical History:   Diagnosis Date    CAD (coronary artery disease) 05/2019    S/p PCI May 2019 [facility]     NUKE 8/7/2020 =Lexiscan pharmacologic stress test shows normal perfusion , noted apical thinning with an EF of 47  %.      Diverticulosis, sigmoid 8/2011    ED (erectile dysfunction) of organic origin     Hypertension     LVH (left ventricular hypertrophy) due to hypertensive disease     PAF (paroxysmal atrial fibrillation) ([facility] [address] 11/18/2019    [facility]ENC[facility] admit nov 2019 no OAC due to anemia    Postherpetic neuralgia     Prostate cancer ([facility] [address]     Sebaceous cyst 4/1/2014    Shingles 02/2019     Past Surgical History:   Procedure Laterality Date    ENDOSCOPY, COLON, DIAGNOSTIC  >= 8-10years ago   Chloé Jubilee SURGERY  1968 [id number]    HX CARPAL TUNNEL RELEASE  4/08    right,left    HX CHOLECYSTECTOMY      HX HEART CATHETERIZATION  05/2019    HX HERNIA REPAIR      HX PROSTATECTOMY  5/06     Family History Problem Relation Age of Onset   Tiera Marie Cancer Mother         pancreatic    Diabetes Father     Stroke Father     Diabetes Sister     Hypertension Sister     Other Sister         Open heart surgery     Diabetes Brother     Hypertension Brother     Hypertension Brother     Asthma Daughter     Heart Attack Daughter     Other Daughter         hip replacement x 2     Social History     Tobacco Use    Smoking status: Never Smoker    Smokeless tobacco: Never Used   Substance Use Topics    Alcohol use: No        Review of Systems   Constitutional: Negative for chills and fever. HENT: Negative for hearing loss and tinnitus. Eyes: Negative for blurred vision and double vision. Respiratory: Negative for cough and shortness of breath. Cardiovascular: Negative for chest pain and palpitations. Gastrointestinal: Negative for nausea and vomiting. Genitourinary: Negative for dysuria and frequency. Musculoskeletal: Negative for back pain and falls. Skin: Negative for itching and rash. Neurological: Negative for dizziness, loss of consciousness and headaches. Endo/Heme/Allergies: Negative. Psychiatric/Behavioral: Negative for depression. The patient is not nervous/anxious. Physical Exam  Vitals reviewed. Constitutional:       Appearance: Normal appearance. HENT:      Head: Normocephalic and atraumatic. Right Ear: Tympanic membrane, ear canal and external ear normal.      Left Ear: Tympanic membrane, ear canal and external ear normal.      Nose: Nose normal.      Mouth/Throat:      Mouth: Mucous membranes are moist.      Pharynx: Oropharynx is clear. Eyes:      Extraocular Movements: Extraocular movements intact. Conjunctiva/sclera: Conjunctivae normal.      Pupils: Pupils are equal, round, and reactive to light. Cardiovascular:      Rate and Rhythm: Normal rate and regular rhythm. Pulses: Normal pulses. Heart sounds: Normal heart sounds.    Pulmonary:      Effort: Pulmonary effort is normal.      Breath sounds: Normal breath sounds. Abdominal:      General: Abdomen is flat. Bowel sounds are normal.      Palpations: Abdomen is soft. Musculoskeletal:         General: Normal range of motion. Cervical back: Normal range of motion and neck supple. Skin:     General: Skin is warm and dry. Neurological:      General: No focal deficit present. Mental Status: He is alert and oriented to person, place, and time. Psychiatric:         Mood and Affect: Mood normal.         Behavior: Behavior normal.         Judgment: Judgment normal.           ASSESSMENT and PLAN  Diagnoses and all orders for this visit:    1. Anemia due to GI blood loss  Pt was admitted to [facility] for [facility], Afib and low hemo on 6/4/2021 according to pt's wife. On 6/8/21, Hgb was approximately 7.0 from message to Cardiology Dr. Jonatan Kent. Pt reports that he feels fine today. Will assess current levels today through labwork. Orders placed. -     CBC W/O DIFF; Future  -     IRON PROFILE; Future  -     FERRITIN; Future    2. Persistent atrial fibrillation (HCC)  Stable today. Pt reports he is well today during visit. Pt is under care of Cardiology Dr. Jonatan Kent. Heart is irregularly irregular today during visit. Advised to continue with current regimen. 3. Coronary artery disease involving native coronary artery of native heart without angina pectoris  Stable today during visit. Pt reports he feels well today during visit. Pt is under care of Cardiology Dr. Jonatan Kent.     4. Essential hypertension with goal blood pressure less than 140/90  BP is at goal today. Advised to continue with Cozaar 100 mg/d. Heart is irregularly irregular due to Afib.       5. Insomnia, unspecified type  Pt's wife reports that pt is having difficulty sleeping. Pt reports that he has tried benadryl and gummies and they provide no relief.  Advised to take Trazodone 50 mg/d.  -     traZODone (DESYREL) 50 mg tablet; Take 1 Tablet by mouth nightly. Follow-up and Dispositions    · Return in about 4 weeks (around 7/12/2021) for memory follow up. Medication risks/benefits/costs/interactions/alternatives discussed with patient. Advised patient to call back or return to office if symptoms worsen/change/persist.  If patient cannot reach us or should anything more severe/urgent arise he/she should proceed directly to the nearest emergency department. Discussed expected course/resolution/complications of diagnosis in detail with patient. Patient given a written after visit summary which includes diagnoses, current medications and vitals. Patient expressed understanding with the diagnosis and plan. Written by kellee López, as dictated by Cristin Ordonez M.D.    8:35 AM - 9:01 AM    Total time spent with the patient 26 minutes, greater than 50% of time spent counseling patient.

## 2021-06-14 NOTE — PROGRESS NOTES
Chief Complaint   Patient presents with   [facility] Follow Up     1. Have you been to the ER, urgent care clinic since your last visit? Hospitalized since your last visit? [address]     2. Have you seen or consulted any other health care providers outside of the [address] [place] [address] since your last visit? Include any pap smears or colon screening.      Needs updated rx for PT   Has Cardiology appointment today   Hematology next week

## 2021-06-14 NOTE — Clinical Note
6/14/2021 Patient: Paty Thacker YOB: 1935 Date of Visit: 6/14/2021 Chiquita Manzo MD 
[facility] [address] [address] [address] [place] [address] Dear Chiquita Manzo MD, Thank you for referring Mr. Wilfred Hammond to [facility] for evaluation. My notes for this consultation are attached. If you have questions, please do not hesitate to call me. I look forward to following your patient along with you.  
 
 
Sincerely, 
 
Blair Castro MD

## 2021-06-14 NOTE — TELEPHONE ENCOUNTER
Attempted to reach patient by telephone x2. A message was left for return call. Patient has appointment this afternoon.

## 2021-06-14 NOTE — PROGRESS NOTES
David Nuñez Tomaszjisaturnino     1935       Gaby Pradhan MD, [facility]  Date of Visit-6/14/2021   PCP is Joe Henderson MD   [facility]  [facility]  HPI:  Rossi Cheng is a 82 y.o. male   Hospital [facility] June 2021-also has prior admits,STM April 2021, December 2019, Cath May 2019  CV Hx  · CAD- JOSEFINA-Cath done 5/30/19. OM1 with 70% blockage had JOSEFINA  · CT scan on 10/8/19 which  Showed low lung volumes, diaphragm on the left was elevated with atelectasis on the left with mucus plugging.   · hospitalized at [facility] 11/12/2019 through 11/21/2019,Atrial fibrillation with RVR possible MG, underwent plasmapheresis/ steroids. Then  became markedly edemetous and I admitted him on 12/12/19. · Echo 11-18-19 EF Definity for atrial fibrillation Moderate to severe LVH EF 60-65%MAC, mild MRMod SERGIO PASP 45  · Echo 12-13-19 EF 50-55% severe LVH, mod LAE, mod MR, TR  · HTN, XOL, anemia Hgb 8.7 11-20-19     Pt has CAD with prior stent in 2019. Also admitted in 2019 by Neurology  Dr. Bjorn Calhoun and was hospitalized at [facility]. He underwent plasmapheresis for possible neuromuscular disorder and then put on steroids. He became markedly edemetous and  admitted him on 12/12/19 to [facility]. He received IV diuretics and was discharged on 12/16/19. Pulmonary follow up  for DAYO. His Prednisone was decreased significantly. He stopped Mestinon.   Pt saw Dr. Pallavi Meier in May 2020 in f/u of anemia. The source of anemia is still not clear and a colonoscopy was planned.      Pt was hospitalized 4/19-4/24 with SOB and dizziness. 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 Eliquis was added to his ASA. He had acute diastolic CHF on admit and received diuresis. His peak troponin was 0.09. Pt was admitted to [facility] with AF. He was anemic with a HGB of 7. Pt is accompanied by family member. Pt reports that if he gets up too soon from his bed, he feels \"wobbly\".  His family member reports he has intermittent periods of lightheadedness, and he had fallen three times before they decided to go to the ER. Pt admitted to [facility] EM[facility] 6/2 to 6/6/21. EGD with dieulafoy lesion. Pt walked around [address] [facility] recently without any issues. His family member reports he got lightheaded walking from the car to the office. Pt reports his BP usually isn't as low as it is today. Pt reports he is planning on having dental work done in the following weeks. Denies chest pain, edema, syncope or shortness of breath at rest, has no tachycardia, palpitations or sense of arrhythmia. Vitals:    06/14/21 1501   BP: 90/60   BP 1 Location: Right arm   BP Patient Position: Sitting   BP Cuff Size: Adult   Pulse: 66   Resp: 16   Height: 5' 8\" (1.727 m)   Weight: 184 lb (83.5 kg)   SpO2: 96%   orthostatics  Encounter date   Last reading 6/14/21   3:01 PM 6/14/21   8:25 AM 5/24/21   10:38 AM   BP 90/60 130/68 122/64   Pulse (Heart Rate) 66 76 51Abnormal    Resp Rate 16 16 16     We also have records of [facility] visit on 6/1/2021 noting weakness K was 4.1 creatinine 1.4 hemoglobin was 8 had a contusion of the left knee at that time EKG 6/1/2021 showed A. fib at 53  Assessment/Plan:    Patient Instructions   Please reduce your Losartan to 50mg daily. You may cut your 100mg tablets in half and take a half tablet once a day until you get your new prescription. We will see you back in 3 weeks. 1. Persistent atrial fibrillation [facility])  Previous hospitalized November 2019 with rapid ventricular rate thought to be due to underlying neurologic disorder. In follow-up seem to had a stroke in April which is likely embolic. Diann Alcala Have kept a lower dose of DOAC due to bleeding risk at [facility] at 2.5. The ventricular rate is controlled now with no active bleeding. If it recurs, we need to think about a Watchman.  EF is normal. He is dizzy, I am going to reduce his Losartan to 50 mg.     2. Coronary artery disease involving native coronary artery of native heart without   angina pectoris [facility])  Prior CAD with JOSEFINA done 2019 to the . He has no further chest pain. On statin, holding ASA      3. Essential hypertension with goal blood pressure less than 140/90  Moderate to severe LVH, now bp normal at rest but drops with standing  ORTHOSTATIC  Will  REDUCE LOSARTAN     At goal , meds and possible side effects reviewed and patient denies     06/14/21 90/60   06/14/21 130/68   05/24/21 122/64   05/07/21 110/70        - losartan (COZAAR) 50 mg tablet; Take 1 Tablet by mouth daily. Dispense: 90 Tablet; Refill: 2    4. Diastolic dysfunction  Diastolic dysfunction probably exacerbated by the anemia and A. fib is admission recently to [facility] doctors responded well to diuretics  04/19/21ECHO ADULT COMPLETE   Interpretation Summary  · Saline contrast was given to evaluate for intracardiac shunt. No shunt seen. · LV: Estimated LVEF is 55 - 60%. Normal cavity size, wall thickness and systolic function (ejection fraction normal). Wall motion: normal.  Lab Results   Component Value Date/Time    NT pro-BNP 17,963 (H) 04/19/2021 10:34 PM    NT pro-BNP 11,479 (H) 12/13/2019 02:48 AM    NT pro-BNP 10,542 (H) 12/12/2019 03:59 PM        5. GI bleeding  GI bleeding with recent Dula Veronica lesion noted hopefully this will resolve his anemia over time and not be a permanent issue with the lack    6. Pedal edema, Neuro with MG work up, elevated hemidiaphragm  Improved co morbids, now GI bleed    F/u in 3 weeks     Impression:   1. Persistent atrial fibrillation ([address]    2. Coronary artery disease involving native coronary artery of native heart without angina pectoris    3. Essential hypertension with goal blood pressure less than 140/90    4. Diastolic dysfunction    5. Gastrointestinal hemorrhage, unspecified gastrointestinal hemorrhage type    6. Pedal edema    7.  Hospital discharge follow-up       Cardiac History:   [facility] Admit November 2019  --New onset Afib , rate controlled, w/up for MG, plasmapharesis done, no OAC due to risk with low plts and anemia when seen by Cards at [facility] EMERGENCY [facility] Hgb was 8.5  ECHO [facility] EMERGENCY [facility] 11-18-19 Definity EF 60-65% mod severe LVH, mild MR, MAC, LAE, mod OUMAR PASP 45    CAD   S/p PCI May 2019 [facility]  Lexiscan 8-7-2020 no reversible ischemia, EF 47%, apical thinning (LVH). Future Appointments   Date Time Provider Samantha Ley   6/23/2021 11:30 AM Aidee Richard  [address] BS AMB   7/2/2021  8:40 AM Gaby Aranda MD CAVREY BS AMB   7/26/2021  8:30 AM Serafin Dietrich MD PAFP BS AMB   9/27/2021 10:00 AM Serafin Dietrich MD PAF BS AMB        ROS-except as noted above. . A complete cardiac and respiratory are reviewed and negative except as above ; Resp-denies wheezing  or productive cough,. Const- No unusual weight loss or fever; Neuro-no recent seizure or CVA ; GI- No BRBPR, abdom pain, bloating ; - no  hematuria   see supplement sheet, initialed and to be scanned by staff  Past Medical History:   Diagnosis Date    CAD (coronary artery disease) 05/2019    S/p PCI May 2019 [facility]     NUKE 8/7/2020 =Lexiscan pharmacologic stress test shows normal perfusion , noted apical thinning with an EF of 47  %.  Diverticulosis, sigmoid 8/2011    ED (erectile dysfunction) of organic origin     Hypertension     LVH (left ventricular hypertrophy) due to hypertensive disease     PAF (paroxysmal atrial fibrillation) [address] 11/18/2019    [facility] EM[facility]GENCY [facility] admit nov 2019 no OAC due to anemia    Postherpetic neuralgia     Prostate cancer ([facility]     Sebaceous cyst 4/1/2014    Shingles 02/2019      Social Hx= reports that he has never smoked. He has never used smokeless tobacco. He reports that he does not drink alcohol and does not use drugs.      Exam and Labs:  BP 90/60 (BP 1 Location: Right arm, BP Patient Position: Sitting, BP Cuff Size: Adult)   Pulse 66   Resp 16   Ht 5' 8\" (1.727 m)   Wt 184 lb (83.5 kg)   SpO2 96%   BMI 27.98 kg/m² Constitutional:  NAD, comfortable  Head: NC,AT. Eyes: No scleral icterus. Neck:  Neck supple. No JVD present. Throat: moist mucous membranes. Chest: Effort normal & normal respiratory excursion . Neurological: alert, conversant and oriented . Skin: Skin is not cold. No obvious systemic rash noted. Not diaphoretic. No erythema. Psychiatric:  Grossly normal mood and affect. Behavior appears normal. Extremities:  no clubbing or cyanosis. Abdomen: non distended    Lungs:breath sounds normal. No stridor. distress, wheezes or  Rales. Heart: normal rate, regular rhythm, normal S1, S2, no murmurs, rubs, clicks or gallops , PMI non displaced. Edema: Edema is none. Lab Results   Component Value Date/Time    Cholesterol, total 104 05/24/2021 11:00 AM    HDL Cholesterol 51 05/24/2021 11:00 AM    LDL, calculated 43.6 05/24/2021 11:00 AM    Triglyceride 47 05/24/2021 11:00 AM    CHOL/HDL Ratio 2.0 05/24/2021 11:00 AM     Lab Results   Component Value Date/Time    Sodium 143 05/24/2021 11:00 AM    Potassium 4.8 05/24/2021 11:00 AM    Chloride 110 (H) 05/24/2021 11:00 AM    CO2 31 05/24/2021 11:00 AM    Anion gap 2 (L) 05/24/2021 11:00 AM    Glucose 96 05/24/2021 11:00 AM    BUN 33 (H) 05/24/2021 11:00 AM    Creatinine 1.16 05/24/2021 11:00 AM    BUN/Creatinine ratio 28 (H) 05/24/2021 11:00 AM    GFR est AA >60 05/24/2021 11:00 AM    GFR est non-AA >60 05/24/2021 11:00 AM    Calcium 9.1 05/24/2021 11:00 AM      Wt Readings from Last 3 Encounters:   06/14/21 184 lb (83.5 kg)   06/14/21 184 lb (83.5 kg)   05/24/21 185 lb (83.9 kg)      BP Readings from Last 3 Encounters:   06/14/21 90/60   06/14/21 130/68   05/24/21 122/64      Current Outpatient Medications   Medication Sig    traZODone (DESYREL) 50 mg tablet Take 1 Tablet by mouth nightly.  losartan (COZAAR) 50 mg tablet Take 1 Tablet by mouth daily.  apixaban (ELIQUIS) 2.5 mg tablet Take 1 Tablet by mouth every twelve (12) hours for 30 days.     atorvastatin (LIPITOR) 40 mg tablet Take 0.5 Tabs by mouth daily.  pantoprazole (PROTONIX) 40 mg tablet Take 1 Tab by mouth daily.  torsemide (DEMADEX) 10 mg tablet Take 1 Tab by mouth daily. Danae Leticia, Outpatient Physical Therapy Evaluate and Treatment    therapeutic multivitamin (THERAGRAN) tablet Take 1 Tab by mouth daily.  gabapentin (NEURONTIN) 400 mg capsule Take 1 Cap by mouth nightly. Max Daily Amount: 400 mg.    albuterol (PROVENTIL HFA, VENTOLIN HFA, PROAIR HFA) 90 mcg/actuation inhaler Take 1-2 Puffs by inhalation every four (4) hours as needed for Wheezing.  cyanocobalamin (VITAMIN B-12) 1,000 mcg tablet Take 1,000 mcg by mouth daily. No current facility-administered medications for this visit. Impression see above.       Written by Jesus Connors, as dictated by Ernst Ortega MD.

## 2021-06-17 NOTE — TELEPHONE ENCOUNTER
Suzanne with [facility] just wanted us to be aware that she had faxed some papers over to us. 1. Medicare certification for physical therapy, faxed this morning, Dr. Amira Baugh needs to sign    2.  Request for referral for occupational therapy, Faxed yesterday    [facility]# [phone]

## 2021-06-21 NOTE — PROGRESS NOTES
Mr. Henry Hatfield you are doing well with regards to your iron levels they are low has her your ferritin levels. You will be seeing Dr. Leno Evans in 2 days I will have her discuss with you if oral iron therapy or an iron infusion is appropriate for you.   If should have any questions let me know

## 2021-06-23 NOTE — PROGRESS NOTES
Fernando Fontana is a 82 y.o. male    Chief Complaint   Patient presents with    Follow-up     anemia       1. Have you been to the ER, urgent care clinic since your last visit? Hospitalized since your last visit? Yes, [facility] and [address] [place] [address]    2. Have you seen or consulted any other health care providers outside of the [address] [place] [address] since your last visit? Include any pap smears or colon screening.  No

## 2021-06-23 NOTE — PROGRESS NOTES
[facility] [place] [address] [address], [address]  W: [phone]  F: [phone]    Reason for Visit:   Juan Luis Ricci is a 82 y.o. male who is seen on 6/23/2021 for follow up of anemia. History of Present Illness:   Patient is a 82 y.o. male with CAD and h/o prostate cancer who is seen for follow up of anemia     He has progressive anemia since 5/2019 with Hb having dropped from 12 g/dl to 9 g/dl by 12/2019. He does have a h/o iron deficiency with iron studies on 12/13/2019 showing a TSAT of 8% though ferritin was 53. When rechecked 1/2/2020 he was iron replete but anemia had barely improved to 9.5 g/dl. He was admitted around 11/2019 at [facility] with SOB and anemia. He was seen by Dr. Russo Flight Dr. John Dunaway. EGD 11/2019 had shown some esophagitis. Colonoscopy was not considered necessary. He also was diagnosed with Myasthenia gravis. He had plasma exchange. He was then placed on prednisone. He was readmitted with SOB in Dec 2019 thought to be due to prednisone and was taken off this. He was seen by Neurology and was then told that he has no myasthenia. He also had a normal SPEP at that time and a normal MMA. His work up was most suggestive of iron deficiency. He received Injectafer x 2 in Jan 2020 and again 10/2020. Now seen for follow up with labs. He was recently hospitalized 3 weeks ago at [facility] due to bleeding blood vessels in his stomach. These were sealed. He is on eliquis for afib and this has been resumed. He has a follow-up EGD in 1 month. He is fatigued. He has FERNANDO and intermittent dizziness. Not craving ice. No other complaints.      He had a prostatectomy 2006    He has never smoked  Mother had pancreatic cancer  Sister had Lymphoma  Daughter had lung cancer    Past Medical History:   Diagnosis Date    CAD (coronary artery disease) 05/2019    S/p PCI May 2019 [facility]     NUKE 8/7/2020 =Lexiscan pharmacologic stress test shows normal perfusion , noted apical thinning with an EF of 47  %.  Diverticulosis, sigmoid 8/2011    ED (erectile dysfunction) of organic origin     Hypertension     LVH (left ventricular hypertrophy) due to hypertensive disease     PA[facility] (paroxysmal atrial fibrillation) [address] [address] 11/18/2019    [facility] EM[facility]GENC[facility] admit nov 2019 no OAC due to anemia    Postherpetic neuralgia     Prostate cancer ([facility]     Sebaceous cyst 4/1/2014    Shingles 02/2019      Past Surgical History:   Procedure Laterality Date    ENDOSCOPY, COLON, DIAGNOSTIC  >= 8-10years ago   [facility] SURGERY  1968 [id number]    HX CARPAL TUNNEL RELEASE  4/08    right,left    HX CHOLECYSTECTOMY      HX HEART CATHETERIZATION  05/2019    HX HERNIA REPAIR      HX PROSTATECTOMY  5/06      Social History     Tobacco Use    Smoking status: Never Smoker    Smokeless tobacco: Never Used   Substance Use Topics    Alcohol use: No      Family History   Problem Relation Age of Onset    Cancer Mother         pancreatic    Diabetes Father     Stroke Father     Diabetes Sister     Hypertension Sister     Other Sister         Open heart surgery     Diabetes Brother     Hypertension Brother     Hypertension Brother     Asthma Daughter     Heart Attack Daughter     Other Daughter         hip replacement x 2     Current Outpatient Medications   Medication Sig    traZODone (DESYREL) 50 mg tablet Take 1 Tablet by mouth nightly.  losartan (COZAAR) 50 mg tablet Take 1 Tablet by mouth daily.  apixaban (ELIQUIS) 2.5 mg tablet Take 1 Tablet by mouth every twelve (12) hours for 30 days.  atorvastatin (LIPITOR) 40 mg tablet Take 0.5 Tabs by mouth daily.  pantoprazole (PROTONIX) 40 mg tablet Take 1 Tab by mouth daily.  torsemide (DEMADEX) 10 mg tablet Take 1 Tab by mouth daily. Leslie Shah, Outpatient Physical Therapy Evaluate and Treatment    therapeutic multivitamin (THERAGRAN) tablet Take 1 Tab by mouth daily.     gabapentin (NEURONTIN) 400 mg capsule Take 1 Cap by mouth nightly. Max Daily Amount: 400 mg.    albuterol (PROVENTIL HFA, VENTOLIN HFA, PROAIR HFA) 90 mcg/actuation inhaler Take 1-2 Puffs by inhalation every four (4) hours as needed for Wheezing.  cyanocobalamin (VITAMIN B-12) 1,000 mcg tablet Take 1,000 mcg by mouth daily. No current facility-administered medications for this visit. No Known Allergies     Review of Systems: A complete review of systems was obtained, negative except as described above. Physical Exam:   Vitals reviewed    Constitutional: Appears well-developed and well-nourished in no apparent distress   Mental status: Alert and awake, Oriented to person/place/time, Able to follow commands  Eyes: EOM normal, Sclera normal, No visible ocular discharge  HENT: Normocephalic, atraumatic; Mouth/Throat: Moist mucous membranes, External Ears normal  Skin: No significant exanthematous lesions or discoloration noted on facial skin  Psychiatric: Normal affect, normal judgment/insight. No hallucinations       Results:     Lab Results   Component Value Date/Time    WBC 5.8 06/16/2021 08:07 AM    HGB 8.0 (L) 06/16/2021 08:07 AM    HCT 26.7 (L) 06/16/2021 08:07 AM    PLATELET 438 38/16/3561 08:07 AM    MCV 92 06/16/2021 08:07 AM    ABS. NEUTROPHILS 3.9 06/16/2021 08:07 AM     Lab Results   Component Value Date/Time    Sodium 143 05/24/2021 11:00 AM    Potassium 4.8 05/24/2021 11:00 AM    Chloride 110 (H) 05/24/2021 11:00 AM    CO2 31 05/24/2021 11:00 AM    Glucose 96 05/24/2021 11:00 AM    BUN 33 (H) 05/24/2021 11:00 AM    Creatinine 1.16 05/24/2021 11:00 AM    GFR est AA >60 05/24/2021 11:00 AM    GFR est non-AA >60 05/24/2021 11:00 AM    Calcium 9.1 05/24/2021 11:00 AM    Glucose (POC) 99 04/21/2021 11:24 AM     Lab Results   Component Value Date/Time    Bilirubin, total 0.4 05/24/2021 11:00 AM    ALT (SGPT) 19 05/24/2021 11:00 AM    Alk.  phosphatase 100 05/24/2021 11:00 AM    Protein, total 6.8 05/24/2021 11:00 AM    Albumin 3.7 05/24/2021 11:00 AM Globulin 3.1 05/24/2021 11:00 AM       Lab Results   Component Value Date/Time    Iron % saturation 6 (LL) 06/16/2021 08:07 AM    TIBC 366 06/16/2021 08:07 AM    Ferritin 51 06/16/2021 08:07 AM    Vitamin B12 238 04/27/2018 03:25 PM    Folate 14.8 04/27/2018 03:25 PM    Haptoglobin 167 05/21/2020 09:05 AM     05/21/2020 09:05 AM    Sed rate (ESR) 20 05/24/2019 12:50 PM    C-Reactive Protein, Qt 0.6 12/12/2018 05:04 PM    C-Reactive Protein, Cardiac 10.91 (H) 05/24/2019 12:50 PM    TSH 3.59 05/24/2021 11:00 AM     No results found for: INR, APTT, DDIMSQ, DDIME, [id number], [facility], FDPLT, Caprice Raveling, [address] [facility] [address], [address] [facility], [id number], [id number], [id number], [id number], [id number], [id number], [facility]    Records reviewed and summarized above. Pathology report(s) reviewed above. Radiology report(s) reviewed above. Assessment:   1) Anemia-    He did have evidence of iron deficiency when checked 12/14/2019. It does appear to have had a negative SPEP , normal MMA and B12 levels and no evidence of hemolysis on work up at [address] [facility] [place]. Received Injecatfer x 2 in Jan 2020 with Hb having improved as of 5/21/2020 to 12.2 g/dl. Iron stores normalized but anemia recurred by 9/2020  EGD 9/2020 showed some atrophic gastritis but no clear bleeding    Labs from 3/2021 shows stable anemia and borderline stores & he was given 1 additional dose of IV iron   Labs from 6/2021 show worsening anemia and low iron stores likely secondary to #2.  Will replete as below     2) Bleeding gastric ulcers  Were sealed 3 weeks ago at [facility]  Has follow-up EGD in 1 month     3) H/O Prostate cancer  Prostatectomy in 2006    4) CAD  On plavix    5) MG  This is not a certain diagnosis  He follows with Dr. Pizano See:     · B12 - 1000 mcg daily  · FA daily  · Follow with GI   · Injectafer x 2  · RTC 12 weeks with cbc, iron profile, ferritin    All questions answered and results were reviewed    I appreciate the opportunity to participate in Mr. Xavier Hammnod's care. I performed a history and physical examination of the patient and discussed his management with the NPP.  I reviewed the NPP note and agree with the documented findings and plan of care  Worsening Iron deficiency anemia due to GI bleed  He looks pale, has periorbital edema  Injecatfer x 2  Follow up in 3 months    Signed By: Tico Jain MD

## 2021-07-02 NOTE — PROGRESS NOTES
Janae Calhoun Stephany     1935       Gaby Aaron MD, [facility]  Date of Visit-7/2/2021   PCP is Susanna Cushing, MD   [facility] [place]  [facility]  HPI:  Bonita Odonnell is a 82 y.o. male   3 week f/u. · of CAD- JOSEFINA-Cath done 5/30/19. OM1 with 70% blockage had JOSEFINA  · CT scan on 10/8/19 which  Showed low lung volumes, diaphragm on the left was elevated with atelectasis on the left with mucus plugging.   · hospitalized at [facility] 11/12/2019 through 11/21/2019,Atrial fibrillation with RVR possible MG, underwent plasmapheresis/ steroids. Then  became markedly edemetous and I admitted him on 12/12/19. He received IV diuretics and was discharged on 12/16/19. Pulmonary follow up  for DAYO. His Prednisone was decreased significantly. He stopped Mestinon. · Echo 11-18-19 EF Definity for atrial fibrillation Moderate to severe LVH EF 60-65%MAC, mild MRMod SERGIO PASP 45  · Echo 12-13-19 EF 50-55% severe LVH, mod LAE, mod MR, TR  · HTN, XOL, anemia Hgb 8.7 11-20-19, saw Dr. Angel Haque in [facility] 0310 TA f/u of anemia. · hospitalized 4/19-4/24/21 to [facility]  with SOB and dizziness. 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 Eliquis was added to his ASA. He had acute diastolic CHF on admit and received diuresis. His peak troponin was 0.09.  · Pt was admitted to [facility] 6-4-7-2021 with severe anemia and AF. He was anemic with a HGB of 7. Pt seen two weeks ago. Stable. Some dizziness so we reduced his Losartan. Pt was at [address] today , and had a near syncopal event with a drop in  to 100 on standing. He reports is unable to sleep at night that is a new problem in the past year trazodone has been ordered but does not help is gotten to our infusions with Dr. Kranthi Stringer. His main complaint is dizziness  Pt is accompanied by a family member.  Pt states that occasionally, he gets a shooting pain across his chest. Pt's family member reports that he had fallen yesterday, and that he is always tired. He reports that his BP got very low yesterday. Pt states that he was at the physical therapist yesterday and felt lethargic, and could not sleep last night. His family member reports that he gets constant swelling on his legs and face. His family member notes that he is constantly very weak. He reports that he has two iron infusions in the coming weeks. Denies chest pain, syncope or shortness of breath at rest, has no tachycardia, palpitations or sense of arrhythmia. EKG:  atrial fibrillation    msec conduction delay  Low voltage in limb leads. Left axis -anterior fascicular block. -  Nonspecific T-abnormality. Assessment/Plan:     Patient Instructions   Please stop your Losartan and use your Torsemide as needed. We will see you back in 6 months. 1. Persistent atrial fibrillation (HCC)  Recovering from stroke. On Eliquis 2.5. He is on lower dose and has had recent GI bleeding with a Dula Veronica lesion discovered at [facility]. In May. He is weak and got low BP and near syncope yesterday. No new bleeding. EF normal.   Rate is ok on EKG, QRS is widened  We will stop ARB to prevent falls, his weakness is also from the anemia  Discuss Watchman with Dr Dallas Pittman in clinic to review options  - AMB POC EKG ROUTINE W/ 12 LEADS, INTER & REP    2. Coronary artery disease involving native coronary artery of native heart with stable angina pectoris [facility])  JOSEFINA OM1 May 2019  Pain-free  3. Essential hypertension with goal blood pressure less than 140/90  Now with lower BP. I want him to reduce torsemide and stop losartan to avoid syncope. BP Readings from Last 6 Encounters:   07/07/21 131/70   07/02/21 100/60   06/23/21 106/65   06/14/21 90/60   06/14/21 130/68   05/24/21 122/64       4.  Diastolic dysfunction  Compensated Demadex  Exacerbated by anemia and A. fib  Key CAD CHF Meds             torsemide (DEMADEX) 10 mg tablet (Taking) Take 1 Tablet by mouth as needed (swelling/shortness of breath). atorvastatin (LIPITOR) 40 mg tablet (Taking) Take 0.5 Tabs by mouth daily. Lab Results   Component Value Date/Time    NT pro-BNP 17,963 (H) 04/19/2021 10:34 PM    NT pro-BNP 11,479 (H) 12/13/2019 02:48 AM    NT pro-BNP 10,542 (H) 12/12/2019 03:59 PM        5. Co-morbids  Elevated hemidiaphragm  Neuro with MG work up  Pedal edema--Improved   GI bleeding  GI bleeding with recent Dula Veronica lesion noted hopefully this will resolve his anemia over time and not be a permanent issue with the lack    F/u 6 months. Impression:   1. Persistent atrial fibrillation ([facility] [address]    2. Coronary artery disease involving native coronary artery of native heart with unstable angina pectoris ([facility] [address]    3. Essential hypertension with goal blood pressure less than 140/90    4. Diastolic dysfunction    5. Elevated hemidiaphragm    6. Pedal edema       Cardiac History:   [facility] Admit November 2019  --New onset Afib , rate controlled, w/up for MG, plasmapharesis done, no OAC due to risk with low plts and anemia when seen by Cards at [address] Hgb was 8.5  ECHO [facility] 11-18-19 Definity EF 60-65% mod severe LVH, mild MR, MAC, LAE, mod OUMAR PASP 45    CAD   S/p PCI May 2019 [facility] 8-7-2020 no reversible ischemia, EF 47%, apical thinning (LVH). Future Appointments   Date Time Provider Samantha Ley   7/13/2021  1:20 PM MD JACOB Santo BS AMB   7/14/2021 11:00 AM B2 PEDS LONG [address] H   7/26/2021  8:30 AM Debra Dietrich MD PAFP BS AMB   9/23/2021  9:00 AM Jessica De Los Santos  [facility] BS AMB   9/27/2021 10:00 AM Debra Dietrich MD PAFP BS AMB   1/7/2022  1:20 PM Gaby Aranda MD CAVREY BS AMB        ROS-except as noted above. . A complete cardiac and respiratory are reviewed and negative except as above ; Resp-denies wheezing  or productive cough,.  Const- No unusual weight loss or fever; Neuro-no recent seizure or CVA ; GI- No BRBPR, abdom pain, bloating ; - no  hematuria see supplement sheet, initialed and to be scanned by staff  Past Medical History:   Diagnosis Date    CAD (coronary artery disease) 05/2019    S/p PCI May 2019 [facility]     NUKE 8/7/2020 =Lexiscan pharmacologic stress test shows normal perfusion , noted apical thinning with an EF of 47  %.  Diverticulosis, sigmoid 8/2011    ED (erectile dysfunction) of organic origin     Hypertension     LVH (left ventricular hypertrophy) due to hypertensive disease     PAF (paroxysmal atrial fibrillation) ([facility] [address] 11/18/2019    [facility] admit nov 2019 no OAC due to anemia    Postherpetic neuralgia     Prostate cancer ([facility] [address]     Sebaceous cyst 4/1/2014    Shingles 02/2019      Social Hx= reports that he has never smoked. He has never used smokeless tobacco. He reports that he does not drink alcohol and does not use drugs. Exam and Labs:  /60   Pulse 61   Resp 16   Ht 5' 8\" (1.727 m)   Wt 195 lb (88.5 kg)   SpO2 96%   BMI 29.65 kg/m² Constitutional:  NAD, comfortable  Head: NC,AT. Eyes: No scleral icterus. Neck:  Neck supple. No JVD present. Throat: moist mucous membranes. Chest: Effort normal & normal respiratory excursion . Neurological: alert, conversant and oriented . Skin: Skin is not cold. No obvious systemic rash noted. Not diaphoretic. No erythema. Psychiatric:  Grossly normal mood and affect. Behavior appears normal. Extremities:  no clubbing or cyanosis. Abdomen: non distended    Lungs:breath sounds normal. No stridor. distress, wheezes or  Rales. Heart: normal rate, regular rhythm, normal S1, S2, no murmurs, rubs, clicks or gallops , PMI non displaced. Edema: Edema is none.   Lab Results   Component Value Date/Time    Cholesterol, total 104 05/24/2021 11:00 AM    HDL Cholesterol 51 05/24/2021 11:00 AM    LDL, calculated 43.6 05/24/2021 11:00 AM    Triglyceride 47 05/24/2021 11:00 AM    CHOL/HDL Ratio 2.0 05/24/2021 11:00 AM     Lab Results   Component Value Date/Time    Sodium 143 05/24/2021 11:00 AM Potassium 4.8 05/24/2021 11:00 AM    Chloride 110 (H) 05/24/2021 11:00 AM    CO2 31 05/24/2021 11:00 AM    Anion gap 2 (L) 05/24/2021 11:00 AM    Glucose 96 05/24/2021 11:00 AM    BUN 33 (H) 05/24/2021 11:00 AM    Creatinine 1.16 05/24/2021 11:00 AM    BUN/Creatinine ratio 28 (H) 05/24/2021 11:00 AM    GFR est AA >60 05/24/2021 11:00 AM    GFR est non-AA >60 05/24/2021 11:00 AM    Calcium 9.1 05/24/2021 11:00 AM      Wt Readings from Last 3 Encounters:   07/02/21 195 lb (88.5 kg)   06/23/21 191 lb (86.6 kg)   06/14/21 184 lb (83.5 kg)      BP Readings from Last 3 Encounters:   07/02/21 100/60   06/23/21 106/65   06/14/21 90/60      Current Outpatient Medications   Medication Sig    traZODone (DESYREL) 50 mg tablet Take 1 Tablet by mouth nightly.  atorvastatin (LIPITOR) 40 mg tablet Take 0.5 Tabs by mouth daily.  pantoprazole (PROTONIX) 40 mg tablet Take 1 Tab by mouth daily.  torsemide (DEMADEX) 10 mg tablet Take 1 Tab by mouth daily. Leia Callejas, Outpatient Physical Therapy Evaluate and Treatment    therapeutic multivitamin (THERAGRAN) tablet Take 1 Tab by mouth daily.  albuterol (PROVENTIL HFA, VENTOLIN HFA, PROAIR HFA) 90 mcg/actuation inhaler Take 1-2 Puffs by inhalation every four (4) hours as needed for Wheezing.  cyanocobalamin (VITAMIN B-12) 1,000 mcg tablet Take 1,000 mcg by mouth daily.  losartan (COZAAR) 50 mg tablet Take 1 Tablet by mouth daily. (Patient not taking: Reported on 7/2/2021)    gabapentin (NEURONTIN) 400 mg capsule Take 1 Cap by mouth nightly. Max Daily Amount: 400 mg. (Patient not taking: Reported on 7/2/2021)     No current facility-administered medications for this visit. Impression see above.       Written by Bety Valdez, as dictated by Star Garcia MD.

## 2021-07-06 NOTE — TELEPHONE ENCOUNTER
Patient's daughter calling to speak to the nurse as the patient is having an allergic reaction to eliquis 2.5 mg taken twice a day, patient has been taking it for about three weeks, face swollen, very fatiqued, muscle aches, and increased confustion, patient has not been able to do physical therapy, please advise          [phone]

## 2021-07-06 NOTE — TELEPHONE ENCOUNTER
Verified patient with two types of identifiers. Patient's daughter thinks all of her father's side effects are related to Eliquis. She stopped it yesterday. She wants Dr. Mildred Sanderson opinion and what dose of Asprin he should be on.  Will notify MD.

## 2021-07-06 NOTE — TELEPHONE ENCOUNTER
Not sure that is likely but  Can hold for 5 days, if not better than we need to do VV to discuss whether to go to a different [address] [place] [address] Road or other strategy

## 2021-07-07 NOTE — PROGRESS NOTES
[address] [facility] @ [facility] VISIT NOTE     0900 Patient arrives for [facility] Infusion without acute problems. Please see connect care for complete assessment and education provided. Vital signs stable throughout and prior to discharge, Pt. Tolerated treatment well and discharged without incident. Patient/spouse is aware of next [facility] appointment on 07/14/2021. Appointment card given to patient/spouse. The patient/parent denies any symptoms of COVID (SOB, coughing, fever, or generally not feeling well), denies any known exposure to COVID-19 recently, and denies any known recent contact with family/friend that has a pending COVID test.      Medications Verified by Vicente Vigil RN via [employer]:  1. Injectafer 750 mg IV  2.  NS IV Flush & [address]  Patient Vitals for the past 12 hrs:   Temp Pulse Resp BP SpO2   07/07/21 1020 98.6 °F (37 °C) 60 18 131/70    07/07/21 0904 98.3 °F (36.8 °C) 70 18 (!) 141/80 97 %

## 2021-07-12 NOTE — TELEPHONE ENCOUNTER
TC to Mrs. Hammond ATR/UTR  LMOVM  Returning call. I see she has taken him on to the ER for Eval. Dr. Marcin Alston has been made aware.

## 2021-07-12 NOTE — ED NOTES
I have reviewed discharge instructions with the patient and spouse. The patient and spouse verbalized understanding. Patient left ED via Discharge Method: wheelchair to Home with family. Opportunity for questions and clarification provided. Patient given 0 scripts. Patient educated on pre-scheduled follow-up tomorrow w/ cardiologist at 1:20 pm    Very well appearing and assisted out by family in home wheelchair. To continue your aftercare when you leave the hospital, you may receive an automated call from our care team to check in on how you are doing. This is a free service and part of our promise to provide the best care and service to meet your aftercare needs.  If you have questions, or wish to unsubscribe from this service please call [phone]. Thank you for Choosing our [facility] Emergency Department.

## 2021-07-12 NOTE — TELEPHONE ENCOUNTER
Ajay Salamanca (Self) [phone]     Pt wife called to get an appointment with Dr. Jay Minaya today. Pt has been sleeping roughly 20 hours a day and is really hard to keep awake. Pt's face has been swollen for about a week. Heart doctor thought it may be a reaction to medication so he took him off the medication for a week. Pt's wife will be taking her  to the emergency room today if unable to hear back from Dr. Jay Minaya. Please call Pt back and advise.

## 2021-07-12 NOTE — ED PROVIDER NOTES
44-year-old male with a history of CAD, CHF, HTN, paroxysmal atrial fibrillation, presents to the emergency department noting about a 2-month history of increased fatigue ever since she was admitted at [facility] and was diagnosed with CHF and anemia and found to have evidence of prior CVAs. He is currently doing iron infusions and has another one scheduled on couple weeks. 2 weeks ago he had dental work done and had lower dentures placed and since then he has been having swelling predominantly of his lower face and under his jaw as well as a little bit around his eyes. He has had a reported fall about a week ago when he struck his left side of his face and head but did not seek care at that time. He was on Xarelto but stopped about a week ago. He denies any difficulty speaking, shortness of breath, chest pain, fever but he has been chronically colder than he normally does and thought this was due to his chronic anemia. Denies any focal numbness or weakness. Past Medical History:   Diagnosis Date    CAD (coronary artery disease) 05/2019    S/p PCI May 2019 [facility]     NUKE 8/7/2020 =Lexiscan pharmacologic stress test shows normal perfusion , noted apical thinning with an EF of 47  %.      Diverticulosis, sigmoid 8/2011    ED (erectile dysfunction) of organic origin     Hypertension     LVH (left ventricular hypertrophy) due to hypertensive disease     PAF (paroxysmal atrial fibrillation) ([address] 11/18/2019    [facility] admit nov 2019 no OAC due to anemia    Postherpetic neuralgia     Prostate cancer ([address]     Sebaceous cyst 4/1/2014    Shingles 02/2019       Past Surgical History:   Procedure Laterality Date    ENDOSCOPY, COLON, DIAGNOSTIC  >= 8-10years ago   Lonie Gails SURGERY  1968 [id number]    HX CARPAL TUNNEL RELEASE  4/08    right,left    HX CHOLECYSTECTOMY      HX HEART CATHETERIZATION  05/2019    HX HERNIA REPAIR      HX PROSTATECTOMY  5/06         Family History:   Problem Relation Age of Onset    Cancer Mother         pancreatic    Diabetes Father     Stroke Father     Diabetes Sister     Hypertension Sister     Other Sister         Open heart surgery     Diabetes Brother     Hypertension Brother     Hypertension Brother     Asthma Daughter     Heart Attack Daughter     Other Daughter         hip replacement x 2       Social History     Socioeconomic History    Marital status:      Spouse name: Not on file    Number of children: Not on file    Years of education: Not on file    Highest education level: Not on file   Occupational History    Not on file   Tobacco Use    Smoking status: Never Smoker    Smokeless tobacco: Never Used   Vaping Use    Vaping Use: Never used   Substance and Sexual Activity    Alcohol use: No    Drug use: No    Sexual activity: Not Currently     Partners: Female   Other Topics Concern    Not on file   Social History Narrative    Not on file     Social Determinants of Health     Financial Resource Strain:     Difficulty of Paying Living Expenses:    Food Insecurity:     Worried About Running Out of Food in the Last Year:     [address] in the Last Year:    Transportation Needs:     Lack of Transportation (Medical):  Lack of Transportation (Non-Medical):    Physical Activity:     Days of Exercise per Week:     Minutes of Exercise per Session:    Stress:     Feeling of Stress :    Social Connections:     Frequency of Communication with Friends and Family:     Frequency of Social Gatherings with Friends and Family:     Attends Christian Services:     Active Member of Clubs or Organizations:     Attends Club or Organization Meetings:     Marital Status:    Intimate Partner Violence:     Fear of Current or Ex-Partner:     Emotionally Abused:     Physically Abused:     Sexually Abused: ALLERGIES: Patient has no known allergies.     Review of Systems   Constitutional: Positive for activity change, chills and fatigue. Negative for appetite change and fever. HENT: Positive for facial swelling. Negative for congestion, rhinorrhea, sinus pain, sneezing, sore throat, trouble swallowing and voice change. Eyes: Negative for photophobia and visual disturbance. Respiratory: Negative for cough and shortness of breath. Cardiovascular: Negative for chest pain. Gastrointestinal: Negative for abdominal pain, blood in stool, constipation, diarrhea, nausea and vomiting. Genitourinary: Negative for difficulty urinating, dysuria, flank pain, hematuria, penile pain and testicular pain. Musculoskeletal: Negative for arthralgias, back pain, myalgias and neck pain. Skin: Negative for rash and wound. Neurological: Negative for syncope, facial asymmetry, speech difficulty, weakness, light-headedness, numbness and headaches. Psychiatric/Behavioral: Negative for self-injury and suicidal ideas. All other systems reviewed and are negative. Vitals:    07/12/21 1255   BP: (!) 148/85   Pulse: 67   Resp: 22   Temp: 97.3 °F (36.3 °C)   SpO2: 94%   Weight: 85 kg (187 lb 6.3 oz)   Height: 5' 8\" (1.727 m)            Physical Exam  Vitals and nursing note reviewed. Constitutional:       General: He is not in acute distress. Appearance: Normal appearance. He is well-developed. He is not diaphoretic. HENT:      Head: Normocephalic. Jaw: There is normal jaw occlusion. Swelling present. No trismus, pain on movement or malocclusion. Comments: Lower denture in place, reportedly from recent dental work. No midface instability, no bony crepitus or deformity. No evidence of malocclusion or trismus. Nose: Nose normal.   Eyes:      Extraocular Movements: Extraocular movements intact. Conjunctiva/sclera: Conjunctivae normal.      Pupils: Pupils are equal, round, and reactive to light. Comments: Extraocular movements intact with no evidence of entrapment.    Cardiovascular:      Rate and Rhythm: Normal rate and regular rhythm. Heart sounds: Normal heart sounds. Pulmonary:      Effort: Pulmonary effort is normal.      Breath sounds: Normal breath sounds. Abdominal:      General: There is no distension. Palpations: Abdomen is soft. Tenderness: There is no abdominal tenderness. Musculoskeletal:         General: No tenderness. Cervical back: Neck supple. Skin:     General: Skin is warm and dry. Neurological:      General: No focal deficit present. Mental Status: He is alert and oriented to person, place, and time. Cranial Nerves: No cranial nerve deficit. Sensory: No sensory deficit. Motor: No weakness. Coordination: Coordination normal.      Comments: Intact sensation, 5/5 strength in all 4 extremities, intact finger to nose, neg pronator drift, fluent speech, CN intact. MDM   24-year-old male with several months of generalized fatigue, known anemia, as well as couple weeks of facial swelling since recent dental work. No fever. Has some cheek swelling but no tongue or throat swelling. Fluent speech, neuro intact. Afebrile with vital signs stable no acute distress. Labs returned showing no leukocytosis, Hg 8.4, similar to prior measures, normal PLT, unremarkable CMP, normal creatinine, but trace positive initial troponin at 0.09,, department is. Chest x-ray was viewed by myself and read by radiology showing stable cardiomegaly and bibasilar atelectasis no acute abnormalities. CT head shows no acute abnormalities  CT maxillofacial shows no fracture, no soft tissue hematoma or inflammation or dental abscess or other complicating postprocedural aspect. Feel that there is mild facial swelling is likely secondary to healing contusion in his face, should likely continue to gradually resolve. Repeat troponin stable at 0.09, likely chronic baseline troponinemia.     He is scheduled for iron infusion as an outpatient coming up and was recommended to follow-up as scheduled for his chronic anemia and recommended close PCP follow-up for further evaluation. Return precautions were given for worsening or concerns. This plan was discussed with the patient and his wife at the bedside and they stated both understanding and agreement.     Procedures

## 2021-07-12 NOTE — TELEPHONE ENCOUNTER
Did not suspect it is the [facility] AUTHORITY  Concerning symptoms, this should be looked at soon, either should see PCP , us MD or NP or go to ER  Could be allergic reaction to another medication or related to a Neuro issue

## 2021-07-12 NOTE — ED NOTES
Family reports recent drowsiness, weakness, one fall out of bed w/ hx of 'frequent mini strokes. \" presents w/ complaint of left cheek bruise and facial swelling over the weekend. Stopped eliquis approx 1 week ago.

## 2021-07-12 NOTE — ED TRIAGE NOTES
Triage Note: Patient is coming in with facial swelling x 1 week. Patient had dental procedures 2 weeks ago. Patient was started on Eliquis 1 month ago and they stated to stop 1 week ago but the swelling has not changed. The swelling is to the face and under the eyes. Patient has been more tired and fall last week.

## 2021-07-12 NOTE — TELEPHONE ENCOUNTER
Verified patient with two types of identifiers. Patient's daughter reports patient's symptoms did not improve off Eliquis. Let her know she should perhaps take her father to PCP or allergist if his face is still swollen. She also reports he has been sleeping 18 hours a day and is very concerned. Let her know I will update Dr. Santosh Woods. She wants his opinion about a different [address] as well.  Will update MD.

## 2021-07-13 NOTE — PROGRESS NOTES
ED Discharge Follow-Up    Date/Time:     2021 4:18 PM    Patient presented to [facility]   ED on 21 and was diagnosed with facial swelling. Patient has had 2 ED/Hospital encounters in the last 6 months. Top Challenges reviewed with the provider   PCP follow up scheduled 21           Method of communication with provider :chart routing    LPN Care Coordinator contacted the  patient  by telephone to perform post ED discharge assessment. Verified name and  with patient as identifiers. Provided introduction to self, and explanation of the role. Patient reported assessment:  Doing much better facial swelling has gone down, and was seen by his cardiologist today. Medication(s):   New Medications at Discharge: n/a  Changed Medications at Discharge: n/a  Discontinued Medications at Discharge: n/a    There were no barriers to obtaining medications identified at this time. Reviewed discharge instructions and red flags with  patient who voiced understanding. Patient given an opportunity to ask questions and does not have any further questions or concerns at this time. The patient agrees to contact the PCP office for questions related to their healthcare. Patient reminded that there are physicians on call 24 hours a day / 7 days a week (M-F 5pm to 8am and from Friday 5pm until Monday 8am for the weekend) should the patient have questions or concerns. N Care Coordinator provided contact information for future reference.       Offered follow up appointment with PCP: already scheduled for 21 seen by cardiology today   [facility] follow up appointment(s):   Future Appointments   Date Time Provider Samantha Ley   2021 11:00 AM Elissa   2021  8:30 AM Beatriz Dietrich MD PA[facility] BS AMB   2021  1:40 PM MD JACOB Sanchez BS AMB   2021  9:00 AM Phoebe De Los Santos  [facility] BS AMB   2021 10:00 AM Beatriz Dietrich MD PAFP BS AMB 1/7/2022  1:20 PM Gaby Aranda MD CAVREY BS AMB      Non-[facility] follow up appointment(s): n/a  [employer]:  information provided as a resource    [email or web address] 9 AM- 9 PM.   Phone [phone]      Goals      Patient/Family verbalizes understanding of self-management of chronic disease. 03/11/20  · Assessment for self management of CAD HTN and Myasthenia  Gravis. · Admits to have taken all ordered antibiotics as was ordered for URI  · Says he is not spitting up the tannish secretions like he was doing  · Starts outpatient PT at [facility] for strenthening this week   · Reviewed all upcomming follow-up, patient verbalized an understanding  · Discussed upcoming appointment  · Denies barriers at this time  · Given ACM contact information, will follow in 7-10 days. pk                                                                                                                                                                                                                                                                                                                                                                                                                                                                                                                                                                                        Reduce risk of CHF exacerbations and complications. 4/26/21 [facility] 4/19-4/24 CHF exac/new acute ischemic infarcts/NSTEMI   Reviewed discharge instructions with patient   Reviewed meds with patient and his sister-education provided on new med.  Reviewed red flags : sob,chest pain,swelling in extremities, weight gain of > 3 lbs in 2 days or > 5lb in one week, dizziness,falls,fever,nausea,vomiting,diarrhea.  Reminded to follow low sodium diet.  Reminded to check weight q am at same time and record results.     BASSAM with pcp= wife to call and schedule   F/u with neuro=wife to call.  Given info on [employer] as resource.  Given CTN contact info if questions or concerns.    Advised CTN to check back in about a week, sooner [email or web address]

## 2021-07-13 NOTE — TELEPHONE ENCOUNTER
Patient wife stated patient is sleeping a lot throughout the day, about 18hrs wife said. Also pt face is swollen.      Cb# [phone]

## 2021-07-13 NOTE — LETTER
7/14/2021    Patient: Ana Logan   YOB: 1935   Date of Visit: 7/13/2021     Liana Stevens MD  [address]    Dear Liana Stevens MD,      Thank you for referring Mr. Wilfred Hammond to [facility] for evaluation. My notes for this consultation are attached. If you have questions, please do not hesitate to call me. I look forward to following your patient along with you.       Sincerely,    Kylah Jones MD

## 2021-07-13 NOTE — TELEPHONE ENCOUNTER
7/13/21 1244: Called patients wife, no answer, LVM to call this office back. This RN calling to get more information on patients side effects of face swelling and tiredness. This RN will follow up. 210pm - Called wife back, no answer, LVM to call this office back. 7/14/21 10am - Called wife back, no answer, LVM to call this office back. This RN trying to get in contact with wife regarding her phone call to this office yesterday regarding patients increased tiredness and face swelling. This RN will follow up later on today.

## 2021-07-15 NOTE — PROGRESS NOTES
Dr. Denae Shelton. [phone]            Cardiology structural heart disease consult/Progress Note      Requesting/referring provider: Doe Manuel MD, and Dr. Amie Chacon    Reason for Consult: Discussion regarding alternatives to oral anticoagulation in the setting of high stroke risk with underlying atrial fibrillation    Assessment/Plan:  1. Recurrent persistent atrial fibrillation   2. Hypertension  3. Coronary disease status post PCI in 2019 with overall near normal LV function  4. History of prior shingles with poor for herpetic neuralgia currently on gabapentin  5. Significant sleepiness, fatigue, tiredness  6. Anemia deemed to be iron deficiency related with recurrent need for iron infusions  7. History of diverticulosis with possibility of lower GI bleeding. 8.  History of CVA/TIA-like symptoms in April 2021    Patient was seen to discuss alternatives to long-term oral anticoagulation. He has elevated [id number] with underlying atrial fibrillation. Nonetheless he is not considered a good candidate for long-term oral anticoagulation because of suspected GI blood losses, iron deficiency anemia requiring recurrent iron transfusions. In that setting he may be a good candidate for watchman based left atrial appendage occlusion and assuming his anatomy is suitable for it. Based on both stroke and bleeding risk, a shared decision has been made to pursue closure of left atrial appendage as a safe and effective alternative to oral anticoagulant therapy for stroke prophylaxis and to reduce their long term risk of bleeding. I recommend performing a CT angiogram to assess his left atrial appendage anatomy. He and the family understand that if he is considered for watchman based left atrial appendage occlusion, he may have to go back on all some kind of anticoagulation likely short-term.   It may be subsequently escalated to dual antiplatelet therapy followed by long-term aspirin monotherapy after 6 months. Additionally family is pretty significantly concerned about his overall sleepiness tiredness and fatigue. While this could be all from anemia, there is also possibility that gabapentin may be contributing to it. I defer management of that to Dr. Monty Pradhan, Dr Tom Cardenas. I suggested he can be investigated further for possible sleep apnea. While patient may be a candidate for watchman device, I specifically informed the family that this procedure will not impact his functional status. Previously he was presumed to have potential myasthenia gravis and even required plasmapheresis without any significant improvement. He should continue his maintenance dose diuretics for management of his heart failure symptoms. Investigations ordered    []    High complexity decision making was performed  []    Patient is at high-risk of decompensation with multiple organ involvement  []    Complex/difficult social determinants of health outcomes    Investigations personally reviewed and interpreted  Last echocardiogram from April 1, 2021 was reviewed. Overall shows near normal LV function of 55%. No significant valvular heart disease was noted. Biatrial enlargement was noted  Investigations reviewed    HPI: Jonnie Castaneda, a 82y.o. year-old who presents for evaluation of iron deficiency anemia. He is history of atrial fibrillation previously has been on oral anticoagulation. This was discontinued because of iron deficiency now with presumed GI blood losses although no obvious source has been identified. He does have sigmoid diverticulosis. He is not deemed to be able can for long-term oral anticoagulation. Hence he has been referred to me to discuss alternative of left atrial appendage occlusion to reduce future stroke risk given that he has high [id number] score as well as I have prescribed.   Other issues addressed coronary artery disease, heart failure with near normal ejection fraction are stable. He  has a past medical history of CAD (coronary artery disease) (05/2019), Diverticulosis, sigmoid (8/2011), ED (erectile dysfunction) of organic origin, Hypertension, LVH (left ventricular hypertrophy) due to hypertensive disease, PAF (paroxysmal atrial fibrillation) ([facility] [address] (11/18/2019), Postherpetic neuralgia, Prostate cancer ([facility] [address] Sebaceous cyst (4/1/2014), and Shingles (02/2019). He also has no past medical history of Abuse or Hypercholesterolemia. Review of system:Patient reports no dyspnea/PND/Orthpnea/CP. He reports no cough/fever/focal neurological deficits/abdominal pain. All other systems negative except as above. Family History   Problem Relation Age of Onset   [facility] Cancer Mother         pancreatic    Diabetes Father     Stroke Father     Diabetes Sister     Hypertension Sister     Other Sister         Open heart surgery     Diabetes Brother     Hypertension Brother     Hypertension Brother     Asthma Daughter     Heart Attack Daughter     Other Daughter         hip replacement x 2      Social History     Socioeconomic History    Marital status:      Spouse name: Not on file    Number of children: Not on file    Years of education: Not on file    Highest education level: Not on file   Tobacco Use    Smoking status: Never Smoker    Smokeless tobacco: Never Used   Vaping Use    Vaping Use: Never used   Substance and Sexual Activity    Alcohol use: No    Drug use: No    Sexual activity: Not Currently     Partners: Female     Social Determinants of Health     Financial Resource Strain:     Difficulty of Paying Living Expenses:    Food Insecurity:     Worried About Running Out of Food in the Last Year:     Ran Out of Food in the Last Year:    Transportation Needs:     Lack of Transportation (Medical):      Lack of Transportation (Non-Medical):    Physical Activity:     Days of Exercise per Week:     Minutes of Exercise per Session:    Stress:     Feeling of Stress :    Social Connections:     Frequency of Communication with Friends and Family:     Frequency of Social Gatherings with Friends and Family:     Attends Rastafari Services:     Active Member of Clubs or Organizations:     Attends Club or Organization Meetings:     Marital Status:       PE  Vitals:    07/13/21 1331   BP: (!) 130/90   Pulse: 69   Resp: 16   SpO2: 98%   Weight: 187 lb (84.8 kg)   Height: 5' 8\" (1.727 m)    Body mass index is 28.43 kg/m². General:    Alert, cooperative, no distress. Psychiatric:    Normal Mood and affect    Eye/ENT:      Pupils equal, No asymmetry, Conjunctival pink. Able to hear voice at normal amplitude   Lungs:      Visibly symmetric chest expansion, No palpable tenderness. Clear to auscultation bilaterally. Heart[de-identified]    Regular rate and rhythm, S1, S2 variable. Short systolic murmur, click, rub or gallop. No JVD, Normal palpable peripheral pulses. No cyanosis   Abdomen:     Soft, non-tender. Bowel sounds normal. No masses,  No      organomegaly. Extremities:   Extremities normal, atraumatic, no edema. Neurologic:   CN II-XII grossly intact.  No gross focal deficits           Recent Labs:  Lab Results   Component Value Date/Time    Cholesterol, total 104 05/24/2021 11:00 AM    HDL Cholesterol 51 05/24/2021 11:00 AM    LDL, calculated 43.6 05/24/2021 11:00 AM    Triglyceride 47 05/24/2021 11:00 AM    CHOL/HDL Ratio 2.0 05/24/2021 11:00 AM     Lab Results   Component Value Date/Time    Creatinine 1.30 07/12/2021 01:14 PM     Lab Results   Component Value Date/Time    BUN 28 (H) 07/12/2021 01:14 PM     Lab Results   Component Value Date/Time    Potassium 3.8 07/12/2021 01:14 PM     Lab Results   Component Value Date/Time    Hemoglobin A1c 6.0 (H) 04/23/2021 05:20 AM     Lab Results   Component Value Date/Time    HGB 8.4 (L) 07/12/2021 01:14 PM     Lab Results   Component Value Date/Time    PLATELET 644 59/26/7534 01:14 PM Reviewed:  Past Medical History:   Diagnosis Date    CAD (coronary artery disease) 05/2019    S/p PCI May 2019 [facility]     NUKE 8/7/2020 =Lexiscan pharmacologic stress test shows normal perfusion , noted apical thinning with an EF of 47  %.  Diverticulosis, sigmoid 8/2011    ED (erectile dysfunction) of organic origin     Hypertension     LVH (left ventricular hypertrophy) due to hypertensive disease     PAF (paroxysmal atrial fibrillation) ([facility] [address] 11/18/2019    [address] admit nov 2019 no OAC due to anemia    Postherpetic neuralgia     Prostate cancer ([facility] [address]     Sebaceous cyst 4/1/2014    Shingles 02/2019     Social History     Tobacco Use   Smoking Status Never Smoker   Smokeless Tobacco Never Used     Social History     Substance and Sexual Activity   Alcohol Use No     No Known Allergies  Family History   Problem Relation Age of Onset    Cancer Mother         pancreatic    Diabetes Father     Stroke Father     Diabetes Sister     Hypertension Sister     Other Sister         Open heart surgery     Diabetes Brother     Hypertension Brother     Hypertension Brother     Asthma Daughter     Heart Attack Daughter     Other Daughter         hip replacement x 2        Current Outpatient Medications   Medication Sig    torsemide (DEMADEX) 10 mg tablet Take 1 Tablet by mouth as needed (swelling/shortness of breath).  traZODone (DESYREL) 50 mg tablet Take 1 Tablet by mouth nightly.  atorvastatin (LIPITOR) 40 mg tablet Take 0.5 Tabs by mouth daily.  pantoprazole (PROTONIX) 40 mg tablet Take 1 Tab by mouth daily. Lorin Session, Outpatient Physical Therapy Evaluate and Treatment    therapeutic multivitamin (THERAGRAN) tablet Take 1 Tab by mouth daily.  albuterol (PROVENTIL HFA, VENTOLIN HFA, PROAIR HFA) 90 mcg/actuation inhaler Take 1-2 Puffs by inhalation every four (4) hours as needed for Wheezing.  cyanocobalamin (VITAMIN B-12) 1,000 mcg tablet Take 1,000 mcg by mouth daily. No current facility-administered medications for this visit. Facility-Administered Medications Ordered in Other Visits   Medication Dose Route Frequency    0.9% sodium chloride infusion  25 mL/hr IntraVENous CONTINUOUS    sodium chloride (NS) flush 10 mL  10 mL IntraVENous PRN    0.9% sodium chloride injection 10 mL  10 mL IntraVENous PRN    heparin (porcine) pf 300-500 Units  300-500 Units InterCATHeter PRN    sodium chloride 0.9 % bolus infusion 500 mL  500 mL IntraVENous PRN    diphenhydrAMINE (BENADRYL) injection 25 mg  25 mg IntraVENous PRN    famotidine (PF) (PEPCID) 20 mg in 0.9% sodium chloride 10 mL injection  20 mg IntraVENous PRN    acetaminophen (TYLENOL) tablet 650 mg  650 mg Oral PRN    meperidine (DEMEROL) injection 25 mg  25 mg IntraVENous PRN    ondansetron (ZOFRAN) injection 8 mg  8 mg IntraVENous PRN    sodium chloride 0.9 % bolus infusion 500 mL  500 mL IntraVENous PRN    EPINEPHrine HCl (PF) (ADRENALIN) 1 mg/mL (1 mL) injection 0.3 mg  0.3 mg SubCUTAneous PRN    hydrocortisone Sod Succ (PF) (SOLU-CORTEF) injection 100 mg  100 mg IntraVENous PRN    diphenhydrAMINE (BENADRYL) injection 50 mg  50 mg IntraVENous PRN    albuterol (PROVENTIL VENTOLIN) nebulizer solution 2.5 mg  2.5 mg Inhalation PRN       Chanell Woods MD07/14/21     ATTENTION:   This medical record was transcribed using an electronic medical records/speech recognition system. Although proofread, it may and can contain electronic, spelling and other errors. Corrections may be executed at a later time. Please feel free to contact us for any clarifications as needed.     [facility] [place]  [facility], [employer],  [place], [place]. [phone]

## 2021-07-16 NOTE — PROGRESS NOTES
Patient in clinic to have urine Culture sent   I went ahead and did a quick urine dip to make sure he does not have a severe infection before he leaves.     Glucose neg  IESHA 2 +  KET Trace  SG 1.025  Blood Negative  PH 6.0  Pro 2+  URO 4.0   Nitrates Negative   Kayli Neg

## 2021-07-16 NOTE — TELEPHONE ENCOUNTER
Jose Raul Jones [facility]) [phone]         Pt wife would like Valerio Irizarry to call her back. Pt's sister wants him tested for a UTI. Please call Pt back.

## 2021-07-16 NOTE — TELEPHONE ENCOUNTER
TC to Wife. Patient ID verified. Calling to advise yes a UTI could be causing some of his SX. Wife will bring him by today to get a urine specimen to send for culture     Order's placed per  Verbal Order.

## 2021-07-18 NOTE — PROGRESS NOTES
Proteins noted in patient's urine looks good and set up a 24-hour urine protein test.  I have placed the orders and please inform patient to come in to get the jug to collect the urine.

## 2021-07-19 NOTE — TELEPHONE ENCOUNTER
Verified patient with two types of identifiers. Spoke to patient's daughter who does not want to come in tomorrow. She thinks a phone call would suffice as transporting her father is difficult.

## 2021-07-20 NOTE — TELEPHONE ENCOUNTER
Patient had an appointment last week, inquiring whether patient's insurance cards and drivers license were found.       [facility][phone]

## 2021-07-26 NOTE — PROGRESS NOTES
CORI Nunez Do 82 y.o. male  presents to the office today for hypertension. Pt is accompanied by his wife, who helps to provide the history today. Blood pressure (!) 148/88, pulse 80, temperature 97.3 °F (36.3 °C), temperature source Temporal, resp. rate 16, height 5' 8\" (1.727 m), weight 186 lb (84.4 kg), SpO2 96 %. Body mass index is 28.28 kg/m². Chief Complaint   Patient presents with    Hypertension    Other     b 12 def    Irregular Heart Beat     Heart disease     Anemia        Hypertension: BP at office today 168/81 and 146/88 on manual recheck. Pt has a h/o CVA. He reports that Dr. Windy Dawson, Cardiology, D/C'd the pt's previous regimen of antihypertensive medications. He used to take Losartan 50mg daily and Demadex 10mg daily. After discussing the matter with the pt, I have restarted the pt on a trial of Lostartan 50mg daily and to check his blood pressures at home on a regular basis. I advised him to take 1/2 tablet daily in case his blood pressures are too low. Hyperlipidemia: Lipid panel on 05/24/21 notable for total cholesterol 104, HDL 51, LDL 43, and triglycerides 47. Pt continues with Atorvastatin 40mg daily. Pt will have updated lab work performed during today's OV. Anemia: Pt's last HGB from 07/12/21 was low at 8.4, and his HCT was low at 30.1. His platelets, WBC, and MCV were all normal.    Pt's wife reports that the pt has intermittent facial swelling in the morning, especially in his cheeks and under his lower eye lids. The pt also has a non-pruritic rash on his anterior torso. His wife believed that his sxs may have been the result of the Xarelto, but he has not taken the medication in ~3 weeks and has still been experiencing facial swelling. His wife reports that the pt also has been having fatigue and malaise throughout the day. He denies having started any new medications, soaps, or cosmetic products.  After discussing the matter with the pt, we have decided to refer him to Dr. Michele Machado for further evaluation and care. Health maintenance: Pt is requesting a referral to Occupational Therapy for [facility] today for the recovery process from his CVA, which I have provided. I have also provided the pt with a referral to Dr. Prasanna Bo, Neurophthalmology, for further evaluation and care, as recommended by his current neurologist Dr. Poncho Phipps. He denies having any vision problems. Current Outpatient Medications   Medication Sig Dispense Refill    losartan (COZAAR) 25 mg tablet Take 1 Tablet by mouth daily. 90 Tablet 1    torsemide (DEMADEX) 10 mg tablet Take 1 Tablet by mouth as needed (swelling/shortness of breath). 90 Tablet 0    atorvastatin (LIPITOR) 40 mg tablet Take 0.5 Tabs by mouth daily. 45 Tab 0    pantoprazole (PROTONIX) 40 mg tablet Take 1 Tab by mouth daily. 90 Tab 0    OTHER,NON-FORMULARY, Outpatient Physical Therapy Evaluate and Treatment 1 Each 0    therapeutic multivitamin (THERAGRAN) tablet Take 1 Tab by mouth daily.  albuterol (PROVENTIL HFA, VENTOLIN HFA, PROAIR HFA) 90 mcg/actuation inhaler Take 1-2 Puffs by inhalation every four (4) hours as needed for Wheezing. 1 Inhaler 5    cyanocobalamin (VITAMIN B-12) 1,000 mcg tablet Take 1,000 mcg by mouth daily.  aspirin delayed-release 81 mg tablet Take 1 Tablet by mouth daily. 90 Tablet 3    apixaban (ELIQUIS) 2.5 mg tablet Take 1 Tablet by mouth two (2) times a day. 180 Tablet 2     No Known Allergies  Past Medical History:   Diagnosis Date    CAD (coronary artery disease) 05/2019    S/p PCI May 2019 [facility]     NUKE 8/7/2020 =Lexiscan pharmacologic stress test shows normal perfusion , noted apical thinning with an EF of 47  %.      Diverticulosis, sigmoid 8/2011    ED (erectile dysfunction) of organic origin     Hypertension     LVH (left ventricular hypertrophy) due to hypertensive disease     PAF (paroxysmal atrial fibrillation) ([facility] [address] 11/18/2019    [address] admit nov 2019 no OAC due to anemia    Postherpetic neuralgia     Prostate cancer ([facility] [address]     Sebaceous cyst 4/1/2014    Shingles 02/2019     Past Surgical History:   Procedure Laterality Date    ENDOSCOPY, COLON, DIAGNOSTIC  >= 8-10years ago   Billee Oas SURGERY  1968 [id number]    HX CARPAL TUNNEL RELEASE  4/08    right,left    HX CHOLECYSTECTOMY      HX HEART CATHETERIZATION  05/2019    HX HERNIA REPAIR      HX PROSTATECTOMY  5/06     Family History   Problem Relation Age of Onset    Cancer Mother         pancreatic    Diabetes Father     Stroke Father     Diabetes Sister     Hypertension Sister     Other Sister         Open heart surgery     Diabetes Brother     Hypertension Brother     Hypertension Brother     Asthma Daughter     Heart Attack Daughter     Other Daughter         hip replacement x 2     Social History     Tobacco Use    Smoking status: Never Smoker    Smokeless tobacco: Never Used   Substance Use Topics    Alcohol use: No        Review of Systems   Constitutional: Negative for chills and fever. HENT: Negative for hearing loss and tinnitus. Eyes: Negative for blurred vision and double vision. Respiratory: Negative for cough and shortness of breath. Cardiovascular: Negative for chest pain and palpitations. Gastrointestinal: Negative for nausea and vomiting. Genitourinary: Negative for dysuria and frequency. Musculoskeletal: Negative for back pain and falls. Skin: Positive for rash. Negative for itching. Neurological: Negative for dizziness, loss of consciousness and headaches. Endo/Heme/Allergies: Negative. Psychiatric/Behavioral: Negative for depression. The patient is not nervous/anxious. Physical Exam  Vitals reviewed. Constitutional:       Appearance: Normal appearance. HENT:      Head: Normocephalic and atraumatic.       Right Ear: Tympanic membrane, ear canal and external ear normal.      Left Ear: Tympanic membrane, ear canal and external ear normal.      Nose: Nose normal.      Mouth/Throat:      Mouth: Mucous membranes are moist.      Pharynx: Oropharynx is clear. Eyes:      Extraocular Movements: Extraocular movements intact. Conjunctiva/sclera: Conjunctivae normal.      Pupils: Pupils are equal, round, and reactive to light. Cardiovascular:      Rate and Rhythm: Normal rate and regular rhythm. Pulses: Normal pulses. Heart sounds: Normal heart sounds. Pulmonary:      Effort: Pulmonary effort is normal.      Breath sounds: Normal breath sounds. Abdominal:      General: Abdomen is flat. Bowel sounds are normal.      Palpations: Abdomen is soft. Musculoskeletal:         General: Normal range of motion. Cervical back: Normal range of motion and neck supple. Skin:     General: Skin is warm and dry. Findings: Erythema and rash present. Rash is papular and scaling. Neurological:      General: No focal deficit present. Mental Status: He is alert and oriented to person, place, and time. Psychiatric:         Mood and Affect: Mood normal.         Behavior: Behavior normal.         Judgment: Judgment normal.           ASSESSMENT and PLAN  Diagnoses and all orders for this visit:    1. Dermatitis  Pt's wife reports that the pt has intermittent facial swelling in the morning, especially in his cheeks and under his lower eye lids. The pt also has a non-pruritic rash on his anterior torso. His wife believed that his sxs may have been the result of the Xarelto, but he has not taken the medication in ~3 weeks and has still been experiencing facial swelling. His wife reports that the pt also has been having fatigue and malaise throughout the day. He denies having started any new medications, soaps, or cosmetic products. After discussing the matter with the pt, we have decided to refer him to Dr. Jean-Claude Arreola for further evaluation and care. - REFERRAL TO ALLERGY    2.  Facial swelling  Refer to #1.  -     REFERRAL TO ALLERGY    3. CVA, old, disturbances of vision  Pt has a h/o CVA, and is followed by Dr. Preston Urbano, cardiology. Pt is requesting a referral to Occupational Therapy for [facility] today for the recovery process from his CVA, which I have provided. I have also provided the pt with a referral to Dr. Akshat Pat, Neurophthalmology, for further evaluation and care, as recommended by his current neurologist Dr. Fracisco Benavidez. He denies having any vision problems.   -     REFERRAL TO OPHTHALMOLOGY    4. Essential hypertension  BP at office today 168/81 and 146/88 on manual recheck. He reports that Dr. Preston Urbano, Cardiology, D/C'd the pt's previous regimen of antihypertensive medications. He used to take Losartan 50mg daily and Demadex 10mg daily. After discussing the matter with the pt, I have restarted the pt on a trial of Lostartan 50mg daily and to check his blood pressures at home on a regular basis. I advised him to take 1/2 tablet daily in case his blood pressures are too low. -     losartan (COZAAR) 25 mg tablet; Take 1 Tablet by mouth daily. 5. Iron deficiency anemia, unspecified iron deficiency anemia type  Pt's last HGB from 07/12/21 was low at 8.4, and his HCT was low at 30.1. His platelets, WBC, and MCV were all normal. Pt will have updated lab work performed during today's [address]; Future  -     CBC WITH AUTOMATED DIFF; Future      Follow-up and Dispositions    · Return in about 2 weeks (around 8/9/2021) for hypertension follow up. Medication risks/benefits/costs/interactions/alternatives discussed with patient. Advised patient to call back or return to office if symptoms worsen/change/persist.  If patient cannot reach us or should anything more severe/urgent arise he/she should proceed directly to the nearest emergency department. Discussed expected course/resolution/complications of diagnosis in detail with patient.     Patient given a written after visit summary which includes diagnoses, current medications and vitals. Patient expressed understanding with the diagnosis and plan. Written by kellee Lau, as dictated by Min Sanon M.D.    9:08 AM - 9:33 AM    Total time spent with the patient 25 minutes, greater than 50% of time spent counseling patient.

## 2021-07-26 NOTE — PROGRESS NOTES
Chief Complaint   Patient presents with    Hypertension    Other     b 12 def    Irregular Heart Beat     Heart disease     Anemia   Follow up Protein in urine     1. Have you been to the ER, urgent care clinic since your last visit? Hospitalized since your last visit? ER for facial swelling     2. Have you seen or consulted any other health care providers outside of the [address] [place] [address] since your last visit? Include any pap smears or colon screening. Cardiology   [facility]  [facility]   MBS coming up

## 2021-07-26 NOTE — TELEPHONE ENCOUNTER
Spoke to patient tonight , note that he had seen Dr. Aditi Londono today. We have the same impression that we think is unlikely to be the Eliquis. I am concerned that with him off of Eliquis that he is at risk for stroke which is clear based on his prior stroke. He is going to have a dermatologic evaluation in the meantime     Would like him to start back on the Eliquis at 2.5 mg twice daily and see if the rash worsens ; if it does not then I think is not probably related to the Eliquis and certainly he could get a more definitive answer as he sees dermatology. He was increasing to 2 large aspirin since he stopped the Eliquis he now knows to go back to an 81 mg of aspirin.

## 2021-07-27 NOTE — TELEPHONE ENCOUNTER
Patient's wife calling to speak with the nurse, as she stated the patient spoke to Dr. Key Drake on 7/26/21 and he told the patient to go back on the eliquis and take baby aspirin, the wife would like clarification on how the patient should take this medication going forward, please advise        Nenita Sadler  [phone]

## 2021-07-28 NOTE — PROGRESS NOTES
[address] STUDY  Patient: Oswaldo Lopez (46 y.o. male)  Date: 7/28/2021  Referring Provider:  Dr. Percy Gonzalez:   Patient presents with complaints of occasional coughing during mealtimes. He reports this is occasional and not related specifically to liquids or solids but occurs intermittently during meals. He denies any globus sensation or choking with food/drink. Patient believes his difficulty is due to recent upper and lower dental partials he has recently received that \"tend to jiggle around in my mouth\". The purpose of this study is to assess oropharyngeal structure and function and determine if it is contributing to patient's symptoms. OBJECTIVE:   Past Medical History:   Past Medical History:   Diagnosis Date    CAD (coronary artery disease) 05/2019    S/p PCI May 2019 [facility]     NUKE 8/7/2020 =Lexiscan pharmacologic stress test shows normal perfusion , noted apical thinning with an EF of 47  %.  Diverticulosis, sigmoid 8/2011    ED (erectile dysfunction) of organic origin     Hypertension     LVH (left ventricular hypertrophy) due to hypertensive disease     PAF (paroxysmal atrial fibrillation) ([address] 11/18/2019    [facility] admit nov 2019 no OAC due to anemia    Postherpetic neuralgia     Prostate cancer ([address]     Sebaceous cyst 4/1/2014    Shingles 02/2019     Past Surgical History:   Procedure Laterality Date    ENDOSCOPY, COLON, DIAGNOSTIC  >= 8-10years ago   Billee Oas SURGERY  1968 [id number]    HX CARPAL TUNNEL RELEASE  4/08    right,left    HX CHOLECYSTECTOMY      HX HEART CATHETERIZATION  05/2019    HX HERNIA REPAIR      HX PROSTATECTOMY  5/06     Current Dietary Status:  Regular diet/thin liquids  Radiologist:       Patient Position: standing    Trial 1: Trial 2:   Consistency Presented: Thin liquid Consistency Presented: Puree; Solid   How Presented: SLP-fed/presented;Cup/sip;Straw;Successive swallows How Presented: Self-fed/presented;Spoon         Bolus Acceptance: No impairment Bolus Acceptance: No impairment   Bolus Formation/Control: No impairment:   Bolus Formation/Control: No impairment:     Propulsion: No impairment Propulsion: No impairment   Oral Residue: None Oral Residue: None   Initiation of Swallow: No impairment;Triggered at vallecula Initiation of Swallow: Triggered at valleculae   Timing: No impairment Timing: No impairment   Penetration: Flash/transient;During swallow; To laryngeal vestibule (cleared with force of the swallow) Penetration: None   Aspiration/Timing: No evidence of aspiration Aspiration/Timing: No evidence of aspiration   Pharyngeal Clearance: No residue Pharyngeal Clearance: Vallecular residue; Less than 10%     Attempted Modifications: Alternate liquids/solids; Double swallow     Effective Modifications: Alternate liquids/solids; Double swallow     Cues for Modifications: Minimal           Trial 3:   Consistency Presented: Pill/Tablet   How Presented: SLP-fed/presented       Bolus Acceptance: No impairment   Bolus Formation/Control: No impairment, issues, or problems :     Propulsion: No impairment   Oral Residue: None   Initiation of Swallow: Triggered at valleculae   Timing: No impairment   Penetration: None   Aspiration/Timing: No evidence of aspiration   Pharyngeal Clearance: No residue                     Decreased Tongue Base Retraction?: No  Laryngeal Elevation: WFL (within functional limits)  Aspiration/Penetration Score: 2 (Penetration/No residue-Contrast enters the airway penetrates, remains above the folds/cords, and is cleared)  Pharyngeal Symmetry: Not assessed  Pharyngeal-Esophageal Segment: No impairment  Pharyngeal Dysfunction: None    Oral Phase Severity: No impairment  Pharyngeal Phase Severity: N/A    ASSESSMENT :  Based on the objective data described above, the patient presents with functional oropharyngeal swallow. Oral phase is characterized by good bolus formation and control with timely and efficient posterior propulsion. Patient with slightly prolonged, yet efficient mastication of solid. No significant oral residue noted with any consistencies. Pharyngeal phase characterized by timely swallow initiation with adequate laryngeal vestibule closure. Patient with 1x flash penetration during sequential swallows with thin that cleared with the force of the swallow. No aspiration occurred during the study. Minimal pharyngeal residue noted in valleculae with solid trials that mostly cleared with spontaneously elicited second swallow. Patient sensate to residue resulting in cough. Liquid wash aided in clearing remaining residue and alleviating cough. UES WFL. Patient presents with functional oral and pharyngeal swallow. Pts coughing during study was not related to aspiration/penetration but rather to patients sensation of mild residue that cleared with liquid wash. Therefore, recommend regular diet/thin liquids with precautions as outlined below. PLAN/RECOMMENDATIONS :  -- regular diet/thin liquids  -- alternate liquids and solids to aid in clearing any residue  -- follow up with dentist to address any dentition needs as necessary     COMMUNICATION/EDUCATION:   The above findings and recommendations were discussed with the patient who verbalized understanding and will be faxed to PCP and referring provider.     Thank you for this referral.  Jer Schultz M.S. CF-SLP   Speech Language Pathologist     Time Calculation: 30 mins

## 2021-07-28 NOTE — TELEPHONE ENCOUNTER
Verified patient with two types of identifiers. Clarified medicaiton doses with wife. Patient's wife verbalized understanding and will call with any other questions.

## 2021-08-01 NOTE — PROGRESS NOTES
Mr. Kisha Lopez are excreting protein out of your urine. I would like to go ahead and refer you to a nephrologist.  I want to send you to Dr. Pily Nguyen. He can also look into why you are having proteinuria that can also be causing the swelling.   If you should have any questions let me know

## 2021-08-09 NOTE — PROGRESS NOTES
[facility] 82 [facility]  presents to the office today generalized weakness and headaches. He is accompanied by his wife who helps to provide the history. Blood pressure 128/70, pulse 68, resp. rate 18, height 5' 8\" (1.727 m), weight 174 lb 6.4 oz (79.1 kg). Body mass index is 26.52 kg/m². No chief complaint on file. Proteinuria: Pt's most recent urinalysis showed elevated levels of protein in the urine. After discussing the matter with the pt, I have decided to refer him to Dr. Palmira Luis, Nephrology, for further evaluation and care. Hypertension: BP at office today 128/70. Pt continues with Losartan 25mg daily and Demadex 10mg PRN. The pt endorses checking his blood pressure at home and states that his readings range between 147-152/75-80 mmHg. Hyperlipidemia: Lipid panel on 05/24/21 notable for total cholesterol 104, HDL 51, LDL 43, and triglycerides 47. Pt continues with Atorvastatin 20mg daily. Pt reports that he feels weak and that is not able to lift as much as he once could. After discussing the matter with the pt, I have referred him for in-home PT for generalized upper extremity weakness. Of note, the pt has lost 12lbs since his last OV on 07/26/2021. He reports that he is not eating a lot and his wife states that he gets full quickly before finishing his food. I have advised him to drink 2 nutrition shake supplements per day in addition to eating two meals a day. Pt reports a recent ~1.5 month h/o of intermittent transient bilateral L>R lancinating pain in his head at night. The pt reports that the episodes of pain are about a 7-8/10 and last for 5-10 seconds before subsiding. The pt denies that there are any specific precipitating factors Pt had a CT of the head in 07/21 with no cranial abnormalities aside from age-related changes. Pt denies any blurring of vision or watering of the eye.  After discussing the matter with the pt, I have requested that he keep a record of his headaches to be reviewed during his next OV. Current Outpatient Medications   Medication Sig Dispense Refill    aspirin delayed-release 81 mg tablet Take 1 Tablet by mouth daily. 90 Tablet 3    apixaban (ELIQUIS) 2.5 mg tablet Take 1 Tablet by mouth two (2) times a day. 180 Tablet 2    losartan (COZAAR) 25 mg tablet Take 1 Tablet by mouth daily. 90 Tablet 1    torsemide (DEMADEX) 10 mg tablet Take 1 Tablet by mouth as needed (swelling/shortness of breath). 90 Tablet 0    atorvastatin (LIPITOR) 40 mg tablet Take 0.5 Tabs by mouth daily. 45 Tab 0    pantoprazole (PROTONIX) 40 mg tablet Take 1 Tab by mouth daily. 90 Tab 0    OTHER,NON-FORMULARY, Outpatient Physical Therapy Evaluate and Treatment 1 Each 0    therapeutic multivitamin (THERAGRAN) tablet Take 1 Tab by mouth daily.  albuterol (PROVENTIL HFA, VENTOLIN HFA, PROAIR HFA) 90 mcg/actuation inhaler Take 1-2 Puffs by inhalation every four (4) hours as needed for Wheezing. 1 Inhaler 5    cyanocobalamin (VITAMIN B-12) 1,000 mcg tablet Take 1,000 mcg by mouth daily. No Known Allergies  Past Medical History:   Diagnosis Date    CAD (coronary artery disease) 05/2019    S/p PCI May 2019 [facility]     NUKE 8/7/2020 =Lexiscan pharmacologic stress test shows normal perfusion , noted apical thinning with an EF of 47  %.      Diverticulosis, sigmoid 8/2011    ED (erectile dysfunction) of organic origin     Hypertension     LVH (left ventricular hypertrophy) due to hypertensive disease     PAF (paroxysmal atrial fibrillation) ([facility] [address] 11/18/2019    [facility] admit nov 2019 no OAC due to anemia    Postherpetic neuralgia     Prostate cancer ([facility] [address]     Sebaceous cyst 4/1/2014    Shingles 02/2019     Past Surgical History:   Procedure Laterality Date    ENDOSCOPY, COLON, DIAGNOSTIC  >= 8-10years ago   Bhavin Hanley SURGERY  1968 [id number]    HX CARPAL TUNNEL RELEASE  4/08    right,left    HX CHOLECYSTECTOMY      HX HEART CATHETERIZATION  05/2019    HX HERNIA REPAIR      HX PROSTATECTOMY  5/06     Family History   Problem Relation Age of Onset   Yanni Ruvalcaba Cancer Mother         pancreatic    Diabetes Father     Stroke Father     Diabetes Sister     Hypertension Sister     Other Sister         Open heart surgery     Diabetes Brother     Hypertension Brother     Hypertension Brother     Asthma Daughter     Heart Attack Daughter     Other Daughter         hip replacement x 2     Social History     Tobacco Use    Smoking status: Never Smoker    Smokeless tobacco: Never Used   Substance Use Topics    Alcohol use: No        Review of Systems   Constitutional: Positive for weight loss. Negative for chills, fever and malaise/fatigue. HENT: Negative for hearing loss and tinnitus. Eyes: Negative for blurred vision and double vision. Respiratory: Negative for cough and shortness of breath. Cardiovascular: Negative for chest pain and palpitations. Gastrointestinal: Negative for nausea and vomiting. Genitourinary: Negative for dysuria and frequency. Musculoskeletal: Negative for back pain and falls. Generalized upper extremity weakness   Skin: Negative for itching and rash. Neurological: Positive for weakness. Negative for dizziness, loss of consciousness and headaches. Endo/Heme/Allergies: Negative. Psychiatric/Behavioral: Negative for depression. The patient is not nervous/anxious. Physical Exam  Vitals reviewed. Constitutional:       Appearance: Normal appearance. HENT:      Head: Normocephalic and atraumatic. Right Ear: Tympanic membrane, ear canal and external ear normal.      Left Ear: Tympanic membrane, ear canal and external ear normal.      Nose: Nose normal.      Mouth/Throat:      Mouth: Mucous membranes are moist.      Pharynx: Oropharynx is clear. Eyes:      Extraocular Movements: Extraocular movements intact.       Conjunctiva/sclera: Conjunctivae normal.      Pupils: Pupils are equal, round, and reactive to light. Cardiovascular:      Rate and Rhythm: Normal rate and regular rhythm. Pulses: Normal pulses. Heart sounds: Normal heart sounds. Pulmonary:      Effort: Pulmonary effort is normal.      Breath sounds: Normal breath sounds. Abdominal:      General: Abdomen is flat. Bowel sounds are normal.      Palpations: Abdomen is soft. Musculoskeletal:         General: Normal range of motion. Cervical back: Normal range of motion and neck supple. Skin:     General: Skin is warm and dry. Neurological:      General: No focal deficit present. Mental Status: He is alert and oriented to person, place, and time. Psychiatric:         Mood and Affect: Mood normal.         Behavior: Behavior normal.         Judgment: Judgment normal.           ASSESSMENT and PLAN  Diagnoses and all orders for this visit:    1. Proteinuria, unspecified type  Pt's most recent urinalysis showed elevated levels of protein in the urine. After discussing the matter with the pt, I have decided to refer him to Dr. Broadus Harada, Nephrology, for further evaluation and care. -     REFERRAL TO NEPHROLOGY    2. Intractable headache, unspecified chronicity pattern, unspecified headache type  Pt reports a recent ~1.5 month h/o of intermittent transient bilateral L>R lancinating pain in his head at night. The pt reports that the episodes of pain are about a 7-8/10 and last for 5-10 seconds before subsiding. The pt denies that there are any specific precipitating factors Pt had a CT of the head in 07/21 with no cranial abnormalities aside from age-related changes. Pt denies any blurring of vision or watering of the eye. After discussing the matter with the pt, I have requested that he keep a record of his headaches to be reviewed during his next OV. 3. Essential hypertension  BP at office today 128/70. Pt continues with Losartan 25mg daily and Demadex 10mg PRN.  The pt endorses checking his blood pressure at home and states that his readings range between 147-152/75-80 mmHg. 4. Weakness generalized  Pt reports that he feels weak and that is not able to lift as much as he once could. After discussing the matter with the pt, I have referred him for in-home PT for generalized upper extremity weakness. Of note, the pt has lost 12lbs since his last OV on 07/26/2021. He reports that he is not eating a lot and his wife states that he gets full quickly before finishing his food. I have advised him to drink 2 nutrition shake supplements per day in addition to eating two meals a day. -     [address] [facility]    Follow-up and Dispositions    · Return in about 4 weeks (around 9/6/2021) for weight follow up. Medication risks/benefits/costs/interactions/alternatives discussed with patient. Advised patient to call back or return to office if symptoms worsen/change/persist.  If patient cannot reach us or should anything more severe/urgent arise he/she should proceed directly to the nearest emergency department. Discussed expected course/resolution/complications of diagnosis in detail with patient. Patient given a written after visit summary which includes diagnoses, current medications and vitals. Patient expressed understanding with the diagnosis and plan. Written by kellee Denise, as dictated by Shay Christiansen M.D.    1:37 PM - 1:56 PM    Total time spent with the patient 19 minutes, greater than 50% of time spent counseling patient.

## 2021-09-01 NOTE — TELEPHONE ENCOUNTER
Called, spoke to pt. Two pt identifiers confirmed. Patient is refusing to go to urgent Care. He states he is not dizzy, he laid down after PT and took a nap. B/p 121/71 and pulse 56 at this time. Patient states he has PT in AM and he will call and left us know if anything goes wrong. Pt verbalized understanding of information discussed w/ no further questions at this time.

## 2021-09-01 NOTE — TELEPHONE ENCOUNTER
PT told patient they were concerned about their BP at the appointment today.  Numbers are:    Walking 88/49 P: 56  5 min resting 114/55 P: 65  Laying down 127/75 P:66  Sitting (lightheaded) 111/64 P: 47

## 2021-09-13 NOTE — PROGRESS NOTES
No chief complaint on file. 1. Have you been to the ER, urgent care clinic since your last visit? Hospitalized since your last visit? Yes Reason for visit: PT fell    2. Have you seen or consulted any other health care providers outside of the [address] [place] [address] since your last visit? Include any pap smears or colon screening.  No

## 2021-09-13 NOTE — PROGRESS NOTES
HPI  Brina Gavin 82 y.o. male  presents to the office today for follow-up on fall and proteinuria. Blood pressure 125/60, pulse (!) 57, temperature 98.1 °F (36.7 °C), resp. rate 16, height 5' 8\" (1.727 m), weight 177 lb (80.3 kg), SpO2 97 %. Body mass index is 26.91 kg/m². Chief Complaint   Patient presents with    Follow-up      Pt's UA from 07/27/21 showed proteinuria at 253mg/24hrs. I had referred him to Dr. Lyla Espinoza, Nephrology, for further evaluation but the pt has not yet made this appointment. I encouraged the pt to make an appointment with Dr. Lyla Espinoza in the near future. Of note, pt and his wife report that the pt feels dizzy while exercising in the morning because he takes his antihypertensive medications as soon as he wakes up. He continues on I instructed the pt to wait to take his medications until after he exercises to see if this helps his sxs. If his sxs fail to improve, I informed the pt that he could cut his Losartan 25mg daily in half and take 12.5 mg if he continues to experience dizziness. Pt fell forward on the sidewalk after a syncopal event recently, as he does not remember falling. The pt also reports that he fell the night before this event while in a restaurant bathroom that had a slippery, wet floor. He was seen at the hospital for evaluation following his fall on the sidewalk and received a normal head CT scan to r/o for internal bleeding. He states that he is scheduled for a PT appointment tomorrow on 09/13/21, but he does not plan on attending this appointment due to feeling instable on his feet. During today's OV, he presents with large contusions under his L eye, on the flexor-lateral aspect of his L arm, and on the flexor aspect of his R wrist as evidence of his fall. I encouraged the pt to always use his rollator from now on so that he has a more stable ambulatory aid. He agrees with this course of action.  Of note, pt is also c/o a raised lump on his R elbow that developed after his fall. I informed him that I believe this to be olecranon bursitis. Health maintenance: I advised pt to contact his local pharmacy about receiving his booster vaccination for COVID-19. Current Outpatient Medications   Medication Sig Dispense Refill    pantoprazole (PROTONIX) 40 mg tablet TAKE 1 TABLET BY MOUTH DAILY 90 Tablet 0    atorvastatin (LIPITOR) 40 mg tablet TAKE 1/2 TABLET BY MOUTH DAILY 45 Tablet 0    torsemide (DEMADEX) 10 mg tablet TAKE 1 TABLET BY MOUTH DAILY 90 Tablet 0    aspirin delayed-release 81 mg tablet Take 1 Tablet by mouth daily. 90 Tablet 3    apixaban (ELIQUIS) 2.5 mg tablet Take 1 Tablet by mouth two (2) times a day. 180 Tablet 2    losartan (COZAAR) 25 mg tablet Take 1 Tablet by mouth daily. 90 Tablet 1    albuterol (PROVENTIL HFA, VENTOLIN HFA, PROAIR HFA) 90 mcg/actuation inhaler Take 1-2 Puffs by inhalation every four (4) hours as needed for Wheezing. 1 Inhaler 5    cyanocobalamin (VITAMIN B-12) 1,000 mcg tablet Take 1,000 mcg by mouth daily. Anastasiya Pinto, Outpatient Physical Therapy Evaluate and Treatment (Patient not taking: Reported on 8/25/2021) 1 Each 0    therapeutic multivitamin (THERAGRAN) tablet Take 1 Tab by mouth daily. (Patient not taking: Reported on 8/25/2021)       No Known Allergies  Past Medical History:   Diagnosis Date    CAD (coronary artery disease) 05/2019    S/p PCI May 2019 [facility]     NUKE 8/7/2020 =Lexiscan pharmacologic stress test shows normal perfusion , noted apical thinning with an EF of 47  %.      Diverticulosis, sigmoid 8/2011    ED (erectile dysfunction) of organic origin     Hypertension     LVH (left ventricular hypertrophy) due to hypertensive disease     PAF (paroxysmal atrial fibrillation) ([address] 11/18/2019    [facility] admit nov 2019 no OAC due to anemia    Postherpetic neuralgia     Prostate cancer ([facility] [address]     Sebaceous cyst 4/1/2014    Shingles 02/2019     Past Surgical History:   Procedure Laterality Date    ENDOSCOPY, COLON, DIAGNOSTIC  >= 8-10years ago   [facility] SURGERY  [address] [id number]    HX CARPAL TUNNEL RELEASE  4/08    right,left    HX CHOLECYSTECTOMY      HX HEART CATHETERIZATION  05/2019    HX HERNIA REPAIR      HX PROSTATECTOMY  5/06     Family History   Problem Relation Age of Onset    Cancer Mother         pancreatic    Diabetes Father     Stroke Father     Diabetes Sister     Hypertension Sister     Other Sister         Open heart surgery     Diabetes Brother     Hypertension Brother     Hypertension Brother     Asthma Daughter     Heart Attack Daughter     Other Daughter         hip replacement x 2     Social History     Tobacco Use    Smoking status: Never Smoker    Smokeless tobacco: Never Used   Substance Use Topics    Alcohol use: No        Review of Systems   Constitutional: Negative for chills and fever. HENT: Negative for hearing loss and tinnitus. Eyes: Negative for blurred vision and double vision. Respiratory: Negative for cough and shortness of breath. Cardiovascular: Negative for chest pain and palpitations. Gastrointestinal: Negative for nausea and vomiting. Genitourinary: Negative for dysuria and frequency. Musculoskeletal: Negative for back pain and falls. Skin: Negative for itching and rash. Large contusions on R arm, under L eye, and on R wrist   Neurological: Negative for dizziness, loss of consciousness and headaches. Endo/Heme/Allergies: Negative. Psychiatric/Behavioral: Negative for depression. The patient is not nervous/anxious. Physical Exam  Vitals reviewed. Constitutional:       Appearance: Normal appearance. HENT:      Head: Normocephalic and atraumatic. Right Ear: Tympanic membrane, ear canal and external ear normal.      Left Ear: Tympanic membrane, ear canal and external ear normal.      Nose: Nose normal.      Mouth/Throat:      Mouth: Mucous membranes are moist.      Pharynx: Oropharynx is clear.    Eyes: Extraocular Movements: Extraocular movements intact. Conjunctiva/sclera: Conjunctivae normal.      Pupils: Pupils are equal, round, and reactive to light. Cardiovascular:      Rate and Rhythm: Normal rate and regular rhythm. Pulses: Normal pulses. Heart sounds: Normal heart sounds. Pulmonary:      Effort: Pulmonary effort is normal.      Breath sounds: Normal breath sounds. Abdominal:      General: Abdomen is flat. Bowel sounds are normal.      Palpations: Abdomen is soft. Musculoskeletal:         General: Normal range of motion. Cervical back: Normal range of motion and neck supple. Skin:     General: Skin is warm and dry. Neurological:      General: No focal deficit present. Mental Status: He is alert and oriented to person, place, and time. Psychiatric:         Mood and Affect: Mood normal.         Behavior: Behavior normal.         Judgment: Judgment normal.           ASSESSMENT and PLAN  Diagnoses and all orders for this visit:    1. Proteinuria, unspecified type  Pt's UA from 07/27/21 showed proteinuria at 253mg/24hrs. I had referred him to Dr. Ayo Lyon, Nephrology, for further evaluation but the pt has not yet made this appointment. I encouraged the pt to make an appointment with Dr. Ayo Lyon in the near future. Of note, pt and his wife report that the pt feels dizzy while exercising in the morning because he takes his antihypertensive medications as soon as he wakes up. He continues on I instructed the pt to wait to take his medications until after he exercises to see if this helps his sxs. If his sxs fail to improve, I informed the pt that he could cut his Losartan 25mg daily in half and take 12.5 mg if he continues to experience dizziness. 2. Dizziness  Pt fell forward on the sidewalk after a syncopal event recently, as he does not remember falling.  The pt also reports that he fell the night before this event while in a restaurant bathroom that had a slippery, wet floor. He was seen at the hospital for evaluation following his fall on the sidewalk and received a normal head CT scan to r/o for internal bleeding. He states that he is scheduled for a PT appointment tomorrow on 09/13/21, but he does not plan on attending this appointment due to feeling instable on his feet. During today's OV, he presents with large contusions under his L eye, on the flexor-lateral aspect of his L arm, and on the flexor aspect of his R wrist as evidence of his fall. I encouraged the pt to always use his rollator from now on so that he has a more stable ambulatory aid. He agrees with this course of action. Of note, pt is also c/o a raised lump on his R elbow that developed after his fall. I informed him that I believe this to be olecranon bursitis. Follow-up and Dispositions    · Return in about 3 months (around 12/13/2021) for follow up. Medication risks/benefits/costs/interactions/alternatives discussed with patient. Advised patient to call back or return to office if symptoms worsen/change/persist.  If patient cannot reach us or should anything more severe/urgent arise he/she should proceed directly to the nearest emergency department. Discussed expected course/resolution/complications of diagnosis in detail with patient. Patient given a written after visit summary which includes diagnoses, current medications and vitals. Patient expressed understanding with the diagnosis and plan. Written by kellee Aguila, as dictated by Earnesteen Halsted, M.D. Total time spent with the patient 30 minutes, greater than 50% of time spent counseling patient.

## 2021-09-23 NOTE — PROGRESS NOTES
[facility]  [address] [facility] [address] [facility], [address] [place], [address]  W: [phone]  F: [phone]    Reason for Visit:   Loyda Ventura is a 82 y.o. male who is seen on 9/23/2021 for follow up of anemia. History of Present Illness:   Patient is a 82 y.o. male with CAD and h/o prostate cancer who is seen for follow up of anemia     He has progressive anemia since 5/2019 with Hb having dropped from 12 g/dl to 9 g/dl by 12/2019. He does have a h/o iron deficiency with iron studies on 12/13/2019 showing a TSAT of 8% though ferritin was 53. When rechecked 1/2/2020 he was iron replete but anemia had barely improved to 9.5 g/dl. He was admitted around 11/2019 at [facility] with SOB and anemia. He was seen by Dr. Ethan Nailer Dr. Senora Epley. EGD 11/2019 had shown some esophagitis. Colonoscopy was not considered necessary. He also was diagnosed with Myasthenia gravis. He had plasma exchange. He was then placed on prednisone. He was readmitted with SOB in Dec 2019 thought to be due to prednisone and was taken off this. He was seen by Neurology and was then told that he has no myasthenia. He also had a normal SPEP at that time and a normal MMA. His work up was most suggestive of iron deficiency. He received Injectafer x 2 in Jan 2020 and again 10/2020. He had GIB in 6/2021. Reviewed njectafer x 2 in July 2021. He comes with labs  He has had falls. He is tired, has no bleeding he thinks, he still has dark stools. No pica. He had a prostatectomy 2006    He has never smoked  Mother had pancreatic cancer  Sister had Lymphoma  Daughter had lung cancer    Past Medical History:   Diagnosis Date    CAD (coronary artery disease) 05/2019    S/p PCI May 2019 [facility]     NUKE 8/7/2020 =Lexiscan pharmacologic stress test shows normal perfusion , noted apical thinning with an EF of 47  %.      Diverticulosis, sigmoid 8/2011    ED (erectile dysfunction) of organic origin     Hypertension     LVH (left ventricular hypertrophy) due to hypertensive disease     PAF (paroxysmal atrial fibrillation) ([facility] 11/18/2019    [address] admit nov 2019 no OAC due to anemia    Postherpetic neuralgia     Prostate cancer ([facility]     Sebaceous cyst 4/1/2014    Shingles 02/2019      Past Surgical History:   Procedure Laterality Date    ENDOSCOPY, COLON, DIAGNOSTIC  >= 8-10years ago     [facility] [id number]    HX CARPAL TUNNEL RELEASE  4/08    right,left    HX CHOLECYSTECTOMY      HX HEART CATHETERIZATION  05/2019    HX HERNIA REPAIR      HX PROSTATECTOMY  5/06      Social History     Tobacco Use    Smoking status: Never Smoker    Smokeless tobacco: Never Used   Substance Use Topics    Alcohol use: No      Family History   Problem Relation Age of Onset    Cancer Mother         pancreatic    Diabetes Father     Stroke Father     Diabetes Sister     Hypertension Sister     Other Sister         Open heart surgery     Diabetes Brother     Hypertension Brother     Hypertension Brother     Asthma Daughter     Heart Attack Daughter     Other Daughter         hip replacement x 2     Current Outpatient Medications   Medication Sig    pantoprazole (PROTONIX) 40 mg tablet TAKE 1 TABLET BY MOUTH DAILY    atorvastatin (LIPITOR) 40 mg tablet TAKE 1/2 TABLET BY MOUTH DAILY    torsemide (DEMADEX) 10 mg tablet TAKE 1 TABLET BY MOUTH DAILY    aspirin delayed-release 81 mg tablet Take 1 Tablet by mouth daily.  apixaban (ELIQUIS) 2.5 mg tablet Take 1 Tablet by mouth two (2) times a day.  losartan (COZAAR) 25 mg tablet Take 1 Tablet by mouth daily.  albuterol (PROVENTIL HFA, VENTOLIN HFA, PROAIR HFA) 90 mcg/actuation inhaler Take 1-2 Puffs by inhalation every four (4) hours as needed for Wheezing.  cyanocobalamin (VITAMIN B-12) 1,000 mcg tablet Take 1,000 mcg by mouth daily.    Jessee Draper, Outpatient Physical Therapy Evaluate and Treatment (Patient not taking: Reported on 8/25/2021)    therapeutic multivitamin (THERAGRAN) tablet Take 1 Tab by mouth daily. (Patient not taking: Reported on 8/25/2021)     No current facility-administered medications for this visit. No Known Allergies     Review of Systems: A complete review of systems was obtained, negative except as described above. Physical Exam:   Vitals reviewed    Constitutional: Appears well-developed and well-nourished in no apparent distress   Mental status: Alert and awake, Oriented to person/place/time, Able to follow commands  Eyes: EOM normal, Sclera normal, No visible ocular discharge  HENT: Normocephalic, atraumatic; Mouth/Throat: Moist mucous membranes, External Ears normal  Skin: No significant exanthematous lesions or discoloration noted on facial skin  Psychiatric: Normal affect, normal judgment/insight. No hallucinations       Results:     Lab Results   Component Value Date/Time    WBC 5.8 09/20/2021 03:08 PM    HGB 8.1 (L) 09/20/2021 03:08 PM    HCT 27.6 (L) 09/20/2021 03:08 PM    PLATELET 797 62/33/4015 03:08 PM    MCV 93 09/20/2021 03:08 PM    ABS. NEUTROPHILS 4.2 09/20/2021 03:08 PM     Lab Results   Component Value Date/Time    Sodium 144 07/12/2021 01:14 PM    Potassium 3.8 07/12/2021 01:14 PM    Chloride 109 (H) 07/12/2021 01:14 PM    CO2 32 07/12/2021 01:14 PM    Glucose 104 (H) 07/12/2021 01:14 PM    BUN 28 (H) 07/12/2021 01:14 PM    Creatinine 1.30 07/12/2021 01:14 PM    GFR est AA >60 07/12/2021 01:14 PM    GFR est non-AA 53 (L) 07/12/2021 01:14 PM    Calcium 8.7 07/12/2021 01:14 PM    Glucose (POC) 99 04/21/2021 11:24 AM     Lab Results   Component Value Date/Time    Bilirubin, total 0.9 07/12/2021 01:14 PM    ALT (SGPT) 43 07/12/2021 01:14 PM    Alk.  phosphatase 87 07/12/2021 01:14 PM    Protein, total 6.9 07/12/2021 01:14 PM    Albumin 3.4 (L) 07/12/2021 01:14 PM    Globulin 3.5 07/12/2021 01:14 PM       Lab Results   Component Value Date/Time    Iron % saturation 5 (LL) 09/20/2021 03:08 PM    TIBC 311 09/20/2021 03:08 PM Ferritin 36 09/20/2021 03:08 PM    Vitamin B12 238 04/27/2018 03:25 PM    Folate 14.8 04/27/2018 03:25 PM    Haptoglobin 167 05/21/2020 09:05 AM     05/21/2020 09:05 AM    Sed rate (ESR) 20 05/24/2019 12:50 PM    C-Reactive Protein, Qt 0.6 12/12/2018 05:04 PM    C-Reactive Protein, Cardiac 10.91 (H) 05/24/2019 12:50 PM    TSH 3.59 05/24/2021 11:00 AM     No results found for: INR, APTT, DDIMSQ, DDIME, [id number], Gurjit Jakob, FDPLT, Selestine Midget, [address] [facility], [facility], [address], [id number], [id number], [id number], [id number], [id number], [id number], Nicolas Carry    Records reviewed and summarized above. Pathology report(s) reviewed above. Radiology report(s) reviewed above. Assessment:   1) Anemia-    He did have evidence of iron deficiency when checked 12/14/2019. It does appear to have had a negative SPEP , normal MMA and B12 levels and no evidence of hemolysis on work up at [facility]. Received Injecatfer x 2 in Jan 2020 with Hb having improved as of 5/21/2020 to 12.2 g/dl. Iron stores normalized but anemia recurred by 9/2020  EGD 9/2020 showed some atrophic gastritis but no clear bleeding    Labs from 3/2021 shows stable anemia and borderline stores & he was given 1 additional dose of IV iron   Labs from 6/2021 show worsening anemia and low iron stores likely secondary to #2. Will replete as below     2) Bleeding gastric ulcers  Were sealed 3 weeks ago [facility]  Has follow-up EGD in 1 month     3) H/O Prostate cancer  Prostatectomy in 2006    4) CAD      5) MG  This is not a certain diagnosis  He follows with Dr. Chester Qureshi    6) H/O Stroke   He is on Eliquis since 7/2021 since 2.5 mg BID  Per Dr. Leti Loving.     Plan:     · B12 - 1000 mcg daily  · FA daily  · Follow with GI   · Injectafer x 2  · Follow with Dr. Leti Loving to discuss Eliquis  · RTC 12 weeks with cbc, iron profile, ferritin    All questions answered and results were reviewed    I appreciate the opportunity to participate in   Aline Stef Hammond's care.      Signed By: Vamshi Grimaldo MD

## 2021-09-23 NOTE — PROGRESS NOTES
Chief Complaint   Patient presents with    Follow-up    Anemia     iron deficiency    Results     lab work     Visit Vitals  BP (!) 143/79 (BP 1 Location: Right upper arm, BP Patient Position: Sitting)   Pulse 63   Resp 12   Ht 5' 8\" (1.727 m)   Wt 176 lb (79.8 kg)   SpO2 94%   BMI 26.76 kg/m²

## 2021-09-23 NOTE — Clinical Note
Chris Griffin  I think this patient continues to have recurrent GI bleeding  I know you have him on Eliquis but wondering if that may have to be reconsidered

## 2021-09-24 NOTE — PROGRESS NOTES
Lab Results   Component Value Date/Time    HGB 8.1 (L) 09/20/2021 03:08 PM      I understand, its a tough one since he also had a stroke that may have been embolic from his Atrial fibrillation   We had him see Dr Amber Wilson for a Watchman to close the EVE and thus allow go off [address], they I think were not yet sure  I would say he can stop the [address] for 2 weeks and in meantime I will ask my nurse to reach out to them to see where they stand on the [employer], let them know to stop the [address] due to low Hgb for 2 weeks, then check Hgb and have it sent to Erica Hanley and Castro Ricci,  and ask if they want to see me after the labs or proceed with the Watchman evaluation or both!     Future Appointments   Date Time Provider Samantha Ley   9/28/2021  1:00 PM H2 ISABELLA FASTRACK [facility]   10/5/2021 11:30 AM H1 ISABELLA FASTRACK [facility]   12/28/2021 10:15 AM Tressa Dietrich MD PAFP BS AMB   1/4/2022 10:30 AM Ivet De Los Santos  [address] BS AMB   1/7/2022  1:20 PM Gaby Aranda MD CAVREY BS AMB

## 2021-09-28 NOTE — PROGRESS NOTES
Sung Hammond     1935       Gaby Galarza MD, [facility]  Date of Visit-9/28/2021   PCP is Lyle Aden MD   [facility] [place]  [facility]  HPI:  Sury Jones is a 82 y.o. male   3 month f/u. · of CAD- JOSEFINA-Cath done 5/30/19. OM1 with 70% blockage had JOSEFINA  · CT scan on 10/8/19 which  Showed low lung volumes, diaphragm on the left was elevated with atelectasis on the left with mucus plugging.   · hospitalized at [facility] 11/12/2019 through 11/21/2019, Atrial fibrillation with RVR possible MG, underwent plasmapheresis/ steroids. Then  became markedly edemetous and I admitted him on 12/12/19. He received IV diuretics and was discharged on 12/16/19. Pulmonary follow up  for DAYO. His Prednisone was decreased significantly. He stopped Mestinon. · Echo 11-18-19 EF Definity for atrial fibrillation Moderate to severe LVH EF 60-65%MAC, mild MRMod SERGIO PASP 45  · Echo 12-13-19 EF 50-55% severe LVH, mod LAE, mod MR, TR  · HTN, XOL, anemia Hgb 8.7 11-20-19, saw Dr. Marisela Landeros in [place] f/u of anemia. · hospitalized 4/19-4/24/21 to [facility]  with SOB and dizziness. 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 Eliquis was added to his ASA. He had acute diastolic CHF on admit and received diuresis. His peak troponin was 0.09.  · Pt was admitted to [facility] 6-4-7-2021 with severe anemia and AF. He was anemic with a HGB of 7. Patient was seen on 7/2 in the office. We stopped losartan and had him start torsemide. He was recovering from a stroke. He was previously stopped on Eliquis due to GI bleeding. He had a visit with Dr. Brian Alonzo on 7/13 to discuss possible Watchman. Patient saw Dr. Brian Alonzo on f/u on 8/25. CT scan showed suitable for Watchman closure. He has seen Dr. Marisela Landeros for his anemia. His hgb dropped to 8 on 9/20. We told him to stop the [address]  Dr. Brian Alonzo suggests his ulcer should have 8 weeks of healing and then we start short term oral anticoagulation, do a Watchman, 6 weeks of anticoag, 4 weeks DAPT. This would put the timing of Watchman around November. Today, the patient reports that he had a bad fall a couple weeks ago. He states that the only thing he remembers from the fall is getting out ofthe car and the next thing he remembers is seeing the ambulance come up. Patient was seen at [facility] ED on 7/12 due to the fall. CT head showed no acute abnormalities. He notes a lack of energy in the past few months. He feels wobbly and lightheaded. Patient reports a few episodes of sharp CP, but nothing major. He is scheduled for iron infusions in the near future. Denies edema, syncope or shortness of breath at rest, has no tachycardia, palpitations or sense of arrhythmia. EKG: Possible atrial fibrillation   -Left axis -anterior fascicular block. Low voltage -possible pulmonary disease. Assessment/Plan:    Patient Instructions   Please stop Eliquis and Asprin. Increase Protonix to twice a day. We will see you back in 3 weeks. 1. Atrial fibrillation, unspecified type ([address]  Prior stoke, has been on Eliquis. Even on the lower dose has been significantly anemic and has had iron infusions. Discussed with Dr. Deana Villela. We've stopped his Eliquis and will stop his ASA. Will consider restating ASA in a few weeks. He has met with Dr. Dallas Pittman twice. He favors watchman and BID PPI. We will se him back in 2 weeks. Be off of Eliquis and ASA and changed Protonix to BID. Pt and family are taking in the information, but I don't think have made a final decision. Pt is very upset about his loss of lifestyle and worried about risk of stroke. - AMB POC EKG ROUTINE W/ 12 LEADS, INTER & REP  - AMB POC EKG ROUTINE W/ 12 LEADS, INTER & REP    2. Coronary artery disease involving native coronary artery of native heart without angina pectoris  JOSEFINA OM1 May 2019  Pain-free    3.  Essential hypertension  At goal , meds and possible side effects reviewed and patient denies  Key CAD CHF Meds             atorvastatin (LIPITOR) 40 mg tablet (Taking) TAKE 1/2 TABLET BY MOUTH DAILY    torsemide (DEMADEX) 10 mg tablet (Taking) TAKE 1 TABLET BY MOUTH DAILY    losartan (COZAAR) 25 mg tablet (Taking) Take 1 Tablet by mouth daily. BP Readings from Last 6 Encounters:   09/28/21 138/85   09/28/21 120/60   09/23/21 (!) 143/79   09/13/21 125/60   08/25/21 130/60   08/09/21 128/70        4. Diastolic dysfunction  Compensated Demadex  Exacerbated by anemia and A. fib  04/19/21    ECHO ADULT COMPLETE 04/23/2021 4/23/2021    Interpretation Summary  · Saline contrast was given to evaluate for intracardiac shunt. No shunt seen. · LV: Estimated LVEF is 55 - 60%. Normal cavity size, wall thickness and systolic function (ejection fraction normal). Wall motion: normal.    Signed by: Vineet Garcia MD on 4/23/2021  4:28 PM      5. Co-morbids  Elevated hemidiaphragm  Neuro with MG work up  Pedal edema--Improved   GI bleeding  GI bleeding with recent Dula Veronica lesion noted hopefully this will resolve his anemia over time and not be a permanent issue with the lack       Follow up in 2 weeks. Impression:   1. Atrial fibrillation, unspecified type ([address]    2. Coronary artery disease involving native coronary artery of native heart without angina pectoris    3. Essential hypertension    4. Diastolic dysfunction    5. Elevated hemidiaphragm    6. Pedal edema       Cardiac History:   [facility] Admit November 2019  --New onset Afib , rate controlled, w/up for MG, plasmapharesis done, no OAC due to risk with low plts and anemia when seen by Cards at [facility] Hgb was 8.5  CAD- JOSEFINA-Cath done 5/30/19.  OM1 with 70% blockage had JOSEFINA  CT scan on 10/8/19 which  Showed low lung volumes, diaphragm on the left was elevated with atelectasis on the left with mucus plugging.   hospitalized at [facility] 11/12/2019 through 11/21/2019,Atrial fibrillation with RVR possible MG, underwent plasmapheresis/ steroids. Then  became markedly edemetous and I admitted him on 12/12/19. Echo 11-18-19 EF Definity for atrial fibrillation Moderate to severe LVH EF 60-65%MAC, mild MRMod SERGIO PASP 45  Echo 12-13-19 EF 50-55% severe LVH, mod LAE, mod MR, TR  HTN, XOL, anemia Hgb 8.7 [id number] HDH 11-18-19 Definity EF 60-65% mod severe LVH, mild MR, MAC, LAE, mod OUMAR PASP 45    CAD   S/p PCI May 2019 [facility]  Lexiscan 8-7-2020 no reversible ischemia, EF 47%, apical thinning (LVH). ROS-except as noted above. . A complete cardiac and respiratory are reviewed and negative except as above ; Resp-denies wheezing  or productive cough,. Const- No unusual weight loss or fever; Neuro-no recent seizure or CVA ; GI- No BRBPR, abdom pain, bloating ; - no  hematuria   see supplement sheet, initialed and to be scanned by staff  Past Medical History:   Diagnosis Date    CAD (coronary artery disease) 05/2019    S/p PCI May 2019 [facility]     NUKE 8/7/2020 =Lexiscan pharmacologic stress test shows normal perfusion , noted apical thinning with an EF of 47  %.  Diverticulosis, sigmoid 8/2011    ED (erectile dysfunction) of organic origin     Hypertension     LVH (left ventricular hypertrophy) due to hypertensive disease     PAF (paroxysmal atrial fibrillation) ([address] 11/18/2019    [facility] admit nov 2019 no OAC due to anemia    Postherpetic neuralgia     Prostate cancer ([address]     Sebaceous cyst 4/1/2014    Shingles 02/2019      Social Hx= reports that he has never smoked. He has never used smokeless tobacco. He reports that he does not drink alcohol and does not use drugs. Exam and Labs:  /60 (BP 1 Location: Left upper arm, BP Patient Position: Sitting, BP Cuff Size: Adult)   Pulse 62   Resp 16   Ht 5' 8\" (1.727 m)   Wt 179 lb 9.6 oz (81.5 kg)   SpO2 99%   BMI 27.31 kg/m² Constitutional:  NAD, comfortable  Head: NC,AT. Eyes: No scleral icterus. Neck:  Neck supple. No JVD present. Throat: moist mucous membranes. Chest: Effort normal & normal respiratory excursion . Neurological: alert, conversant and oriented . Skin: Skin is not cold. No obvious systemic rash noted. Not diaphoretic. No erythema. Psychiatric:  Grossly normal mood and affect. Behavior appears normal. Extremities:  no clubbing or cyanosis. Abdomen: non distended    Lungs:breath sounds normal. No stridor. distress, wheezes or  Rales. Heart: 1/6 systolic murmur,  normal rate, regular rhythm, normal S1, S2, no rubs, clicks or gallops , PMI non displaced. Edema: Edema is none. Lab Results   Component Value Date/Time    Cholesterol, total 104 05/24/2021 11:00 AM    HDL Cholesterol 51 05/24/2021 11:00 AM    LDL, calculated 43.6 05/24/2021 11:00 AM    Triglyceride 47 05/24/2021 11:00 AM    CHOL/HDL Ratio 2.0 05/24/2021 11:00 AM     Lab Results   Component Value Date/Time    Sodium 144 07/12/2021 01:14 PM    Potassium 3.8 07/12/2021 01:14 PM    Chloride 109 (H) 07/12/2021 01:14 PM    CO2 32 07/12/2021 01:14 PM    Anion gap 3 (L) 07/12/2021 01:14 PM    Glucose 104 (H) 07/12/2021 01:14 PM    BUN 28 (H) 07/12/2021 01:14 PM    Creatinine 1.30 07/12/2021 01:14 PM    BUN/Creatinine ratio 22 (H) 07/12/2021 01:14 PM    GFR est AA >60 07/12/2021 01:14 PM    GFR est non-AA 53 (L) 07/12/2021 01:14 PM    Calcium 8.7 07/12/2021 01:14 PM      Wt Readings from Last 3 Encounters:   09/28/21 179 lb 9.6 oz (81.5 kg)   09/23/21 176 lb (79.8 kg)   09/13/21 177 lb (80.3 kg)      BP Readings from Last 3 Encounters:   09/28/21 120/60   09/23/21 (!) 143/79   09/13/21 125/60      Current Outpatient Medications   Medication Sig    diclofenac (VOLTAREN) 1 % gel     pantoprazole (PROTONIX) 40 mg tablet TAKE 1 TABLET BY MOUTH DAILY    atorvastatin (LIPITOR) 40 mg tablet TAKE 1/2 TABLET BY MOUTH DAILY    torsemide (DEMADEX) 10 mg tablet TAKE 1 TABLET BY MOUTH DAILY    losartan (COZAAR) 25 mg tablet Take 1 Tablet by mouth daily.     albuterol (PROVENTIL HFA, VENTOLIN HFA, PROAIR HFA) 90 mcg/actuation inhaler Take 1-2 Puffs by inhalation every four (4) hours as needed for Wheezing.  cyanocobalamin (VITAMIN B-12) 1,000 mcg tablet Take 1,000 mcg by mouth daily. Jaqui Poe, Outpatient Physical Therapy Evaluate and Treatment (Patient not taking: Reported on 8/25/2021)    therapeutic multivitamin [facility]) tablet Take 1 Tab by mouth daily. (Patient not taking: Reported on 8/25/2021)     No current facility-administered medications for this visit. Impression see above.       Written by Yong Mayorga, as dictated by Delinda Epley, MD.

## 2021-09-28 NOTE — PROGRESS NOTES
OPIC Short Note                   1300 Pt admit to [facility] for Injectafer (1/2) ambulatory with rollator in stable condition. Assessment completed. No new concerns voiced. PIV established in right forearm with good blood return. PIV connected to NS KVO. Mr. Hammond's vitals were reviewed prior to and after treatment. Patient Vitals for the past 12 hrs:   Temp Pulse BP   09/28/21 1519  74 138/85   09/28/21 1303 97.8 °F (36.6 °C) 74 123/64       Medications Administered     0.9% sodium chloride infusion     Admin Date  09/28/2021 Action  New Bag Dose  25 mL/hr Rate  25 mL/hr Route  IntraVENous Administered By  Felicia Catalan RN          ferric carboxymaltose (INJECTAFER) 750 mg in 0.9% sodium chloride 250 mL, overfill volume 25 mL IVPB     Admin Date  09/28/2021 Action  New Bag Dose  750 mg Rate  870 mL/hr Route  IntraVENous Administered By  Felicia Catalan RN              Mr. Caridad Miller tolerated the infusion, and had no complaints. Line flushed following infusion. PIV removed without difficulty. Arm wrapped with 2x2 and coban. Mr. Caridad Miller was discharged from [place] [address] in stable condition. Patient is aware of appointment next week.      Future Appointments   Date Time Provider Samantha Ley   10/5/2021 11:30 AM [address]   10/19/2021  9:20 AM MD JACOB Llamas BS AMB   12/28/2021 10:15 AM Bharat Dietrich MD PAFP BS AMB   1/4/2022 10:30 AM Sandra De Los Santos  [address] BS AMB   1/7/2022  1:20 PM MD JACOB Llamas BS AMB       Jaycee Mo RN  September 28, 2021  3:41 PM

## 2021-10-05 NOTE — PROGRESS NOTES
[facility] Progress Note    Date: 2021    Name: Bakari Hicks    MRN: [id number]         : 1935        1130: Pt arrived ambulatory to [facility] for Injectafer in stable condition. Assessment completed. No other complaints voiced at this time. Peripheral IV with positive blood return. Normal Saline started at [facility]. Patient denies SOB, fever, cough, general not feeling well. Patient denies recent exposure to someone who has tested positive for COVID-19. Patient denies having contact with anyone who has a pending COVID test.      Patient Vitals for the past 12 hrs:   Temp Pulse Resp BP   10/05/21 1317  62  (!) 154/79   10/05/21 1134 (!) 96.5 °F (35.8 °C) 77 16 (!) 145/76         Medications Administered     ferric carboxymaltose (INJECTAFER) 750 mg in 0.9% sodium chloride 250 mL, overfill volume 25 mL IVPB     Admin Date  10/05/2021 Action  New Bag Dose  750 mg Rate  870 mL/hr Route  IntraVENous Administered By  Jose G Hopson RN                  1330: Tolerated treatment well, no adverse reactions noted. D/Cd from [facility] ambulatory and in no distress.       Future Appointments   Date Time Provider Samantha Ley   10/19/2021  9:20 AM MD JACOB Bailon BS AMB   2021 10:15 AM Kedar Sanchez MD PAFP BS AMB   2022 10:30 AM Rui De Los Santos  [address]  BS AMB   2022  1:20 PM Fam Patel [place] [id number] [address] RN  2021

## 2021-10-15 NOTE — TELEPHONE ENCOUNTER
Pt called and said he fell 2 wks ago and injured his lt eye. Today he  noticed Light green pus coming out of the corner of eye.  Pt advise

## 2021-10-15 NOTE — TELEPHONE ENCOUNTER
TC to Patient. ID verified. LMOVM advised he needs to seek care today for his eye.  is not in the clinic today. Advised may call [employer] [facility] to come out to his home. May go to local Urgent Care. May download and do musiXmatch kirsten and see a provider Immediately.    No available appointments in clinic this week

## 2021-10-19 PROBLEM — K31.82 DIEULAFOY LESION (HEMORRHAGIC) OF STOMACH AND DUODENUM: Status: ACTIVE | Noted: 2021-01-01

## 2021-10-19 PROBLEM — I50.32 DIASTOLIC CHF, CHRONIC (HCC): Status: ACTIVE | Noted: 2021-01-01

## 2021-10-19 NOTE — PATIENT INSTRUCTIONS
Please start baby Asprin 81mg OTC daily. You have been scheduled with Dr. Eddie Wu and we will see you back in 4 months.

## 2021-10-19 NOTE — PROGRESS NOTES
Martin Saab Stephany     1935       Gaby Mtz MD, [facility]  Date of Visit-10/19/2021   PCP is Elvia Rush MD   [facility]  [facility]  HPI:  Steven Niño is a 82 y.o. male   One  month f/u. · CAD- JOSEFINA-Cath done 5/30/19. OM1 with 70% blockage had JOSEFINA  · CT scan on 10/8/19 which  Showed low lung volumes, diaphragm on the left was elevated with atelectasis on the left with mucus plugging.   · hospitalized at [facility] 11/12/2019 through 11/21/2019, Atrial fibrillation with RVR possible MG, underwent plasmapheresis/ steroids. Then  became markedly edemetous and I admitted him on 12/12/19. He received IV diuretics and was discharged on 12/16/19. Pulmonary follow up  for DAYO. His Prednisone was decreased significantly. He stopped Mestinon. · Echo 11-18-19 EF Definity for atrial fibrillation Moderate to severe LVH EF 60-65%MAC, mild MRMod SERGIO PASP 45  · Echo 12-13-19 EF 50-55% severe LVH, mod LAE, mod MR, TR  · HTN, XOL, anemia Hgb 8.7 11-20-19, saw Dr. Edson Shepard in [place] 1610 OR f/u of anemia. · hospitalized 4/19-4/24/21 to [facility]  with SOB and dizziness. 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 Eliquis was added to his ASA. He had acute diastolic CHF on admit and received diuresis. His peak troponin was 0.09.  · Pt was admitted to [facility] 6-4-7-2021 with severe anemia and AF. He was anemic with a HGB of 7. Patient was seen on 7/2/21  in the office. We stopped losartan and had him start torsemide. He was recovering from a stroke. He was previously stopped on Eliquis due to GI bleeding. He had a visit with Dr. Aubree Gonzalez on 7/13/21to discuss possible Watchman. Patient saw Dr. Aubree Gonzalez on f/u on 8/25. CT scan showed suitable for Watchman closure. He has seen Dr. Edson Shepard for his anemia. His hgb dropped to 8 on 9/20. We told him to stop the [address].    Dr. Aubree Gonzalez suggests his ulcer should have 8 weeks of healing and then we start short term oral anticoagulation, do a Watchman, 6 weeks of anticoag, 4 weeks DAPT. This would put the timing of Watchman around November. Today. Madina Longoria Last visit he reported a bad fall after he got out of the car and had gone to [address] [place] [address] ED on 7/12/2021 to the fall with CT of the head showing no acute abnormalities. He had brief chest pain. Recommendation was to stop Eliquis and aspirin increase Protonix to twice daily. Was to undergo iron infusion  Had 2 iron infusions. He had to go to [facility] because of an eye infection with purulent drainage on 10/14/2021. The report about 2 weeks ago he went to  [facility] ER because of hallucinations and fatigue. CT of the head was negative and labs were okay per the report with a hemoglobin of 8.1. He has persistent shortness of breath with exertion dizziness and palpitations. He gets periods of confusion that are mild. Is now on a I antibiotic drops. Assessment/Plan:    Patient Instructions   Please start baby Asprin 81mg OTC daily. You have been scheduled with Dr. Mini Thomas and we will see you back in 4 months. 1. Atrial fibrillation, persistent ([address]  Prior stoke, has been on Eliquis. Even on the lower dose has been significantly anemic and has had iron infusions. Discussed with Dr. Kayla Booker. We've stopped his Eliquis and  ASA. Today we will restart aspirin 81 mg.    I will have him see Dr. Mini Thomas for watchman in about a month or so that should give some time for the hemoglobin to come up and get in the iron infusions. Also continue twice daily PPI    Pt is very upset about his loss of lifestyle and worried about risk of stroke. Start back ASA 81 mg  Rate is controlled    2. Coronary artery disease involving native coronary artery of native heart without angina pectoris  JOSEFINA OM1 May 2019  Pain-free    3.  Essential hypertension  At goal , meds and possible side effects reviewed and patient denies  Key CAD CHF Meds             atorvastatin (LIPITOR) 40 mg tablet (Taking) TAKE 1/2 TABLET BY MOUTH DAILY    torsemide (DEMADEX) 10 mg tablet (Taking) TAKE 1 TABLET BY MOUTH DAILY    losartan (COZAAR) 25 mg tablet (Taking) Take 1 Tablet by mouth daily. BP Readings from Last 6 Encounters:   10/19/21 (!) 130/90   10/05/21 (!) 154/79   09/28/21 138/85   09/28/21 120/60   09/23/21 (!) 143/79   09/13/21 125/60        4. Diastolic dysfunction/LVH HCVD  Compensated Demadex  Exacerbated by anemia and A. Fib  Sock line edema      04/19/21 ECHO ADULT COMPLETE 04/23/2021 4/23/2021  Interpretation Summary  · Saline contrast was given to evaluate for intracardiac shunt. No shunt seen. · LV: Estimated LVEF is 55 - 60%. Normal cavity size, wall thickness and systolic function (ejection fraction normal). Wall motion: normal.      5. Co-morbids  Elevated hemidiaphragm  Neuro with MG work up  Pedal edema--Improved   High risk for falls  GI bleeding  GI bleeding with recent [place] lesion noted hopefully this will resolve his anemia over time and not be a permanent issue with the lack       Impression:   1. Persistent atrial fibrillation ([address]    2. Coronary artery disease involving native coronary artery of native heart without angina pectoris    3. Hypertension, essential    4. Diastolic CHF, chronic ([address]    5. Hypertensive left ventricular hypertrophy with heart failure (HCC)    6. Elevated hemidiaphragm    7. At high risk for falls    8. Gastroesophageal reflux disease without esophagitis    9.  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 OM1 with 70% blockage had JOSEFINA  CT scan on 10/8/19 which  Showed low lung volumes, diaphragm on the left was elevated with atelectasis on the left with mucus plugging.   hospitalized at [email or web address] 11/12/2019 through 11/21/2019,Atrial fibrillation with RVR possible MG, underwent plasmapheresis/ steroids. Then  became markedly edemetous and I admitted him on 12/12/19. Echo 11-18-19 EF Definity for atrial fibrillation Moderate to severe LVH EF 60-65%MAC, mild MRMod SERGIO PASP 45  Echo 12-13-19 EF 50-55% severe LVH, mod LAE, mod MR, TR  HTN, XOL, anemia Hgb 8.7 [id number] HDH 11-18-19 Definity EF 60-65% mod severe LVH, mild MR, MAC, LAE, mod OUMAR PASP 45    CAD   S/p PCI May 2019 [facility]  Lexiscan 8-7-2020 no reversible ischemia, EF 47%, apical thinning (LVH). ROS-except as noted above. . A complete cardiac and respiratory are reviewed and negative except as above ; Resp-denies wheezing  or productive cough,. Const- No unusual weight loss or fever; Neuro-no recent seizure or CVA ; GI- No BRBPR, abdom pain, bloating ; - no  hematuria   see supplement sheet, initialed and to be scanned by staff  Past Medical History:   Diagnosis Date    CAD (coronary artery disease) 05/2019    S/p PCI May 2019 [facility]     NUKE 8/7/2020 =Lexiscan pharmacologic stress test shows normal perfusion , noted apical thinning with an EF of 47  %.  Diverticulosis, sigmoid 8/2011    ED (erectile dysfunction) of organic origin     Hypertension     LVH (left ventricular hypertrophy) due to hypertensive disease     PAF (paroxysmal atrial fibrillation) ([address] 11/18/2019    [facility] admit nov 2019 no OAC due to anemia    Postherpetic neuralgia     Prostate cancer ([address]     Sebaceous cyst 4/1/2014    Shingles 02/2019      Social Hx= reports that he has never smoked.  He has never used smokeless tobacco. He reports that he does not drink alcohol and does not use drugs. Exam and Labs:  BP (!) 130/90   Pulse (!) 48   Resp 16   Ht 5' 8\" (1.727 m)   Wt 180 lb (81.6 kg)   SpO2 96%   BMI 27.37 kg/m² Constitutional:  NAD, comfortable  Head: NC,AT. Eyes: No scleral icterus. Neck:  Neck supple. No JVD present. Throat: moist mucous membranes. Chest: Effort normal & normal respiratory excursion . Neurological: alert, conversant and oriented . Skin: Skin is not cold. No obvious systemic rash noted. Not diaphoretic. No erythema. Psychiatric:  Grossly normal mood and affect. Behavior appears normal. Extremities:  no clubbing or cyanosis. Abdomen: non distended    Lungs:breath sounds normal. No stridor. distress, wheezes or  Rales. Heart: 1/6 systolic murmur,  Irregularly irregular rhythm,  normal S1, S2, no rubs, clicks or gallops , PMI non displaced.     Edema: Edema is sock line  Lab Results   Component Value Date/Time    Cholesterol, total 104 05/24/2021 11:00 AM    HDL Cholesterol 51 05/24/2021 11:00 AM    LDL, calculated 43.6 05/24/2021 11:00 AM    Triglyceride 47 05/24/2021 11:00 AM    CHOL/HDL Ratio 2.0 05/24/2021 11:00 AM     Lab Results   Component Value Date/Time    Sodium 144 07/12/2021 01:14 PM    Potassium 3.8 07/12/2021 01:14 PM    Chloride 109 (H) 07/12/2021 01:14 PM    CO2 32 07/12/2021 01:14 PM    Anion gap 3 (L) 07/12/2021 01:14 PM    Glucose 104 (H) 07/12/2021 01:14 PM    BUN 28 (H) 07/12/2021 01:14 PM    Creatinine 1.30 07/12/2021 01:14 PM    BUN/Creatinine ratio 22 (H) 07/12/2021 01:14 PM    GFR est AA >60 07/12/2021 01:14 PM    GFR est non-AA 53 (L) 07/12/2021 01:14 PM    Calcium 8.7 07/12/2021 01:14 PM      Wt Readings from Last 3 Encounters:   10/19/21 180 lb (81.6 kg)   09/28/21 179 lb 9.6 oz (81.5 kg)   09/23/21 176 lb (79.8 kg)      BP Readings from Last 3 Encounters:   10/19/21 (!) 130/90   10/05/21 (!) 154/79   09/28/21 138/85      Current Outpatient Medications   Medication Sig    ofloxacin (FLOXIN) 0.3 % ophthalmic solution     diclofenac (VOLTAREN) 1 % gel     pantoprazole (PROTONIX) 40 mg tablet Take 1 Tablet by mouth two (2) times a day.  atorvastatin (LIPITOR) 40 mg tablet TAKE 1/2 TABLET BY MOUTH DAILY    torsemide (DEMADEX) 10 mg tablet TAKE 1 TABLET BY MOUTH DAILY    losartan (COZAAR) 25 mg tablet Take 1 Tablet by mouth daily.  therapeutic multivitamin (THERAGRAN) tablet Take 1 Tablet by mouth daily.  albuterol (PROVENTIL HFA, VENTOLIN HFA, PROAIR HFA) 90 mcg/actuation inhaler Take 1-2 Puffs by inhalation every four (4) hours as needed for Wheezing.  cyanocobalamin (VITAMIN B-12) 1,000 mcg tablet Take 1,000 mcg by mouth daily. Roger Gibson, Outpatient Physical Therapy Evaluate and Treatment (Patient not taking: Reported on 8/25/2021)     No current facility-administered medications for this visit. Impression see above.

## 2021-11-05 NOTE — TELEPHONE ENCOUNTER
Called patient. Two patient indentifiers verified. Pt is scheduled for watchman on 12/21 @ 10:45 am. Pt was given instructions over the phone. Pt scheduled for H & P. Pt verbalized understanding.

## 2021-11-05 NOTE — LETTER
11/5/2021 2:58 PM 
 
Mr. Bella Guillory [address] [facility] [address] [facility] address: 
Migue Munoz, Keena Pickett Procedure:Watchman Your procedure is scheduled for 12/21/21. You need to arrive about 2 hours prior to procedure, so please arrive by 8:45 am to [address] will proceed to the main entrance of the hospital where you will be screened and checked in. Bring your insurance information and list of current medications. You may not have anything to eat or drink after midnight, except for sips of water to take medications. Medication instructions: Take all medications with sips of water COVID testing 3-4 day prior to procedure. [facility]: 
[facility] location: Patient discharge area at the [facility]: [address] [id number] Hours: Monday-Friday 7 am to 3 pm 
 
*Have lab work completed no more then 30 days prior to the procedure but at least 2-3 days prior to the procedure. *You will need someone to drive you home after the procedure. Plan to be at [facility] a total of 6-8 hours. *Arrange for a responsible adult to help you at home for at least 24 hours, 
*Wear comfortable clothing. Leave jewelry, money, and other valuables at home. You may wear dentures, eyeglasses, and/or hearing aids. *Bring an overnight bag with you (just incase you need to spend the night.) Post Procedure Instructions: 
*No driving for 24 hours post procedure. *No heavy lifting (over 10 lbs) or strenuous activity for 48 hours. *No tub baths, swimming, hot tubs, or spas for 1 week. The band aid over cath site may be removed the day after procedure and site washed gently with soap and water. *The site may appear bruised/ discolored for a couple of weeks. A small knot may be present. You may experience tenderness or soreness in groin area. This may be relieved with the use of Tylenol.  
*If there is any visible blood at the site, hold pressure for 20 minutes. *Call the office if you should notice numbness, tingling, coldness, or loss of feeling in the area. Call the office if you have a fever within 2-3 days after procedure. Remember, when you begin lifting things, use proper body mechanics and bend with your knees, centering the weight on your legs. Please call with any questions: [phone]. You may also contact the cath lab directly at [phone] Sincerely, 
 
 
Cecy Blum MD

## 2021-11-09 NOTE — TELEPHONE ENCOUNTER
Patient's wife called and her  would like to know if they can reschedule the watchmen procedure that is schedule for 12/21/21. For earlier in December or earlier in January because that week is not a good time.     Phone [phone]

## 2021-11-10 NOTE — TELEPHONE ENCOUNTER
I think his wife is confused with my last OV conversation with her that 12 is ideal and 8 is a safer Hgb in general , but not speciifc to the procedure  I am not aware of any threshold that we use for Watchman, obviously more stable patient the better  Since Dr Aubree Gonzalez is the one who does the procedure , I will have him weigh in on the matter, thanks

## 2021-11-10 NOTE — TELEPHONE ENCOUNTER
Pt wife Brock Avila stated pt is still very weak and has an upcoming procedure. For the procedure they want his hemoglobin at a 12 but its only at an 8.     Cb# [phone]

## 2021-11-11 NOTE — TELEPHONE ENCOUNTER
Returned call to patient and his wife. They stated his son would like to be here for the procedure and he will be out of town for the 12/21 and the 12/28 dates that we have for watchman. Will call patient with other dates once I have them. Pt and his wife verbalized understanding and denies any further questions at this time.

## 2021-11-15 NOTE — TELEPHONE ENCOUNTER
Called patient to offer 12/16 for watchman procedure. Pt wife stated pt is currently in the hospital. He is not doing well. He took a fall and broke several ribs and is very confused per the wife. No

## 2021-12-06 NOTE — TELEPHONE ENCOUNTER
Returned call to patient daughter. Two patient indentifiers verified. Milagros Kay is on HIPPA form. She stated that patient was admitted to [address] [place] [address] for a stroke. Pt daughter stated they do not want patient to have a procedure at this time. Virtual visit was canceled with Dr. Evelyn Nicholson and patient will follow up with Dr. Jennifer Yin in Jan. Pt daughter verbalized understanding and denies any further questions at this time.      Future Appointments   Date Time Provider Samantha Ley   12/28/2021 10:15 AM MD MARIUSZ Barragan BS AMB   1/4/2022 10:30 AM Gita De Los Santos  [facility] BS AMB   1/7/2022  1:20 PM Gaby Aranda MD CAVREY BS AMB   2/21/2022  1:40 PM Gaby Aranda MD CAVREY BS AMB

## 2021-12-06 NOTE — TELEPHONE ENCOUNTER
Patient's daughter is calling because her father had a stroke and she is wondering if he needs this upcoming procedure. She would like to know if the watchmen procedure is necessary since he has had a stroke.     [phone]

## 2021-12-07 NOTE — TELEPHONE ENCOUNTER
Chelly Fowler [facility]) [phone]     Pt wife stated the pt is on Hoscpice now after another stroke. Pt is prescribed by the hospital gabapentin 100mg and they would like a refill by the PCP. Pt was prescribed to take two pills every night.  Please refill the medication and wife would like a call back

## 2021-12-11 NOTE — TELEPHONE ENCOUNTER
Spoke to patient's wife today she requested the patient that have a refill of gabapentin he is currently taking 1 tablet at night and I asked her if he stopped prior to that.   She states that 1 will suffice and it has been sent to her [address] for refill

## 2022-01-01 ENCOUNTER — TELEPHONE (OUTPATIENT)
Dept: CARDIOLOGY CLINIC | Age: 87
End: 2022-01-01

## 2022-01-01 ENCOUNTER — TELEPHONE (OUTPATIENT)
Dept: FAMILY MEDICINE CLINIC | Age: 87
End: 2022-01-01

## 2022-01-01 ENCOUNTER — TELEPHONE (OUTPATIENT)
Dept: ONCOLOGY | Age: 87
End: 2022-01-01

## 2022-01-01 DIAGNOSIS — I10 ESSENTIAL HYPERTENSION WITH GOAL BLOOD PRESSURE LESS THAN 140/90: ICD-10-CM

## 2022-01-01 DIAGNOSIS — R60.9 EDEMA, UNSPECIFIED TYPE: ICD-10-CM

## 2022-01-01 RX ORDER — TORSEMIDE 10 MG/1
10 TABLET ORAL DAILY
Qty: 90 TABLET | Refills: 0 | Status: SHIPPED | OUTPATIENT
Start: 2022-01-01

## 2022-01-01 RX ORDER — LOSARTAN POTASSIUM 25 MG/1
25 TABLET ORAL DAILY
Qty: 90 TABLET | Refills: 1 | Status: SHIPPED | OUTPATIENT
Start: 2022-01-01

## 2022-01-04 NOTE — TELEPHONE ENCOUNTER
Family member called stating that the patient is in Hospice care and need blood work done at his house.   Please advise

## 2022-01-05 NOTE — TELEPHONE ENCOUNTER
[address] [facility] patients daughter Vicente Clement verified x2. Omar Ibarra stated that her father is on hospice [facility]), but they are still going to continue to follow up with Dr. Mana Jose as well as his other providers. Omar Ibarra stated that she is his POA and  that she is going to speak to hospice to have them draw labs to check his HGB, I informed her of the labs that Dr. Mana Jose ordered (CBC, Iron profile, Ferritin). Omar Ibarra will call our office to update us on labs when they have been completed for Dr. Mana Jose to review and we are to advise her if he needs a transfusion. She stated the patient will only be on hospice until they see how well his body recovers from the stroke he had in November and she will take him off if needed. She also stated that her father is on continuous O2-3L and he was also put back on his Eliquis.

## 2022-02-03 NOTE — TELEPHONE ENCOUNTER
Call for patient refill of torsemide. Thanks, Adelso Ballard    Last Visit: 9/13/21 MD Dietrich  Next Appointment: Not re-scheduled- no show 12/28/21  Previous Refill Encounter(s): 8/27/21 90    Requested Prescriptions     Pending Prescriptions Disp Refills    torsemide (DEMADEX) 10 mg tablet 90 Tablet 0     Sig: Take 1 Tablet by mouth daily.

## 2022-02-10 NOTE — TELEPHONE ENCOUNTER
L/m for spouse to c/b to make VV appt w/Rosi, if spouse calls and I'm not available please call Heidi.

## 2022-02-11 NOTE — TELEPHONE ENCOUNTER
Daughter called to advise Patient passed away this am. They will be using [facility]. Message has been sent to DR. Dietrich to advise.

## 2022-03-18 PROBLEM — J98.6 ELEVATED HEMIDIAPHRAGM: Status: ACTIVE | Noted: 2019-12-29

## 2022-03-18 PROBLEM — D50.0 IRON DEFICIENCY ANEMIA DUE TO CHRONIC BLOOD LOSS: Status: ACTIVE | Noted: 2020-01-17

## 2022-03-18 PROBLEM — B02.29 POSTHERPETIC NEURALGIA: Status: ACTIVE | Noted: 2019-12-19

## 2022-03-19 PROBLEM — I51.89 DIASTOLIC DYSFUNCTION: Status: ACTIVE | Noted: 2020-03-27

## 2022-03-19 PROBLEM — I50.9 DECOMPENSATED HEART FAILURE (HCC): Status: ACTIVE | Noted: 2021-01-01

## 2022-03-19 PROBLEM — I48.0 PAF (PAROXYSMAL ATRIAL FIBRILLATION) (HCC): Status: ACTIVE | Noted: 2019-11-18

## 2022-03-19 PROBLEM — I25.10 CORONARY ARTERY DISEASE INVOLVING NATIVE CORONARY ARTERY OF NATIVE HEART WITHOUT ANGINA PECTORIS: Status: ACTIVE | Noted: 2019-12-29

## 2022-03-19 PROBLEM — I50.32 DIASTOLIC CHF, CHRONIC (HCC): Status: ACTIVE | Noted: 2021-01-01

## 2022-03-19 PROBLEM — R60.0 PEDAL EDEMA: Status: ACTIVE | Noted: 2019-12-29

## 2022-03-19 PROBLEM — Z91.81 AT HIGH RISK FOR FALLS: Status: ACTIVE | Noted: 2021-01-01

## 2022-03-20 PROBLEM — I48.19 PERSISTENT ATRIAL FIBRILLATION (HCC): Status: ACTIVE | Noted: 2019-12-29

## 2022-03-20 PROBLEM — K31.82 DIEULAFOY LESION (HEMORRHAGIC) OF STOMACH AND DUODENUM: Status: ACTIVE | Noted: 2021-01-01

## (undated) DEVICE — KIT MED IMAG CNTRST AGNT W/ IOPAMIDOL REUSE

## (undated) DEVICE — PINNACLE INTRODUCER SHEATH: Brand: PINNACLE

## (undated) DEVICE — TUBING HYDR IRR --

## (undated) DEVICE — Device: Brand: PROWATER

## (undated) DEVICE — PACK PROCEDURE SURG HRT CATH

## (undated) DEVICE — CATH GUID COR EB35 6FR 100CM -- LAUNCHER

## (undated) DEVICE — FORCEPS BX L240CM JAW DIA2.8MM L CAP W/ NDL MIC MESH TOOTH

## (undated) DEVICE — ANGIOGRAPHY KIT

## (undated) DEVICE — KIT MFLD ISOLATN NACL CNTRST PRT TBNG SPIK W/ PRSS TRNSDUC

## (undated) DEVICE — ANGIOGRAPHIC CATHETER: Brand: IMPULSE™

## (undated) DEVICE — CATH ANGI BLLN DIL 2.5X08MM -- NC EUPHORA

## (undated) DEVICE — KIT HND CTRL 3 W STPCOCK ROT END 54IN PREM HI PRSS TBNG AT